# Patient Record
Sex: MALE | Race: ASIAN | NOT HISPANIC OR LATINO | Employment: OTHER | URBAN - METROPOLITAN AREA
[De-identification: names, ages, dates, MRNs, and addresses within clinical notes are randomized per-mention and may not be internally consistent; named-entity substitution may affect disease eponyms.]

---

## 2017-01-20 ENCOUNTER — HOSPITAL ENCOUNTER (OUTPATIENT)
Dept: LAB | Facility: MEDICAL CENTER | Age: 52
End: 2017-01-20
Attending: INTERNAL MEDICINE
Payer: COMMERCIAL

## 2017-01-20 DIAGNOSIS — I25.10 CORONARY ARTERY DISEASE DUE TO CALCIFIED CORONARY LESION: ICD-10-CM

## 2017-01-20 DIAGNOSIS — I25.84 CORONARY ARTERY DISEASE DUE TO CALCIFIED CORONARY LESION: ICD-10-CM

## 2017-01-20 DIAGNOSIS — E78.5 DYSLIPIDEMIA: ICD-10-CM

## 2017-01-20 LAB
ALBUMIN SERPL BCP-MCNC: 4.6 G/DL (ref 3.2–4.9)
ALBUMIN/GLOB SERPL: 1.7 G/DL
ALP SERPL-CCNC: 43 U/L (ref 30–99)
ALT SERPL-CCNC: 24 U/L (ref 2–50)
ANION GAP SERPL CALC-SCNC: 11 MMOL/L (ref 0–11.9)
AST SERPL-CCNC: 22 U/L (ref 12–45)
BILIRUB SERPL-MCNC: 0.7 MG/DL (ref 0.1–1.5)
BUN SERPL-MCNC: 20 MG/DL (ref 8–22)
CALCIUM SERPL-MCNC: 9.6 MG/DL (ref 8.5–10.5)
CHLORIDE SERPL-SCNC: 102 MMOL/L (ref 96–112)
CHOLEST SERPL-MCNC: 153 MG/DL (ref 100–199)
CO2 SERPL-SCNC: 28 MMOL/L (ref 20–33)
CREAT SERPL-MCNC: 0.88 MG/DL (ref 0.5–1.4)
GLOBULIN SER CALC-MCNC: 2.7 G/DL (ref 1.9–3.5)
GLUCOSE SERPL-MCNC: 165 MG/DL (ref 65–99)
HDLC SERPL-MCNC: 47 MG/DL
LDLC SERPL CALC-MCNC: 82 MG/DL
POTASSIUM SERPL-SCNC: 4.1 MMOL/L (ref 3.6–5.5)
PROT SERPL-MCNC: 7.3 G/DL (ref 6–8.2)
SODIUM SERPL-SCNC: 141 MMOL/L (ref 135–145)
TRIGL SERPL-MCNC: 122 MG/DL (ref 0–149)

## 2017-01-20 PROCEDURE — 80053 COMPREHEN METABOLIC PANEL: CPT

## 2017-01-20 PROCEDURE — 80061 LIPID PANEL: CPT

## 2017-01-20 PROCEDURE — 36415 COLL VENOUS BLD VENIPUNCTURE: CPT

## 2017-01-25 ENCOUNTER — APPOINTMENT (OUTPATIENT)
Dept: CARDIOLOGY | Facility: MEDICAL CENTER | Age: 52
End: 2017-01-25
Payer: COMMERCIAL

## 2017-03-28 ENCOUNTER — OFFICE VISIT (OUTPATIENT)
Dept: CARDIOLOGY | Facility: MEDICAL CENTER | Age: 52
End: 2017-03-28
Payer: COMMERCIAL

## 2017-03-28 VITALS
BODY MASS INDEX: 25.16 KG/M2 | DIASTOLIC BLOOD PRESSURE: 80 MMHG | HEIGHT: 65 IN | HEART RATE: 102 BPM | WEIGHT: 151 LBS | SYSTOLIC BLOOD PRESSURE: 130 MMHG

## 2017-03-28 DIAGNOSIS — I25.84 CORONARY ARTERY DISEASE DUE TO CALCIFIED CORONARY LESION: ICD-10-CM

## 2017-03-28 DIAGNOSIS — E78.5 DYSLIPIDEMIA: ICD-10-CM

## 2017-03-28 DIAGNOSIS — I25.10 CORONARY ARTERY DISEASE DUE TO CALCIFIED CORONARY LESION: ICD-10-CM

## 2017-03-28 PROCEDURE — 99214 OFFICE O/P EST MOD 30 MIN: CPT | Performed by: INTERNAL MEDICINE

## 2017-03-28 RX ORDER — NAPROXEN SODIUM 220 MG
220 TABLET ORAL 2 TIMES DAILY WITH MEALS
COMMUNITY
End: 2023-08-10

## 2017-03-28 RX ORDER — EZETIMIBE 10 MG/1
10 TABLET ORAL DAILY
Qty: 30 TAB | Refills: 11 | Status: SHIPPED | OUTPATIENT
Start: 2017-03-28 | End: 2018-05-07 | Stop reason: SDUPTHER

## 2017-03-28 RX ORDER — LISINOPRIL 2.5 MG/1
2.5 TABLET ORAL DAILY
COMMUNITY
End: 2022-07-01

## 2017-03-28 NOTE — Clinical Note
[facility]   [address]  [place] [address]  Phone: [phone]  Fax: [phone]              Jorden Bonilla  1965    Encounter Date: 3/28/2017    Jasbir Vegas M.D.          PROGRESS NOTE:  Subjective:   Jorden Bonilla is a 51 y.o. male who presents today for followup post coronary bypass graft surgery in August 2015.    He denies any chest discomfort, dyspnea, edema, palpitations or lightheadedness. He is exercising regularly without difficulty. He does jog a couple times a week for about 45 minutes.    Past Medical History   Diagnosis Date   • Type II or unspecified type diabetes mellitus without mention of complication, not stated as uncontrolled      Past Surgical History   Procedure Laterality Date   • Multiple coronary artery bypass endo vein harvest N/A 8/22/2015     Procedure: coronary artery bypass grtafting x 3, endoscopic vein harvest right leg, transesophageal echocardiogram;  Surgeon: Henry Baldwin M.D.;  Location: SURGERY [facility];  Service:      History reviewed. No pertinent family history.  History   Smoking status   • Former Smoker -- 25 years   • Quit date: 08/22/1999   Smokeless tobacco   • Never Used     No Known Allergies  Outpatient Encounter Prescriptions as of 3/28/2017   Medication Sig Dispense Refill   • lisinopril (PRINIVIL) 2.5 MG Tab Take 2.5 mg by mouth every day.     • aspirin 81 MG tablet Take 81 mg by mouth every day.     • naproxen (ALEVE) 220 MG tablet Take 220 mg by mouth 2 times a day, with meals.     • Coenzyme Q10 (CO Q 10) 100 MG Cap Take  by mouth.     • metformin (GLUCOPHAGE) 1000 MG tablet Take 1,000 mg by mouth 2 times a day, with meals.     • metoprolol (LOPRESSOR) 25 MG Tab Take 1 Tab by mouth 2 Times a Day. 60 Tab 11   • rosuvastatin (CRESTOR) 40 MG tablet Take 1 Tab by mouth every day. 30 Tab 11   • aspirin EC 81 MG EC tablet Take 1 Tab by mouth every day. 30 Tab 3   • rosuvastatin (CRESTOR)  "40 MG tablet Take 1 Tab by mouth every day. 30 Tab 3   • omeprazole (PRILOSEC) 20 MG delayed-release capsule Take 1 Cap by mouth every day. 30 Cap 11     No facility-administered encounter medications on file as of 3/28/2017.     ROS       Objective:   /80 mmHg  Pulse 102  Ht 1.651 m (5' 5\")  Wt 68.493 kg (151 lb)  BMI 25.13 kg/m2    Physical Exam   Neck: No JVD present.   Cardiovascular: Normal rate and regular rhythm.  Exam reveals no gallop.    No murmur heard.  Pulmonary/Chest: Effort normal. He has no rales.   Abdominal: Soft. There is no tenderness.   Musculoskeletal: He exhibits no edema.          Lab Results   Component Value Date/Time    CHOLESTEROL, 01/20/2017 06:06 AM    LDL 82 01/20/2017 06:06 AM    HDL 47 01/20/2017 06:06 AM    TRIGLYCERIDES 122 01/20/2017 06:06 AM       Lab Results   Component Value Date/Time    SODIUM 141 01/20/2017 06:06 AM    POTASSIUM 4.1 01/20/2017 06:06 AM    CHLORIDE 102 01/20/2017 06:06 AM    CO2 28 01/20/2017 06:06 AM    GLUCOSE 165* 01/20/2017 06:06 AM    BUN 20 01/20/2017 06:06 AM    CREATININE 0.88 01/20/2017 06:06 AM     Lab Results   Component Value Date/Time    ALKALINE PHOSPHATASE 43 01/20/2017 06:06 AM    AST(SGOT) 22 01/20/2017 06:06 AM    ALT(SGPT) 24 01/20/2017 06:06 AM    TOTAL BILIRUBIN 0.7 01/20/2017 06:06 AM      No results found for: BNPBTYPENAT           Assessment:     1. Coronary artery disease due to calcified coronary lesion: CABG ×3 in 2015     2. Dyslipidemia         Medical Decision Making:  Today's Assessment / Status / Plan:     Mr. Bonilla is clinically stable. He is asymptomatic from a cardiovascular standpoint. His lipid status has improved with better dietary compliance but is not under optimal control. At this time, we will add ezetimibe 10 mg daily. He'll have lab work in about 6 weeks and follow-up in a few months with an echocardiogram. He is mildly tachycardic and has not had an echocardiogram since his bypass graft " surgery.      Narendra Arora D.O.  [address]   [address]  [facility] [address]  VIA Facsimile: [phone]

## 2017-03-28 NOTE — MR AVS SNAPSHOT
"Jorden Bonilla   3/28/2017 9:00 AM   Office Visit   MRN: [id number]    Department:  [facility]   Dept Phone:  [phone]    Description:  Male : 1965   Provider:  Jasbir Vegas M.D.           Reason for Visit     Follow-Up [facility] in [facility] 2017      Allergies as of 3/28/2017     No Known Allergies      You were diagnosed with     Coronary artery disease due to calcified coronary lesion   [8284290]       Dyslipidemia   [136312]         Vital Signs     Blood Pressure Pulse Height Weight Body Mass Index Smoking Status    130/80 mmHg 102 1.651 m (5' 5\") 68.493 kg (151 lb) 25.13 kg/m2 Former Smoker      Basic Information     Date Of Birth Sex Race Ethnicity Preferred Language    1965 Male  Non- English      Problem List              ICD-10-CM Priority Class Noted - Resolved    Chest pain R07.9   2015 - Present    Dyslipidemia E78.5   2015 - Present    Diabetes mellitus, new onset (CMS-HCC) E11.9   2015 - Present    Abnormal stress test R94.39   2015 - Present    Coronary artery disease due to calcified coronary lesion: CABG ×3 in  I25.10, I25.84   2015 - Present    S/P CABG x 3 Z95.1   2015 - Present      Health Maintenance        Date Due Completion Dates    IMM HEP B VACCINE (1 of 3 - Primary Series) 1965 ---    DIABETES MONOFILAMENT / LE EXAM 1965 ---    RETINAL SCREENING 1981 ---    URINE ACR / MICROALBUMIN 1981 ---    IMM DTaP/Tdap/Td Vaccine (1 - Tdap) 2009    COLONOSCOPY 2013 ---    IMM INFLUENZA (1) 2016 10/1/2014    A1C SCREENING 2017, 2015, 2015    FASTING LIPID PROFILE 2018, 2016, 2016, 3/30/2016, 2015    SERUM CREATININE 2018, 2016, 3/30/2016, 2015, 2015, 2015, 2015, 2015, 2015, 2015            Current Immunizations     Influenza TIV (IM) 10/1/2014    TD Vaccine 2009  8:00 PM   "   Below and/or attached are the medications your provider expects you to take. Review all of your home medications and newly ordered medications with your provider and/or pharmacist. Follow medication instructions as directed by your provider and/or pharmacist. Please keep your medication list with you and share with your provider. Update the information when medications are discontinued, doses are changed, or new medications (including over-the-counter products) are added; and carry medication information at all times in the event of emergency situations     Allergies:  No Known Allergies          Medications  Valid as of: March 28, 2017 -  9:17 AM    Generic Name Brand Name Tablet Size Instructions for use    Aspirin (Tablet Delayed Response) aspirin 81 MG Take 1 Tab by mouth every day.        Aspirin (Tab) aspirin 81 MG Take 81 mg by mouth every day.        Coenzyme Q10 (Cap) Co Q 10 100 MG Take  by mouth.        Ezetimibe (Tab) ZETIA 10 MG Take 1 Tab by mouth every day.        Lisinopril (Tab) PRINIVIL 2.5 MG Take 2.5 mg by mouth every day.        MetFORMIN HCl (Tab) GLUCOPHAGE 1000 MG Take 1,000 mg by mouth 2 times a day, with meals.        Metoprolol Tartrate (Tab) LOPRESSOR 25 MG Take 1 Tab by mouth 2 Times a Day.        Naproxen Sodium (Tab) ANAPROX 220 MG Take 220 mg by mouth 2 times a day, with meals.        Omeprazole (CAPSULE DELAYED RELEASE) PRILOSEC 20 MG Take 1 Cap by mouth every day.        Rosuvastatin Calcium (Tab) CRESTOR 40 MG Take 1 Tab by mouth every day.        Rosuvastatin Calcium (Tab) CRESTOR 40 MG Take 1 Tab by mouth every day.        .                 Medicines prescribed today were sent to:     [facility] [address] [place] [address] - [address] [place] [address]    Phone: [phone] Fax: [phone]    Open 24 Hours?: No      Medication refill instructions:       If your prescription bottle indicates you have medication refills left, it is not necessary  to call your provider’s office. Please contact your pharmacy and they will refill your medication.    If your prescription bottle indicates you do not have any refills left, you may request refills at any time through one of the following ways: The online Ecrio system (except Urgent Care), by calling your provider’s office, or by asking your pharmacy to contact your provider’s office with a refill request. Medication refills are processed only during regular business hours and may not be available until the next business day. Your provider may request additional information or to have a follow-up visit with you prior to refilling your medication.   *Please Note: Medication refills are assigned a new Rx number when refilled electronically. Your pharmacy may indicate that no refills were authorized even though a new prescription for the same medication is available at the pharmacy. Please request the medicine by name with the pharmacy before contacting your provider for a refill.        Your To Do List     Future Labs/Procedures Complete By Expires    COMP METABOLIC PANEL  As directed 3/28/2018    Echocardiogram Comp w/o Cont  As directed 3/28/2018    Scheduling Instructions:    Less than 3 months    LIPID PROFILE  As directed 3/28/2018         Ecrio Access Code: [email or web address]  Expires: 4/15/2017 12:06 PM    Ecrio  A secure, online tool to manage your health information     [employer]’s Ecrio® is a secure, online tool that connects you to your personalized health information from the privacy of your home -- day or night - making it very easy for you to manage your healthcare. Once the activation process is completed, you can even access your medical information using the Ecrio wilfredo, which is available for free in the Apple Wilfredo store or Google Play store.     Ecrio provides the following levels of access (as shown below):   My Chart Features   [facility] Primary Care Doctor [facility]  Specialists  [facility]  Urgent  Care [facility]  Primary Care  Doctor   Email your healthcare team securely and privately 24/7 X X X    Manage appointments: schedule your next appointment; view details of past/upcoming appointments X      Request prescription refills. X      View recent personal medical records, including lab and immunizations X X X X   View health record, including health history, allergies, medications X X X X   Read reports about your outpatient visits, procedures, consult and ER notes X X X X   See your discharge summary, which is a recap of your hospital and/or ER visit that includes your diagnosis, lab results, and care plan. X X       How to register for CorpU:  1. Go to  [email or web address].  2. Click on the Sign Up Now box, which takes you to the New Member Sign Up page. You will need to provide the following information:  a. Enter your CorpU Access Code exactly as it appears at the top of this page. (You will not need to use this code after you’ve completed the sign-up process. If you do not sign up before the expiration date, you must request a new code.)   b. Enter your date of birth.   c. Enter your home email address.   d. Click Submit, and follow the next screen’s instructions.  3. Create a CorpU ID. This will be your CorpU login ID and cannot be changed, so think of one that is secure and easy to remember.  4. Create a CorpU password. You can change your password at any time.  5. Enter your Password Reset Question and Answer. This can be used at a later time if you forget your password.   6. Enter your e-mail address. This allows you to receive e-mail notifications when new information is available in CorpU.  7. Click Sign Up. You can now view your health information.    For assistance activating your CorpU account, call [phone]

## 2017-04-21 ENCOUNTER — HOSPITAL ENCOUNTER (OUTPATIENT)
Dept: CARDIOLOGY | Facility: MEDICAL CENTER | Age: 52
End: 2017-04-21
Attending: INTERNAL MEDICINE
Payer: COMMERCIAL

## 2017-04-21 DIAGNOSIS — E78.5 DYSLIPIDEMIA: ICD-10-CM

## 2017-04-21 DIAGNOSIS — I25.84 CORONARY ARTERY DISEASE DUE TO CALCIFIED CORONARY LESION: ICD-10-CM

## 2017-04-21 DIAGNOSIS — I25.10 CORONARY ARTERY DISEASE DUE TO CALCIFIED CORONARY LESION: ICD-10-CM

## 2017-04-21 LAB
LV EJECT FRACT  99904: 65
LV EJECT FRACT MOD 2C 99903: 63.6
LV EJECT FRACT MOD 4C 99902: 67
LV EJECT FRACT MOD BP 99901: 66.35

## 2017-04-21 PROCEDURE — 93306 TTE W/DOPPLER COMPLETE: CPT | Mod: 26 | Performed by: INTERNAL MEDICINE

## 2017-04-21 PROCEDURE — 93306 TTE W/DOPPLER COMPLETE: CPT

## 2017-06-22 ENCOUNTER — HOSPITAL ENCOUNTER (OUTPATIENT)
Dept: LAB | Facility: MEDICAL CENTER | Age: 52
End: 2017-06-22
Attending: INTERNAL MEDICINE
Payer: COMMERCIAL

## 2017-06-22 DIAGNOSIS — I25.10 CORONARY ARTERY DISEASE DUE TO CALCIFIED CORONARY LESION: ICD-10-CM

## 2017-06-22 DIAGNOSIS — I25.84 CORONARY ARTERY DISEASE DUE TO CALCIFIED CORONARY LESION: ICD-10-CM

## 2017-06-22 DIAGNOSIS — E78.5 DYSLIPIDEMIA: ICD-10-CM

## 2017-06-22 LAB
ALBUMIN SERPL BCP-MCNC: 4.2 G/DL (ref 3.2–4.9)
ALBUMIN/GLOB SERPL: 1.6 G/DL
ALP SERPL-CCNC: 42 U/L (ref 30–99)
ALT SERPL-CCNC: 55 U/L (ref 2–50)
ANION GAP SERPL CALC-SCNC: 6 MMOL/L (ref 0–11.9)
AST SERPL-CCNC: 43 U/L (ref 12–45)
BILIRUB SERPL-MCNC: 0.6 MG/DL (ref 0.1–1.5)
BUN SERPL-MCNC: 13 MG/DL (ref 8–22)
CALCIUM SERPL-MCNC: 9.2 MG/DL (ref 8.5–10.5)
CHLORIDE SERPL-SCNC: 101 MMOL/L (ref 96–112)
CHOLEST SERPL-MCNC: 66 MG/DL (ref 100–199)
CO2 SERPL-SCNC: 28 MMOL/L (ref 20–33)
CREAT SERPL-MCNC: 0.82 MG/DL (ref 0.5–1.4)
GFR SERPL CREATININE-BSD FRML MDRD: >60 ML/MIN/1.73 M 2
GLOBULIN SER CALC-MCNC: 2.7 G/DL (ref 1.9–3.5)
GLUCOSE SERPL-MCNC: 155 MG/DL (ref 65–99)
HDLC SERPL-MCNC: 35 MG/DL
LDLC SERPL CALC-MCNC: 13 MG/DL
POTASSIUM SERPL-SCNC: 3.9 MMOL/L (ref 3.6–5.5)
PROT SERPL-MCNC: 6.9 G/DL (ref 6–8.2)
SODIUM SERPL-SCNC: 135 MMOL/L (ref 135–145)
TRIGL SERPL-MCNC: 89 MG/DL (ref 0–149)

## 2017-06-22 PROCEDURE — 80061 LIPID PANEL: CPT

## 2017-06-22 PROCEDURE — 80053 COMPREHEN METABOLIC PANEL: CPT

## 2017-06-22 PROCEDURE — 36415 COLL VENOUS BLD VENIPUNCTURE: CPT

## 2017-06-29 ENCOUNTER — OFFICE VISIT (OUTPATIENT)
Dept: CARDIOLOGY | Facility: MEDICAL CENTER | Age: 52
End: 2017-06-29
Payer: COMMERCIAL

## 2017-06-29 VITALS
WEIGHT: 148 LBS | HEIGHT: 65 IN | HEART RATE: 110 BPM | BODY MASS INDEX: 24.66 KG/M2 | DIASTOLIC BLOOD PRESSURE: 80 MMHG | SYSTOLIC BLOOD PRESSURE: 120 MMHG | OXYGEN SATURATION: 96 %

## 2017-06-29 DIAGNOSIS — E78.5 DYSLIPIDEMIA: ICD-10-CM

## 2017-06-29 DIAGNOSIS — I25.84 CORONARY ARTERY DISEASE DUE TO CALCIFIED CORONARY LESION: ICD-10-CM

## 2017-06-29 DIAGNOSIS — I25.10 CORONARY ARTERY DISEASE DUE TO CALCIFIED CORONARY LESION: ICD-10-CM

## 2017-06-29 PROCEDURE — 99214 OFFICE O/P EST MOD 30 MIN: CPT | Performed by: INTERNAL MEDICINE

## 2017-06-29 NOTE — Clinical Note
[facility]   [address], [address]  [place] [address]  Phone: [phone]  Fax: [phone]              Jorden Bonilla  1965    Encounter Date: 6/29/2017    Jasbir Vegas M.D.          PROGRESS NOTE:  Subjective:   Jorden Bonilla is a 52 y.o. male who presents today for followup post coronary bypass graft surgery in August 2015.    He denies any chest discomfort, dyspnea, edema, palpitations or lightheadedness. He is exercising regularly without difficulty.     Past Medical History   Diagnosis Date   • Type II or unspecified type diabetes mellitus without mention of complication, not stated as uncontrolled      Past Surgical History   Procedure Laterality Date   • Multiple coronary artery bypass endo vein harvest N/A 8/22/2015     Procedure: coronary artery bypass grtafting x 3, endoscopic vein harvest right leg, transesophageal echocardiogram;  Surgeon: Henry Baldwin M.D.;  Location: SURGERY [facility];  Service:      No family history on file.  History   Smoking status   • Former Smoker -- 25 years   • Quit date: 08/22/1999   Smokeless tobacco   • Never Used     No Known Allergies  Outpatient Encounter Prescriptions as of 6/29/2017   Medication Sig Dispense Refill   • lisinopril (PRINIVIL) 2.5 MG Tab Take 2.5 mg by mouth every day.     • aspirin 81 MG tablet Take 81 mg by mouth every day.     • naproxen (ALEVE) 220 MG tablet Take 220 mg by mouth 2 times a day, with meals.     • Coenzyme Q10 (CO Q 10) 100 MG Cap Take  by mouth.     • metformin (GLUCOPHAGE) 1000 MG tablet Take 1,000 mg by mouth 2 times a day, with meals.     • ezetimibe (ZETIA) 10 MG Tab Take 1 Tab by mouth every day. 30 Tab 11   • metoprolol (LOPRESSOR) 25 MG Tab Take 1 Tab by mouth 2 Times a Day. 60 Tab 11   • rosuvastatin (CRESTOR) 40 MG tablet Take 1 Tab by mouth every day. 30 Tab 11   • aspirin EC 81 MG EC tablet Take 1 Tab by mouth every day. 30 Tab 3   • rosuvastatin  "(CRESTOR) 40 MG tablet Take 1 Tab by mouth every day. (Patient not taking: Reported on 6/29/2017) 30 Tab 3   • omeprazole (PRILOSEC) 20 MG delayed-release capsule Take 1 Cap by mouth every day. (Patient not taking: Reported on 6/29/2017) 30 Cap 11     No facility-administered encounter medications on file as of 6/29/2017.     ROS       Objective:   /80 mmHg  Pulse 110  Ht 1.651 m (5' 5\")  Wt 67.132 kg (148 lb)  BMI 24.63 kg/m2  SpO2 96%    Physical Exam   Neck: No JVD present.   Cardiovascular: Regular rhythm.  Tachycardia present.  Exam reveals no gallop.    No murmur heard.  Pulmonary/Chest: Effort normal. He has no rales.   Abdominal: Soft. There is no tenderness.   Musculoskeletal: He exhibits no edema.          Lab Results   Component Value Date/Time    CHOLESTEROL,TOT 66* 06/22/2017 06:24 AM    LDL 13 06/22/2017 06:24 AM    HDL 35* 06/22/2017 06:24 AM    TRIGLYCERIDES 89 06/22/2017 06:24 AM       Lab Results   Component Value Date/Time    SODIUM 135 06/22/2017 06:24 AM    POTASSIUM 3.9 06/22/2017 06:24 AM    CHLORIDE 101 06/22/2017 06:24 AM    CO2 28 06/22/2017 06:24 AM    GLUCOSE 155* 06/22/2017 06:24 AM    BUN 13 06/22/2017 06:24 AM    CREATININE 0.82 06/22/2017 06:24 AM     Lab Results   Component Value Date/Time    ALKALINE PHOSPHATASE 42 06/22/2017 06:24 AM    AST(SGOT) 43 06/22/2017 06:24 AM    ALT(SGPT) 55* 06/22/2017 06:24 AM    TOTAL BILIRUBIN 0.6 06/22/2017 06:24 AM      No results found for: BNPBTYPENAT     Echocardiogram:  CONCLUSIONS  Normal left ventricular chamber size. Mild concentric left ventricular   hypertrophy. Normal left ventricular systolic function. Left   ventricular ejection fraction is visually estimated to be 65%. Normal   regional wall motion. Grade I diastolic dysfunction.  Mild tricuspid regurgitation. Right ventricular systolic pressure is   estimated to be 35 mmHg.  Compared to the report of the study done 8/2015- the current study is   technically much improved..   "     Exam Date:         04/21/2017        Assessment:     1. Coronary artery disease due to calcified coronary lesion: CABG ×3 in 2015     2. Dyslipidemia         Medical Decision Making:  Today's Assessment / Status / Plan:     Mr. Bonilla is clinically stable. He has had an excellent response in his lipid status with the addition of ezetimibe. His LDL is probably not really 13. We will obtain a direct LDL before his follow-up appointment in a few months. He is somewhat tachycardic but his LV function is normal. I suspect his tachycardia is because of poor physical conditioning. We discussed a better walking program on his treadmill. In addition, he'll undergo a routine exercise tolerance test in about 3 months prior to his follow-up appointment. Depending on the results of that study and his heart rate response, we will decide whether or not to proceed any further evaluation.      Narendra Arora D.O.  [address]   [address]  [place] [address]  VIA Facsimile: [phone]

## 2017-06-29 NOTE — MR AVS SNAPSHOT
"Jorden Bonilla   2017 10:30 AM   Office Visit   MRN: [id number]    Department:  [facility]   Dept Phone:  [phone]    Description:  Male : 1965   Provider:  Jasbir Vegas M.D.           Reason for Visit     Follow-Up           Allergies as of 2017     No Known Allergies      You were diagnosed with     Coronary artery disease due to calcified coronary lesion   [9876554]       Dyslipidemia   [971094]         Vital Signs     Blood Pressure Pulse Height Weight Body Mass Index Oxygen Saturation    120/80 mmHg 110 1.651 m (5' 5\") 67.132 kg (148 lb) 24.63 kg/m2 96%    Smoking Status                   Former Smoker           Basic Information     Date Of Birth Sex Race Ethnicity Preferred Language    1965 Male  Non- English      Problem List              ICD-10-CM Priority Class Noted - Resolved    Chest pain R07.9   2015 - Present    Dyslipidemia E78.5   2015 - Present    Diabetes mellitus, new onset (CMS-HCC) E11.9   2015 - Present    Abnormal stress test R94.39   2015 - Present    Coronary artery disease due to calcified coronary lesion: CABG ×3 in  I25.10, I25.84   2015 - Present    S/P CABG x 3 Z95.1   2015 - Present      Health Maintenance        Date Due Completion Dates    IMM HEP B VACCINE (1 of 3 - Primary Series) 1965 ---    IMM DTaP/Tdap/Td Vaccine (1 - Tdap) 2009    COLONOSCOPY 2013 ---            Current Immunizations     Influenza TIV (IM) 10/1/2014    TD Vaccine 2009  8:00 PM      Below and/or attached are the medications your provider expects you to take. Review all of your home medications and newly ordered medications with your provider and/or pharmacist. Follow medication instructions as directed by your provider and/or pharmacist. Please keep your medication list with you and share with your provider. Update the information when medications are discontinued, doses are changed, or new " medications (including over-the-counter products) are added; and carry medication information at all times in the event of emergency situations     Allergies:  No Known Allergies          Medications  Valid as of: June 29, 2017 - 10:32 AM    Generic Name Brand Name Tablet Size Instructions for use    Aspirin (Tablet Delayed Response) aspirin 81 MG Take 1 Tab by mouth every day.        Aspirin (Tab) aspirin 81 MG Take 81 mg by mouth every day.        Coenzyme Q10 (Cap) Co Q 10 100 MG Take  by mouth.        Ezetimibe (Tab) ZETIA 10 MG Take 1 Tab by mouth every day.        Lisinopril (Tab) PRINIVIL 2.5 MG Take 2.5 mg by mouth every day.        MetFORMIN HCl (Tab) GLUCOPHAGE 1000 MG Take 1,000 mg by mouth 2 times a day, with meals.        Metoprolol Tartrate (Tab) LOPRESSOR 25 MG Take 1 Tab by mouth 2 Times a Day.        Naproxen Sodium (Tab) ANAPROX 220 MG Take 220 mg by mouth 2 times a day, with meals.        Omeprazole (CAPSULE DELAYED RELEASE) PRILOSEC 20 MG Take 1 Cap by mouth every day.        Rosuvastatin Calcium (Tab) CRESTOR 40 MG Take 1 Tab by mouth every day.        Rosuvastatin Calcium (Tab) CRESTOR 40 MG Take 1 Tab by mouth every day.        .                 Medicines prescribed today were sent to:     [facility] [address] [place] [address]    Phone: [phone] Fax: [phone]    Open 24 Hours?: No      Medication refill instructions:       If your prescription bottle indicates you have medication refills left, it is not necessary to call your provider’s office. Please contact your pharmacy and they will refill your medication.    If your prescription bottle indicates you do not have any refills left, you may request refills at any time through one of the following ways: The online G-mode system (except Urgent Care), by calling your provider’s office, or by asking your pharmacy to contact your provider’s office with a refill request.  Medication refills are processed only during regular business hours and may not be available until the next business day. Your provider may request additional information or to have a follow-up visit with you prior to refilling your medication.   *Please Note: Medication refills are assigned a new Rx number when refilled electronically. Your pharmacy may indicate that no refills were authorized even though a new prescription for the same medication is available at the pharmacy. Please request the medicine by name with the pharmacy before contacting your provider for a refill.        Your To Do List     Future Labs/Procedures Complete By Expires    LUCILA DIRECT  As directed 6/29/2018      Instructions    Training heart rate between 130 and 140          Latio Access Code: [email or web address]  Expires: 7/16/2017  4:16 AM    Latio  A secure, online tool to manage your health information     [employer]’s Latio® is a secure, online tool that connects you to your personalized health information from the privacy of your home -- day or night - making it very easy for you to manage your healthcare. Once the activation process is completed, you can even access your medical information using the Latio wilfredo, which is available for free in the Apple Wilfredo store or Google Play store.     Latio provides the following levels of access (as shown below):   My Chart Features   [facility] Primary Care Doctor [facility]  Specialists [facility]  Urgent  Care Non-[facility]  Primary Care  Doctor   Email your healthcare team securely and privately 24/7 X X X    Manage appointments: schedule your next appointment; view details of past/upcoming appointments X      Request prescription refills. X      View recent personal medical records, including lab and immunizations X X X X   View health record, including health history, allergies, medications X X X X   Read reports about your outpatient visits, procedures, consult and ER notes X X X X   See your  discharge summary, which is a recap of your hospital and/or ER visit that includes your diagnosis, lab results, and care plan. X X       How to register for Fan TV:  1. Go to  [email or web address].  2. Click on the Sign Up Now box, which takes you to the New Member Sign Up page. You will need to provide the following information:  a. Enter your Fan TV Access Code exactly as it appears at the top of this page. (You will not need to use this code after you’ve completed the sign-up process. If you do not sign up before the expiration date, you must request a new code.)   b. Enter your date of birth.   c. Enter your home email address.   d. Click Submit, and follow the next screen’s instructions.  3. Create a Fan TV ID. This will be your Fan TV login ID and cannot be changed, so think of one that is secure and easy to remember.  4. Create a Fan TV password. You can change your password at any time.  5. Enter your Password Reset Question and Answer. This can be used at a later time if you forget your password.   6. Enter your e-mail address. This allows you to receive e-mail notifications when new information is available in Fan TV.  7. Click Sign Up. You can now view your health information.    For assistance activating your Fan TV account, call [phone]

## 2017-06-29 NOTE — PROGRESS NOTES
Subjective:   Jorden Bonilla is a 52 y.o. male who presents today for followup post coronary bypass graft surgery in August 2015.    He denies any chest discomfort, dyspnea, edema, palpitations or lightheadedness. He is exercising regularly without difficulty.     Past Medical History   Diagnosis Date   • Type II or unspecified type diabetes mellitus without mention of complication, not stated as uncontrolled      Past Surgical History   Procedure Laterality Date   • Multiple coronary artery bypass endo vein harvest N/A 8/22/2015     Procedure: coronary artery bypass grtafting x 3, endoscopic vein harvest right leg, transesophageal echocardiogram;  Surgeon: Henry Baldwin M.D.;  Location: SURGERY [facility];  Service:      No family history on file.  History   Smoking status   • Former Smoker -- 25 years   • Quit date: 08/22/1999   Smokeless tobacco   • Never Used     No Known Allergies  Outpatient Encounter Prescriptions as of 6/29/2017   Medication Sig Dispense Refill   • lisinopril (PRINIVIL) 2.5 MG Tab Take 2.5 mg by mouth every day.     • aspirin 81 MG tablet Take 81 mg by mouth every day.     • naproxen (ALEVE) 220 MG tablet Take 220 mg by mouth 2 times a day, with meals.     • Coenzyme Q10 (CO Q 10) 100 MG Cap Take  by mouth.     • metformin (GLUCOPHAGE) 1000 MG tablet Take 1,000 mg by mouth 2 times a day, with meals.     • ezetimibe (ZETIA) 10 MG Tab Take 1 Tab by mouth every day. 30 Tab 11   • metoprolol (LOPRESSOR) 25 MG Tab Take 1 Tab by mouth 2 Times a Day. 60 Tab 11   • rosuvastatin (CRESTOR) 40 MG tablet Take 1 Tab by mouth every day. 30 Tab 11   • aspirin EC 81 MG EC tablet Take 1 Tab by mouth every day. 30 Tab 3   • rosuvastatin (CRESTOR) 40 MG tablet Take 1 Tab by mouth every day. (Patient not taking: Reported on 6/29/2017) 30 Tab 3   • omeprazole (PRILOSEC) 20 MG delayed-release capsule Take 1 Cap by mouth every day. (Patient not taking: Reported on 6/29/2017) 30 Cap 11     No  "facility-administered encounter medications on file as of 6/29/2017.     ROS       Objective:   /80 mmHg  Pulse 110  Ht 1.651 m (5' 5\")  Wt 67.132 kg (148 lb)  BMI 24.63 kg/m2  SpO2 96%    Physical Exam   Neck: No JVD present.   Cardiovascular: Regular rhythm.  Tachycardia present.  Exam reveals no gallop.    No murmur heard.  Pulmonary/Chest: Effort normal. He has no rales.   Abdominal: Soft. There is no tenderness.   Musculoskeletal: He exhibits no edema.          Lab Results   Component Value Date/Time    CHOLESTEROL,TOT 66* 06/22/2017 06:24 AM    LDL 13 06/22/2017 06:24 AM    HDL 35* 06/22/2017 06:24 AM    TRIGLYCERIDES 89 06/22/2017 06:24 AM       Lab Results   Component Value Date/Time    SODIUM 135 06/22/2017 06:24 AM    POTASSIUM 3.9 06/22/2017 06:24 AM    CHLORIDE 101 06/22/2017 06:24 AM    CO2 28 06/22/2017 06:24 AM    GLUCOSE 155* 06/22/2017 06:24 AM    BUN 13 06/22/2017 06:24 AM    CREATININE 0.82 06/22/2017 06:24 AM     Lab Results   Component Value Date/Time    ALKALINE PHOSPHATASE 42 06/22/2017 06:24 AM    AST(SGOT) 43 06/22/2017 06:24 AM    ALT(SGPT) 55* 06/22/2017 06:24 AM    TOTAL BILIRUBIN 0.6 06/22/2017 06:24 AM      No results found for: BNPBTYPENAT     Echocardiogram:  CONCLUSIONS  Normal left ventricular chamber size. Mild concentric left ventricular   hypertrophy. Normal left ventricular systolic function. Left   ventricular ejection fraction is visually estimated to be 65%. Normal   regional wall motion. Grade I diastolic dysfunction.  Mild tricuspid regurgitation. Right ventricular systolic pressure is   estimated to be 35 mmHg.  Compared to the report of the study done 8/2015- the current study is   technically much improved..     Exam Date:         04/21/2017        Assessment:     1. Coronary artery disease due to calcified coronary lesion: CABG ×3 in 2015     2. Dyslipidemia         Medical Decision Making:  Today's Assessment / Status / Plan:     Mr. Bonilla is clinically " stable. He has had an excellent response in his lipid status with the addition of ezetimibe. His LDL is probably not really 13. We will obtain a direct LDL before his follow-up appointment in a few months. He is somewhat tachycardic but his LV function is normal. I suspect his tachycardia is because of poor physical conditioning. We discussed a better walking program on his treadmill. In addition, he'll undergo a routine exercise tolerance test in about 3 months prior to his follow-up appointment. Depending on the results of that study and his heart rate response, we will decide whether or not to proceed any further evaluation.

## 2017-09-05 ENCOUNTER — HOSPITAL ENCOUNTER (OUTPATIENT)
Dept: LAB | Facility: MEDICAL CENTER | Age: 52
End: 2017-09-05
Attending: FAMILY MEDICINE
Payer: COMMERCIAL

## 2017-09-05 LAB
ALBUMIN SERPL BCP-MCNC: 4.4 G/DL (ref 3.2–4.9)
ALBUMIN/GLOB SERPL: 1.6 G/DL
ALP SERPL-CCNC: 34 U/L (ref 30–99)
ALT SERPL-CCNC: 25 U/L (ref 2–50)
ANION GAP SERPL CALC-SCNC: 9 MMOL/L (ref 0–11.9)
AST SERPL-CCNC: 21 U/L (ref 12–45)
BASOPHILS # BLD AUTO: 0.4 % (ref 0–1.8)
BASOPHILS # BLD: 0.03 K/UL (ref 0–0.12)
BILIRUB SERPL-MCNC: 0.5 MG/DL (ref 0.1–1.5)
BUN SERPL-MCNC: 11 MG/DL (ref 8–22)
CALCIUM SERPL-MCNC: 9.6 MG/DL (ref 8.5–10.5)
CHLORIDE SERPL-SCNC: 101 MMOL/L (ref 96–112)
CHOLEST SERPL-MCNC: 168 MG/DL (ref 100–199)
CO2 SERPL-SCNC: 28 MMOL/L (ref 20–33)
CREAT SERPL-MCNC: 0.78 MG/DL (ref 0.5–1.4)
EOSINOPHIL # BLD AUTO: 0.17 K/UL (ref 0–0.51)
EOSINOPHIL NFR BLD: 2.3 % (ref 0–6.9)
ERYTHROCYTE [DISTWIDTH] IN BLOOD BY AUTOMATED COUNT: 41 FL (ref 35.9–50)
EST. AVERAGE GLUCOSE BLD GHB EST-MCNC: 235 MG/DL
FASTING STATUS PATIENT QL REPORTED: NORMAL
GFR SERPL CREATININE-BSD FRML MDRD: >60 ML/MIN/1.73 M 2
GLOBULIN SER CALC-MCNC: 2.7 G/DL (ref 1.9–3.5)
GLUCOSE SERPL-MCNC: 167 MG/DL (ref 65–99)
HBA1C MFR BLD: 9.8 % (ref 0–5.6)
HCT VFR BLD AUTO: 48.9 % (ref 42–52)
HDLC SERPL-MCNC: 44 MG/DL
HGB BLD-MCNC: 15.4 G/DL (ref 14–18)
IMM GRANULOCYTES # BLD AUTO: 0.02 K/UL (ref 0–0.11)
IMM GRANULOCYTES NFR BLD AUTO: 0.3 % (ref 0–0.9)
LDLC SERPL CALC-MCNC: 56 MG/DL
LYMPHOCYTES # BLD AUTO: 2.43 K/UL (ref 1–4.8)
LYMPHOCYTES NFR BLD: 33 % (ref 22–41)
MCH RBC QN AUTO: 26.9 PG (ref 27–33)
MCHC RBC AUTO-ENTMCNC: 31.5 G/DL (ref 33.7–35.3)
MCV RBC AUTO: 85.3 FL (ref 81.4–97.8)
MONOCYTES # BLD AUTO: 0.62 K/UL (ref 0–0.85)
MONOCYTES NFR BLD AUTO: 8.4 % (ref 0–13.4)
NEUTROPHILS # BLD AUTO: 4.1 K/UL (ref 1.82–7.42)
NEUTROPHILS NFR BLD: 55.6 % (ref 44–72)
NRBC # BLD AUTO: 0 K/UL
NRBC BLD AUTO-RTO: 0 /100 WBC
PLATELET # BLD AUTO: 172 K/UL (ref 164–446)
PMV BLD AUTO: 11 FL (ref 9–12.9)
POTASSIUM SERPL-SCNC: 4.4 MMOL/L (ref 3.6–5.5)
PROT SERPL-MCNC: 7.1 G/DL (ref 6–8.2)
PSA SERPL-MCNC: 0.95 NG/ML (ref 0–4)
RBC # BLD AUTO: 5.73 M/UL (ref 4.7–6.1)
SODIUM SERPL-SCNC: 138 MMOL/L (ref 135–145)
TRIGL SERPL-MCNC: 341 MG/DL (ref 0–149)
TSH SERPL DL<=0.005 MIU/L-ACNC: 2.35 UIU/ML (ref 0.3–3.7)
WBC # BLD AUTO: 7.4 K/UL (ref 4.8–10.8)

## 2017-09-05 PROCEDURE — 84153 ASSAY OF PSA TOTAL: CPT

## 2017-09-05 PROCEDURE — 80053 COMPREHEN METABOLIC PANEL: CPT

## 2017-09-05 PROCEDURE — 36415 COLL VENOUS BLD VENIPUNCTURE: CPT

## 2017-09-05 PROCEDURE — 85025 COMPLETE CBC W/AUTO DIFF WBC: CPT

## 2017-09-05 PROCEDURE — 84443 ASSAY THYROID STIM HORMONE: CPT

## 2017-09-05 PROCEDURE — 83036 HEMOGLOBIN GLYCOSYLATED A1C: CPT

## 2017-09-05 PROCEDURE — 80061 LIPID PANEL: CPT

## 2017-09-18 ENCOUNTER — HOSPITAL ENCOUNTER (OUTPATIENT)
Dept: LAB | Facility: MEDICAL CENTER | Age: 52
End: 2017-09-18
Attending: INTERNAL MEDICINE
Payer: COMMERCIAL

## 2017-09-18 DIAGNOSIS — E78.5 DYSLIPIDEMIA: ICD-10-CM

## 2017-09-18 DIAGNOSIS — I25.10 CORONARY ARTERY DISEASE DUE TO CALCIFIED CORONARY LESION: ICD-10-CM

## 2017-09-18 DIAGNOSIS — I25.84 CORONARY ARTERY DISEASE DUE TO CALCIFIED CORONARY LESION: ICD-10-CM

## 2017-09-18 PROCEDURE — 83721 ASSAY OF BLOOD LIPOPROTEIN: CPT

## 2017-09-18 PROCEDURE — 36415 COLL VENOUS BLD VENIPUNCTURE: CPT

## 2017-09-19 LAB — LDLC SERPL-MCNC: 134 MG/DL (ref 0–129)

## 2017-09-21 ENCOUNTER — NON-PROVIDER VISIT (OUTPATIENT)
Dept: CARDIOLOGY | Facility: MEDICAL CENTER | Age: 52
End: 2017-09-21
Attending: INTERNAL MEDICINE
Payer: COMMERCIAL

## 2017-09-21 VITALS
WEIGHT: 148 LBS | HEIGHT: 65 IN | SYSTOLIC BLOOD PRESSURE: 128 MMHG | OXYGEN SATURATION: 96 % | HEART RATE: 93 BPM | BODY MASS INDEX: 24.66 KG/M2 | DIASTOLIC BLOOD PRESSURE: 74 MMHG

## 2017-09-21 DIAGNOSIS — I25.84 CORONARY ARTERY DISEASE DUE TO CALCIFIED CORONARY LESION: ICD-10-CM

## 2017-09-21 DIAGNOSIS — I25.10 CORONARY ARTERY DISEASE DUE TO CALCIFIED CORONARY LESION: ICD-10-CM

## 2017-09-21 LAB — TREADMILL STRESS: NORMAL

## 2017-09-21 PROCEDURE — 93015 CV STRESS TEST SUPVJ I&R: CPT | Performed by: INTERNAL MEDICINE

## 2017-09-21 NOTE — PROGRESS NOTES
Dear Blanca,    Can you please let Jorden Bonilla know that result is ok and I will see patient as scheduled?    Thanks Jaime Rios.

## 2017-09-25 ENCOUNTER — OFFICE VISIT (OUTPATIENT)
Dept: CARDIOLOGY | Facility: MEDICAL CENTER | Age: 52
End: 2017-09-25
Payer: COMMERCIAL

## 2017-09-25 VITALS
SYSTOLIC BLOOD PRESSURE: 122 MMHG | HEART RATE: 94 BPM | DIASTOLIC BLOOD PRESSURE: 80 MMHG | BODY MASS INDEX: 24.66 KG/M2 | OXYGEN SATURATION: 96 % | WEIGHT: 148 LBS | HEIGHT: 65 IN

## 2017-09-25 DIAGNOSIS — I25.10 CORONARY ARTERY DISEASE DUE TO CALCIFIED CORONARY LESION: ICD-10-CM

## 2017-09-25 DIAGNOSIS — E78.5 DYSLIPIDEMIA: ICD-10-CM

## 2017-09-25 DIAGNOSIS — I25.84 CORONARY ARTERY DISEASE DUE TO CALCIFIED CORONARY LESION: ICD-10-CM

## 2017-09-25 PROCEDURE — 99214 OFFICE O/P EST MOD 30 MIN: CPT | Performed by: INTERNAL MEDICINE

## 2017-09-25 RX ORDER — METOPROLOL SUCCINATE 50 MG/1
50 TABLET, EXTENDED RELEASE ORAL DAILY
Qty: 30 TAB | Refills: 11 | Status: SHIPPED | OUTPATIENT
Start: 2017-09-25 | End: 2022-07-01

## 2017-09-25 NOTE — LETTER
[facility]   [address], [address]  [place] [address]  Phone: [phone]  Fax: [phone]              Jorden Bonilla  1965    Encounter Date: 9/25/2017    Jasbir Vegas M.D.          PROGRESS NOTE:  Subjective:   Jorden Bonilla is a 52 y.o. male who presents today for followup post coronary bypass graft surgery in August 2015.    He's been exercising about 6 days a week for 30 minutes.  He denies any chest discomfort, dyspnea, edema, palpitations or lightheadedness. He is exercising regularly without difficulty.     Past Medical History:   Diagnosis Date   • Type II or unspecified type diabetes mellitus without mention of complication, not stated as uncontrolled      Past Surgical History:   Procedure Laterality Date   • MULTIPLE CORONARY ARTERY BYPASS ENDO VEIN HARVEST N/A 8/22/2015    Procedure: coronary artery bypass grtafting x 3, endoscopic vein harvest right leg, transesophageal echocardiogram;  Surgeon: Henry Baldwin M.D.;  Location: SURGERY [facility];  Service:      No family history on file.  History   Smoking Status   • Former Smoker   • Years: 25.00   • Quit date: 8/22/1999   Smokeless Tobacco   • Never Used     No Known Allergies  Outpatient Encounter Prescriptions as of 9/25/2017   Medication Sig Dispense Refill   • lisinopril (PRINIVIL) 2.5 MG Tab Take 2.5 mg by mouth every day.     • naproxen (ALEVE) 220 MG tablet Take 220 mg by mouth 2 times a day, with meals.     • metformin (GLUCOPHAGE) 1000 MG tablet Take 1,000 mg by mouth 2 times a day, with meals.     • ezetimibe (ZETIA) 10 MG Tab Take 1 Tab by mouth every day. 30 Tab 11   • rosuvastatin (CRESTOR) 40 MG tablet Take 1 Tab by mouth every day. 30 Tab 11   • aspirin EC 81 MG EC tablet Take 1 Tab by mouth every day. 30 Tab 3   • aspirin 81 MG tablet Take 81 mg by mouth every day.     • Coenzyme Q10 (CO Q 10) 100 MG Cap Take  by mouth.     • metoprolol (LOPRESSOR) 25 MG Tab Take  "1 Tab by mouth 2 Times a Day. 60 Tab 11   • rosuvastatin (CRESTOR) 40 MG tablet Take 1 Tab by mouth every day. (Patient not taking: Reported on 6/29/2017) 30 Tab 3   • omeprazole (PRILOSEC) 20 MG delayed-release capsule Take 1 Cap by mouth every day. (Patient not taking: Reported on 6/29/2017) 30 Cap 11     No facility-administered encounter medications on file as of 9/25/2017.      ROS       Objective:   /80   Pulse 94   Ht 1.651 m (5' 5\")   Wt 67.1 kg (148 lb)   SpO2 96%   BMI 24.63 kg/m²      Physical Exam   Neck: No JVD present.   Cardiovascular: Regular rhythm.  Tachycardia present.  Exam reveals no gallop.    No murmur heard.  Pulmonary/Chest: Effort normal. He has no rales.   Abdominal: Soft. There is no tenderness.   Musculoskeletal: He exhibits no edema.          Lab Results   Component Value Date/Time    CHOLSTRLTOT 168 09/05/2017 07:00 AM    LDL 56 09/05/2017 07:00 AM    HDL 44 09/05/2017 07:00 AM    TRIGLYCERIDE 341 (H) 09/05/2017 07:00 AM       Lab Results   Component Value Date/Time    SODIUM 138 09/05/2017 07:00 AM    POTASSIUM 4.4 09/05/2017 07:00 AM    CHLORIDE 101 09/05/2017 07:00 AM    CO2 28 09/05/2017 07:00 AM    GLUCOSE 167 (H) 09/05/2017 07:00 AM    BUN 11 09/05/2017 07:00 AM    CREATININE 0.78 09/05/2017 07:00 AM     Lab Results   Component Value Date/Time    ALKPHOSPHAT 34 09/05/2017 07:00 AM    ASTSGOT 21 09/05/2017 07:00 AM    ALTSGPT 25 09/05/2017 07:00 AM    TBILIRUBIN 0.5 09/05/2017 07:00 AM      No results found for: BNPBTYPENAT     Direct LDL from September 18: 134.    Echocardiogram:  CONCLUSIONS  Normal left ventricular chamber size. Mild concentric left ventricular   hypertrophy. Normal left ventricular systolic function. Left   ventricular ejection fraction is visually estimated to be 65%. Normal   regional wall motion. Grade I diastolic dysfunction.  Mild tricuspid regurgitation. Right ventricular systolic pressure is   estimated to be 35 mmHg.  Compared to the report " of the study done 8/2015- the current study is   technically much improved..     Exam Date:         04/21/2017    Exercise tolerance test:  9/21/2017 3:59 PM  Provider conclusions and analysis:  Adequate exercise capacity  No evidence of ischemic ECG changes at peak stress  At peak stress, rhythm is sinus tachycardia  No arrhythmia noted.      Assessment:     1. Coronary artery disease due to calcified coronary lesion: CABG ×3 in 2015     2. Dyslipidemia         Medical Decision Making:  Today's Assessment / Status / Plan:     Mr. Bonilla is clinically stable. He is exercising more regularly. However, his resting heart rate is mildly elevated. At this time, we will place him back on low-dose beta blocker therapy. His lipid status has been under excellent control. It's unclear why his direct LDL is elevated at 134. In any event he is on high-dose rosuvastatin and ezetimibe. He will follow-up in 6 months with repeat blood work.      Narendra Arora D.O.  [address]  [address]  [facility] [address]  VIA Facsimile: [phone]

## 2017-09-26 NOTE — PROGRESS NOTES
Subjective:   Jorden Bonilla is a 52 y.o. male who presents today for followup post coronary bypass graft surgery in August 2015.    He's been exercising about 6 days a week for 30 minutes.  He denies any chest discomfort, dyspnea, edema, palpitations or lightheadedness. He is exercising regularly without difficulty.     Past Medical History:   Diagnosis Date   • Type II or unspecified type diabetes mellitus without mention of complication, not stated as uncontrolled      Past Surgical History:   Procedure Laterality Date   • MULTIPLE CORONARY ARTERY BYPASS ENDO VEIN HARVEST N/A 8/22/2015    Procedure: coronary artery bypass grtafting x 3, endoscopic vein harvest right leg, transesophageal echocardiogram;  Surgeon: Henry Baldwin M.D.;  Location: SURGERY [facility];  Service:      No family history on file.  History   Smoking Status   • Former Smoker   • Years: 25.00   • Quit date: 8/22/1999   Smokeless Tobacco   • Never Used     No Known Allergies  Outpatient Encounter Prescriptions as of 9/25/2017   Medication Sig Dispense Refill   • lisinopril (PRINIVIL) 2.5 MG Tab Take 2.5 mg by mouth every day.     • naproxen (ALEVE) 220 MG tablet Take 220 mg by mouth 2 times a day, with meals.     • metformin (GLUCOPHAGE) 1000 MG tablet Take 1,000 mg by mouth 2 times a day, with meals.     • ezetimibe (ZETIA) 10 MG Tab Take 1 Tab by mouth every day. 30 Tab 11   • rosuvastatin (CRESTOR) 40 MG tablet Take 1 Tab by mouth every day. 30 Tab 11   • aspirin EC 81 MG EC tablet Take 1 Tab by mouth every day. 30 Tab 3   • aspirin 81 MG tablet Take 81 mg by mouth every day.     • Coenzyme Q10 (CO Q 10) 100 MG Cap Take  by mouth.     • metoprolol (LOPRESSOR) 25 MG Tab Take 1 Tab by mouth 2 Times a Day. 60 Tab 11   • rosuvastatin (CRESTOR) 40 MG tablet Take 1 Tab by mouth every day. (Patient not taking: Reported on 6/29/2017) 30 Tab 3   • omeprazole (PRILOSEC) 20 MG delayed-release capsule Take 1 Cap by mouth every day. (Patient not  "taking: Reported on 6/29/2017) 30 Cap 11     No facility-administered encounter medications on file as of 9/25/2017.      ROS       Objective:   /80   Pulse 94   Ht 1.651 m (5' 5\")   Wt 67.1 kg (148 lb)   SpO2 96%   BMI 24.63 kg/m²     Physical Exam   Neck: No JVD present.   Cardiovascular: Regular rhythm.  Tachycardia present.  Exam reveals no gallop.    No murmur heard.  Pulmonary/Chest: Effort normal. He has no rales.   Abdominal: Soft. There is no tenderness.   Musculoskeletal: He exhibits no edema.          Lab Results   Component Value Date/Time    CHOLSTRLTOT 168 09/05/2017 07:00 AM    LDL 56 09/05/2017 07:00 AM    HDL 44 09/05/2017 07:00 AM    TRIGLYCERIDE 341 (H) 09/05/2017 07:00 AM       Lab Results   Component Value Date/Time    SODIUM 138 09/05/2017 07:00 AM    POTASSIUM 4.4 09/05/2017 07:00 AM    CHLORIDE 101 09/05/2017 07:00 AM    CO2 28 09/05/2017 07:00 AM    GLUCOSE 167 (H) 09/05/2017 07:00 AM    BUN 11 09/05/2017 07:00 AM    CREATININE 0.78 09/05/2017 07:00 AM     Lab Results   Component Value Date/Time    ALKPHOSPHAT 34 09/05/2017 07:00 AM    ASTSGOT 21 09/05/2017 07:00 AM    ALTSGPT 25 09/05/2017 07:00 AM    TBILIRUBIN 0.5 09/05/2017 07:00 AM      No results found for: BNPBTYPENAT     Direct LDL from September 18: 134.    Echocardiogram:  CONCLUSIONS  Normal left ventricular chamber size. Mild concentric left ventricular   hypertrophy. Normal left ventricular systolic function. Left   ventricular ejection fraction is visually estimated to be 65%. Normal   regional wall motion. Grade I diastolic dysfunction.  Mild tricuspid regurgitation. Right ventricular systolic pressure is   estimated to be 35 mmHg.  Compared to the report of the study done 8/2015- the current study is   technically much improved..     Exam Date:         04/21/2017    Exercise tolerance test:  9/21/2017 3:59 PM  Provider conclusions and analysis:  Adequate exercise capacity  No evidence of ischemic ECG changes at " peak stress  At peak stress, rhythm is sinus tachycardia  No arrhythmia noted.      Assessment:     1. Coronary artery disease due to calcified coronary lesion: CABG ×3 in 2015     2. Dyslipidemia         Medical Decision Making:  Today's Assessment / Status / Plan:     Mr. Bonilla is clinically stable. He is exercising more regularly. However, his resting heart rate is mildly elevated. At this time, we will place him back on low-dose beta blocker therapy. His lipid status has been under excellent control. It's unclear why his direct LDL is elevated at 134. In any event he is on high-dose rosuvastatin and ezetimibe. He will follow-up in 6 months with repeat blood work.

## 2018-05-07 DIAGNOSIS — I25.10 CORONARY ARTERY DISEASE DUE TO CALCIFIED CORONARY LESION: ICD-10-CM

## 2018-05-07 DIAGNOSIS — I25.84 CORONARY ARTERY DISEASE DUE TO CALCIFIED CORONARY LESION: ICD-10-CM

## 2018-05-09 RX ORDER — EZETIMIBE 10 MG/1
TABLET ORAL
Qty: 90 TAB | Refills: 0 | Status: SHIPPED | OUTPATIENT
Start: 2018-05-09 | End: 2023-08-10

## 2019-04-07 NOTE — PROGRESS NOTES
INTENSIVE CARE UNIT  Resident Physician Progress Note    Patient - Emerita Pang  Date of Admission -  2019  8:39 PM  Date of Evaluation -  2019  Room and Bed Number -  0117/0117-01   Hospital Day - 2      SUBJECTIVE:     OVERNIGHT EVENTS:    Patient developed worsening labs, pain, lactic acidosis yesterday afternoon. Surgery consulted and took patient to the OR for ischemic bowel resection. Patient aggressively fluid hydrated overnight. Able to wean patient off pressors overnight. Down to one sedative. Patient awakening this AM and intermittently following commands with sedation holiday this AM. Calcium replaced multiple times throughout night. No further dialysis attempted as potassium improved. Around 9 AM, patient went into Afib with RVR. Digoxin 250 mcg given x2 and cardiology consulted. Patient abd has wound vac and was left open. Surgery planning to take back within 24 to 48 hours.      AWAKE & FOLLOWING COMMANDS:  [] No   [x] Yes    SECRETIONS Amount:  [x] Small [] Moderate  [] Large  [] None  Color:     [] White [x] Colored  [] Bloody    SEDATION:  RAAS Score:  [] Propofol gtt  [x] Fentanyl gtt  [] Ativan gtt   [] No Sedation    PARALYZED:  [x] No    [] Yes    VASOPRESSORS:  [] No    [x] Yes  [] Levophed [] Dopamine [x] Vasopressin  [] Dobutamine [] Phenylephrine [] Epinephrine      OBJECTIVE:     VITAL SIGNS:  BP (!) 71/36   Pulse 153   Temp 97.3 °F (36.3 °C) (Core)   Resp 17   Ht 5' 1.02\" (1.55 m)   Wt 190 lb 7.6 oz (86.4 kg)   SpO2 97%   BMI 35.96 kg/m²   Tmax over 24 hours:  Temp (24hrs), Av.7 °F (38.2 °C), Min:97.3 °F (36.3 °C), Max:101.4 °F (38.6 °C)      Patient Vitals for the past 8 hrs:   BP Temp Temp src Pulse Resp SpO2 Weight   19 1015 -- -- -- 153 17 97 % --   19 1009 -- -- -- 154 -- -- --   19 1000 (!) 71/36 -- -- 153 24 96 % --   19 0958 -- -- -- 154 25 96 % --   1945 -- -- -- 156 24 96 % --   19 -- -- -- 154 -- -- -- Subjective:   Jorden Bonilla is a 51 y.o. male who presents today for followup post coronary bypass graft surgery in August 2015.    He denies any chest discomfort, dyspnea, edema, palpitations or lightheadedness. He is exercising regularly without difficulty. He does jog a couple times a week for about 45 minutes.    Past Medical History   Diagnosis Date   • Type II or unspecified type diabetes mellitus without mention of complication, not stated as uncontrolled      Past Surgical History   Procedure Laterality Date   • Multiple coronary artery bypass endo vein harvest N/A 8/22/2015     Procedure: coronary artery bypass grtafting x 3, endoscopic vein harvest right leg, transesophageal echocardiogram;  Surgeon: Henry Baldwin M.D.;  Location: SURGERY [facility];  Service:      History reviewed. No pertinent family history.  History   Smoking status   • Former Smoker -- 25 years   • Quit date: 08/22/1999   Smokeless tobacco   • Never Used     No Known Allergies  Outpatient Encounter Prescriptions as of 3/28/2017   Medication Sig Dispense Refill   • lisinopril (PRINIVIL) 2.5 MG Tab Take 2.5 mg by mouth every day.     • aspirin 81 MG tablet Take 81 mg by mouth every day.     • naproxen (ALEVE) 220 MG tablet Take 220 mg by mouth 2 times a day, with meals.     • Coenzyme Q10 (CO Q 10) 100 MG Cap Take  by mouth.     • metformin (GLUCOPHAGE) 1000 MG tablet Take 1,000 mg by mouth 2 times a day, with meals.     • metoprolol (LOPRESSOR) 25 MG Tab Take 1 Tab by mouth 2 Times a Day. 60 Tab 11   • rosuvastatin (CRESTOR) 40 MG tablet Take 1 Tab by mouth every day. 30 Tab 11   • aspirin EC 81 MG EC tablet Take 1 Tab by mouth every day. 30 Tab 3   • rosuvastatin (CRESTOR) 40 MG tablet Take 1 Tab by mouth every day. 30 Tab 3   • omeprazole (PRILOSEC) 20 MG delayed-release capsule Take 1 Cap by mouth every day. 30 Cap 11     No facility-administered encounter medications on file as of 3/28/2017.     ROS       Objective:   BP  "130/80 mmHg  Pulse 102  Ht 1.651 m (5' 5\")  Wt 68.493 kg (151 lb)  BMI 25.13 kg/m2    Physical Exam   Neck: No JVD present.   Cardiovascular: Normal rate and regular rhythm.  Exam reveals no gallop.    No murmur heard.  Pulmonary/Chest: Effort normal. He has no rales.   Abdominal: Soft. There is no tenderness.   Musculoskeletal: He exhibits no edema.          Lab Results   Component Value Date/Time    CHOLESTEROL, 01/20/2017 06:06 AM    LDL 82 01/20/2017 06:06 AM    HDL 47 01/20/2017 06:06 AM    TRIGLYCERIDES 122 01/20/2017 06:06 AM       Lab Results   Component Value Date/Time    SODIUM 141 01/20/2017 06:06 AM    POTASSIUM 4.1 01/20/2017 06:06 AM    CHLORIDE 102 01/20/2017 06:06 AM    CO2 28 01/20/2017 06:06 AM    GLUCOSE 165* 01/20/2017 06:06 AM    BUN 20 01/20/2017 06:06 AM    CREATININE 0.88 01/20/2017 06:06 AM     Lab Results   Component Value Date/Time    ALKALINE PHOSPHATASE 43 01/20/2017 06:06 AM    AST(SGOT) 22 01/20/2017 06:06 AM    ALT(SGPT) 24 01/20/2017 06:06 AM    TOTAL BILIRUBIN 0.7 01/20/2017 06:06 AM      No results found for: BNPBTYPENAT           Assessment:     1. Coronary artery disease due to calcified coronary lesion: CABG ×3 in 2015     2. Dyslipidemia         Medical Decision Making:  Today's Assessment / Status / Plan:     Mr. Bonilla is clinically stable. He is asymptomatic from a cardiovascular standpoint. His lipid status has improved with better dietary compliance but is not under optimal control. At this time, we will add ezetimibe 10 mg daily. He'll have lab work in about 6 weeks and follow-up in a few months with an echocardiogram. He is mildly tachycardic and has not had an echocardiogram since his bypass graft surgery.  " 04/07/19 0930 -- -- -- 152 22 96 % --   04/07/19 0915 -- -- -- 155 21 97 % --   04/07/19 0905 -- -- -- -- 29 97 % --   04/07/19 0903 -- -- -- 129 28 97 % --   04/07/19 0900 (!) 98/54 -- -- 107 22 97 % --   04/07/19 0845 -- -- -- 106 22 97 % --   04/07/19 0830 -- -- -- 103 27 96 % --   04/07/19 0815 -- -- -- 105 25 96 % --   04/07/19 0800 (!) 81/43 97.3 °F (36.3 °C) CORE 103 22 96 % --   04/07/19 0745 -- -- -- 106 20 97 % --   04/07/19 0730 -- -- -- 103 29 100 % --   04/07/19 0715 -- -- -- 102 21 96 % --   04/07/19 0700 (!) 76/37 -- -- 101 28 96 % --   04/07/19 0645 -- -- -- 103 20 96 % --   04/07/19 0630 -- -- -- 106 27 96 % --   04/07/19 0615 -- -- -- 107 28 96 % --   04/07/19 0600 (!) 69/32 -- -- 110 20 96 % --   04/07/19 0545 -- -- -- 110 26 97 % --   04/07/19 0530 -- -- -- 110 19 97 % --   04/07/19 0515 -- -- -- 109 22 97 % --   04/07/19 0503 -- -- -- -- -- -- 190 lb 7.6 oz (86.4 kg)   04/07/19 0500 (!) 90/50 -- -- 108 23 96 % --   04/07/19 0445 -- -- -- 107 21 -- --   04/07/19 0430 -- -- -- 104 18 91 % --   04/07/19 0415 -- -- -- 102 16 93 % --   04/07/19 0400 114/82 98.1 °F (36.7 °C) CORE 101 21 92 % --   04/07/19 0345 -- -- -- 103 21 -- --   04/07/19 0330 -- -- -- 102 21 91 % --   04/07/19 0315 -- -- -- 101 19 94 % --   04/07/19 0300 95/63 -- -- 105 19 91 % --   04/07/19 0245 -- -- -- 107 18 -- --         Intake/Output Summary (Last 24 hours) at 4/7/2019 1032  Last data filed at 4/7/2019 1000  Gross per 24 hour   Intake 40118.38 ml   Output 2210 ml   Net 83002.38 ml     Date 04/07/19 0000 - 04/07/19 2359   Shift 3565-3956 5519-3296 0166-7043 24 Hour Total   INTAKE   I.V.(mL/kg) 6027. 7(69.8) 10(0.1)  6037. 7(69.9)   Shift Total(mL/kg) 2853. 7(69.8) 10(0.1)  6037. 7(69.9)   OUTPUT   Urine(mL/kg/hr) 455(0.7) 175  630   Emesis/NG output(mL/kg) 200(2.3)   200(2.3)   Drains(mL/kg) 700(8.1)   700(8.1)   Shift Total(mL/kg) 1355(15.7) 175(2)  1530(17.7)   Weight (kg) 86.4 86.4 86.4 86.4     Wt Readings from Last 3 Day  Ventilator Started: Yes  Skin Assessment: Clean, dry, & intact  Vent Type: Servo i  Vent Mode: PRVC  Vt Ordered: 470 mL  Rate Set: 20 bmp  FiO2 : 40 %  Sensitivity: 5  PEEP/CPAP: 8  I Time/ I Time %: 0.9 s  Cuff Pressure (cm H2O): 22 cm H2O  Humidification Source: HME  Additional Respiratory  Assessments  Pulse: 153  Resp: 17  SpO2: 97 %  End Tidal CO2: 30 (%)  Position: Semi-Salazar's  Humidification Source: HME  Oral Care Completed?: Yes  Oral Care: Mouth swabbed, Mouth moisturizer, Mouth suctioned  Subglottic Suction Done?: Yes  Cuff Pressure (cm H2O): 22 cm H2O    ABGs:   Lab Results   Component Value Date    AOE9NDP 21 04/07/2019    FIO2 40.0 04/07/2019     DATA:  Complete Blood Count: Recent Labs     04/06/19 0436 04/06/19 2107 04/07/19  0457   WBC 12.3* 9.9 16.8*   RBC 4.19 2.97* 2.97*   HGB 12.8 9.2* 9.1*   HCT 40.3 29.2* 28.1*   MCV 96.2 98.3 94.6   MCH 30.5 31.0 30.6   MCHC 31.8 31.5 32.4   RDW 14.6* 15.0* 14.6*    271 234   MPV 10.5 11.8 11.8        Last 3 Blood Glucose:   Recent Labs     04/05/19 2152 04/06/19  0025 04/06/19 0436 04/06/19 0942 04/06/19  2348 04/07/19  0457   GLUCOSE 186* 227* 105* 85 91 73        PT/INR:    Lab Results   Component Value Date    PROTIME 15.5 04/07/2019    INR 1.5 04/07/2019     PTT:    Lab Results   Component Value Date    APTT 28.0 04/05/2019       Comprehensive Metabolic Profile:   Recent Labs     04/06/19  0436 04/06/19  0942  04/06/19 2348 04/07/19  0442 04/07/19  0457 04/07/19  0651   * 131*  --  129*  --   --  129*  --    K 5.2 4.6  --  5.1  --   --  5.4*  --    CL 96* 94*  --  93*  --   --  91*  --    CO2 13* 13*  --  14*  --   --  17*  --    BUN 53* 62*  --  62*  --   --  62*  --    CREATININE 3.11* 3.89*   < > 3.79*   < > 5.83* 3.90* 6.19*   GLUCOSE 105* 85  --  91  --   --  73  --    CALCIUM 6.8* 6.8*  --  6.4*  --   --  7.5*  --    PROT 5.5*  --   --  3.9*  --   --  4.0*  --    LABALBU 3.2*  --   --  2.6*  --   --  2.3*  --    BILITOT 0.62  --   --  1.56*  --   --  1.92*  --    ALKPHOS 240*  --   --  187*  --   --  194*  --    AST 2,617*  --   --  3,260*  --   --  3,542*  --    ALT 1,740*  --   --  1,408*  --   --  1,528*  --     < > = values in this interval not displayed. Magnesium:   Lab Results   Component Value Date    MG 2.2 04/06/2019    MG 1.6 04/06/2019     Phosphorus:   Lab Results   Component Value Date    PHOS 8.8 04/06/2019    PHOS 6.1 04/06/2019     Ionized Calcium:   Lab Results   Component Value Date    CAION 1.00 04/07/2019    CAION 0.96 04/07/2019    CAION 0.88 04/06/2019        Urinalysis: Lab Results   Component Value Date    NITRU NEGATIVE 04/06/2019    COLORU DARK YELLOW 04/06/2019    PHUR 5.0 04/06/2019    WBCUA 50  04/06/2019    RBCUA 50  04/06/2019    MUCUS NOT REPORTED 04/06/2019    TRICHOMONAS NOT REPORTED 04/06/2019    YEAST NOT REPORTED 04/06/2019    BACTERIA NOT REPORTED 04/06/2019    SPECGRAV 1.032 04/06/2019    LEUKOCYTESUR NEGATIVE 04/06/2019    UROBILINOGEN Normal 04/06/2019    BILIRUBINUR NEGATIVE 04/06/2019    GLUCOSEU NEGATIVE 04/06/2019    KETUA TRACE 04/06/2019    AMORPHOUS NOT REPORTED 04/06/2019       HgBA1c:  No results found for: LABA1C  TSH:    Lab Results   Component Value Date    TSH 0.97 04/05/2019     Lactic Acid: No results found for: LACTA   Troponin: No results for input(s): TROPONINI in the last 72 hours.     ASSESSMENT:     Patient Active Problem List    Diagnosis Date Noted    Ischemic necrosis of large intestine ([facility] [address] 04/07/2019    Small bowel ischemia ([facility] [address] 04/07/2019    Acute GI bleeding 04/07/2019    Rhabdomyolysis 04/06/2019    Hyponatremia 04/06/2019    Lactic acidosis 04/06/2019    MARY (acute kidney injury) ([facility] [address] 11/71/4651    Metabolic acidosis 35/21/1293    Acute respiratory failure ([facility] [address] 04/06/2019    Elevated liver enzymes 04/06/2019    Hyperkalemia     Shock ([facility] [address] 04/05/2019    Arthritis of left knee     S/P total knee arthroplasty 01/26/2017    replacement. Will give IV fluid bolus her  Avoid diuretics. Follow up LFTs and INR tomorrow. Continue to follow arterial blood gases. Next and continue with bicarbonate drip. Likely will need CVVH. Continue with Levophed but surgery want vasopressin. Will get chest x-ray tomorrow. Continue to follow lactic acid. Surgery plans to take her back to surgery later today. Cardiology was consulted and discussed with cardiology we will not be able to give amiodarone drip because of severely elevated liver function tests and she already had digoxin. Will start her on Cardizem drip without bolus and will continue with pressors  Follow up CK tomorrow     Discussed with parents in detail, all questions answered  Discussed with nursing staff, treatment plan discussed. Discussed with surgery attending Dr. Judah Adams. Discussed with the respiratory therapist.     Total critical care time caring for this patient with life threatening, unstable organ failure, including direct patient contact, management of life support systems, review of data including imaging and labs, discussions with other team members and physicians at least 61  Min so far today, excluding procedures. Genesis Arenas MD  4/7/2019 6:03 PM    Please note that this chart was generated using voice recognition Dragon dictation software. Although every effort was made to ensure the accuracy of this automated transcription, some errors in transcription may have occurred.

## 2022-06-29 ENCOUNTER — OFFICE VISIT (OUTPATIENT)
Dept: URGENT CARE | Facility: PHYSICIAN GROUP | Age: 57
End: 2022-06-29
Payer: COMMERCIAL

## 2022-06-29 VITALS
RESPIRATION RATE: 20 BRPM | TEMPERATURE: 96.6 F | SYSTOLIC BLOOD PRESSURE: 114 MMHG | HEART RATE: 109 BPM | DIASTOLIC BLOOD PRESSURE: 76 MMHG | BODY MASS INDEX: 23.82 KG/M2 | OXYGEN SATURATION: 98 % | HEIGHT: 65 IN | WEIGHT: 143 LBS

## 2022-06-29 DIAGNOSIS — R19.7 DIARRHEA, UNSPECIFIED TYPE: ICD-10-CM

## 2022-06-29 PROBLEM — R80.9 MICROALBUMINURIA DUE TO TYPE 2 DIABETES MELLITUS (HCC): Status: ACTIVE | Noted: 2021-12-21

## 2022-06-29 PROBLEM — E55.9 VITAMIN D DEFICIENCY: Status: ACTIVE | Noted: 2021-12-22

## 2022-06-29 PROBLEM — E11.65 TYPE 2 DIABETES MELLITUS WITH HYPERGLYCEMIA, WITHOUT LONG-TERM CURRENT USE OF INSULIN (HCC): Status: ACTIVE | Noted: 2022-03-30

## 2022-06-29 PROBLEM — I10 PRIMARY HYPERTENSION: Status: ACTIVE | Noted: 2021-12-21

## 2022-06-29 PROBLEM — E11.29 MICROALBUMINURIA DUE TO TYPE 2 DIABETES MELLITUS (HCC): Status: ACTIVE | Noted: 2021-12-21

## 2022-06-29 PROBLEM — K59.04 CHRONIC IDIOPATHIC CONSTIPATION: Status: ACTIVE | Noted: 2021-12-21

## 2022-06-29 PROBLEM — E78.2 MIXED HYPERLIPIDEMIA: Status: ACTIVE | Noted: 2021-12-21

## 2022-06-29 PROBLEM — K21.9 GASTROESOPHAGEAL REFLUX DISEASE WITHOUT ESOPHAGITIS: Status: ACTIVE | Noted: 2021-12-21

## 2022-06-29 PROCEDURE — 99203 OFFICE O/P NEW LOW 30 MIN: CPT | Performed by: PHYSICIAN ASSISTANT

## 2022-06-29 RX ORDER — EMPAGLIFLOZIN 10 MG/1
TABLET, FILM COATED ORAL
COMMUNITY
Start: 2022-04-04 | End: 2022-07-01

## 2022-06-29 RX ORDER — OLMESARTAN MEDOXOMIL 20 MG/1
20 TABLET ORAL DAILY
COMMUNITY
Start: 2022-06-09 | End: 2022-09-07

## 2022-06-29 RX ORDER — EMPAGLIFLOZIN 25 MG/1
TABLET, FILM COATED ORAL
COMMUNITY
Start: 2022-05-12 | End: 2022-07-01

## 2022-06-29 RX ORDER — BLOOD SUGAR DIAGNOSTIC
STRIP MISCELLANEOUS
COMMUNITY
Start: 2022-03-30 | End: 2022-07-01

## 2022-06-29 RX ORDER — DULAGLUTIDE 1.5 MG/.5ML
1.5 INJECTION, SOLUTION SUBCUTANEOUS
Status: ON HOLD | COMMUNITY
Start: 2022-05-12 | End: 2023-09-05

## 2022-06-29 RX ORDER — NAPROXEN SODIUM 220 MG
220 TABLET ORAL
COMMUNITY
End: 2022-07-01

## 2022-06-29 RX ORDER — DULAGLUTIDE 0.75 MG/.5ML
INJECTION, SOLUTION SUBCUTANEOUS
COMMUNITY
Start: 2022-04-04 | End: 2022-07-01

## 2022-06-29 RX ORDER — DULAGLUTIDE 1.5 MG/.5ML
INJECTION, SOLUTION SUBCUTANEOUS
COMMUNITY
Start: 2022-06-18 | End: 2022-07-01

## 2022-06-29 RX ORDER — METFORMIN HYDROCHLORIDE 500 MG/1
1000 TABLET, EXTENDED RELEASE ORAL 2 TIMES DAILY
COMMUNITY
Start: 2022-06-09 | End: 2022-08-08

## 2022-06-29 RX ORDER — EMPAGLIFLOZIN 25 MG/1
1 TABLET, FILM COATED ORAL
COMMUNITY
Start: 2022-05-12 | End: 2022-07-01

## 2022-06-29 RX ORDER — EZETIMIBE 10 MG/1
10 TABLET ORAL DAILY
COMMUNITY
Start: 2022-05-12 | End: 2022-07-01

## 2022-06-29 RX ORDER — ATORVASTATIN CALCIUM 40 MG/1
80 TABLET, FILM COATED ORAL DAILY
COMMUNITY
Start: 2022-06-09 | End: 2022-09-07

## 2022-06-29 ASSESSMENT — ENCOUNTER SYMPTOMS
EYE REDNESS: 0
CHILLS: 0
SINUS PAIN: 0
DIARRHEA: 1
DIZZINESS: 0
VOMITING: 0
EYE PAIN: 0
NAUSEA: 0
FEVER: 0
COUGH: 0
CONSTIPATION: 0
ABDOMINAL PAIN: 0
EYE DISCHARGE: 0
SHORTNESS OF BREATH: 0
SORE THROAT: 0
DIAPHORESIS: 0
HEADACHES: 0
WHEEZING: 0

## 2022-06-29 NOTE — PROGRESS NOTES
"  Subjective:     Jorden Bonilla  is a 57 y.o. male who presents for Diarrhea (2x days ) and Other (PT states low blood pressure; 77/50/)       He presents today with diarrhea x2 days.  He also reported an at home blood pressure using an electronic blood pressure machine of 77/50 recorded just a few hours ago.  He notes chills yesterday but denies fever.  No chest pain, shortness of breath, difficulties with breathing.  Denies any recent travel or drinking out of any questionable water sources.  He has used Imodium for his symptoms, this has improved his diarrhea.  Denies any recent close sick contacts.      Review of Systems   Constitutional: Negative for chills, diaphoresis, fever and malaise/fatigue.   HENT: Negative for congestion, ear discharge, sinus pain and sore throat.    Eyes: Negative for pain, discharge and redness.   Respiratory: Negative for cough, shortness of breath and wheezing.    Cardiovascular: Negative for chest pain.   Gastrointestinal: Positive for diarrhea. Negative for abdominal pain, constipation, nausea and vomiting.   Neurological: Negative for dizziness and headaches.      No Known Allergies  Past Medical History:   Diagnosis Date   • Type II or unspecified type diabetes mellitus without mention of complication, not stated as uncontrolled         Objective:   /76 (BP Location: Right arm, Patient Position: Sitting, BP Cuff Size: Adult)   Pulse (!) 109   Temp 35.9 °C (96.6 °F) (Temporal)   Resp 20   Ht 1.651 m (5' 5\")   Wt 64.9 kg (143 lb)   SpO2 98%   BMI 23.80 kg/m²   Physical Exam  Vitals and nursing note reviewed.   Constitutional:       General: He is not in acute distress.     Appearance: Normal appearance. He is not ill-appearing, toxic-appearing or diaphoretic.   HENT:      Head: Normocephalic.      Right Ear: Tympanic membrane, ear canal and external ear normal. There is no impacted cerumen.      Left Ear: Tympanic membrane, ear canal and external ear normal. " There is no impacted cerumen.      Nose: No congestion or rhinorrhea.      Mouth/Throat:      Mouth: Mucous membranes are moist.      Pharynx: No oropharyngeal exudate or posterior oropharyngeal erythema.   Eyes:      General: No scleral icterus.        Right eye: No discharge.         Left eye: No discharge.      Conjunctiva/sclera: Conjunctivae normal.   Cardiovascular:      Rate and Rhythm: Normal rate and regular rhythm.   Pulmonary:      Effort: Pulmonary effort is normal. No respiratory distress.      Breath sounds: Normal breath sounds. No stridor. No wheezing or rhonchi.   Musculoskeletal:      Cervical back: Neck supple.   Lymphadenopathy:      Cervical: No cervical adenopathy.   Neurological:      General: No focal deficit present.      Mental Status: He is alert and oriented to person, place, and time.   Psychiatric:         Mood and Affect: Mood normal.         Behavior: Behavior normal.         Thought Content: Thought content normal.         Judgment: Judgment normal.             Diagnostic testing: None    Assessment/Plan:     Encounter Diagnoses   Name Primary?   • Diarrhea, unspecified type         Plan for care for today's complaint includes over-the-counter Imodium.  Patient did have a low blood pressure recording at home, blood pressure taken multiple times in office today was within the normal range.  He is also not ill-appearing or having altered mental status so I do not feel that additional work-up for hypotension is needed at this time.  He should continue adequate food and fluid intake to help prevent dehydration.  I did discuss with the patient and his wife signs of dehydration, if these do arise then he should follow-up in the emergency department for IV rehydration..    See AVS Instructions below for written guidance provided to patient on after-visit management and care in addition to our verbal discussion during the visit.    Please note that this dictation was created using voice  recognition software. I have attempted to correct all errors, but there may be sound-alike, spelling, grammar and possibly content errors that I did not discover before finalizing the note.    Centreterri Harrell PA-C

## 2022-07-01 ENCOUNTER — APPOINTMENT (OUTPATIENT)
Dept: RADIOLOGY | Facility: MEDICAL CENTER | Age: 57
DRG: 872 | End: 2022-07-01
Attending: EMERGENCY MEDICINE
Payer: COMMERCIAL

## 2022-07-01 ENCOUNTER — HOSPITAL ENCOUNTER (INPATIENT)
Facility: MEDICAL CENTER | Age: 57
LOS: 1 days | DRG: 872 | End: 2022-07-03
Attending: EMERGENCY MEDICINE | Admitting: STUDENT IN AN ORGANIZED HEALTH CARE EDUCATION/TRAINING PROGRAM
Payer: COMMERCIAL

## 2022-07-01 ENCOUNTER — APPOINTMENT (OUTPATIENT)
Dept: RADIOLOGY | Facility: MEDICAL CENTER | Age: 57
DRG: 872 | End: 2022-07-01
Attending: STUDENT IN AN ORGANIZED HEALTH CARE EDUCATION/TRAINING PROGRAM
Payer: COMMERCIAL

## 2022-07-01 DIAGNOSIS — E87.6 HYPOKALEMIA: ICD-10-CM

## 2022-07-01 DIAGNOSIS — R19.7 DIARRHEA OF PRESUMED INFECTIOUS ORIGIN: ICD-10-CM

## 2022-07-01 PROBLEM — R00.0 TACHYCARDIA: Status: ACTIVE | Noted: 2022-07-01

## 2022-07-01 LAB
ALBUMIN SERPL BCP-MCNC: 4.7 G/DL (ref 3.2–4.9)
ALBUMIN/GLOB SERPL: 1.7 G/DL
ALP SERPL-CCNC: 61 U/L (ref 30–99)
ALT SERPL-CCNC: 15 U/L (ref 2–50)
ANION GAP SERPL CALC-SCNC: 13 MMOL/L (ref 7–16)
APPEARANCE UR: CLEAR
AST SERPL-CCNC: 12 U/L (ref 12–45)
BASOPHILS # BLD AUTO: 0.3 % (ref 0–1.8)
BASOPHILS # BLD: 0.04 K/UL (ref 0–0.12)
BILIRUB SERPL-MCNC: 0.6 MG/DL (ref 0.1–1.5)
BILIRUB UR QL STRIP.AUTO: NEGATIVE
BUN SERPL-MCNC: 24 MG/DL (ref 8–22)
CALCIUM SERPL-MCNC: 8.9 MG/DL (ref 8.5–10.5)
CHLORIDE SERPL-SCNC: 98 MMOL/L (ref 96–112)
CO2 SERPL-SCNC: 20 MMOL/L (ref 20–33)
COLOR UR: YELLOW
CREAT SERPL-MCNC: 1.08 MG/DL (ref 0.5–1.4)
EKG IMPRESSION: NORMAL
EOSINOPHIL # BLD AUTO: 0.17 K/UL (ref 0–0.51)
EOSINOPHIL NFR BLD: 1.5 % (ref 0–6.9)
ERYTHROCYTE [DISTWIDTH] IN BLOOD BY AUTOMATED COUNT: 36.7 FL (ref 35.9–50)
FLUAV RNA SPEC QL NAA+PROBE: NEGATIVE
FLUBV RNA SPEC QL NAA+PROBE: NEGATIVE
GFR SERPLBLD CREATININE-BSD FMLA CKD-EPI: 79 ML/MIN/1.73 M 2
GLOBULIN SER CALC-MCNC: 2.7 G/DL (ref 1.9–3.5)
GLUCOSE SERPL-MCNC: 230 MG/DL (ref 65–99)
GLUCOSE UR STRIP.AUTO-MCNC: 100 MG/DL
HCT VFR BLD AUTO: 47.4 % (ref 42–52)
HGB BLD-MCNC: 15.8 G/DL (ref 14–18)
IMM GRANULOCYTES # BLD AUTO: 0.05 K/UL (ref 0–0.11)
IMM GRANULOCYTES NFR BLD AUTO: 0.4 % (ref 0–0.9)
KETONES UR STRIP.AUTO-MCNC: NEGATIVE MG/DL
LACTATE SERPL-SCNC: 1.9 MMOL/L (ref 0.5–2)
LACTATE SERPL-SCNC: 2.2 MMOL/L (ref 0.5–2)
LEUKOCYTE ESTERASE UR QL STRIP.AUTO: NEGATIVE
LIPASE SERPL-CCNC: 32 U/L (ref 11–82)
LYMPHOCYTES # BLD AUTO: 0.74 K/UL (ref 1–4.8)
LYMPHOCYTES NFR BLD: 6.5 % (ref 22–41)
MCH RBC QN AUTO: 26.8 PG (ref 27–33)
MCHC RBC AUTO-ENTMCNC: 33.3 G/DL (ref 33.7–35.3)
MCV RBC AUTO: 80.3 FL (ref 81.4–97.8)
MICRO URNS: ABNORMAL
MONOCYTES # BLD AUTO: 0.97 K/UL (ref 0–0.85)
MONOCYTES NFR BLD AUTO: 8.5 % (ref 0–13.4)
NEUTROPHILS # BLD AUTO: 9.49 K/UL (ref 1.82–7.42)
NEUTROPHILS NFR BLD: 82.8 % (ref 44–72)
NITRITE UR QL STRIP.AUTO: NEGATIVE
NRBC # BLD AUTO: 0 K/UL
NRBC BLD-RTO: 0 /100 WBC
NT-PROBNP SERPL IA-MCNC: 35 PG/ML (ref 0–125)
PH UR STRIP.AUTO: 5.5 [PH] (ref 5–8)
PLATELET # BLD AUTO: 254 K/UL (ref 164–446)
PMV BLD AUTO: 10.2 FL (ref 9–12.9)
POTASSIUM SERPL-SCNC: 3.5 MMOL/L (ref 3.6–5.5)
PROCALCITONIN SERPL-MCNC: 0.19 NG/ML
PROT SERPL-MCNC: 7.4 G/DL (ref 6–8.2)
PROT UR QL STRIP: NEGATIVE MG/DL
RBC # BLD AUTO: 5.9 M/UL (ref 4.7–6.1)
RBC UR QL AUTO: NEGATIVE
RSV RNA SPEC QL NAA+PROBE: NEGATIVE
SARS-COV-2 RNA RESP QL NAA+PROBE: NOTDETECTED
SODIUM SERPL-SCNC: 131 MMOL/L (ref 135–145)
SP GR UR STRIP.AUTO: 1.01
SPECIMEN SOURCE: NORMAL
TROPONIN T SERPL-MCNC: 8 NG/L (ref 6–19)
TSH SERPL DL<=0.005 MIU/L-ACNC: 1.08 UIU/ML (ref 0.38–5.33)
UROBILINOGEN UR STRIP.AUTO-MCNC: 0.2 MG/DL
WBC # BLD AUTO: 11.5 K/UL (ref 4.8–10.8)

## 2022-07-01 PROCEDURE — 93005 ELECTROCARDIOGRAM TRACING: CPT | Performed by: EMERGENCY MEDICINE

## 2022-07-01 PROCEDURE — 71045 X-RAY EXAM CHEST 1 VIEW: CPT

## 2022-07-01 PROCEDURE — 81003 URINALYSIS AUTO W/O SCOPE: CPT

## 2022-07-01 PROCEDURE — 700105 HCHG RX REV CODE 258: Performed by: EMERGENCY MEDICINE

## 2022-07-01 PROCEDURE — 93005 ELECTROCARDIOGRAM TRACING: CPT

## 2022-07-01 PROCEDURE — 87040 BLOOD CULTURE FOR BACTERIA: CPT

## 2022-07-01 PROCEDURE — 96365 THER/PROPH/DIAG IV INF INIT: CPT

## 2022-07-01 PROCEDURE — 84443 ASSAY THYROID STIM HORMONE: CPT

## 2022-07-01 PROCEDURE — 85025 COMPLETE CBC W/AUTO DIFF WBC: CPT

## 2022-07-01 PROCEDURE — 36415 COLL VENOUS BLD VENIPUNCTURE: CPT

## 2022-07-01 PROCEDURE — 0241U HCHG SARS-COV-2 COVID-19 NFCT DS RESP RNA 4 TRGT MIC: CPT

## 2022-07-01 PROCEDURE — 83880 ASSAY OF NATRIURETIC PEPTIDE: CPT

## 2022-07-01 PROCEDURE — 83605 ASSAY OF LACTIC ACID: CPT

## 2022-07-01 PROCEDURE — 99220 PR INITIAL OBSERVATION CARE,LEVL III: CPT | Performed by: STUDENT IN AN ORGANIZED HEALTH CARE EDUCATION/TRAINING PROGRAM

## 2022-07-01 PROCEDURE — 700117 HCHG RX CONTRAST REV CODE 255: Performed by: EMERGENCY MEDICINE

## 2022-07-01 PROCEDURE — A9270 NON-COVERED ITEM OR SERVICE: HCPCS | Performed by: EMERGENCY MEDICINE

## 2022-07-01 PROCEDURE — 83690 ASSAY OF LIPASE: CPT

## 2022-07-01 PROCEDURE — 700102 HCHG RX REV CODE 250 W/ 637 OVERRIDE(OP): Performed by: EMERGENCY MEDICINE

## 2022-07-01 PROCEDURE — 80053 COMPREHEN METABOLIC PANEL: CPT

## 2022-07-01 PROCEDURE — 71275 CT ANGIOGRAPHY CHEST: CPT

## 2022-07-01 PROCEDURE — 96375 TX/PRO/DX INJ NEW DRUG ADDON: CPT

## 2022-07-01 PROCEDURE — 87086 URINE CULTURE/COLONY COUNT: CPT

## 2022-07-01 PROCEDURE — 84145 PROCALCITONIN (PCT): CPT

## 2022-07-01 PROCEDURE — 84484 ASSAY OF TROPONIN QUANT: CPT

## 2022-07-01 PROCEDURE — C9803 HOPD COVID-19 SPEC COLLECT: HCPCS | Performed by: EMERGENCY MEDICINE

## 2022-07-01 PROCEDURE — 99285 EMERGENCY DEPT VISIT HI MDM: CPT

## 2022-07-01 PROCEDURE — 74177 CT ABD & PELVIS W/CONTRAST: CPT

## 2022-07-01 PROCEDURE — G0378 HOSPITAL OBSERVATION PER HR: HCPCS

## 2022-07-01 RX ORDER — ACETAMINOPHEN 325 MG/1
975 TABLET ORAL ONCE
Status: COMPLETED | OUTPATIENT
Start: 2022-07-01 | End: 2022-07-01

## 2022-07-01 RX ORDER — SODIUM CHLORIDE, SODIUM LACTATE, POTASSIUM CHLORIDE, CALCIUM CHLORIDE 600; 310; 30; 20 MG/100ML; MG/100ML; MG/100ML; MG/100ML
2000 INJECTION, SOLUTION INTRAVENOUS ONCE
Status: COMPLETED | OUTPATIENT
Start: 2022-07-01 | End: 2022-07-01

## 2022-07-01 RX ORDER — EZETIMIBE 10 MG/1
10 TABLET ORAL DAILY
Status: DISCONTINUED | OUTPATIENT
Start: 2022-07-02 | End: 2022-07-03 | Stop reason: HOSPADM

## 2022-07-01 RX ORDER — ATORVASTATIN CALCIUM 80 MG/1
80 TABLET, FILM COATED ORAL DAILY
Status: DISCONTINUED | OUTPATIENT
Start: 2022-07-02 | End: 2022-07-03 | Stop reason: HOSPADM

## 2022-07-01 RX ORDER — DEXTROSE MONOHYDRATE 25 G/50ML
25 INJECTION, SOLUTION INTRAVENOUS
Status: DISCONTINUED | OUTPATIENT
Start: 2022-07-01 | End: 2022-07-03 | Stop reason: HOSPADM

## 2022-07-01 RX ADMIN — IOHEXOL 75 ML: 350 INJECTION, SOLUTION INTRAVENOUS at 23:49

## 2022-07-01 RX ADMIN — ACETAMINOPHEN 975 MG: 325 TABLET, FILM COATED ORAL at 18:16

## 2022-07-01 RX ADMIN — SODIUM CHLORIDE, POTASSIUM CHLORIDE, SODIUM LACTATE AND CALCIUM CHLORIDE 2000 ML: 600; 310; 30; 20 INJECTION, SOLUTION INTRAVENOUS at 18:15

## 2022-07-02 ENCOUNTER — APPOINTMENT (OUTPATIENT)
Dept: CARDIOLOGY | Facility: MEDICAL CENTER | Age: 57
DRG: 872 | End: 2022-07-02
Attending: STUDENT IN AN ORGANIZED HEALTH CARE EDUCATION/TRAINING PROGRAM
Payer: COMMERCIAL

## 2022-07-02 PROBLEM — E87.6 HYPOKALEMIA: Status: ACTIVE | Noted: 2022-07-02

## 2022-07-02 PROBLEM — A41.9 SEPSIS (HCC): Status: ACTIVE | Noted: 2022-07-02

## 2022-07-02 PROBLEM — R19.7 DIARRHEA: Status: ACTIVE | Noted: 2022-07-02

## 2022-07-02 LAB
GLUCOSE BLD STRIP.AUTO-MCNC: 124 MG/DL (ref 65–99)
GLUCOSE BLD STRIP.AUTO-MCNC: 148 MG/DL (ref 65–99)
GLUCOSE BLD STRIP.AUTO-MCNC: 149 MG/DL (ref 65–99)
GLUCOSE BLD STRIP.AUTO-MCNC: 169 MG/DL (ref 65–99)
GLUCOSE BLD STRIP.AUTO-MCNC: 177 MG/DL (ref 65–99)
LV EJECT FRACT  99904: 55
LV EJECT FRACT MOD 2C 99903: 58
LV EJECT FRACT MOD 4C 99902: 57.63
LV EJECT FRACT MOD BP 99901: 49.98

## 2022-07-02 PROCEDURE — 700101 HCHG RX REV CODE 250: Performed by: STUDENT IN AN ORGANIZED HEALTH CARE EDUCATION/TRAINING PROGRAM

## 2022-07-02 PROCEDURE — 700102 HCHG RX REV CODE 250 W/ 637 OVERRIDE(OP): Performed by: NURSE PRACTITIONER

## 2022-07-02 PROCEDURE — A9270 NON-COVERED ITEM OR SERVICE: HCPCS | Performed by: NURSE PRACTITIONER

## 2022-07-02 PROCEDURE — 700111 HCHG RX REV CODE 636 W/ 250 OVERRIDE (IP): Performed by: STUDENT IN AN ORGANIZED HEALTH CARE EDUCATION/TRAINING PROGRAM

## 2022-07-02 PROCEDURE — 700102 HCHG RX REV CODE 250 W/ 637 OVERRIDE(OP): Performed by: STUDENT IN AN ORGANIZED HEALTH CARE EDUCATION/TRAINING PROGRAM

## 2022-07-02 PROCEDURE — 770001 HCHG ROOM/CARE - MED/SURG/GYN PRIV*

## 2022-07-02 PROCEDURE — 99232 SBSQ HOSP IP/OBS MODERATE 35: CPT | Performed by: NURSE PRACTITIONER

## 2022-07-02 PROCEDURE — A9270 NON-COVERED ITEM OR SERVICE: HCPCS | Performed by: STUDENT IN AN ORGANIZED HEALTH CARE EDUCATION/TRAINING PROGRAM

## 2022-07-02 PROCEDURE — 700105 HCHG RX REV CODE 258: Performed by: NURSE PRACTITIONER

## 2022-07-02 PROCEDURE — 82962 GLUCOSE BLOOD TEST: CPT | Mod: 91

## 2022-07-02 PROCEDURE — 93306 TTE W/DOPPLER COMPLETE: CPT | Mod: 26 | Performed by: INTERNAL MEDICINE

## 2022-07-02 PROCEDURE — 93306 TTE W/DOPPLER COMPLETE: CPT

## 2022-07-02 RX ORDER — METRONIDAZOLE 500 MG/1
500 TABLET ORAL EVERY 8 HOURS
Status: DISCONTINUED | OUTPATIENT
Start: 2022-07-02 | End: 2022-07-03 | Stop reason: HOSPADM

## 2022-07-02 RX ORDER — SODIUM CHLORIDE 9 MG/ML
INJECTION, SOLUTION INTRAVENOUS CONTINUOUS
Status: ACTIVE | OUTPATIENT
Start: 2022-07-02 | End: 2022-07-03

## 2022-07-02 RX ORDER — ONDANSETRON 2 MG/ML
4 INJECTION INTRAMUSCULAR; INTRAVENOUS EVERY 4 HOURS PRN
Status: DISCONTINUED | OUTPATIENT
Start: 2022-07-02 | End: 2022-07-03 | Stop reason: HOSPADM

## 2022-07-02 RX ORDER — POTASSIUM CHLORIDE 20 MEQ/1
20 TABLET, EXTENDED RELEASE ORAL ONCE
Status: COMPLETED | OUTPATIENT
Start: 2022-07-02 | End: 2022-07-02

## 2022-07-02 RX ORDER — SODIUM CHLORIDE 9 MG/ML
INJECTION, SOLUTION INTRAVENOUS CONTINUOUS
Status: DISCONTINUED | OUTPATIENT
Start: 2022-07-02 | End: 2022-07-02

## 2022-07-02 RX ORDER — METRONIDAZOLE 500 MG/100ML
500 INJECTION, SOLUTION INTRAVENOUS EVERY 8 HOURS
Status: DISCONTINUED | OUTPATIENT
Start: 2022-07-02 | End: 2022-07-02

## 2022-07-02 RX ADMIN — METRONIDAZOLE 500 MG: 500 TABLET ORAL at 22:06

## 2022-07-02 RX ADMIN — METRONIDAZOLE 500 MG: 500 INJECTION, SOLUTION INTRAVENOUS at 05:13

## 2022-07-02 RX ADMIN — METRONIDAZOLE 500 MG: 500 TABLET ORAL at 13:32

## 2022-07-02 RX ADMIN — METRONIDAZOLE 500 MG: 500 INJECTION, SOLUTION INTRAVENOUS at 00:45

## 2022-07-02 RX ADMIN — SODIUM CHLORIDE: 9 INJECTION, SOLUTION INTRAVENOUS at 14:22

## 2022-07-02 RX ADMIN — WATER 1 G: 1000 INJECTION, SOLUTION INTRAVENOUS at 00:51

## 2022-07-02 RX ADMIN — ASPIRIN 81 MG: 81 TABLET, COATED ORAL at 05:13

## 2022-07-02 RX ADMIN — EZETIMIBE 10 MG: 10 TABLET ORAL at 05:13

## 2022-07-02 RX ADMIN — POTASSIUM CHLORIDE 20 MEQ: 1500 TABLET, EXTENDED RELEASE ORAL at 00:51

## 2022-07-02 RX ADMIN — RIVAROXABAN 10 MG: 10 TABLET, FILM COATED ORAL at 17:24

## 2022-07-02 RX ADMIN — ATORVASTATIN CALCIUM 80 MG: 80 TABLET, FILM COATED ORAL at 05:13

## 2022-07-02 ASSESSMENT — ENCOUNTER SYMPTOMS
PALPITATIONS: 0
COUGH: 0
FEVER: 0
DOUBLE VISION: 0
WEAKNESS: 0
HEMOPTYSIS: 0
DIARRHEA: 1
DIZZINESS: 1
CHILLS: 0
HEADACHES: 0
VOMITING: 0
NECK PAIN: 0
BLURRED VISION: 0
NAUSEA: 0
HEARTBURN: 0
ABDOMINAL PAIN: 0
ABDOMINAL PAIN: 1
BRUISES/BLEEDS EASILY: 0
FALLS: 0
MYALGIAS: 0
DEPRESSION: 0

## 2022-07-02 ASSESSMENT — LIFESTYLE VARIABLES
EVER FELT BAD OR GUILTY ABOUT YOUR DRINKING: NO
AVERAGE NUMBER OF DAYS PER WEEK YOU HAVE A DRINK CONTAINING ALCOHOL: 0
TOTAL SCORE: 0
TOTAL SCORE: 0
ALCOHOL_USE: NO
HOW MANY TIMES IN THE PAST YEAR HAVE YOU HAD 5 OR MORE DRINKS IN A DAY: 0
HAVE PEOPLE ANNOYED YOU BY CRITICIZING YOUR DRINKING: NO
ON A TYPICAL DAY WHEN YOU DRINK ALCOHOL HOW MANY DRINKS DO YOU HAVE: 0
EVER HAD A DRINK FIRST THING IN THE MORNING TO STEADY YOUR NERVES TO GET RID OF A HANGOVER: NO
CONSUMPTION TOTAL: NEGATIVE
DOES PATIENT WANT TO STOP DRINKING: CANNOT ASSESS
HAVE YOU EVER FELT YOU SHOULD CUT DOWN ON YOUR DRINKING: NO
TOTAL SCORE: 0

## 2022-07-02 ASSESSMENT — COGNITIVE AND FUNCTIONAL STATUS - GENERAL
SUGGESTED CMS G CODE MODIFIER DAILY ACTIVITY: CH
DAILY ACTIVITIY SCORE: 24
SUGGESTED CMS G CODE MODIFIER MOBILITY: CH
MOBILITY SCORE: 24

## 2022-07-02 ASSESSMENT — PAIN DESCRIPTION - PAIN TYPE: TYPE: ACUTE PAIN

## 2022-07-02 ASSESSMENT — FIBROSIS 4 INDEX
FIB4 SCORE: 0.72
FIB4 SCORE: 0.72

## 2022-07-02 ASSESSMENT — PATIENT HEALTH QUESTIONNAIRE - PHQ9
1. LITTLE INTEREST OR PLEASURE IN DOING THINGS: NOT AT ALL
2. FEELING DOWN, DEPRESSED, IRRITABLE, OR HOPELESS: NOT AT ALL
SUM OF ALL RESPONSES TO PHQ9 QUESTIONS 1 AND 2: 0

## 2022-07-02 NOTE — CARE PLAN
The patient is Stable - Low risk of patient condition declining or worsening    Shift Goals  Clinical Goals: Abx therapy, monitor BP and HR  Patient Goals: Rest  Family Goals: Rest/comfort      Problem: Hemodynamics  Goal: Patient's hemodynamics, fluid balance and neurologic status will be stable or improve  Outcome: Progressing  Note: Patient received 2L of fluid in ED, will continue to monitor I/O's and vital signs.     Problem: Fluid Volume  Goal: Fluid volume balance will be maintained  Outcome: Progressing  Note: Monitoring labs and vitals signs.      Problem: Fall Risk  Goal: Patient will remain free from falls  Outcome: Progressing  Note: Fall precautions in place, please see admit note.        Progress made toward(s) clinical / shift goals:  Progressing

## 2022-07-02 NOTE — PROGRESS NOTES
4 Eyes Skin Assessment Completed by Nahomi RN and ANJALI Bal.    Head WDL  Ears Redness and Blanching  Nose WDL  Mouth WDL  Neck WDL  Breast/Chest Scar from previous heart surgery  Shoulder Blades WDL  Spine WDL  (R) Arm/Elbow/Hand WDL  (L) Arm/Elbow/Hand WDL  Abdomen WDL  Groin WDL  Scrotum/Coccyx/Buttocks WDL  (R) Leg Scar  (L) Leg Scar  (R) Heel/Foot/Toe Dry and calloused   (L) Heel/Foot/Toe Dry and calloused          Devices In Places Tele Box, Blood Pressure Cuff and Pulse Ox      Interventions In Place Pillows and Pressure Redistribution Mattress    Possible Skin Injury No    Pictures Uploaded Into Epic N/A  Wound Consult Placed N/A  RN Wound Prevention Protocol Ordered No

## 2022-07-02 NOTE — PROGRESS NOTES
[facility] Medicine Daily Progress Note    Date of Service  7/2/2022    Chief Complaint  Jorden Bonilla is a 57 y.o. male admitted 7/1/2022 with dizziness.    Hospital Course  Jorden Bonilla is a 57 y.o. male past medical history of CAD status post CABG in 2017, type II DM patient reports he believes it was 2015 with past medical history of  who presented 7/1/2022 with dizziness.    Patient presented with complaint of mild abdominal pain 5 out of 10.  Is reporting loose stools/diarrhea for 24 hours.  Patient continues to have loose stools in the morning.  He thinks this is possibly related to something that he ate.  Denies any fever, chills, nausea or vomiting.    The emergency department patient met criteria for sepsis with tachycardia hypotension and possible abdominal infection.  Was started on ceftriaxone and Flagyl, and sepsis bundle including fluid resuscitation.    There was concern for possible PE, patient underwent CTA which was negative for PE.  CT abdomen/pelvis was completed did show distended small bowel loops concerning for ileus.  Patient has active bowel sounds, denies abdominal pain, continues to have liquid stool.  Will be started on a diet, continue IV rehydration continue to monitor.    Since diarrhea stools are persisting this morning will order additional stool work-up.  We will continue prophylactic antibiotic therapy with Flagyl and Rocephin.  Nursing to check orthostatics given the patient's presentation with dizziness.    Also noted to be mildly hypokalemic and an atraumatic.  Believe these are secondary to GI losses, will continue to monitor closely and replace potassium as needed.    Patient with history of CAD, CABG x3 and 2015.  Echocardiogram pending given presentation with dizziness.  Continue patient's aspirin, ARB and statin.      Interval Problem Update  Patient in bed, no signs of acute distress.  Denies any abdominal pain, has no abdominal tenderness.  Does report that he  continues to have diarrhea.  -Continue use, mild rehydration  -Pending echocardiogram results  -Will have nursing complete orthostatics  -Will send additional testing for stool given persistent diarrhea  -Continue prophylactic Flagyl/ceftriaxone  -Continue to monitor electrolytes closely    I have discussed this patient's plan of care and discharge plan at IDT rounds today with Case Management, Nursing, Nursing leadership, and other members of the IDT team.    Consultants/Specialty  None    Code Status  Full Code    Disposition  Patient is not medically cleared for discharge.   Anticipate discharge to to home with close outpatient follow-up.  I have placed the appropriate orders for post-discharge needs.    Review of Systems  Review of Systems   Constitutional: Negative for chills and fever.   HENT: Negative for hearing loss and tinnitus.    Eyes: Negative for blurred vision and double vision.   Respiratory: Negative for cough and hemoptysis.    Cardiovascular: Negative for chest pain and palpitations.   Gastrointestinal: Positive for diarrhea. Negative for abdominal pain, heartburn, nausea and vomiting.   Genitourinary: Negative for dysuria and urgency.   Musculoskeletal: Negative for falls, myalgias and neck pain.   Skin: Negative for itching and rash.   Neurological: Positive for dizziness. Negative for weakness and headaches.   Endo/Heme/Allergies: Does not bruise/bleed easily.   Psychiatric/Behavioral: Negative for depression and suicidal ideas.        Physical Exam  Temp:  [36.9 °C (98.4 °F)-38.3 °C (100.9 °F)] 37.2 °C (99 °F)  Pulse:  [] 101  Resp:  [16-21] 16  BP: ()/(61-72) 95/63  SpO2:  [94 %-98 %] 95 %    Physical Exam  Vitals and nursing note reviewed.   Constitutional:       General: He is not in acute distress.     Appearance: He is well-developed. He is not ill-appearing.   HENT:      Head: Normocephalic and atraumatic.      Mouth/Throat:      Mouth: Mucous membranes are moist.    Cardiovascular:      Rate and Rhythm: Normal rate and regular rhythm.      Pulses: Normal pulses.      Heart sounds: Normal heart sounds. No murmur heard.  Pulmonary:      Effort: Pulmonary effort is normal.      Breath sounds: Normal breath sounds. No decreased air movement. No wheezing.   Abdominal:      General: Bowel sounds are normal. There is no distension.      Palpations: Abdomen is soft.      Tenderness: There is no abdominal tenderness. There is no right CVA tenderness, left CVA tenderness or guarding.   Musculoskeletal:      Right lower leg: No edema.      Left lower leg: No edema.   Neurological:      General: No focal deficit present.      Mental Status: He is alert and oriented to person, place, and time.      Cranial Nerves: No cranial nerve deficit.      Motor: No weakness.   Psychiatric:         Attention and Perception: Attention normal.         Mood and Affect: Mood normal.         Speech: Speech normal.         Behavior: Behavior normal. Behavior is cooperative.         Fluids    Intake/Output Summary (Last 24 hours) at 7/2/2022 1400  Last data filed at 7/2/2022 1007  Gross per 24 hour   Intake 2530 ml   Output --   Net 2530 ml       Laboratory  Recent Labs     07/01/22  1717   WBC 11.5*   RBC 5.90   HEMOGLOBIN 15.8   HEMATOCRIT 47.4   MCV 80.3*   MCH 26.8*   MCHC 33.3*   RDW 36.7   PLATELETCT 254   MPV 10.2     Recent Labs     07/01/22  1717   SODIUM 131*   POTASSIUM 3.5*   CHLORIDE 98   CO2 20   GLUCOSE 230*   BUN 24*   CREATININE 1.08   CALCIUM 8.9                   Imaging  EC-ECHOCARDIOGRAM COMPLETE W/O CONT         CT-ABDOMEN-PELVIS WITH   Final Result         1.  Fluid-filled distended small bowel loops and fluid-filled contents throughout the colon, appearance suggests ileus and/or enterocolitis versus other underlying diarrheal disease.   2.  Atherosclerosis      CT-CTA CHEST PULMONARY ARTERY W/ RECONS   Final Result         1.  No large central pulmonary embolus is appreciated,  evaluation of the subsegmental branches is essentially nondiagnostic due to motion artifacts. Additional imaging would be required for definitive exclusion of small distal pulmonary emboli.   2.  Atherosclerosis and atherosclerotic coronary artery disease      DX-CHEST-PORTABLE (1 VIEW)   Final Result      1.  There is no acute cardiopulmonary process.           Assessment/Plan  * Sepsis (HCC)- (present on admission)  Assessment & Plan  This is Sepsis Present on admission  SIRS criteria identified on my evaluation include: Fever, with temperature greater than 101 deg F and Tachycardia, with heart rate greater than 90 BPM  Source is GI infection   Sepsis protocol initiated  Fluid resuscitation ordered per protocol  Crystalloid Fluid Administration: Fluid resuscitation ordered per standard protocol - 30 mL/kg per current or ideal body weight  IV antibiotics as appropriate for source of sepsis  Reassessment: I have reassessed the patient's hemodynamic status    IV Rocephin/Flagyl   Blood cultures drawn, no growth at 24 hours  Patient continues to have some mild diarrhea continue IV management          Diarrhea  Assessment & Plan  Patient presents with 1 day of diarrhea and dizziness, diarrhea continues to persist  -Continue IV fluid replacement and electrolyte replacement as needed  -Will send additional studies including stool culture and C. difficile, low suspicion for C. difficile as patient is not recently on antibiotics, PPI or has any history    Hypokalemia  Assessment & Plan  Replete     Tachycardia- (present on admission)  Assessment & Plan  Secondary to sepsis due to GI infection   CTA PE negative for PE  IVF     Type 2 diabetes mellitus with hyperglycemia, without long-term current use of insulin (HCC)- (present on admission)  Assessment & Plan  Last A1c 9.5   SSI with accu-checks and hypoglycemia protocol       Coronary artery disease due to calcified coronary lesion: CABG ×3 in 2015- (present on  admission)  Assessment & Plan  C/w Aspirin, ARB, Statin   BB from AM   Echocardiogram results are still pending    Dyslipidemia- (present on admission)  Assessment & Plan  C/w statin    Hyponatremia- (present on admission)  Assessment & Plan  Most likely secondary to acute dehydration and volume loss related to gastritis  Patient receiving fluid resuscitation we will continue to monitor       VTE prophylaxis: Xarelto 10 mg daily as prophylaxis    I have performed a physical exam and reviewed and updated ROS and Plan today (7/2/2022). In review of yesterday's note (7/1/2022), there are no changes except as documented above.

## 2022-07-02 NOTE — ED TRIAGE NOTES
Chief Complaint   Patient presents with   • Hypotension     Pt reports bp yesterday read 77/50. Denies fevers, CP, SOB, N/V. Pt is reporting generalizes abdominal pain and diarrhea. Denies recent infections.    • Dizziness     Since Wednesday      Protocol ordered. Pt ambulatory. A&Ox4. Pt educated to inform staff of any changes.     Language line  used: Gabbi [id number].

## 2022-07-02 NOTE — ED NOTES
Med rec completed per patient with photo of medication bottles on phone  And Language Line interpretor [id number]  Allergies reviewed  No PO Antibiotics in the last 30 days

## 2022-07-02 NOTE — CARE PLAN
Problem: Mechanical Ventilation  Goal: Patient will be able to express needs and understand communication  Outcome: Progressing     Problem: Knowledge Deficit - Standard  Goal: Patient and family/care givers will demonstrate understanding of plan of care, disease process/condition, diagnostic tests and medications  Outcome: Progressing     Problem: Fall Risk  Goal: Patient will remain free from falls  Outcome: Progressing   The patient is Stable - Low risk of patient condition declining or worsening    Shift Goals  Clinical Goals: abx, monitor BP and bm's  Patient Goals: rest, comfort  Family Goals: Rest/comfort    Progress made toward(s) clinical / shift goals:  abx changed from IV to PO and continued, patient with only one reported loose BM this AM, BP has remained stable, echo complete and unchanged from the prior study    Patient is not progressing towards the following goals: patient still with mild abdominal pain, c diff testing ordered

## 2022-07-02 NOTE — HOSPITAL COURSE
Jorden Bonilla is a 57 y.o. male past medical history of CAD status post CABG in 2017, type II DM patient reports he believes it was 2015 with past medical history of  who presented 7/1/2022 with dizziness.    Patient presented with complaint of mild abdominal pain 5 out of 10.  Is reporting loose stools/diarrhea for 24 hours.  Patient continues to have loose stools in the morning.  He thinks this is possibly related to something that he ate.  Denies any fever, chills, nausea or vomiting.    The emergency department patient met criteria for sepsis with tachycardia hypotension and possible abdominal infection.  Was started on ceftriaxone and Flagyl, and sepsis bundle including fluid resuscitation.    There was concern for possible PE, patient underwent CTA which was negative for PE. CT abdomen did show dilated loops of bowl concerning ileus versus enterocolitis.Patient has active bowel sounds, denies abdominal pain. And able to have formed stool, diarrhea has resolved.      Given initial presentation concern for sepsis with GI source, patient to complete 5 day course of Flagyl and Cefdinir.      Also noted to be mildly hypokalemic.  Believe these are secondary to GI losses, replaced, will be discharged with 3 additional days of potassium replacement.    Patient with history of CAD, CABG x3 and 2015.  Echocardiogram completed, no changes from patient's baseline noted.  Continue patient's aspirin, ARB and statin.

## 2022-07-02 NOTE — ASSESSMENT & PLAN NOTE
Most likely secondary to acute dehydration and volume loss related to gastritis  Patient receiving fluid resuscitation we will continue to monitor

## 2022-07-02 NOTE — H&P
Hospital Medicine History & Physical Note    Date of Service  7/1/2022    Primary Care Physician  Narendra Arora D.O.    Consultants  none    Specialist Names: none     Code Status  Full Code    Chief Complaint  Chief Complaint   Patient presents with   • Hypotension     Pt reports bp yesterday read 77/50. Denies fevers, CP, SOB, N/V. Pt is reporting generalizes abdominal pain and diarrhea. Denies recent infections.    • Dizziness     Since Wednesday        History of Presenting Illness  Jorden Bonilla is a 57 y.o. male past medical history of CAD status post CABG in 2017, type II DM patient reports he believes it was 2015 with past medical history of  who presented 7/1/2022 with dizziness.  He reports that the floor and everything around him is moving.  He denies any does report chest pain that is worse with exertion.  He denies any chills.  He denies any nausea or vomiting.  Another complaint which he mentioned to me was abdominal pain 5 out of 10 intensity.  Reports loose stools since yesterday.    In the ER, patient met criteria for sepsis.  There was concern for possible PE for which a CTA pulmonary angiogram was done which was negative for large embolus.  CT abdomen/pelvis done was significant for fluid-filled distended small bowel loops and fluid-filled contents throughout the colon suggestive of ileus.    He will be admitted for sepsis secondary to intra-abdominal infection.    I discussed the plan of care with patient.    Review of Systems  Review of Systems   Constitutional: Negative for chills and fever.   HENT: Negative for hearing loss and tinnitus.    Eyes: Negative for blurred vision and double vision.   Respiratory: Negative for cough and hemoptysis.    Cardiovascular: Negative for chest pain and palpitations.   Gastrointestinal: Positive for abdominal pain and diarrhea. Negative for heartburn and nausea.   Genitourinary: Negative for dysuria and urgency.   Musculoskeletal: Negative for myalgias  and neck pain.   Skin: Negative for itching and rash.   Neurological: Positive for dizziness. Negative for headaches.   Endo/Heme/Allergies: Does not bruise/bleed easily.   Psychiatric/Behavioral: Negative for depression and suicidal ideas.       Past Medical History   has a past medical history of Type II or unspecified type diabetes mellitus without mention of complication, not stated as uncontrolled.    Surgical History   has a past surgical history that includes multiple coronary artery bypass endo vein harvest (N/A, 8/22/2015).     Family History  family history is not on file.   Family history reviewed with patient. There is no family history that is pertinent to the chief complaint.     Social History   reports that he quit smoking about 22 years ago. He quit after 25.00 years of use. He has never used smokeless tobacco. He reports that he does not drink alcohol and does not use drugs.    Allergies  No Known Allergies    Medications  Prior to Admission Medications   Prescriptions Last Dose Informant Patient Reported? Taking?   Dulaglutide (TRULICITY) 1.5 MG/0.5ML Solution Pen-injector 6/23/2022 at [facility] Patient Yes No   Sig: Inject 1.5 mg under the skin.   aspirin EC 81 MG EC tablet >3 days at [facility] Patient No No   Sig: Take 1 Tab by mouth every day.   atorvastatin (LIPITOR) 40 MG Tab >3 days at [facility] Patient Yes No   Sig: Take 80 mg by mouth every day. 2 tablets = 80 mg   ezetimibe (ZETIA) 10 MG Tab >3 days at [facility] Patient No No   Sig: TAKE ONE TABLET BY MOUTH ONCE DAILY   metFORMIN ER (GLUCOPHAGE XR) 500 MG TABLET SR 24 HR >3 days at [facility] Patient Yes No   Sig: Take 1,000 mg by mouth 2 times a day. 2 tablets = 1000 mg   naproxen (ANAPROX) 220 MG tablet >3 days at [facility] Patient Yes No   Sig: Take 220 mg by mouth 2 times a day, with meals.   olmesartan (BENICAR) 20 MG Tab >3 days at [facility] Patient Yes No   Sig: Take 20 mg by mouth every day.      Facility-Administered Medications: None       Physical Exam  Temp:   [37.8 °C (100 °F)-37.9 °C (100.2 °F)] 37.8 °C (100 °F)  Pulse:  [111-124] 116  Resp:  [18] 18  BP: (100-120)/(61-72) 101/61  SpO2:  [94 %-98 %] 97 %  Blood Pressure: 101/61   Temperature: 37.8 °C (100 °F)   Pulse: (!) 116   Respiration: 18   Pulse Oximetry: 97 %       Physical Exam  Vitals and nursing note reviewed.   Constitutional:       General: He is not in acute distress.     Appearance: Normal appearance. He is not ill-appearing.   HENT:      Head: Normocephalic and atraumatic.      Right Ear: Tympanic membrane normal. There is no impacted cerumen.      Left Ear: Tympanic membrane normal. There is no impacted cerumen.      Nose: Nose normal. No congestion or rhinorrhea.      Mouth/Throat:      Mouth: Mucous membranes are moist.      Pharynx: Oropharynx is clear.   Eyes:      General:         Right eye: No discharge.         Left eye: No discharge.      Extraocular Movements: Extraocular movements intact.      Pupils: Pupils are equal, round, and reactive to light.   Cardiovascular:      Rate and Rhythm: Regular rhythm. Tachycardia present.      Pulses: Normal pulses.      Heart sounds: Normal heart sounds.   Pulmonary:      Effort: Pulmonary effort is normal. No respiratory distress.      Breath sounds: Normal breath sounds. No stridor. No wheezing or rhonchi.   Abdominal:      General: There is no distension.      Palpations: Abdomen is soft. There is no mass.      Tenderness: There is abdominal tenderness.      Hernia: No hernia is present.      Comments: Hyperactive bowel sounds    Musculoskeletal:         General: No swelling, tenderness, deformity or signs of injury. Normal range of motion.      Cervical back: Neck supple. No rigidity or tenderness.   Skin:     General: Skin is warm.      Capillary Refill: Capillary refill takes less than 2 seconds.      Coloration: Skin is not jaundiced or pale.      Findings: No bruising or erythema.   Neurological:      General: No focal deficit present.      Mental  Status: He is alert and oriented to person, place, and time. Mental status is at baseline.      Cranial Nerves: No cranial nerve deficit.      Sensory: No sensory deficit.      Motor: No weakness.      Coordination: Coordination normal.   Psychiatric:         Mood and Affect: Mood normal.         Behavior: Behavior normal.         Laboratory:  Recent Labs     07/01/22 1717   WBC 11.5*   RBC 5.90   HEMOGLOBIN 15.8   HEMATOCRIT 47.4   MCV 80.3*   MCH 26.8*   MCHC 33.3*   RDW 36.7   PLATELETCT 254   MPV 10.2     Recent Labs     07/01/22 1717   SODIUM 131*   POTASSIUM 3.5*   CHLORIDE 98   CO2 20   GLUCOSE 230*   BUN 24*   CREATININE 1.08   CALCIUM 8.9     Recent Labs     07/01/22 1717   ALTSGPT 15   ASTSGOT 12   ALKPHOSPHAT 61   TBILIRUBIN 0.6   LIPASE 32   GLUCOSE 230*         Recent Labs     07/01/22 2035   NTPROBNP 35         Recent Labs     07/01/22 2035   TROPONINT 8       Imaging:  DX-CHEST-PORTABLE (1 VIEW)   Final Result      1.  There is no acute cardiopulmonary process.      CT-HEAD W/O    (Results Pending)   CT-CTA CHEST PULMONARY ARTERY W/ RECONS    (Results Pending)   EC-ECHOCARDIOGRAM COMPLETE W/O CONT    (Results Pending)       X-Ray:  I have personally reviewed the images and compared with prior images.  EKG:  I have personally reviewed the images and compared with prior images.    Assessment/Plan:  Justification for Admission Status  I anticipate this patient will require at least two midnights for appropriate medical management, necessitating inpatient admission because sepsis secondary to Gi infection     * Sepsis (HCC)- (present on admission)  Assessment & Plan  This is Sepsis Present on admission  SIRS criteria identified on my evaluation include: Fever, with temperature greater than 101 deg F and Tachycardia, with heart rate greater than 90 BPM  Source is GI infection   Sepsis protocol initiated  Fluid resuscitation ordered per protocol  Crystalloid Fluid Administration: Fluid resuscitation  ordered per standard protocol - 30 mL/kg per current or ideal body weight  IV antibiotics as appropriate for source of sepsis  Reassessment: I have reassessed the patient's hemodynamic status    IV Rocephin/Flagyl   De escalate based on C+S          Hypokalemia  Assessment & Plan  Replete     Tachycardia- (present on admission)  Assessment & Plan  Secondary to sepsis due to GI infection   CTA PE negative for PE  IVF     Type 2 diabetes mellitus with hyperglycemia, without long-term current use of insulin (HCC)- (present on admission)  Assessment & Plan  Last A1c 9.5   SSI with accu-checks and hypoglycemia protocol       Coronary artery disease due to calcified coronary lesion: CABG ×3 in 2015- (present on admission)  Assessment & Plan  C/w Aspirin, ARB, Statin   BB from AM   Echocardiogram transthoracic to evaluate for LVF    Dyslipidemia- (present on admission)  Assessment & Plan  C/w statin      VTE prophylaxis: Xarelto 10 mg daily as prophylaxis

## 2022-07-02 NOTE — ASSESSMENT & PLAN NOTE
Patient presents with 1 day of diarrhea and dizziness, diarrhea continues to persist  -Continue IV fluid replacement and electrolyte replacement as needed  -Will send additional studies including stool culture and C. difficile, low suspicion for C. difficile as patient is not recently on antibiotics, PPI or has any history

## 2022-07-02 NOTE — ED PROVIDER NOTES
ED Provider Note      CHIEF COMPLAINT  Dizzy    HPI    Please note Tag"[employer]  was used    Jorden Ashley Bonilla is a 57 y.o. male who presents with a chief complaint of dizzy.  He has no specific description.  He says he simply dizzy.  He denies being vertiginous.  It is not worse when he is moving or walking.  He states is worse when he is drinking his medicine.  It is intermittent.  States that he limit his work by 4 hours yesterday.  There is been associated chest pressure chest pain associated.  No associated nausea no vomiting.  He has had diarrhea for the last several days which actually improved.    Patient history of type 2 diabetes.  Accompanied by his wife.      Denies any fever chills sore throat trouble swallowing chest pain trouble breathing diarrhea diarrhea noted above no dysuria no hematuria no rashes.    He states that he saw his doctor who increased his cholesterol medication.  However, he has not seen his doctor or partners doctor about the dizziness.  He states that his knee as his eyes are causing the room to spin.  It is when he is walking and exerting himself.  Is better when he is at rest.  And associate shortness of breath.    REVIEW OF SYSTEMS  See above    PAST MEDICAL HISTORY  Past Medical History:   Diagnosis Date   • Type II or unspecified type diabetes mellitus without mention of complication, not stated as uncontrolled        FAMILY HISTORY  History reviewed. No pertinent family history.    SOCIAL HISTORY  Social History     Socioeconomic History   • Marital status:    Tobacco Use   • Smoking status: Former Smoker     Years: 25.00     Quit date: 1999     Years since quittin.8   • Smokeless tobacco: Never Used   Substance and Sexual Activity   • Alcohol use: No   • Drug use: No       SURGICAL HISTORY  Past Surgical History:   Procedure Laterality Date   • MULTIPLE CORONARY ARTERY BYPASS ENDO VEIN HARVEST N/A 2015    Procedure: coronary artery bypass  "grtafting x 3, endoscopic vein harvest right leg, transesophageal echocardiogram;  Surgeon: Henry Baldwin M.D.;  Location: SURGERY [facility];  Service:        CURRENT MEDICATIONS  Home Medications     Reviewed by Polly Parr R.N. (Registered Nurse) on 07/01/22 at 1707  Med List Status: Not Addressed   Medication Last Dose Status   aspirin 81 MG tablet  Active   Aspirin Buf,CaCarb-MgCarb-MgO, 81 MG Tab  Active   aspirin EC 81 MG EC tablet  Active   atorvastatin (LIPITOR) 40 MG Tab  Active   Coenzyme Q10 (CO Q 10) 100 MG Cap  Active   Dulaglutide (TRULICITY) 1.5 MG/0.5ML Solution Pen-injector  Active   Empagliflozin (JARDIANCE) 25 MG Tab  Active   ezetimibe (ZETIA) 10 MG Tab  Active   ezetimibe (ZETIA) 10 MG Tab  Active   glucose blood (ONETOUCH ULTRA) strip  Active   JARDIANCE 10 MG Tab  Active   JARDIANCE 25 MG Tab  Active   lisinopril (PRINIVIL) 2.5 MG Tab  Active   metformin (GLUCOPHAGE) 1000 MG tablet  Active   metFORMIN ER (GLUCOPHAGE XR) 500 MG TABLET SR 24 HR  Active   metoprolol SR (TOPROL XL) 50 MG TABLET SR 24 HR  Active   naproxen (ANAPROX) 220 MG tablet  Active   naproxen (ANAPROX) 220 MG tablet  Active   olmesartan (BENICAR) 20 MG Tab  Active   rosuvastatin (CRESTOR) 40 MG tablet  Active   TRULICITY 0.75 MG/0.5ML Solution Pen-injector  Active   TRULICITY 1.5 MG/0.5ML Solution Pen-injector  Active   Zoster Vac Recomb Adjuvanted (SHINGRIX) 50 MCG/0.5ML Recon Susp  Active                 ALLERGIES  No Known Allergies    PHYSICAL EXAM  VITAL SIGNS: /72   Pulse (!) 123   Temp 37.9 °C (100.2 °F) (Temporal)   Resp 18   Ht 1.651 m (5' 5\")   Wt 63.9 kg (140 lb 14 oz)   SpO2 98%   BMI 23.44 kg/m²   Constitutional: Well developed, Well nourished, No acute distress, Non-toxic appearance.   HENT: Normocephalic, Atraumatic, Bilateral external ears normal, Oropharynx moist, No oral exudates, Nose normal.  Panic membranes are clear bilaterally eyes: PERRLA, EOMI, Conjunctiva normal, No " discharge.   Musculoskeletal: Neck has normal range of motion, No tenderness, Supple.   Lymphatic: No cervical lymphadenopathy noted.   Cardiovascular: Normal heart rate, Normal rhythm, No murmurs, No rubs, No gallops.  Patient has no dizziness while standing up.  Orthostatics her blood pressure actually increasing.  Thorax & Lungs: Normal breath sounds, No respiratory distress, No wheezing, No chest tenderness.   Abdomen: Nondistended nontender soft  Skin: Warm, Dry, No erythema, No rash.   : No CVA tenderness.   Psychiatric: Calm, not anxious  Neurologic: Alert & oriented, moves all extremities equally good [place]-Hallpike test.  No facial droop good finger-nose negative pronator drift    RADIOLOGY/PROCEDURES  Results for orders placed or performed during the hospital encounter of 07/01/22   CBC WITH DIFFERENTIAL   Result Value Ref Range    WBC 11.5 (H) 4.8 - 10.8 K/uL    RBC 5.90 4.70 - 6.10 M/uL    Hemoglobin 15.8 14.0 - 18.0 g/dL    Hematocrit 47.4 42.0 - 52.0 %    MCV 80.3 (L) 81.4 - 97.8 fL    MCH 26.8 (L) 27.0 - 33.0 pg    MCHC 33.3 (L) 33.7 - 35.3 g/dL    RDW 36.7 35.9 - 50.0 fL    Platelet Count 254 164 - 446 K/uL    MPV 10.2 9.0 - 12.9 fL    Neutrophils-Polys 82.80 (H) 44.00 - 72.00 %    Lymphocytes 6.50 (L) 22.00 - 41.00 %    Monocytes 8.50 0.00 - 13.40 %    Eosinophils 1.50 0.00 - 6.90 %    Basophils 0.30 0.00 - 1.80 %    Immature Granulocytes 0.40 0.00 - 0.90 %    Nucleated RBC 0.00 /100 WBC    Neutrophils (Absolute) 9.49 (H) 1.82 - 7.42 K/uL    Lymphs (Absolute) 0.74 (L) 1.00 - 4.80 K/uL    Monos (Absolute) 0.97 (H) 0.00 - 0.85 K/uL    Eos (Absolute) 0.17 0.00 - 0.51 K/uL    Baso (Absolute) 0.04 0.00 - 0.12 K/uL    Immature Granulocytes (abs) 0.05 0.00 - 0.11 K/uL    NRBC (Absolute) 0.00 K/uL   COMP METABOLIC PANEL   Result Value Ref Range    Sodium 131 (L) 135 - 145 mmol/L    Potassium 3.5 (L) 3.6 - 5.5 mmol/L    Chloride 98 96 - 112 mmol/L    Co2 20 20 - 33 mmol/L    Anion Gap 13.0 7.0 - 16.0     Glucose 230 (H) 65 - 99 mg/dL    Bun 24 (H) 8 - 22 mg/dL    Creatinine 1.08 0.50 - 1.40 mg/dL    Calcium 8.9 8.5 - 10.5 mg/dL    AST(SGOT) 12 12 - 45 U/L    ALT(SGPT) 15 2 - 50 U/L    Alkaline Phosphatase 61 30 - 99 U/L    Total Bilirubin 0.6 0.1 - 1.5 mg/dL    Albumin 4.7 3.2 - 4.9 g/dL    Total Protein 7.4 6.0 - 8.2 g/dL    Globulin 2.7 1.9 - 3.5 g/dL    A-G Ratio 1.7 g/dL   LIPASE   Result Value Ref Range    Lipase 32 11 - 82 U/L   URINALYSIS    Specimen: Urine, Clean Catch   Result Value Ref Range    Color Yellow     Character Clear     Specific Gravity 1.015 <1.035    Ph 5.5 5.0 - 8.0    Glucose 100 (A) Negative mg/dL    Ketones Negative Negative mg/dL    Protein Negative Negative mg/dL    Bilirubin Negative Negative    Urobilinogen, Urine 0.2 Negative    Nitrite Negative Negative    Leukocyte Esterase Negative Negative    Occult Blood Negative Negative    Micro Urine Req see below    Lactic acid (lactate)   Result Value Ref Range    Lactic Acid 2.2 (H) 0.5 - 2.0 mmol/L   Lactic acid (lactate): Repeat if initial lactic acid result is greater than 2   Result Value Ref Range    Lactic Acid 1.9 0.5 - 2.0 mmol/L   ESTIMATED GFR   Result Value Ref Range    GFR (CKD-EPI) 79 >60 mL/min/1.73 m 2   CoV-2, FLU A/B, and RSV by PCR (2-4 Hours CEPHEID) : Collect NP swab in VTM    Specimen: Respirate   Result Value Ref Range    Influenza virus A RNA Negative Negative    Influenza virus B, PCR Negative Negative    RSV, PCR Negative Negative    SARS-CoV-2 by PCR NotDetected     SARS-CoV-2 Source NP Swab    TROPONIN   Result Value Ref Range    Troponin T 8 6 - 19 ng/L   EKG   Result Value Ref Range    Report       [facility] Emergency Dept.    Test Date:  2022  Pt Name:    KENDRA GAFFNEY                 Department: ER  MRN:        [id number]                      Room:  Gender:     Male                         Technician: [id number]  :        1965                   Requested By:ER TRIAGE PROTOCOL  Order  #:    [id number]                    Reading MD:    Measurements  Intervals                                Axis  Rate:       123                          P:          25  WA:         148                          QRS:        -26  QRSD:       80                           T:          66  QT:         308  QTc:        441    Interpretive Statements  SINUS TACHYCARDIA  LEFT ATRIAL ABNORMALITY  BORDERLINE LEFT AXIS DEVIATION  BORDERLINE T ABNORMALITIES, ANT-LAT LEADS  Compared to ECG 08/24/2015 10:55:28  Atrial abnormality now present  T-wave abnormality now present  ST (T wave) deviation no longer present          COURSE & MEDICAL DECISION MAKING  Pertinent Labs & Imaging studies reviewed. (See chart for details)  Very pleasant gentleman 57 years old type 2 diabetes here with a chief complaint of dizziness.  At this point patient is borderline temperature is tachycardic.  I SPECT is most likely is infectious in etiology.  His neuro exam shows no signs of cerebellar dysfunction.  He is negative [place]-Hallpike test.  Patient simply states dizzy does not give vertiginous symptoms.    Plan  IV fluids 2 L  Tylenol  Septic protocol started  's COVID fluids the patient has no initial symptoms or findings suggest focal bacterial infection.    She was examined he continued be tachycardic troponin was added that was negative.  The EKG showed no acute ST segment elevations or depressions and no signs of pericarditis.  Patient we had a toggle lock  used patient is dizzy usually is walking.  Is been going on for the last 3 days.  He did increase his Crestor.  However he has not a echocardiogram for many many years.    Is a 57-year-old gentleman who comes in today with dizziness for the last 3 to 4 days worse when he is walking almost like the room is spinning.  Worse when he walks.  He states still sounds very much like exertional dizziness and at this point not positional.  CT scan is pending over and call the hospitalist LAURA  think patient is a correct tachycardia heart cardiac work-up and observation he is still tachycardic still borderline hypotensive.    10:01 PM  Patient remains tachycardic hypotensive has a fever at this point admit margination admit the patient for further work-up.  Echocardiogram I am concerned about a cardiac etiology at this time.  The patient at this point is in guarded condition.    FINAL IMPRESSION  1.  Tachycardia  2.  Hypotension  3.  Fever      Electronically signed by: Nick Stanford M.D., 7/1/2022 6:10 PM

## 2022-07-02 NOTE — PROGRESS NOTES
Assumed care of patient, up to new room in T-819. Received report from Blanca ALBA. Patient is AOx4, states 0/10 pain. Patient denies feeling nauseous. Patient oriented to new room, fall precautions in place, nonskid socks on, bed locked and in lowest position, bed alarm on and working, and call light within reach. Vitals signs taken, patient is medical without a heart monitor on. All questions answered, no further needs at this time.

## 2022-07-02 NOTE — ED NOTES
Pt resting in gurney, respirations even and unlabored. NAD noted. Updated on POC - pending ct scan. Pt verbalized understanding.

## 2022-07-02 NOTE — ED NOTES
Pt completed IVF, repeat lactic collected and sent. Temp rechecked, pt denies needs at the moment. NAD noted.

## 2022-07-02 NOTE — ED NOTES
Lactic & labs sent. CXR at bedside. Pt's only complaint is midback pain & diarrhea, no cough noted, abdomen soft & nontender. On cardiac monitor.

## 2022-07-02 NOTE — ASSESSMENT & PLAN NOTE
This is Sepsis Present on admission  SIRS criteria identified on my evaluation include: Fever, with temperature greater than 101 deg F and Tachycardia, with heart rate greater than 90 BPM  Source is GI infection   Sepsis protocol initiated  Fluid resuscitation ordered per protocol  Crystalloid Fluid Administration: Fluid resuscitation ordered per standard protocol - 30 mL/kg per current or ideal body weight  IV antibiotics as appropriate for source of sepsis  Reassessment: I have reassessed the patient's hemodynamic status    IV Rocephin/Flagyl   Blood cultures drawn, no growth at 24 hours  Patient continues to have some mild diarrhea continue IV management

## 2022-07-02 NOTE — ED NOTES
Pt hooked back onto monitor, started and 2nd bag of LR. Pt denies pain, respirations even and unlabored. Relative at bedside.

## 2022-07-03 ENCOUNTER — PHARMACY VISIT (OUTPATIENT)
Dept: PHARMACY | Facility: MEDICAL CENTER | Age: 57
End: 2022-07-03
Payer: MEDICARE

## 2022-07-03 VITALS
DIASTOLIC BLOOD PRESSURE: 73 MMHG | HEART RATE: 77 BPM | BODY MASS INDEX: 22.74 KG/M2 | WEIGHT: 136.47 LBS | RESPIRATION RATE: 16 BRPM | OXYGEN SATURATION: 97 % | SYSTOLIC BLOOD PRESSURE: 116 MMHG | HEIGHT: 65 IN | TEMPERATURE: 98.8 F

## 2022-07-03 PROBLEM — R19.7 DIARRHEA: Status: RESOLVED | Noted: 2022-07-02 | Resolved: 2022-07-03

## 2022-07-03 PROBLEM — A41.9 SEPSIS (HCC): Status: RESOLVED | Noted: 2022-07-02 | Resolved: 2022-07-03

## 2022-07-03 PROBLEM — E87.6 HYPOKALEMIA: Status: RESOLVED | Noted: 2022-07-02 | Resolved: 2022-07-03

## 2022-07-03 PROBLEM — R00.0 TACHYCARDIA: Status: RESOLVED | Noted: 2022-07-01 | Resolved: 2022-07-03

## 2022-07-03 LAB
ANION GAP SERPL CALC-SCNC: 11 MMOL/L (ref 7–16)
BASOPHILS # BLD AUTO: 0 % (ref 0–1.8)
BASOPHILS # BLD: 0 K/UL (ref 0–0.12)
BUN SERPL-MCNC: 12 MG/DL (ref 8–22)
CALCIUM SERPL-MCNC: 8.4 MG/DL (ref 8.5–10.5)
CHLORIDE SERPL-SCNC: 107 MMOL/L (ref 96–112)
CO2 SERPL-SCNC: 22 MMOL/L (ref 20–33)
CREAT SERPL-MCNC: 0.82 MG/DL (ref 0.5–1.4)
EOSINOPHIL # BLD AUTO: 0.32 K/UL (ref 0–0.51)
EOSINOPHIL NFR BLD: 5.3 % (ref 0–6.9)
ERYTHROCYTE [DISTWIDTH] IN BLOOD BY AUTOMATED COUNT: 36.7 FL (ref 35.9–50)
GFR SERPLBLD CREATININE-BSD FMLA CKD-EPI: 101 ML/MIN/1.73 M 2
GLUCOSE BLD STRIP.AUTO-MCNC: 143 MG/DL (ref 65–99)
GLUCOSE SERPL-MCNC: 123 MG/DL (ref 65–99)
HCT VFR BLD AUTO: 41.5 % (ref 42–52)
HGB BLD-MCNC: 14 G/DL (ref 14–18)
LYMPHOCYTES # BLD AUTO: 1.52 K/UL (ref 1–4.8)
LYMPHOCYTES NFR BLD: 25.4 % (ref 22–41)
MANUAL DIFF BLD: NORMAL
MCH RBC QN AUTO: 27 PG (ref 27–33)
MCHC RBC AUTO-ENTMCNC: 33.7 G/DL (ref 33.7–35.3)
MCV RBC AUTO: 80.1 FL (ref 81.4–97.8)
MONOCYTES # BLD AUTO: 0.63 K/UL (ref 0–0.85)
MONOCYTES NFR BLD AUTO: 10.5 % (ref 0–13.4)
MORPHOLOGY BLD-IMP: NORMAL
MYELOCYTES NFR BLD MANUAL: 0.9 %
NEUTROPHILS # BLD AUTO: 3.47 K/UL (ref 1.82–7.42)
NEUTROPHILS NFR BLD: 57.9 % (ref 44–72)
NRBC # BLD AUTO: 0 K/UL
NRBC BLD-RTO: 0 /100 WBC
PLATELET # BLD AUTO: 208 K/UL (ref 164–446)
PLATELET BLD QL SMEAR: NORMAL
PMV BLD AUTO: 10.6 FL (ref 9–12.9)
POTASSIUM SERPL-SCNC: 3.1 MMOL/L (ref 3.6–5.5)
RBC # BLD AUTO: 5.18 M/UL (ref 4.7–6.1)
RBC BLD AUTO: PRESENT
SMUDGE CELLS BLD QL SMEAR: NORMAL
SODIUM SERPL-SCNC: 140 MMOL/L (ref 135–145)
VARIANT LYMPHS BLD QL SMEAR: NORMAL
WBC # BLD AUTO: 6 K/UL (ref 4.8–10.8)

## 2022-07-03 PROCEDURE — A9270 NON-COVERED ITEM OR SERVICE: HCPCS | Performed by: STUDENT IN AN ORGANIZED HEALTH CARE EDUCATION/TRAINING PROGRAM

## 2022-07-03 PROCEDURE — 700102 HCHG RX REV CODE 250 W/ 637 OVERRIDE(OP): Performed by: STUDENT IN AN ORGANIZED HEALTH CARE EDUCATION/TRAINING PROGRAM

## 2022-07-03 PROCEDURE — 700111 HCHG RX REV CODE 636 W/ 250 OVERRIDE (IP): Performed by: STUDENT IN AN ORGANIZED HEALTH CARE EDUCATION/TRAINING PROGRAM

## 2022-07-03 PROCEDURE — RXMED WILLOW AMBULATORY MEDICATION CHARGE: Performed by: NURSE PRACTITIONER

## 2022-07-03 PROCEDURE — 36415 COLL VENOUS BLD VENIPUNCTURE: CPT

## 2022-07-03 PROCEDURE — 85007 BL SMEAR W/DIFF WBC COUNT: CPT

## 2022-07-03 PROCEDURE — 99239 HOSP IP/OBS DSCHRG MGMT >30: CPT | Mod: FS | Performed by: NURSE PRACTITIONER

## 2022-07-03 PROCEDURE — 700102 HCHG RX REV CODE 250 W/ 637 OVERRIDE(OP): Performed by: NURSE PRACTITIONER

## 2022-07-03 PROCEDURE — A9270 NON-COVERED ITEM OR SERVICE: HCPCS | Performed by: NURSE PRACTITIONER

## 2022-07-03 PROCEDURE — 85025 COMPLETE CBC W/AUTO DIFF WBC: CPT

## 2022-07-03 PROCEDURE — 80048 BASIC METABOLIC PNL TOTAL CA: CPT

## 2022-07-03 PROCEDURE — 82962 GLUCOSE BLOOD TEST: CPT

## 2022-07-03 RX ORDER — METRONIDAZOLE 500 MG/1
500 TABLET ORAL EVERY 8 HOURS
Qty: 9 TABLET | Refills: 0 | Status: SHIPPED | OUTPATIENT
Start: 2022-07-03 | End: 2022-07-06

## 2022-07-03 RX ORDER — POTASSIUM CHLORIDE 20 MEQ/1
20 TABLET, EXTENDED RELEASE ORAL DAILY
Qty: 3 TABLET | Refills: 0 | Status: SHIPPED | OUTPATIENT
Start: 2022-07-03 | End: 2022-07-06

## 2022-07-03 RX ORDER — CEFDINIR 300 MG/1
300 CAPSULE ORAL 2 TIMES DAILY
Qty: 6 CAPSULE | Refills: 0 | Status: SHIPPED | OUTPATIENT
Start: 2022-07-03 | End: 2022-07-06

## 2022-07-03 RX ORDER — POTASSIUM CHLORIDE 20 MEQ/1
40 TABLET, EXTENDED RELEASE ORAL ONCE
Status: COMPLETED | OUTPATIENT
Start: 2022-07-03 | End: 2022-07-03

## 2022-07-03 RX ADMIN — EZETIMIBE 10 MG: 10 TABLET ORAL at 05:34

## 2022-07-03 RX ADMIN — POTASSIUM CHLORIDE 40 MEQ: 1500 TABLET, EXTENDED RELEASE ORAL at 09:44

## 2022-07-03 RX ADMIN — ASPIRIN 81 MG: 81 TABLET, COATED ORAL at 05:34

## 2022-07-03 RX ADMIN — WATER 1 G: 1000 INJECTION, SOLUTION INTRAVENOUS at 05:34

## 2022-07-03 RX ADMIN — ATORVASTATIN CALCIUM 80 MG: 80 TABLET, FILM COATED ORAL at 05:34

## 2022-07-03 RX ADMIN — METRONIDAZOLE 500 MG: 500 TABLET ORAL at 05:34

## 2022-07-03 ASSESSMENT — PAIN DESCRIPTION - PAIN TYPE: TYPE: ACUTE PAIN

## 2022-07-03 NOTE — CARE PLAN
Problem: Hemodynamics  Goal: Patient's hemodynamics, fluid balance and neurologic status will be stable or improve  Outcome: Progressing     Problem: Respiratory  Goal: Patient will achieve/maintain optimum respiratory ventilation and gas exchange  Outcome: Progressing     Problem: Knowledge Deficit - Standard  Goal: Patient and family/care givers will demonstrate understanding of plan of care, disease process/condition, diagnostic tests and medications  Outcome: Progressing     Problem: Fall Risk  Goal: Patient will remain free from falls  Outcome: Progressing   The patient is Stable - Low risk of patient condition declining or worsening    Shift Goals  Clinical Goals: ABX, monitor BP  Patient Goals: restt, comfort  Family Goals: Rest/comfort    Progress made toward(s) clinical / shift goals:  Progressing    Patient is not progressing towards the following goals:

## 2022-07-03 NOTE — DISCHARGE SUMMARY
Discharge Summary    CHIEF COMPLAINT ON ADMISSION  Chief Complaint   Patient presents with   • Hypotension     Pt reports bp yesterday read 77/50. Denies fevers, CP, SOB, N/V. Pt is reporting generalizes abdominal pain and diarrhea. Denies recent infections.    • Dizziness     Since Wednesday        Reason for Admission  Low blood pressure      Admission Date  7/1/2022    CODE STATUS  Full Code    HPI & HOSPITAL COURSE    Jorden Bonilla is a 57 y.o. male past medical history of CAD status post CABG in 2017, type II DM patient reports he believes it was 2015 with past medical history of  who presented 7/1/2022 with dizziness.    Patient presented with complaint of mild abdominal pain 5 out of 10.  Is reporting loose stools/diarrhea for 24 hours.  Patient continues to have loose stools in the morning.  He thinks this is possibly related to something that he ate.  Denies any fever, chills, nausea or vomiting.    The emergency department patient met criteria for sepsis with tachycardia hypotension and possible abdominal infection.  Was started on ceftriaxone and Flagyl, and sepsis bundle including fluid resuscitation.    There was concern for possible PE, patient underwent CTA which was negative for PE. CT abdomen did show dilated loops of bowl concerning ileus versus enterocolitis.Patient has active bowel sounds, denies abdominal pain. And able to have formed stool, diarrhea has resolved.      Given initial presentation concern for sepsis with GI source, patient to complete 5 day course of Flagyl and Cefdinir.      Also noted to be mildly hypokalemic.  Believe these are secondary to GI losses, replaced, will be discharged with 3 additional days of potassium replacement.    Patient with history of CAD, CABG x3 and 2015.  Echocardiogram completed, no changes from patient's baseline noted.  Continue patient's aspirin, ARB and statin.    I have performed a physical exam and reviewed and updated ROS and Plan today  (7/3/2022). In review of yesterday's note (7/2/2022), there are no changes except as documented above.     Therefore, he is discharged in fair and stable condition to home with close outpatient follow-up.    The patient met 2-midnight criteria for an inpatient stay at the time of discharge.    Discharge Date  7/3/2022    FOLLOW UP ITEMS POST DISCHARGE    Discharge Instructions per REY Bowers.    Continue flagyl, cefdinir and potassium for 3 days. Restart all home medications.    DIET: [place] diet    ACTIVITY: No limitations    DIAGNOSIS: Acute dehydration and electrolyte imbalance secondary to acute gastritis    Return to ER if symptoms return.      DISCHARGE DIAGNOSES  Principal Problem (Resolved):    Sepsis (HCC) POA: Yes  Active Problems:    Dyslipidemia POA: Yes    Coronary artery disease due to calcified coronary lesion: CABG ×3 in 2015 POA: Yes      Overview: Formatting of this note might be different from the original.      2015 CABG x 3    Type 2 diabetes mellitus with hyperglycemia, without long-term current use of insulin (HCC) POA: Yes      Overview: Formatting of this note might be different from the original.      With proteinuria, CAD      3/23/22 A1c 11.1 fasting glucose 179  Resolved Problems:    Hyponatremia POA: Yes    Tachycardia POA: Yes    Hypokalemia POA: Unknown    Diarrhea POA: Unknown      FOLLOW UP  No future appointments.  No follow-up provider specified.    MEDICATIONS ON DISCHARGE     Medication List      START taking these medications      Instructions   cefdinir 300 MG Caps  Commonly known as: OMNICEF   Take 1 Capsule by mouth 2 times a day for 3 days.  Dose: 300 mg     metroNIDAZOLE 500 MG Tabs  Commonly known as: FLAGYL   Take 1 Tablet by mouth every 8 hours for 3 days.  Dose: 500 mg     potassium chloride SA 20 MEQ Tbcr  Commonly known as: Kdur   Take 1 Tablet by mouth every day for 3 days.  Dose: 20 mEq        CONTINUE taking these medications      Instructions    aspirin 81 MG EC tablet   Take 1 Tab by mouth every day.  Dose: 81 mg     atorvastatin 40 MG Tabs  Commonly known as: LIPITOR   Take 80 mg by mouth every day. 2 tablets = 80 mg  Dose: 80 mg     ezetimibe 10 MG Tabs  Commonly known as: ZETIA   Doctor's comments: Please consider 90 day supplies to promote better adherence  TAKE ONE TABLET BY MOUTH ONCE DAILY     metFORMIN  MG Tb24  Commonly known as: GLUCOPHAGE XR   Take 1,000 mg by mouth 2 times a day. 2 tablets = 1000 mg  Dose: 1,000 mg     naproxen 220 MG tablet  Commonly known as: ANAPROX   Take 220 mg by mouth 2 times a day, with meals.  Dose: 220 mg     olmesartan 20 MG Tabs  Commonly known as: BENICAR   Take 20 mg by mouth every day.  Dose: 20 mg     Trulicity 1.5 MG/0.5ML Sopn  Generic drug: Dulaglutide   Inject 1.5 mg under the skin.  Dose: 1.5 mg            Allergies  No Known Allergies    DIET  Orders Placed This Encounter   Procedures   • Diet Order Diet: Consistent CHO (Diabetic)     Standing Status:   Standing     Number of Occurrences:   1     Order Specific Question:   Diet:     Answer:   Consistent CHO (Diabetic) [4]       ACTIVITY  As tolerated.  Weight bearing as tolerated    CONSULTATIONS  None    PROCEDURES  Echocardiogram    LABORATORY  Lab Results   Component Value Date    SODIUM 140 07/03/2022    POTASSIUM 3.1 (L) 07/03/2022    CHLORIDE 107 07/03/2022    CO2 22 07/03/2022    GLUCOSE 123 (H) 07/03/2022    BUN 12 07/03/2022    CREATININE 0.82 07/03/2022        Lab Results   Component Value Date    WBC 6.0 07/03/2022    HEMOGLOBIN 14.0 07/03/2022    HEMATOCRIT 41.5 (L) 07/03/2022    PLATELETCT 208 07/03/2022      EC-ECHOCARDIOGRAM COMPLETE W/O CONT   Final Result      CT-ABDOMEN-PELVIS WITH   Final Result         1.  Fluid-filled distended small bowel loops and fluid-filled contents throughout the colon, appearance suggests ileus and/or enterocolitis versus other underlying diarrheal disease.   2.  Atherosclerosis      CT-CTA CHEST  PULMONARY ARTERY W/ RECONS   Final Result         1.  No large central pulmonary embolus is appreciated, evaluation of the subsegmental branches is essentially nondiagnostic due to motion artifacts. Additional imaging would be required for definitive exclusion of small distal pulmonary emboli.   2.  Atherosclerosis and atherosclerotic coronary artery disease      DX-CHEST-PORTABLE (1 VIEW)   Final Result      1.  There is no acute cardiopulmonary process.        Total time of the discharge process took 34 minutes.

## 2022-07-03 NOTE — PROGRESS NOTES
Bedside report received from day RN, pt care assumed, assessment completed. Pt is A&O4, pain 0/10. Updated on POC, questions answered. Bed in lowest, locked position, treaded socks on, call light and belongings within reach. Fall precautions in place.

## 2022-07-03 NOTE — DISCHARGE INSTRUCTIONS
Discharge Instructions per REY Bowers.    Continue flagyl, cefdinir and potassium for 3 days. Restart all home medications.    DIET: [place] diet    ACTIVITY: No limitations    DIAGNOSIS: Acute dehydration and electrolyte imbalance secondary to acute gastritis    Return to ER if symptoms return.    Discharge Instructions    Discharged to home by car with relative. Discharged via wheelchair, hospital escort: Yes.  Special equipment needed: Not Applicable    Be sure to schedule a follow-up appointment with your primary care doctor or any specialists as instructed.     Discharge Plan:        I understand that a diet low in cholesterol, fat, and sodium is recommended for good health. Unless I have been given specific instructions below for another diet, I accept this instruction as my diet prescription.   Other diet: [place]      Special Instructions: None    -Is this patient being discharged with medication to prevent blood clots?  No    Is patient discharged on Warfarin / Coumadin?   No

## 2022-07-04 LAB
BACTERIA UR CULT: NORMAL
SIGNIFICANT IND 70042: NORMAL
SITE SITE: NORMAL
SOURCE SOURCE: NORMAL

## 2022-07-06 LAB
BACTERIA BLD CULT: NORMAL
BACTERIA BLD CULT: NORMAL
SIGNIFICANT IND 70042: NORMAL
SIGNIFICANT IND 70042: NORMAL
SITE SITE: NORMAL
SITE SITE: NORMAL
SOURCE SOURCE: NORMAL
SOURCE SOURCE: NORMAL

## 2022-07-13 NOTE — DOCUMENTATION QUERY
[facility]                                                                       Query Response Note      PATIENT:               KENDRA GAFFNEY  ACCT #:                  [id number]  MRN:                     [id number]  :                      1965  ADMIT DATE:       2022 5:28 PM  DISCH DATE:        7/3/2022 12:06 PM  RESPONDING  PROVIDER #:        [id number]           QUERY TEXT:    The diagnosis of sepsis has been documented.    Please provide additional clinical indicators/SIRS criteria supportive of the documented diagnosis of sepsis.     Note: If an appropriate response is not listed below, please respond with a new note.    Thank you.      The patient's Clinical Indicators include:  57 y.o. male presented 2022 with dizziness. Sepsis secondary to GI infection. - Dr. Resendiz, H&P, 22    Patient met criteria for sepsis with tachycardia, hypotension and possible abdominal infection. Acute gastritis - Leandro MOHR, Discharged Summary, 7/3/22    LOS: 2    WBC: 11.5, 6.0  Procalcitonin: 0.19  Lactate: 2.2, 1.9    T-max: 100.9  HR: 100-120s    Risk factor: gastritis    Treatment: IV flagyl, ceftriaxone    Thank you,  Helio Alves  Clinical   Connect via [employer]  Options provided:   -- Sepsis exists, (please document additional + SIRS criteria and clinical indicators)   -- Sepsis does not exist - amended documentation in the medical record and updated problem list   -- Unable to provide additional clarity regarding the diagnosis   -- Other, please specify   -- Unable to determine      Query created by: Helio Alves on 2022 12:20 PM    RESPONSE TEXT:    sepsis exists - fever, tachycardia, intraabdominal source of infection/colitis.          Electronically signed by:  PAMELA DAWN MD 2022 1:21 PM

## 2023-08-10 ENCOUNTER — APPOINTMENT (OUTPATIENT)
Dept: RADIOLOGY | Facility: MEDICAL CENTER | Age: 58
DRG: 065 | End: 2023-08-10
Attending: HOSPITALIST
Payer: COMMERCIAL

## 2023-08-10 ENCOUNTER — APPOINTMENT (OUTPATIENT)
Dept: RADIOLOGY | Facility: MEDICAL CENTER | Age: 58
DRG: 065 | End: 2023-08-10
Attending: EMERGENCY MEDICINE
Payer: COMMERCIAL

## 2023-08-10 ENCOUNTER — HOSPITAL ENCOUNTER (INPATIENT)
Facility: MEDICAL CENTER | Age: 58
LOS: 4 days | DRG: 065 | End: 2023-08-14
Attending: EMERGENCY MEDICINE | Admitting: HOSPITALIST
Payer: COMMERCIAL

## 2023-08-10 DIAGNOSIS — I63.9 CEREBROVASCULAR ACCIDENT (CVA), UNSPECIFIED MECHANISM (HCC): ICD-10-CM

## 2023-08-10 DIAGNOSIS — I69.959 FLACCID HEMIPLEGIA AS LATE EFFECT OF CEREBROVASCULAR DISEASE, UNSPECIFIED CEREBROVASCULAR DISEASE TYPE, UNSPECIFIED LATERALITY (HCC): ICD-10-CM

## 2023-08-10 PROBLEM — G81.90 HEMIPLEGIA (HCC): Status: ACTIVE | Noted: 2023-08-10

## 2023-08-10 PROBLEM — E86.0 DEHYDRATION: Status: ACTIVE | Noted: 2023-08-10

## 2023-08-10 LAB
ABO GROUP BLD: NORMAL
ALBUMIN SERPL BCP-MCNC: 3.3 G/DL (ref 3.2–4.9)
ALBUMIN/GLOB SERPL: 1.5 G/DL
ALP SERPL-CCNC: 48 U/L (ref 30–99)
ALT SERPL-CCNC: 11 U/L (ref 2–50)
ANION GAP SERPL CALC-SCNC: 10 MMOL/L (ref 7–16)
APTT PPP: 27.8 SEC (ref 24.7–36)
AST SERPL-CCNC: 13 U/L (ref 12–45)
BASOPHILS # BLD AUTO: 0.3 % (ref 0–1.8)
BASOPHILS # BLD: 0.03 K/UL (ref 0–0.12)
BILIRUB SERPL-MCNC: 0.5 MG/DL (ref 0.1–1.5)
BLD GP AB SCN SERPL QL: NORMAL
BUN SERPL-MCNC: 17 MG/DL (ref 8–22)
CALCIUM ALBUM COR SERPL-MCNC: 8.9 MG/DL (ref 8.5–10.5)
CALCIUM SERPL-MCNC: 8.3 MG/DL (ref 8.5–10.5)
CHLORIDE SERPL-SCNC: 97 MMOL/L (ref 96–112)
CO2 SERPL-SCNC: 23 MMOL/L (ref 20–33)
CREAT SERPL-MCNC: 0.72 MG/DL (ref 0.5–1.4)
EKG IMPRESSION: NORMAL
EOSINOPHIL # BLD AUTO: 0.04 K/UL (ref 0–0.51)
EOSINOPHIL NFR BLD: 0.5 % (ref 0–6.9)
ERYTHROCYTE [DISTWIDTH] IN BLOOD BY AUTOMATED COUNT: 35.9 FL (ref 35.9–50)
EST. AVERAGE GLUCOSE BLD GHB EST-MCNC: 295 MG/DL
GFR SERPLBLD CREATININE-BSD FMLA CKD-EPI: 104 ML/MIN/1.73 M 2
GLOBULIN SER CALC-MCNC: 2.2 G/DL (ref 1.9–3.5)
GLUCOSE BLD STRIP.AUTO-MCNC: 134 MG/DL (ref 65–99)
GLUCOSE BLD STRIP.AUTO-MCNC: 168 MG/DL (ref 65–99)
GLUCOSE BLD STRIP.AUTO-MCNC: 241 MG/DL (ref 65–99)
GLUCOSE BLD STRIP.AUTO-MCNC: 339 MG/DL (ref 65–99)
GLUCOSE SERPL-MCNC: 334 MG/DL (ref 65–99)
HBA1C MFR BLD: 11.9 % (ref 4–5.6)
HCT VFR BLD AUTO: 41.6 % (ref 42–52)
HGB BLD-MCNC: 13.9 G/DL (ref 14–18)
IMM GRANULOCYTES # BLD AUTO: 0.04 K/UL (ref 0–0.11)
IMM GRANULOCYTES NFR BLD AUTO: 0.5 % (ref 0–0.9)
INR PPP: 1.04 (ref 0.87–1.13)
LYMPHOCYTES # BLD AUTO: 1.36 K/UL (ref 1–4.8)
LYMPHOCYTES NFR BLD: 15.5 % (ref 22–41)
MCH RBC QN AUTO: 27 PG (ref 27–33)
MCHC RBC AUTO-ENTMCNC: 33.4 G/DL (ref 32.3–36.5)
MCV RBC AUTO: 80.9 FL (ref 81.4–97.8)
MONOCYTES # BLD AUTO: 0.51 K/UL (ref 0–0.85)
MONOCYTES NFR BLD AUTO: 5.8 % (ref 0–13.4)
NEUTROPHILS # BLD AUTO: 6.78 K/UL (ref 1.82–7.42)
NEUTROPHILS NFR BLD: 77.4 % (ref 44–72)
NRBC # BLD AUTO: 0 K/UL
NRBC BLD-RTO: 0 /100 WBC (ref 0–0.2)
PLATELET # BLD AUTO: 203 K/UL (ref 164–446)
PMV BLD AUTO: 10.3 FL (ref 9–12.9)
POTASSIUM SERPL-SCNC: 4.1 MMOL/L (ref 3.6–5.5)
PROT SERPL-MCNC: 5.5 G/DL (ref 6–8.2)
PROTHROMBIN TIME: 13.5 SEC (ref 12–14.6)
RBC # BLD AUTO: 5.14 M/UL (ref 4.7–6.1)
RH BLD: NORMAL
SODIUM SERPL-SCNC: 130 MMOL/L (ref 135–145)
TROPONIN T SERPL-MCNC: 11 NG/L (ref 6–19)
WBC # BLD AUTO: 8.8 K/UL (ref 4.8–10.8)

## 2023-08-10 PROCEDURE — 71045 X-RAY EXAM CHEST 1 VIEW: CPT

## 2023-08-10 PROCEDURE — 70450 CT HEAD/BRAIN W/O DYE: CPT

## 2023-08-10 PROCEDURE — 86901 BLOOD TYPING SEROLOGIC RH(D): CPT

## 2023-08-10 PROCEDURE — 93005 ELECTROCARDIOGRAM TRACING: CPT | Performed by: EMERGENCY MEDICINE

## 2023-08-10 PROCEDURE — 70496 CT ANGIOGRAPHY HEAD: CPT

## 2023-08-10 PROCEDURE — 86850 RBC ANTIBODY SCREEN: CPT

## 2023-08-10 PROCEDURE — 99285 EMERGENCY DEPT VISIT HI MDM: CPT

## 2023-08-10 PROCEDURE — 92610 EVALUATE SWALLOWING FUNCTION: CPT

## 2023-08-10 PROCEDURE — 36415 COLL VENOUS BLD VENIPUNCTURE: CPT

## 2023-08-10 PROCEDURE — 700102 HCHG RX REV CODE 250 W/ 637 OVERRIDE(OP): Performed by: HOSPITALIST

## 2023-08-10 PROCEDURE — 86900 BLOOD TYPING SEROLOGIC ABO: CPT

## 2023-08-10 PROCEDURE — 82962 GLUCOSE BLOOD TEST: CPT | Mod: 91

## 2023-08-10 PROCEDURE — 80053 COMPREHEN METABOLIC PANEL: CPT

## 2023-08-10 PROCEDURE — 85730 THROMBOPLASTIN TIME PARTIAL: CPT

## 2023-08-10 PROCEDURE — 700117 HCHG RX CONTRAST REV CODE 255: Performed by: EMERGENCY MEDICINE

## 2023-08-10 PROCEDURE — 85025 COMPLETE CBC W/AUTO DIFF WBC: CPT

## 2023-08-10 PROCEDURE — 99223 1ST HOSP IP/OBS HIGH 75: CPT | Performed by: HOSPITALIST

## 2023-08-10 PROCEDURE — 70498 CT ANGIOGRAPHY NECK: CPT

## 2023-08-10 PROCEDURE — 83036 HEMOGLOBIN GLYCOSYLATED A1C: CPT

## 2023-08-10 PROCEDURE — 70551 MRI BRAIN STEM W/O DYE: CPT

## 2023-08-10 PROCEDURE — 700111 HCHG RX REV CODE 636 W/ 250 OVERRIDE (IP): Mod: JZ | Performed by: HOSPITALIST

## 2023-08-10 PROCEDURE — 700105 HCHG RX REV CODE 258: Performed by: HOSPITALIST

## 2023-08-10 PROCEDURE — 84484 ASSAY OF TROPONIN QUANT: CPT

## 2023-08-10 PROCEDURE — 99254 IP/OBS CNSLTJ NEW/EST MOD 60: CPT | Performed by: PSYCHIATRY & NEUROLOGY

## 2023-08-10 PROCEDURE — 0042T CT-CEREBRAL PERFUSION ANALYSIS: CPT

## 2023-08-10 PROCEDURE — 770020 HCHG ROOM/CARE - TELE (206)

## 2023-08-10 PROCEDURE — A9270 NON-COVERED ITEM OR SERVICE: HCPCS | Performed by: HOSPITALIST

## 2023-08-10 PROCEDURE — 85610 PROTHROMBIN TIME: CPT

## 2023-08-10 RX ORDER — ATORVASTATIN CALCIUM 80 MG/1
80 TABLET, FILM COATED ORAL EVERY EVENING
Status: ON HOLD | COMMUNITY
End: 2023-09-05 | Stop reason: SDUPTHER

## 2023-08-10 RX ORDER — POLYETHYLENE GLYCOL 3350 17 G/17G
1 POWDER, FOR SOLUTION ORAL
Status: DISCONTINUED | OUTPATIENT
Start: 2023-08-10 | End: 2023-08-14 | Stop reason: HOSPADM

## 2023-08-10 RX ORDER — ATORVASTATIN CALCIUM 40 MG/1
40 TABLET, FILM COATED ORAL EVERY EVENING
Status: DISCONTINUED | OUTPATIENT
Start: 2023-08-10 | End: 2023-08-11

## 2023-08-10 RX ORDER — SODIUM CHLORIDE 9 MG/ML
1000 INJECTION, SOLUTION INTRAVENOUS CONTINUOUS
Status: DISCONTINUED | OUTPATIENT
Start: 2023-08-10 | End: 2023-08-11

## 2023-08-10 RX ORDER — EMPAGLIFLOZIN 25 MG/1
25 TABLET, FILM COATED ORAL EVERY EVENING
Status: ON HOLD | COMMUNITY
End: 2023-09-05

## 2023-08-10 RX ORDER — METFORMIN HYDROCHLORIDE 500 MG/1
1000 TABLET, EXTENDED RELEASE ORAL 2 TIMES DAILY
Status: ON HOLD | COMMUNITY
End: 2023-09-05

## 2023-08-10 RX ORDER — ACETAMINOPHEN 325 MG/1
650 TABLET ORAL EVERY 6 HOURS PRN
Status: DISCONTINUED | OUTPATIENT
Start: 2023-08-10 | End: 2023-08-14 | Stop reason: HOSPADM

## 2023-08-10 RX ORDER — ATORVASTATIN CALCIUM 80 MG/1
80 TABLET, FILM COATED ORAL EVERY EVENING
Status: DISCONTINUED | OUTPATIENT
Start: 2023-08-10 | End: 2023-08-10

## 2023-08-10 RX ORDER — AMOXICILLIN 250 MG
2 CAPSULE ORAL 2 TIMES DAILY
Status: DISCONTINUED | OUTPATIENT
Start: 2023-08-10 | End: 2023-08-14 | Stop reason: HOSPADM

## 2023-08-10 RX ORDER — PROCHLORPERAZINE EDISYLATE 5 MG/ML
5-10 INJECTION INTRAMUSCULAR; INTRAVENOUS EVERY 4 HOURS PRN
Status: DISCONTINUED | OUTPATIENT
Start: 2023-08-10 | End: 2023-08-14 | Stop reason: HOSPADM

## 2023-08-10 RX ORDER — OLMESARTAN MEDOXOMIL 20 MG/1
20 TABLET ORAL EVERY EVENING
Status: DISCONTINUED | OUTPATIENT
Start: 2023-08-11 | End: 2023-08-14 | Stop reason: HOSPADM

## 2023-08-10 RX ORDER — PROMETHAZINE HYDROCHLORIDE 25 MG/1
12.5-25 TABLET ORAL EVERY 4 HOURS PRN
Status: DISCONTINUED | OUTPATIENT
Start: 2023-08-10 | End: 2023-08-14 | Stop reason: HOSPADM

## 2023-08-10 RX ORDER — ONDANSETRON 2 MG/ML
4 INJECTION INTRAMUSCULAR; INTRAVENOUS EVERY 4 HOURS PRN
Status: DISCONTINUED | OUTPATIENT
Start: 2023-08-10 | End: 2023-08-14 | Stop reason: HOSPADM

## 2023-08-10 RX ORDER — ONDANSETRON 4 MG/1
4 TABLET, ORALLY DISINTEGRATING ORAL EVERY 4 HOURS PRN
Status: DISCONTINUED | OUTPATIENT
Start: 2023-08-10 | End: 2023-08-14 | Stop reason: HOSPADM

## 2023-08-10 RX ORDER — OLMESARTAN MEDOXOMIL 20 MG/1
20 TABLET ORAL EVERY EVENING
Status: ON HOLD | COMMUNITY
End: 2023-09-05

## 2023-08-10 RX ORDER — ASPIRIN 81 MG/1
81 TABLET ORAL EVERY EVENING
Status: DISCONTINUED | OUTPATIENT
Start: 2023-08-10 | End: 2023-08-14 | Stop reason: HOSPADM

## 2023-08-10 RX ORDER — PROMETHAZINE HYDROCHLORIDE 25 MG/1
12.5-25 SUPPOSITORY RECTAL EVERY 4 HOURS PRN
Status: DISCONTINUED | OUTPATIENT
Start: 2023-08-10 | End: 2023-08-14 | Stop reason: HOSPADM

## 2023-08-10 RX ORDER — DEXTROSE MONOHYDRATE 25 G/50ML
25 INJECTION, SOLUTION INTRAVENOUS
Status: DISCONTINUED | OUTPATIENT
Start: 2023-08-10 | End: 2023-08-14 | Stop reason: HOSPADM

## 2023-08-10 RX ORDER — ENOXAPARIN SODIUM 100 MG/ML
40 INJECTION SUBCUTANEOUS DAILY
Status: DISCONTINUED | OUTPATIENT
Start: 2023-08-10 | End: 2023-08-14 | Stop reason: HOSPADM

## 2023-08-10 RX ORDER — BISACODYL 10 MG
10 SUPPOSITORY, RECTAL RECTAL
Status: DISCONTINUED | OUTPATIENT
Start: 2023-08-10 | End: 2023-08-14 | Stop reason: HOSPADM

## 2023-08-10 RX ADMIN — SENNOSIDES AND DOCUSATE SODIUM 2 TABLET: 50; 8.6 TABLET ORAL at 09:18

## 2023-08-10 RX ADMIN — PROCHLORPERAZINE EDISYLATE 10 MG: 5 INJECTION, SOLUTION INTRAMUSCULAR; INTRAVENOUS at 21:57

## 2023-08-10 RX ADMIN — ATORVASTATIN CALCIUM 40 MG: 40 TABLET, FILM COATED ORAL at 17:08

## 2023-08-10 RX ADMIN — INSULIN HUMAN 2 UNITS: 100 INJECTION, SOLUTION PARENTERAL at 11:55

## 2023-08-10 RX ADMIN — IOHEXOL 40 ML: 350 INJECTION, SOLUTION INTRAVENOUS at 07:30

## 2023-08-10 RX ADMIN — INSULIN HUMAN 1 UNITS: 100 INJECTION, SOLUTION PARENTERAL at 16:57

## 2023-08-10 RX ADMIN — ONDANSETRON 4 MG: 2 INJECTION INTRAMUSCULAR; INTRAVENOUS at 16:52

## 2023-08-10 RX ADMIN — IOHEXOL 80 ML: 350 INJECTION, SOLUTION INTRAVENOUS at 07:30

## 2023-08-10 RX ADMIN — ENOXAPARIN SODIUM 40 MG: 100 INJECTION SUBCUTANEOUS at 17:07

## 2023-08-10 RX ADMIN — ASPIRIN 81 MG: 81 TABLET, COATED ORAL at 17:08

## 2023-08-10 RX ADMIN — SODIUM CHLORIDE 1000 ML: 9 INJECTION, SOLUTION INTRAVENOUS at 12:54

## 2023-08-10 RX ADMIN — ONDANSETRON 4 MG: 2 INJECTION INTRAMUSCULAR; INTRAVENOUS at 21:12

## 2023-08-10 ASSESSMENT — LIFESTYLE VARIABLES
EVER FELT BAD OR GUILTY ABOUT YOUR DRINKING: NO
CONSUMPTION TOTAL: NEGATIVE
HAVE YOU EVER FELT YOU SHOULD CUT DOWN ON YOUR DRINKING: NO
EVER HAD A DRINK FIRST THING IN THE MORNING TO STEADY YOUR NERVES TO GET RID OF A HANGOVER: NO
TOTAL SCORE: 0
TOTAL SCORE: 0
ALCOHOL_USE: NO
ON A TYPICAL DAY WHEN YOU DRINK ALCOHOL HOW MANY DRINKS DO YOU HAVE: 0
HAVE PEOPLE ANNOYED YOU BY CRITICIZING YOUR DRINKING: NO
TOTAL SCORE: 0
AVERAGE NUMBER OF DAYS PER WEEK YOU HAVE A DRINK CONTAINING ALCOHOL: 0
HOW MANY TIMES IN THE PAST YEAR HAVE YOU HAD 5 OR MORE DRINKS IN A DAY: 0

## 2023-08-10 ASSESSMENT — COGNITIVE AND FUNCTIONAL STATUS - GENERAL
MOVING FROM LYING ON BACK TO SITTING ON SIDE OF FLAT BED: A LOT
DAILY ACTIVITIY SCORE: 17
MOBILITY SCORE: 11
MOVING TO AND FROM BED TO CHAIR: A LOT
STANDING UP FROM CHAIR USING ARMS: TOTAL
WALKING IN HOSPITAL ROOM: TOTAL
SUGGESTED CMS G CODE MODIFIER DAILY ACTIVITY: CK
CLIMB 3 TO 5 STEPS WITH RAILING: TOTAL
DRESSING REGULAR LOWER BODY CLOTHING: A LOT
TOILETING: A LITTLE
HELP NEEDED FOR BATHING: A LITTLE
DRESSING REGULAR UPPER BODY CLOTHING: A LOT
SUGGESTED CMS G CODE MODIFIER MOBILITY: CL
PERSONAL GROOMING: A LITTLE

## 2023-08-10 ASSESSMENT — ENCOUNTER SYMPTOMS
NERVOUS/ANXIOUS: 0
MYALGIAS: 0
FLANK PAIN: 0
FOCAL WEAKNESS: 1
CHILLS: 0
FEVER: 0
BRUISES/BLEEDS EASILY: 0
STRIDOR: 0
EYE DISCHARGE: 0
ABDOMINAL PAIN: 0
VOMITING: 0
SHORTNESS OF BREATH: 0
EYE REDNESS: 0
COUGH: 0

## 2023-08-10 ASSESSMENT — FIBROSIS 4 INDEX
FIB4 SCORE: 1.16
FIB4 SCORE: 1.16

## 2023-08-10 ASSESSMENT — PAIN DESCRIPTION - PAIN TYPE: TYPE: ACUTE PAIN

## 2023-08-10 NOTE — ED NOTES
Dysphagia screening/MRI screening completed. Medicated per MAR. No additional needs at this time.

## 2023-08-10 NOTE — ASSESSMENT & PLAN NOTE
Was taking Jardiance, Trulicity, metformin   A1c >11%  Hyperglycemia improving  glargine 5 units nightly  Continue ISS

## 2023-08-10 NOTE — ED PROVIDER NOTES
Emergency Physician Note    Chief Concern:  Stroke Like Symptoms- Left sided weakness.     HPI/ROS   Outside Historians:   Significant other Wife     External Records Reviewed:   Inpatient Notes Mr. Bonilla was admitted to this facility last year.  Discharge summary reviewed from 7/3/2022.  He was admitted for evaluation of lightheadedness and low blood pressure.  He is noted of the past medical history significant for coronary artery disease status post CABG in 2017, type 2 diabetes.  He also reported abdominal discomfort.  He met criteria for sepsis, there was also concern for pulmonary embolism, CTA was negative for PE.  CT of the abdomen showed dilated loops of bowel concerning for ileus versus enterocolitis.  He was treated with Flagyl and cefdinir.  He responded well to therapy, was able to be discharged in stable condition.    HPI:  Jorden Bonilla is a 58 y.o. male who presents to the emergency department as a stroke activation.  He felt normal when he went to bed last night around 10:30 PM.  Around 6:15 AM he presented to the emergency department today brought by private vehicle for left upper extremity and left lower extremity weakness, predominantly left lower extremity.  He states he is having significant difficulty moving the left leg, and that is much weaker than usual.  He noticed this when he woke up around 3:00 this morning.  He attempted to massage the leg, states it is a little bit stronger at this time than it was when he first woke up, but does not feel as strong as the right.  He has not noticed any slurred speech, family member has not noticed any abnormal speech, or confusion.  He has no associated chest pain or back pain.  He stroke risk factors include history of CABG, as well as diabetes.  He states that he is supposed to be taking medication for hypertension and diabetes, though he does not take these medications daily.  He reports no prior history of CVA.  He has no prior history of  CVA, states that he was able to bear some weight on the left leg, but he had to walk very slowly and carefully.  He did drive to  his wife from work, but states that he drove with the right leg, and did not have to use the left.    PAST MEDICAL HISTORY    Past Medical History:   Diagnosis Date    Type II or unspecified type diabetes mellitus without mention of complication, not stated as uncontrolled        SURGICAL HISTORY  Past Surgical History:   Procedure Laterality Date    MULTIPLE CORONARY ARTERY BYPASS ENDO VEIN HARVEST N/A 8/22/2015    Procedure: coronary artery bypass grtafting x 3, endoscopic vein harvest right leg, transesophageal echocardiogram;  Surgeon: Henry Baldwin M.D.;  Location: SURGERY [facility];  Service:        FAMILY HISTORY  No family history reported.     SOCIAL HISTORY   reports that he quit smoking about 23 years ago. He has never used smokeless tobacco. He reports that he does not drink alcohol and does not use drugs.    CURRENT MEDICATIONS  Current Discharge Medication List        CONTINUE these medications which have NOT CHANGED    Details   atorvastatin (LIPITOR) 80 MG tablet Take 80 mg by mouth every evening.      olmesartan (BENICAR) 20 MG Tab Take 20 mg by mouth every evening.      Empagliflozin (JARDIANCE) 25 MG Tab Take 25 mg by mouth every evening.      Multiple Vitamins-Minerals (CENTRUM ADULT PO) Take 1 Tablet by mouth every evening.      Coenzyme Q10 (COQ-10 PO) Take 1 Tablet by mouth every evening.      metFORMIN ER (GLUCOPHAGE XR) 500 MG TABLET SR 24 HR Take 1,000 mg by mouth 2 times a day.      Dulaglutide (TRULICITY) 1.5 MG/0.5ML Solution Pen-injector Inject 1.5 mg under the skin.      aspirin EC 81 MG EC tablet Take 1 Tab by mouth every day.  Qty: 30 Tab, Refills: 3             ALLERGIES  Patient has no known allergies.    PHYSICAL EXAM  Vital Signs: /74   Pulse 75   Temp 36.4 °C (97.5 °F) (Temporal)   Resp 16   Wt 62 kg (136 lb 11 oz)   SpO2 94%    BMI 22.75 kg/m²   Constitutional: Alert, no acute distress  HENT: Normocephalic, atraumatic.  Cardiovascular: Regular rate and rhythm, no murmur appreciated, no irregular rhythm  Pulmonary: No respiratory distress, normal work of breathing  Abdomen: Soft, non tender, no peritoneal signs.  Skin: Warm, dry, no rashes or lesions  Musculoskeletal: Normal range of motion in all extremities, no swelling or deformity noted  Neurologic: NIH stroke scale is 1, he has no facial droop, no slurred speech, normal finger-to-nose testing which is slightly slowed on the left likely due to left upper extremity weakness.  He has 5 out of 5 right upper and right lower extremity strength, 4 out of 5 left upper and left lower extremity strength.  Psychiatric: Normal and appropriate mood and affect      Diagnostic Studies & Procedures    Labs:  All labs reviewed by me as noted below.    EK Lead EKG interpreted by me as noted below  Results for orders placed or performed during the hospital encounter of 08/10/23   EKG (NOW)   Result Value Ref Range    Report       [facility] Emergency Dept.    Test Date:  2023-08-10  Pt Name:    KENDRA GAFFNEY                 Department: ER  MRN:        [id number]                      Room:        04  Gender:     Male                         Technician: [id number]  :        1965                   Requested By:DARVIN REMY  Order #:    [id number]                    Reading MD: DARVIN REMY MD    Measurements  Intervals                                Axis  Rate:       90                           P:          31  AK:         151                          QRS:        -41  QRSD:       85                           T:          31  QT:         372  QTc:        455    Interpretive Statements  Rate 90, sinus rhythm, no ST elevation or depression, no pathologic T wave  inversions.  T wave inversions previously seen in V2 are no longer present.  Electronically Signed On 08-  07:31:05 PDT by DARVIN REMY MD          Radiology:  The attending Emergency Physician has independently interpreted the following imaging:  I independently reviewed the CT of the head, no acute bleed identified.    DX-CHEST-PORTABLE (1 VIEW)   Final Result      No acute process.      CT-CEREBRAL PERFUSION ANALYSIS   Final Result      1.  Cerebral blood flow less than 30% likely representing completed infarct = 0 mL.      2.  T Max more than 6 seconds likely representing combination of completed infarct and ischemia = 0 mL.      3.  Mismatched volume likely representing ischemic brain/penumbra = None      4.  Please note that the cerebral perfusion was performed on the limited brain tissue around the basal ganglia region. Infarct/ischemia outside the CT perfusion sections can be missed in this study.      CT-CTA NECK WITH & W/O-POST PROCESSING   Final Result      No significant carotid or vertebral stenosis.      CT-CTA HEAD WITH & W/O-POST PROCESS   Final Result         1. 40% left intracranial vertebral artery stenosis.   2. Minimal bilateral intracranial carotid arterial narrowing secondary to plaque.   3. No large vessel occlusion or aneurysm identified.      CT-HEAD W/O   Final Result      No acute process.         MR-BRAIN-W/O    (Results Pending)   EC-ECHOCARDIOGRAM COMPLETE W/O CONT    (Results Pending)       Course and Medical Decision Making    ED Observation Status? No; Patient does not meet criteria for ED Observation.        Initial Assessment and Plan    Mr. Kwon presents to the emergency department today for evaluation of weakness onset around 3:00 this morning when he woke from sleep.  This is localized to the left leg, and somewhat involving the left arm.  He states that his symptoms seem to have improved since time of onset.  Stroke assessment was called on his arrival.  Due to last known normal less than 24 hours prior to arrival and weakness on left lower extremity testing, stroke activation  was called on my initial assessment.  He reports no thoracic back pain, no chest pain, doubt aortic etiology.  Systolic blood pressure is 138, no evidence of hypertensive emergency.    I discussed his presentation with Dr. Lopez who reviewed imaging as well, no large vessel occlusion, no indication for alteplase at this time with NIH stroke scale of 1.  Plan is for admission to hospitalist service for continued CVA evaluation, and MR imaging.    On laboratory evaluation glucose is 334 with a normal anion gap.  Sodium is 130, otherwise no significant abnormalities.  High-sensitivity troponin is 11.    Chest x-ray is negative for acute process.  CT perfusion study shows no mismatch.  CTA of the neck is negative for carotid or vertebral stenosis.  CT of the head demonstrates 40% left intracranial vertebral artery stenosis, no large vessel occlusion or aneurysm identified.  CT head is negative for acute process.     Plan at this time is for admission to medicine service for continued CVA evaluation.  He has had no new or worsening neurologic symptoms since time of arrival.    Additional Problems and Disposition    I have discussed management of the patient with the following physicians:  Dr. Lopez, Stroke Neurologist and Dr. Barraza (Hospitalist).     Disposition:  Patient will be hospitalized by Dr. Barraza in guarded condition.     FINAL IMPRESSION   1. Flaccid hemiplegia as late effect of cerebrovascular disease, unspecified cerebrovascular disease type, unspecified laterality (HCC)      Antonio SANCHEZ (Scott), am scribing for, and in the presence of, Phyllis Paige M.D..    Electronically signed by: Antonio Platt (Scott), 8/10/2023    Phyllis SANCHEZ M.D. personally performed the services described in this documentation, as scribed by Antonio Platt in my presence, and it is both accurate and complete.     The note accurately reflects work and decisions made by me.  Phyllis Paige M.D.  8/13/2023  11:14  AM

## 2023-08-10 NOTE — PROGRESS NOTES
4 Eyes Skin Assessment Completed by davi, RN and irene RN.    Head WDL  Ears WDL  Nose WDL  Mouth WDL  Neck WDL  Breast/Chest Scar  Shoulder Blades WDL  Spine WDL  (R) Arm/Elbow/Hand WDL  (L) Arm/Elbow/Hand WDL  Abdomen WDL  Groin WDL  Scrotum/Coccyx/Buttocks WDL  (R) Leg WDL  (L) Leg WDL  (R) Heel/Foot/Toe WDL  (L) Heel/Foot/Toe WDL          Devices In Places Blood Pressure Cuff and Pulse Ox      Interventions In Place Pillows    Possible Skin Injury No    Pictures Uploaded Into Epic N/A  Wound Consult Placed N/A  RN Wound Prevention Protocol Ordered No

## 2023-08-10 NOTE — DISCHARGE PLANNING
PM&R referral from Dr. Whitmore. Stroke protocol. Ongoing work up NIHSS 1. Physiatry consult pended at this time.

## 2023-08-10 NOTE — H&P
Hospital Medicine History & Physical Note    Date of Service  8/10/2023    Primary Care Physician  Narendra Arora D.O.    Consultants  [facility]    Code Status  Full Code    Chief Complaint  Left-sided weakness    History of Presenting Illness  Jorden Bonilla is a 58 y.o. male with a past medical history of poorly controlled diabetes and primary hypertension due to poor compliance who presented 8/10/2023 with left-sided weakness.  Patient was last normal and he slept last night around 10:30 PM.  He woke up this morning with left upper and lower extremity weakness.  The patient denies having abnormal jerking movement, tongue biting, fecal or urinary incontinence.      I discussed the plan of care with emergency department physician, the patient and patient family present at bedside    Review of Systems  Review of Systems   Constitutional:  Negative for chills and fever.   Eyes:  Negative for discharge and redness.   Respiratory:  Negative for cough, shortness of breath and stridor.    Cardiovascular:  Negative for chest pain and leg swelling.   Gastrointestinal:  Negative for abdominal pain and vomiting.   Genitourinary:  Negative for flank pain.   Musculoskeletal:  Negative for myalgias.   Skin: Negative.    Neurological:  Positive for focal weakness.   Endo/Heme/Allergies:  Does not bruise/bleed easily.   Psychiatric/Behavioral:  The patient is not nervous/anxious.      Past Medical History   has a past medical history of Type II or unspecified type diabetes mellitus without mention of complication, not stated as uncontrolled.    Surgical History   has a past surgical history that includes multiple coronary artery bypass endo vein harvest (N/A, 8/22/2015).     Family History  family history is not on file.      Social History   reports that he quit smoking about 23 years ago. He has never used smokeless tobacco. He reports that he does not drink alcohol and does not use drugs.    Allergies  No Known  Allergies    Medications  Prior to Admission Medications   Prescriptions Last Dose Informant Patient Reported? Taking?   ATORVASTATIN CALCIUM PO 8/9/2023 at PM Significant Other Yes Yes   Sig: Take 1 Tablet by mouth every evening.   Coenzyme Q10 (COQ-10 PO) 8/9/2023 at PM Significant Other Yes Yes   Sig: Take 1 Tablet by mouth every evening.   Dulaglutide (TRULICITY) 1.5 MG/0.5ML Solution Pen-injector 8/9/2023 at 2 Significant Other Yes No   Sig: Inject 1.5 mg under the skin.   Empagliflozin (JARDIANCE) 25 MG Tab 8/9/2023 at PM Significant Other Yes Yes   Sig: Take 25 mg by mouth every evening.   METFORMIN HCL PO 8/9/2023 at PM Significant Other Yes Yes   Sig: Take 2 Tablets by mouth 2 times a day.   Multiple Vitamins-Minerals (CENTRUM ADULT PO) 8/9/2023 at PM Significant Other Yes Yes   Sig: Take 1 Tablet by mouth every evening.   aspirin EC 81 MG EC tablet 8/9/2023 at PM Significant Other No No   Sig: Take 1 Tab by mouth every day.   olmesartan (BENICAR) 20 MG Tab 8/9/2023 at PM Significant Other Yes Yes   Sig: Take 20 mg by mouth every evening.      Facility-Administered Medications: None     Physical Exam  Temp:  [36.4 °C (97.5 °F)] 36.4 °C (97.5 °F)  Pulse:  [73-90] 75  Resp:  [16-18] 16  BP: (115-146)/(72-75) 115/74  SpO2:  [93 %-96 %] 94 %  Blood Pressure: 122/75   Temperature: 36.4 °C (97.5 °F)   Pulse: 73   Respiration: 16   Pulse Oximetry: 93 %     Physical Exam  Constitutional:       General: He is not in acute distress.     Appearance: He is not ill-appearing or diaphoretic.   HENT:      Head: Atraumatic.      Right Ear: External ear normal.      Left Ear: External ear normal.      Nose: No congestion or rhinorrhea.      Mouth/Throat:      Mouth: Mucous membranes are moist.   Eyes:      General: No scleral icterus.        Right eye: No discharge.         Left eye: No discharge.      Pupils: Pupils are equal, round, and reactive to light.   Cardiovascular:      Rate and Rhythm: Normal rate and regular  rhythm.   Pulmonary:      Effort: Pulmonary effort is normal.   Abdominal:      General: There is no distension.   Musculoskeletal:      Cervical back: Neck supple. No rigidity. No muscular tenderness.      Right lower leg: No edema.      Left lower leg: No edema.   Skin:     Coloration: Skin is not jaundiced or pale.   Neurological:      Mental Status: He is alert and oriented to person, place, and time.      Motor: Weakness (Primary 3/5 on the left upper extremity, 2/5 on the left lower extremity) present.      Coordination: Coordination abnormal.   Psychiatric:         Mood and Affect: Mood normal.         Behavior: Behavior normal.       Laboratory:  Recent Labs     08/10/23  0648   WBC 8.8   RBC 5.14   HEMOGLOBIN 13.9*   HEMATOCRIT 41.6*   MCV 80.9*   MCH 27.0   MCHC 33.4   RDW 35.9   PLATELETCT 203   MPV 10.3     Recent Labs     08/10/23  0648   SODIUM 130*   POTASSIUM 4.1   CHLORIDE 97   CO2 23   GLUCOSE 334*   BUN 17   CREATININE 0.72   CALCIUM 8.3*     Recent Labs     08/10/23  0648   ALTSGPT 11   ASTSGOT 13   ALKPHOSPHAT 48   TBILIRUBIN 0.5   GLUCOSE 334*     Recent Labs     08/10/23  0648   APTT 27.8   INR 1.04     No results for input(s): NTPROBNP in the last 72 hours.      Recent Labs     08/10/23  0648   TROPONINT 11     Imaging:  DX-CHEST-PORTABLE (1 VIEW)   Final Result      No acute process.      CT-CEREBRAL PERFUSION ANALYSIS   Final Result      1.  Cerebral blood flow less than 30% likely representing completed infarct = 0 mL.      2.  T Max more than 6 seconds likely representing combination of completed infarct and ischemia = 0 mL.      3.  Mismatched volume likely representing ischemic brain/penumbra = None      4.  Please note that the cerebral perfusion was performed on the limited brain tissue around the basal ganglia region. Infarct/ischemia outside the CT perfusion sections can be missed in this study.      CT-CTA NECK WITH & W/O-POST PROCESSING   Final Result      No significant carotid  or vertebral stenosis.      CT-CTA HEAD WITH & W/O-POST PROCESS   Final Result         1. 40% left intracranial vertebral artery stenosis.   2. Minimal bilateral intracranial carotid arterial narrowing secondary to plaque.   3. No large vessel occlusion or aneurysm identified.      CT-HEAD W/O   Final Result      No acute process.         MR-BRAIN-W/O    (Results Pending)   EC-ECHOCARDIOGRAM COMPLETE W/O CONT    (Results Pending)     Assessment/Plan:  Justification for Admission Status  I anticipate this patient will require at least two midnights for appropriate medical management, necessitating inpatient admission because the patient has hemiplegia likely secondary to an acute stroke.    Patient will need a Telemetry bed on NEUROLOGY service.  Hemiplegia likely secondary to acute stroke.    * Hemiplegia (HCC)- (present on admission)  Assessment & Plan  Admit to Neuro, with continuous cardiac monitoring  CT, CT-angiography, head, neck showed no acute infarction, or hemorrhage   We will check an echo, and an MRI  NPO, till screened by speech  Aspiration, Fall, and seizure precautions    Neuro checks   Speech. Physical, occupational therapy evaluation ordered  Physiatry consult placed   I will start high intensity statin and aspirin.    Dehydration- (present on admission)  Assessment & Plan  Likely secondary to reduced oral intake and osmotic diuresis secondary to hyperglycemia.  Encourage oral intake as tolerated, antiemetics as needed.  Intravenous hydration until adequate oral intake is achieved    Primary hypertension- (present on admission)  Assessment & Plan  Resume home olmesartan with hold parameters    Type 2 diabetes mellitus with hyperglycemia, without long-term current use of insulin (HCC)- (present on admission)  Assessment & Plan  With hyperglycemia  Last glycated hemoglobin was 8.5%  I will start short acting insulin for now  I will order Accu-Checks, hypoglycemia protocol  Adjust according to blood  sugars trend    Hyponatremia- (present on admission)  Assessment & Plan  With a component of pseudohyponatremia.  Measured sodium is likely low secondary to elevated blood sugars.  Treating hyperglycemia, continue to monitor, anticipate measured sodium to improve once blood sugars are higher controlled.      Dyslipidemia- (present on admission)  Assessment & Plan  Start high intensity statin    VTE prophylaxis: SCDs/TEDs and enoxaparin ppx

## 2023-08-10 NOTE — CONSULTS
Neurology STROKE CODE H&P  Neurohospitalist Service, [facility] Neurosciences    Referring Physician: Phyllis Paige M.D.    STROKE CODE: Left side weakness      To obtain the most accurate data regarding the time called, and time patient seen, refer to the stroke run-sheet and chart.  For time of CT, refer to the radiology report. See A&P below for TPA Decision and door to needle time if and when applicable.    HPI: Jorden Bonilla is a 58 year old man with CAD s/p CABG, type 2 diabetes, hypertension, presenting with L side weakness.  He reports that he went to bed normal around 1030PM last night, and woke up this morning with L leg weakness.  Later, he noted that his L hand also felt weak.  He came to the ER for evaulation.  On arrival, SBPs in 140s, FSBS 339.  He reports no headache, no prior history of stroke.  He states he is compliant with medications, which includes ASA but no anticoagulation.  On ROS, denies infectious prodrome or constitutional symptoms.    Review of systems: In addition to what is detailed in the HPI above, all other systems reviewed and are negative.    Past Medical History:    has a past medical history of Type II or unspecified type diabetes mellitus without mention of complication, not stated as uncontrolled. CAD and hypertension.    FHx:  No family history of early strokes or blood clotting disorder    SHx:   reports that he quit smoking about 23 years ago. He has never used smokeless tobacco. He reports that he does not drink alcohol and does not use drugs.    Allergies:  No Known Allergies    Medications:  No current facility-administered medications for this encounter.    Current Outpatient Medications:     Dulaglutide (TRULICITY) 1.5 MG/0.5ML Solution Pen-injector, Inject 1.5 mg under the skin., Disp: , Rfl:     ezetimibe (ZETIA) 10 MG Tab, TAKE ONE TABLET BY MOUTH ONCE DAILY, Disp: 90 Tab, Rfl: 0    naproxen (ANAPROX) 220 MG tablet, Take 220 mg by mouth 2 times a  day, with meals., Disp: , Rfl:     aspirin EC 81 MG EC tablet, Take 1 Tab by mouth every day., Disp: 30 Tab, Rfl: 3    Physical Examination:    Vitals:    08/10/23 0648 08/10/23 0649   BP: (!) 146/72    Pulse: 87 90   Resp: 18    SpO2: 96%          General: Patient is awake and in no acute distress  Eye: Examination of optic disks not indicated at this time given acuity of consult  Neck: There is normal range of motion  CV: Regular rate   Extremities:  Clear, dry, intact, without peripheral edema    NEUROLOGICAL EXAM:     Mental status: Awake, alert and fully oriented  Speech and language: Speech is clear and fluent. The patient is able to name and repeat, and follow commands  Cranial nerve exam:  Visual fields are full. There is no nystagmus. Extraocular muscles are intact. Face is symmetric.   Motor exam: There is sustained antigravity with no downward drift in R arm and leg.  Left arm is antigravity, sustained.  There is clear finger extensor weakness.  Left leg is antigravity with drift.  Sensory exam:  Reacts to tactile in all 4 distal extremities, there is no neglect to double stim.  Coordination: No ataxia on bilateral finger-to-nose testing.  Gait: Deferred due to patient preference.    NIHSS: National Institutes of Health Stroke Scale    [0] 1a:Level of Consciousness    0-alert 1-drowsy   2-stupor   3-coma  [0] 1b:LOC Questions                  0-both  1-one      2-neither  [0] 1c:LOC Commands                   0-both  1-one      2-neither  [0] 2: Best Gaze                     0-nl    1-partial  2-forced  [0] 3: Visual Fields                   0-nl    1-partial  2-complete 3-bilat  [0] 4: Facial Paresis                0-nl    1-minor    2-partial  3-full  MOTOR                       0-nl  [0] 5: Right Arm           1-drift  [0] 6: Left Arm             2-some effort vs gravity  [0] 7: Right Leg           3-no effort vs gravity  [1] 8: Left Leg             4-no movement                              x-untestable  [0] 9: Limb Ataxia                    0-abs   1-1_limb   2-2+_limbs       x-untestable  [0] 10:Sensory                        0-nl    1-partial  2-dense  [0] 11:Best Language/Aphasia         0-nl    1-mild/mod 2-severe   3-mute  [0] 12:Dysarthria                     0-nl    1-mild/mod 2-severe       x-untestable  [0] 13:Neglect/Inattention            0-none  1-partial  2-complete  [1] TOTAL    Baseline Modified Farnham Scale (MRS): 0 = No symptoms    Objective Data:    Labs:  Lab Results   Component Value Date/Time    PROTHROMBTM 16.5 (H) 08/22/2015 11:40 AM    INR 1.34 (H) 08/22/2015 11:40 AM      Lab Results   Component Value Date/Time    WBC 6.0 07/03/2022 01:57 AM    RBC 5.18 07/03/2022 01:57 AM    HEMOGLOBIN 14.0 07/03/2022 01:57 AM    HEMATOCRIT 41.5 (L) 07/03/2022 01:57 AM    MCV 80.1 (L) 07/03/2022 01:57 AM    MCH 27.0 07/03/2022 01:57 AM    MCHC 33.7 07/03/2022 01:57 AM    MPV 10.6 07/03/2022 01:57 AM    NEUTSPOLYS 57.90 07/03/2022 01:57 AM    LYMPHOCYTES 25.40 07/03/2022 01:57 AM    MONOCYTES 10.50 07/03/2022 01:57 AM    EOSINOPHILS 5.30 07/03/2022 01:57 AM    BASOPHILS 0.00 07/03/2022 01:57 AM      Lab Results   Component Value Date/Time    SODIUM 140 07/03/2022 01:57 AM    POTASSIUM 3.1 (L) 07/03/2022 01:57 AM    CHLORIDE 107 07/03/2022 01:57 AM    CO2 22 07/03/2022 01:57 AM    GLUCOSE 123 (H) 07/03/2022 01:57 AM    BUN 12 07/03/2022 01:57 AM    CREATININE 0.82 07/03/2022 01:57 AM      Lab Results   Component Value Date/Time    CHOLSTRLTOT 168 09/05/2017 07:00 AM    LDL 56 09/05/2017 07:00 AM    HDL 44 09/05/2017 07:00 AM    TRIGLYCERIDE 341 (H) 09/05/2017 07:00 AM       Lab Results   Component Value Date/Time    ALKPHOSPHAT 61 07/01/2022 05:17 PM    ASTSGOT 12 07/01/2022 05:17 PM    ALTSGPT 15 07/01/2022 05:17 PM    TBILIRUBIN 0.6 07/01/2022 05:17 PM        Imaging/Testing:    I interpreted and/or reviewed the patient's neuroimaging    DX-CHEST-PORTABLE (1 VIEW)    (Results Pending)    CT-HEAD W/O    (Results Pending)   CT-CEREBRAL PERFUSION ANALYSIS    (Results Pending)   CT-CTA HEAD WITH & W/O-POST PROCESS    (Results Pending)   CT-CTA NECK WITH & W/O-POST PROCESSING    (Results Pending)       Assessment and Plan:  Jorden Bonilla is a 58 year old man with multiple vascular risk factors, presenting with L side weakness upon awakening today.  Stroke protocol CT without evidence of hemorrhage, large territory stroke, LVO, or critical, flow limiting stenoses.  There is no substantial ischemic penumbra that warrants off-label thrombolytics.  At this time, will admit for stroke evaluation and therapies.    Problem list:   Stroke by clinical assessment   Type 2 diabetes   CAD   Hypertension    Recommendations:   - admit to Neuroscience, q4h neurochecks/NIHSS   - expedite MRI brain without contrast   - continue ASA 81mg daily   - in absence of ischemic penumbra or critical stenoses, no acute hypertensive response is required. Long-term BP goal is 110-130/60-80, ok to restart PO anti-hypertensives   - stroke labs:  HgbA1c and lipid panel   - start atorvastatin 40mg daily for goal LDL < 70, titrate dose to LDL results   - DM management for goal HgbA1c < 7, acutely aim for FSBS    - may defer TTE and ziopatch monitoring unless MRI brain reveals embolic appearing strokes   - PT/OT/SLP   - stroke bridge clinic follow up    Patient discussed with Dr. Paige, ER attending.    Anirudh Lopez MD  Vascular Neurology

## 2023-08-10 NOTE — ASSESSMENT & PLAN NOTE
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

## 2023-08-10 NOTE — ED NOTES
Pharmacy Medication Reconciliation    ~Medication Reconciliation partially completed per patient & patient spouse at bedside with pictures of Rx bottles.   ~Allergies reviewed   ~No oral ABX within the last 30 days  ~[facility] :  [facility]    ~Med rec to follow up with patient home pharmacy when they reopen to verify Metformin & Atorvastatin

## 2023-08-10 NOTE — THERAPY
"Speech Language Pathology   Clinical Swallow Evaluation     Patient Name: Jorden Bonilla  AGE:  58 y.o., SEX:  male  Medical Record #: [id number]  Date of Service: 8/10/2023      History of Present Illness  Pt is a 58 y.o. M admitted on 8/10/23 with L UE/LE weakness. Stroke protocol CT without evidence of hemorrhage, large territory stroke, LVO, or critical, flow limiting stenoses.  There is no substantial ischemic penumbra that warrants off-label thrombolytics.    CMHx: Hemiplegia   PMHx: CAD s/p CABG, type 2 diabetes, hypertension    No Hx of skilled ST indicated in the pt's chart.     MRI Pending     CXR 8/10/23:   \"No acute process.\"    General Information:  Vitals  O2 Delivery Device: None - Room Air  Level of Consciousness: Alert, Awake  Orientation: Oriented x 4  Follows Directives: Yes    Prior Living Situation & Level of Function:  Prior Services: None, Home-Independent  Lives with - Patient's Self Care Capacity: Spouse  Communication: WFL  Swallowing: WFL    Oral Mechanism Evaluation:  Dentition: Fair, Natural dentition   Facial Symmetry: Equal  Facial Sensation: Equal     Labial Observations: WFL   Lingual Observations: Midline  Motor Speech: WFL       Laryngeal Function:  Secretion Management: Adequate  Voice Quality: WFL  Cough: Perceptually WNL    Subjective  Patient cleared by RN for clinical bedside swallow evaluation. Pt was AAOx4, pleasant, and cooperative with SLP tasks. Pt's spouse was present during the evaluation. Language line utilized.  Pt denied Hx of dysphagia and pneumonia. Pt endorsed Hx of acid reflux managed with diet modifications. Per pt report, he does not believe he has any difficulty with deglutition at this time.       Assessment  Current Method of Nutrition: Oral diet (Cardiac/CHO)  Positioning: Zambrano's (60-90 degrees)  Bolus Administration: Patient (SLP provided assistance with meal tray set-up)  O2 Delivery Device: None - Room Air  Factor(s) Affecting Performance: None   "   Swallowing Trials:  Swallowing Trials  Thin Liquid (TN0): WFL  Pureed (PU4): WFL  Soft & Bite Sized (SB6): WFL  Regular (RG7): Impaired    Comments:   Pt was positioned upright in Zambrano's prior to PO trials. Due to L UE hemiplegia, SLP provided assistance with meal tray set-up. 3 ounce water challenge was completed. The 3 ounce water challenge is a swallow screen which if passed has a predictive rate of 96% sensitivity for identifying individuals safe to swallow (Daler et al., 2008). Pt completed without incident. Additional trials of puree, soft and bite size, and regular were completed. Pt demonstrated appropriate labial containment, stripping, and A/P transport inferred to be WNL as evidenced via lack of oral pocketing upon inspection. Mastication was prolonged, however functional, on regular items. Pt reported globus on regular and requested liquid wash. With use of liquid wash, globus sensation improved. HLE was palpated and present; swallow initiation inferred to be timely. No s/sx of laryngeal insufficiency or esophageal dysphagia appreciated. Vocal quality was clear and cough was perceptually strong. Pt demonstrated appropriate rate and volume of intake with self-feeding.     Pt /family education provided on the anatomy/physiology of deglutition and s/sx of aspiration. Additional education provided on good, thorough, oral care and mobility, as medically appropriate, to mitigate the risk associated with aspiration. Utilizing the teach back method, pt verbally demonstrated understanding with 100% accuracy.  Additional education on reflux precautions and risk management (small bites/sips, slow rate, and alternate bites/sips) was completed. Pt/family verbally acknowledged education. All questions addressed.     Clinical Impressions  Pt presents with mild oral dysphagia at bedside on regular texture items as indicated by prolonged mastication and report of globus. Per patient, globus improved with use of  "liquid wash and was not present on all other PO. Pt is at increased risk of impaired airway protection/swallow efficiency r/t potential CVA; however, no clinical s/sx of laryngeal insufficiency appreciated on PO. Pt with strong cough and fair oral hygiene mitigating the risk associated with aspiration.  SLP recommending careful initiation of soft and bite size solids with thin liquids. Skilled ST in the acute setting indicated to monitor diet tolerance and maximize dysphagia outcomes. Should change in status occur, please downgrade as appropriate and consult SLP. Pt/family verbally consented to this plan of care.     Recommendations  Diet Consistency: soft and bite size solids with thin liquids  Instrumentation: None indicated at this time  Medication: Whole with liquid, As tolerated  Supervision: Distant supervision - check on patient 2-3 times per meal, Assist with meal tray set up  Positioning: Fully upright and midline during oral intake, Remain upright for 45 minutes after oral intake, Meals sitting upright in a chair, as tolerated  Risk Management : Small bites/sips, Alternate bites and sips, Slow rate of intake, Physical mobility, as tolerated  Oral Care: BID    SLP Treatment Plan  Treatment Plan: Dysphagia Treatment  SLP Frequency: 3x Per Week  Estimated Duration: Until Therapy Goals Met    Anticipated Discharge Needs  Discharge Recommendations: Other (Pend clinical progress)   Therapy Recommendations Upon DC: Dysphagia Training      Patient / Family Goals  Patient / Family Goal #1: \"I haven't eaten at the hospital yet.\"  Short Term Goals  Short Term Goal # 1: Pt will consume soft and bite size solid and thin liquid diet without any overt s/sx of aspiration or change in respiratory status.    Ijeoma Riggs, SLP   "

## 2023-08-10 NOTE — ED NOTES
Assumed care of pt, bedside report received. Introduced self as pt RN, GCS 15, NAD, VSS on RA, aware of POC, denies needs at this time, wife at bedside, call light in reach, will continue to monitor.

## 2023-08-11 LAB
ALBUMIN SERPL BCP-MCNC: 4 G/DL (ref 3.2–4.9)
ALBUMIN/GLOB SERPL: 1.6 G/DL
ALP SERPL-CCNC: 61 U/L (ref 30–99)
ALT SERPL-CCNC: 12 U/L (ref 2–50)
ANION GAP SERPL CALC-SCNC: 16 MMOL/L (ref 7–16)
AST SERPL-CCNC: 13 U/L (ref 12–45)
BILIRUB SERPL-MCNC: 0.6 MG/DL (ref 0.1–1.5)
BUN SERPL-MCNC: 17 MG/DL (ref 8–22)
CALCIUM ALBUM COR SERPL-MCNC: 8.7 MG/DL (ref 8.5–10.5)
CALCIUM SERPL-MCNC: 8.7 MG/DL (ref 8.5–10.5)
CHLORIDE SERPL-SCNC: 104 MMOL/L (ref 96–112)
CHOLEST SERPL-MCNC: 275 MG/DL (ref 100–199)
CO2 SERPL-SCNC: 19 MMOL/L (ref 20–33)
CREAT SERPL-MCNC: 0.76 MG/DL (ref 0.5–1.4)
ERYTHROCYTE [DISTWIDTH] IN BLOOD BY AUTOMATED COUNT: 35.9 FL (ref 35.9–50)
GFR SERPLBLD CREATININE-BSD FMLA CKD-EPI: 103 ML/MIN/1.73 M 2
GLOBULIN SER CALC-MCNC: 2.5 G/DL (ref 1.9–3.5)
GLUCOSE BLD STRIP.AUTO-MCNC: 205 MG/DL (ref 65–99)
GLUCOSE BLD STRIP.AUTO-MCNC: 255 MG/DL (ref 65–99)
GLUCOSE BLD STRIP.AUTO-MCNC: 259 MG/DL (ref 65–99)
GLUCOSE BLD STRIP.AUTO-MCNC: 303 MG/DL (ref 65–99)
GLUCOSE SERPL-MCNC: 320 MG/DL (ref 65–99)
HCT VFR BLD AUTO: 47.6 % (ref 42–52)
HDLC SERPL-MCNC: 51 MG/DL
HGB BLD-MCNC: 15.5 G/DL (ref 14–18)
LDLC SERPL CALC-MCNC: 189 MG/DL
MAGNESIUM SERPL-MCNC: 2.1 MG/DL (ref 1.5–2.5)
MCH RBC QN AUTO: 26.2 PG (ref 27–33)
MCHC RBC AUTO-ENTMCNC: 32.6 G/DL (ref 32.3–36.5)
MCV RBC AUTO: 80.4 FL (ref 81.4–97.8)
PLATELET # BLD AUTO: 241 K/UL (ref 164–446)
PMV BLD AUTO: 10.9 FL (ref 9–12.9)
POTASSIUM SERPL-SCNC: 4.6 MMOL/L (ref 3.6–5.5)
PROT SERPL-MCNC: 6.5 G/DL (ref 6–8.2)
RBC # BLD AUTO: 5.92 M/UL (ref 4.7–6.1)
SODIUM SERPL-SCNC: 139 MMOL/L (ref 135–145)
TRIGL SERPL-MCNC: 177 MG/DL (ref 0–149)
WBC # BLD AUTO: 11.8 K/UL (ref 4.8–10.8)

## 2023-08-11 PROCEDURE — A9270 NON-COVERED ITEM OR SERVICE: HCPCS | Performed by: PSYCHIATRY & NEUROLOGY

## 2023-08-11 PROCEDURE — 97166 OT EVAL MOD COMPLEX 45 MIN: CPT

## 2023-08-11 PROCEDURE — 97163 PT EVAL HIGH COMPLEX 45 MIN: CPT

## 2023-08-11 PROCEDURE — 700102 HCHG RX REV CODE 250 W/ 637 OVERRIDE(OP): Performed by: PSYCHIATRY & NEUROLOGY

## 2023-08-11 PROCEDURE — 99255 IP/OBS CONSLTJ NEW/EST HI 80: CPT | Performed by: PHYSICAL MEDICINE & REHABILITATION

## 2023-08-11 PROCEDURE — 700102 HCHG RX REV CODE 250 W/ 637 OVERRIDE(OP): Performed by: HOSPITALIST

## 2023-08-11 PROCEDURE — 80061 LIPID PANEL: CPT

## 2023-08-11 PROCEDURE — 770020 HCHG ROOM/CARE - TELE (206)

## 2023-08-11 PROCEDURE — 80053 COMPREHEN METABOLIC PANEL: CPT

## 2023-08-11 PROCEDURE — 700105 HCHG RX REV CODE 258: Performed by: HOSPITALIST

## 2023-08-11 PROCEDURE — 99233 SBSQ HOSP IP/OBS HIGH 50: CPT | Performed by: PSYCHIATRY & NEUROLOGY

## 2023-08-11 PROCEDURE — 700102 HCHG RX REV CODE 250 W/ 637 OVERRIDE(OP): Performed by: INTERNAL MEDICINE

## 2023-08-11 PROCEDURE — 700111 HCHG RX REV CODE 636 W/ 250 OVERRIDE (IP): Performed by: HOSPITALIST

## 2023-08-11 PROCEDURE — A9270 NON-COVERED ITEM OR SERVICE: HCPCS | Performed by: HOSPITALIST

## 2023-08-11 PROCEDURE — 51798 US URINE CAPACITY MEASURE: CPT

## 2023-08-11 PROCEDURE — 82962 GLUCOSE BLOOD TEST: CPT | Mod: 91

## 2023-08-11 PROCEDURE — L4398 FOOT DROP SPLINT PRE OTS: HCPCS

## 2023-08-11 PROCEDURE — A9270 NON-COVERED ITEM OR SERVICE: HCPCS | Performed by: INTERNAL MEDICINE

## 2023-08-11 PROCEDURE — 85027 COMPLETE CBC AUTOMATED: CPT

## 2023-08-11 PROCEDURE — 36415 COLL VENOUS BLD VENIPUNCTURE: CPT

## 2023-08-11 PROCEDURE — 99232 SBSQ HOSP IP/OBS MODERATE 35: CPT | Performed by: INTERNAL MEDICINE

## 2023-08-11 PROCEDURE — 83735 ASSAY OF MAGNESIUM: CPT

## 2023-08-11 RX ORDER — CLOPIDOGREL BISULFATE 75 MG/1
75 TABLET ORAL DAILY
Status: DISCONTINUED | OUTPATIENT
Start: 2023-08-11 | End: 2023-08-13

## 2023-08-11 RX ORDER — MECLIZINE HYDROCHLORIDE 25 MG/1
12.5 TABLET ORAL 3 TIMES DAILY
Status: DISCONTINUED | OUTPATIENT
Start: 2023-08-11 | End: 2023-08-12

## 2023-08-11 RX ORDER — ATORVASTATIN CALCIUM 80 MG/1
80 TABLET, FILM COATED ORAL EVERY EVENING
Status: DISCONTINUED | OUTPATIENT
Start: 2023-08-11 | End: 2023-08-14 | Stop reason: HOSPADM

## 2023-08-11 RX ADMIN — INSULIN GLARGINE-YFGN 5 UNITS: 100 INJECTION, SOLUTION SUBCUTANEOUS at 18:15

## 2023-08-11 RX ADMIN — ENOXAPARIN SODIUM 40 MG: 100 INJECTION SUBCUTANEOUS at 18:05

## 2023-08-11 RX ADMIN — SENNOSIDES AND DOCUSATE SODIUM 2 TABLET: 50; 8.6 TABLET ORAL at 18:05

## 2023-08-11 RX ADMIN — PROCHLORPERAZINE EDISYLATE 10 MG: 5 INJECTION, SOLUTION INTRAMUSCULAR; INTRAVENOUS at 09:13

## 2023-08-11 RX ADMIN — CLOPIDOGREL BISULFATE 75 MG: 75 TABLET ORAL at 08:20

## 2023-08-11 RX ADMIN — MECLIZINE HYDROCHLORIDE 12.5 MG: 25 TABLET ORAL at 20:01

## 2023-08-11 RX ADMIN — INSULIN HUMAN 3 UNITS: 100 INJECTION, SOLUTION PARENTERAL at 20:04

## 2023-08-11 RX ADMIN — ONDANSETRON 4 MG: 2 INJECTION INTRAMUSCULAR; INTRAVENOUS at 07:04

## 2023-08-11 RX ADMIN — MECLIZINE HYDROCHLORIDE 12.5 MG: 25 TABLET ORAL at 12:20

## 2023-08-11 RX ADMIN — SODIUM CHLORIDE 1000 ML: 9 INJECTION, SOLUTION INTRAVENOUS at 02:16

## 2023-08-11 RX ADMIN — MECLIZINE HYDROCHLORIDE 12.5 MG: 25 TABLET ORAL at 16:12

## 2023-08-11 RX ADMIN — INSULIN HUMAN 2 UNITS: 100 INJECTION, SOLUTION PARENTERAL at 18:17

## 2023-08-11 RX ADMIN — ATORVASTATIN CALCIUM 80 MG: 80 TABLET, FILM COATED ORAL at 18:05

## 2023-08-11 RX ADMIN — ASPIRIN 81 MG: 81 TABLET, COATED ORAL at 18:05

## 2023-08-11 RX ADMIN — INSULIN HUMAN 4 UNITS: 100 INJECTION, SOLUTION PARENTERAL at 08:11

## 2023-08-11 RX ADMIN — SENNOSIDES AND DOCUSATE SODIUM 2 TABLET: 50; 8.6 TABLET ORAL at 04:46

## 2023-08-11 RX ADMIN — INSULIN HUMAN 3 UNITS: 100 INJECTION, SOLUTION PARENTERAL at 12:32

## 2023-08-11 ASSESSMENT — COGNITIVE AND FUNCTIONAL STATUS - GENERAL
STANDING UP FROM CHAIR USING ARMS: TOTAL
DAILY ACTIVITIY SCORE: 12
HELP NEEDED FOR BATHING: A LOT
PERSONAL GROOMING: A LOT
MOVING TO AND FROM BED TO CHAIR: UNABLE
DRESSING REGULAR UPPER BODY CLOTHING: A LOT
TOILETING: A LOT
EATING MEALS: A LOT
MOVING FROM LYING ON BACK TO SITTING ON SIDE OF FLAT BED: UNABLE
DRESSING REGULAR LOWER BODY CLOTHING: A LOT
CLIMB 3 TO 5 STEPS WITH RAILING: TOTAL
SUGGESTED CMS G CODE MODIFIER DAILY ACTIVITY: CL
WALKING IN HOSPITAL ROOM: TOTAL
SUGGESTED CMS G CODE MODIFIER MOBILITY: CM
MOBILITY SCORE: 9

## 2023-08-11 ASSESSMENT — ENCOUNTER SYMPTOMS
DIZZINESS: 1
SHORTNESS OF BREATH: 0
ABDOMINAL PAIN: 0
NAUSEA: 0
SPEECH CHANGE: 1
FOCAL WEAKNESS: 1
HEADACHES: 0
MYALGIAS: 0

## 2023-08-11 ASSESSMENT — ACTIVITIES OF DAILY LIVING (ADL): TOILETING: INDEPENDENT

## 2023-08-11 ASSESSMENT — FIBROSIS 4 INDEX: FIB4 SCORE: 0.93

## 2023-08-11 ASSESSMENT — PAIN DESCRIPTION - PAIN TYPE: TYPE: ACUTE PAIN

## 2023-08-11 NOTE — DISCHARGE PLANNING
Would appreciate [place] ila once appropriate.    1238-Physiatry to consult.     7439-Msg placed to Alyssa, spouse to discuss [facility] & D/C resources/support.  Submitted to insurance.    0643-Insurance has authorized.  Auth # [id number]

## 2023-08-11 NOTE — THERAPY
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 Pt mobilizing fairly well very participatory despite nausea and dizziness; holding own balance edge of bed by end of session, no neglect noted; able to stand min A of 2 with some shuffled steps; highly recommend acute rehab if qualifies, has 24/7 support on dc  and good potential. Will follow.     Plan    Physical Therapy Initial Treatment Plan   Treatment Plan : Bed Mobility, Equipment, Gait Training, Manual Therapy, Neuro Re-Education / Balance, Self Care / Home Evaluation, Stair Training, Therapeutic Activities, Therapeutic Exercise  Treatment Frequency: 5 Times per Week  Duration: Until Therapy Goals Met    DC Equipment Recommendations: Unable to determine at this time  Discharge Recommendations: acute rehab if qualifies; SNF if not     Abridged Subjective/Objective     08/11/23 1032   Prior Living Situation   Prior Services None;Home-Independent   Housing / Facility 1 Story House   Steps Into Home 1   Equipment Owned None   Lives with - Patient's Self Care Capacity Spouse   Comments denies falls; multiple family elisabe lives with her and can provide 24/7 assist;   Prior Level of Functional Mobility   Bed Mobility Independent   Transfer Status Independent   Ambulation Independent   Ambulation Distance to tolerance   Stairs Independent   Cognition    Cognition / Consciousness X   Level of Consciousness Alert   Initiation Impaired   Comments cooperative; clearly uncomfortable wiht his dizziness and nausea; wife at bedside; following all cmmands, no neglect noted;   Passive ROM Lower Body   Passive ROM Lower Body WDL    Strength Lower Body   Lower Body Strength  X   Comments no active movement during testing; no patellar reflex illicited   Sensation Lower Body   Lower Extremity Sensation   WDL   Comments subjective reports, will need to clarify and localize   Balance Assessment   Sitting Balance (Static) Fair -   Sitting Balance (Dynamic) Poor +   Standing Balance (Static) Poor   Standing Balance (Dynamic) Poor -   Weight Shift Sitting Fair   Weight Shift Standing Poor   Comments right UE on railing with holding midline; head forward wiht fatigue but able to hold upright by end of session with good scapular retraction   Bed Mobility    Supine to Sit Moderate Assist   Sit to Supine Minimal Assist   Gait Analysis   Comments took some shuffled side ways steps toward the head of bed   Functional Mobility   Sit to Stand Minimal Assist  (of 2 vs mod of 1)   Bed, Chair, Wheelchair Transfer   (likely mod; deferred due to ongoing nausea)   Comments reports eyes closed makes dizziness improve and focusing on a nonmoving object however fatiuges and nasuea increases;   Short Term Goals    Short Term Goal # 1 Pt will perform supine<>sit from flat HOB/n orailing with supervisoin within 6 visits to ensure independent mobility at home.   Short Term Goal # 2 Pt will perform sit<>stand with hemiwalker and supervision wihtin 6 visits to ensure independnet mobility at home.   Short Term Goal # 3 Pt will ambulate x 150ft with hemiwalker wihtin 6 visits to ensure independent mobility at home.   Short Term Goal # 4 Pt will ascend/descend 1 step with min A within 6 visits to enter/exit home.   Education Group   Role of Physical Therapist Patient Response Patient;Acceptance;Explanation;Demonstration;Action Demonstration;Verbal Demonstration   Additional Comments dizziness attenuation execises, eyes open vs closed for symptoms

## 2023-08-11 NOTE — DIETARY
Nutrition Services: T2DM Diet Education Consult   Day 1 of admit.  Jorden Bonilla is a 58 y.o. male with admitting DX of Hemiplegia.     RD able to visit pt at bedside to provide diabetes diet education for T2DM patient. Pt asleep, spouse Alyssa at bedside. Diet education provided with  #[id number] Paul with Alyssa pts spouse. Tagalog handout provided. Discussed balanced plate method and [place] foods they regularly eat. Rehab physician did use  for visit as well during education. Encouraged water intake for hydration, and discussed importance of adequate protein in diet for muscle strength, and fiber. All questions answered.     No other education needs identified at this time. Consider referral to outpatient nutrition services for continuation of education as indicated or per pt preferences.     Please re-consult RD as indicated.

## 2023-08-11 NOTE — THERAPY
Occupational Therapy   Initial Evaluation     Patient Name: Jorden Bonilla  Age:  58 y.o., Sex:  male  Medical Record #: [id number]  Today's Date: 8/11/2023     Precautions  Precautions: Fall Risk, Swallow Precautions  Comments: L phyllis    Assessment  Patient is 58 y.o. male with a diagnosis of new onset CVA with left weakness and poorly controlled DM with high blood sugar.  Additional factors influencing patient status / progress: Pt has large supportive family and spouse that can assist 24/7 prn. Pt is motivated for therapy despite significant dizziness and nausea. Pt is independent and strong at baseline. Pt can benefit from acute OT to increase independence in ADLs prior to dc. Pt will need post acute inpatient OT prior to dc home.      Plan    Occupational Therapy Initial Treatment Plan   Treatment Interventions: Self Care / Activities of Daily Living, Adaptive Equipment, Neuro Re-Education / Balance, Therapeutic Exercises, Therapeutic Activity  Treatment Frequency: 5 Times per Week  Duration: Until Therapy Goals Met    DC Equipment Recommendations: Unable to determine at this time  Discharge Recommendations: Recommend post-acute placement for additional occupational therapy services prior to discharge home        08/11/23 0855   Prior Living Situation   Prior Services None;Home-Independent   Housing / Facility 1 Story House   Steps Into Home 1   Steps In Home 0   Bathroom Set up Bathtub / Shower Combination   Equipment Owned None   Lives with - Patient's Self Care Capacity Spouse;Sibling;Adult Children   Comments Family can assist prn   Prior Level of ADL Function   Self Feeding Independent   Grooming / Hygiene Independent   Bathing Independent   Dressing Independent   Toileting Independent   Prior Level of IADL Function   Medication Management Independent   Laundry Independent   Kitchen Mobility Independent   Finances Independent   Home Management Independent   Shopping Independent   Prior Level Of Mobility  Independent Without Device in Community   Driving / Transportation Driving Independent   History of Falls   History of Falls No   Precautions   Precautions Fall Risk;Swallow Precautions   Comments L phyllis   Pain   Pain Scales 0 to 10 Scale    Intervention Ambulation / Increased Activity   Pain 0 - 10 Group   Therapist Pain Assessment Post Activity Pain Same as Prior to Activity;Nurse Notified;0   Cognition    Cognition / Consciousness X   Speech/ Communication Delayed Responses;Dysarthric   Level of Consciousness Responds to voice   Ability To Follow Commands 1 Step   Attention Impaired   Initiation Impaired   Passive ROM Upper Body   Passive ROM Upper Body WDL   Active ROM Upper Body   Active ROM Upper Body  X   Dominant Hand Right   Comments L flaccid   Strength Upper Body   Upper Body Strength  X   Comments R wnl; L shoulder shrug only   Sensation Upper Body   Upper Extremity Sensation  WDL   Comments Pt reports normal sensation bilaterally   Upper Body Muscle Tone   Upper Body Muscle Tone  X   Lt Upper Extremity Muscle Tone Non Functional;Hypotonic   Neurological Concerns   Neurological Concerns Yes  (new onset Left phyllis; right lean in standing; mild right lean in sitting)   Coordination Upper Body   Coordination X   Comments no left GM/FM coord   Balance Assessment   Sitting Balance (Static) Fair   Sitting Balance (Dynamic) Fair -   Standing Balance (Static) Poor +   Standing Balance (Dynamic) Poor +   Weight Shift Sitting Poor   Weight Shift Standing Poor   Comments with bilat HHA   Bed Mobility    Supine to Sit Moderate Assist   Sit to Supine Moderate Assist   Scooting Moderate Assist   ADL Assessment   Grooming Seated;Moderate Assist   Upper Body Dressing Moderate Assist   Lower Body Dressing Maximal Assist   Toileting Moderate Assist  (with urinal)   Functional Mobility   Sit to Stand Moderate Assist   Bed, Chair, Wheelchair Transfer   (sit to stand only and side steps along bed with mod a.)   Comments  bilateral HHA   Visual Perception   Visual Perception  X   Comments mild possible neglect to left but will look all planes when cued   Activity Tolerance   Sitting Edge of Bed 10 min   Standing 2 min   Patient / Family Goals   Patient / Family Goal #1 Family: get better   Short Term Goals   Short Term Goal # 1 xfer to chair with min a   Short Term Goal # 2 LB dressing with min a   Short Term Goal # 3 seated grooming with spv   Occupational Therapy Initial Treatment Plan    Treatment Interventions Self Care / Activities of Daily Living;Adaptive Equipment;Neuro Re-Education / Balance;Therapeutic Exercises;Therapeutic Activity   Treatment Frequency 5 Times per Week   Duration Until Therapy Goals Met   Problem List   Problem List Decreased Active Daily Living Skills;Decreased Homemaking Skills;Decreased Upper Extremity Strength Left;Decreased Upper Extremity AROM Left;Decreased Functional Mobility;Decreased Activity Tolerance;Safety Awareness Deficits / Cognition;Impaired Coordination Left Upper Extremity;Impaired Upper Extremity Tone Left;Impaired Postural Control / Balance   Anticipated Discharge Equipment and Recommendations   DC Equipment Recommendations Unable to determine at this time   Discharge Recommendations Recommend post-acute placement for additional occupational therapy services prior to discharge home

## 2023-08-11 NOTE — CONSULTS
Physical Medicine and Rehabilitation Consultation          Date of initial consultation: 8/11/2023  Consulting provider: Steve Barraza M.D.  Reason for consultation: assess for acute inpatient rehab appropriateness  LOS: 1 Day(s)    Chief complaint: Left hemiplegia    HPI: The patient is a 58 y.o. right hand dominant male with a past medical history of CAD status post CABG, type 2 diabetes, hypertension;  who presented on 8/10/2023  6:19 AM with left-sided weakness.  Patient was seen by neurology, found to have a NIH score of 1 on 8/10.  CT head showed no acute intracranial process or LVO.  CT perfusion found no mismatch.  Patient did not receive tPA or thrombectomy.  Follow-up MRI found questionable tiny acute infarct in the anterior right medulla, versus artifact.  Overnight, patient had worsening left-sided weakness, today NIH score is 7.     The patient currently reports not doing very well.  Patient endorses headache, nausea, weakness, and numbness in the left side.  Patient is joined the room by his spouse who is at bedside.  Spouse works [employer] shift 10 PM to 6 AM Monday through Friday.  There is no other family in the area to help support on discharge.    ROS  Pertinent positives are mentioned in the HPI, all others reviewed and are negative.    Social Hx:  1 SH  1 BONG  With: Spouse    Employment: [employer]    THERAPY:  Restrictions: Fall risk, swallow precautions  PT: Functional mobility   8/11: Perform sit to stand at min assist, took some shuffled steps along the edge of the bed    OT: ADLs  8/11: Max assist lower body dressing, mod assist toileting    SLP:   8/10: Soft and bite-size solids with thin liquids    IMAGING:  MR brain 8/10/2023  1.  Questionable tiny acute infarct in the anterior right medulla is artifact.  2.  No intracranial hemorrhage or other significant brain abnormality.    PROCEDURES:  None    PMH:  Past Medical History:   Diagnosis Date    Type II or unspecified type  diabetes mellitus without mention of complication, not stated as uncontrolled        PSH:  Past Surgical History:   Procedure Laterality Date    MULTIPLE CORONARY ARTERY BYPASS ENDO VEIN HARVEST N/A 8/22/2015    Procedure: coronary artery bypass grtafting x 3, endoscopic vein harvest right leg, transesophageal echocardiogram;  Surgeon: Henry Baldwin M.D.;  Location: SURGERY [facility];  Service:        FHX:  Non-pertinent to today's issues    Medications:  Current Facility-Administered Medications   Medication Dose    atorvastatin (Lipitor) tablet 80 mg  80 mg    clopidogrel (Plavix) tablet 75 mg  75 mg    meclizine (Antivert) tablet 12.5 mg  12.5 mg    aspirin EC tablet 81 mg  81 mg    olmesartan (Benicar) tablet 20 mg  20 mg    acetaminophen (Tylenol) tablet 650 mg  650 mg    senna-docusate (Pericolace Or Senokot S) 8.6-50 MG per tablet 2 Tablet  2 Tablet    And    polyethylene glycol/lytes (Miralax) PACKET 1 Packet  1 Packet    And    magnesium hydroxide (Milk Of Magnesia) suspension 30 mL  30 mL    And    bisacodyl (Dulcolax) suppository 10 mg  10 mg    enoxaparin (Lovenox) inj 40 mg  40 mg    ondansetron (Zofran) syringe/vial injection 4 mg  4 mg    ondansetron (Zofran ODT) dispertab 4 mg  4 mg    promethazine (Phenergan) tablet 12.5-25 mg  12.5-25 mg    promethazine (Phenergan) suppository 12.5-25 mg  12.5-25 mg    prochlorperazine (Compazine) injection 5-10 mg  5-10 mg    insulin regular (HumuLIN R,NovoLIN R) injection  1-6 Units    And    dextrose 50% (D50W) injection 25 g  25 g       Allergies:  No Known Allergies      Physical Exam:  Vitals: /81   Pulse 87   Temp 36.3 °C (97.3 °F) (Temporal)   Resp 18   Wt 63.5 kg (139 lb 15.9 oz)   SpO2 93%   Gen: NAD  Head: NC/AT  Eyes/ Nose/ Mouth: moist mucous membranes  Cardio: RRR, good distal perfusion, warm extremities  Pulm: normal respiratory effort, no cyanosis   Abd: Soft NTND, negative borborygmi   Ext: No peripheral edema. No calf  tenderness. No clubbing.    Mental status: answers questions appropriately follows commands  Speech: fluent, no aphasia or dysarthria    Motor:      Upper Extremity  Myotome R L   Shoulder flexion C5 5 0/5   Elbow flexion C5 5 0/5   Wrist extension C6 5 0/5   Elbow extension C7 5 0/5   Finger flexion C8 5 0/5   Finger abduction T1 5 0/5     Lower Extremity Myotome R L   Hip flexion L2 5 0/5   Knee extension L3 5 3/5   Ankle dorsiflexion L4 5 0/5   Toe extension L5 5 0/5   Ankle plantarflexion S1 5 0/5     Sensory:   Altered sensation to light touch in the left hemibody    Labs: Reviewed and significant for   Recent Labs     08/10/23  0648 08/11/23  0658   RBC 5.14 5.92   HEMOGLOBIN 13.9* 15.5   HEMATOCRIT 41.6* 47.6   PLATELETCT 203 241   PROTHROMBTM 13.5  --    APTT 27.8  --    INR 1.04  --      Recent Labs     08/10/23  0648 08/11/23  0658   SODIUM 130* 139   POTASSIUM 4.1 4.6   CHLORIDE 97 104   CO2 23 19*   GLUCOSE 334* 320*   BUN 17 17   CREATININE 0.72 0.76   CALCIUM 8.3* 8.7     Recent Results (from the past 24 hour(s))   POCT glucose device results    Collection Time: 08/10/23  4:55 PM   Result Value Ref Range    POC Glucose, Blood 168 (H) 65 - 99 mg/dL   POCT glucose device results    Collection Time: 08/10/23  8:16 PM   Result Value Ref Range    POC Glucose, Blood 134 (H) 65 - 99 mg/dL   Lipid Profile    Collection Time: 08/11/23  6:58 AM   Result Value Ref Range    Cholesterol,Tot 275 (H) 100 - 199 mg/dL    Triglycerides 177 (H) 0 - 149 mg/dL    HDL 51 >=40 mg/dL     (H) <100 mg/dL   CBC without Differential    Collection Time: 08/11/23  6:58 AM   Result Value Ref Range    WBC 11.8 (H) 4.8 - 10.8 K/uL    RBC 5.92 4.70 - 6.10 M/uL    Hemoglobin 15.5 14.0 - 18.0 g/dL    Hematocrit 47.6 42.0 - 52.0 %    MCV 80.4 (L) 81.4 - 97.8 fL    MCH 26.2 (L) 27.0 - 33.0 pg    MCHC 32.6 32.3 - 36.5 g/dL    RDW 35.9 35.9 - 50.0 fL    Platelet Count 241 164 - 446 K/uL    MPV 10.9 9.0 - 12.9 fL   Comp Metabolic Panel  (CMP)    Collection Time: 08/11/23  6:58 AM   Result Value Ref Range    Sodium 139 135 - 145 mmol/L    Potassium 4.6 3.6 - 5.5 mmol/L    Chloride 104 96 - 112 mmol/L    Co2 19 (L) 20 - 33 mmol/L    Anion Gap 16.0 7.0 - 16.0    Glucose 320 (H) 65 - 99 mg/dL    Bun 17 8 - 22 mg/dL    Creatinine 0.76 0.50 - 1.40 mg/dL    Calcium 8.7 8.5 - 10.5 mg/dL    Correct Calcium 8.7 8.5 - 10.5 mg/dL    AST(SGOT) 13 12 - 45 U/L    ALT(SGPT) 12 2 - 50 U/L    Alkaline Phosphatase 61 30 - 99 U/L    Total Bilirubin 0.6 0.1 - 1.5 mg/dL    Albumin 4.0 3.2 - 4.9 g/dL    Total Protein 6.5 6.0 - 8.2 g/dL    Globulin 2.5 1.9 - 3.5 g/dL    A-G Ratio 1.6 g/dL   Magnesium    Collection Time: 08/11/23  6:58 AM   Result Value Ref Range    Magnesium 2.1 1.5 - 2.5 mg/dL   ESTIMATED GFR    Collection Time: 08/11/23  6:58 AM   Result Value Ref Range    GFR (CKD-EPI) 103 >60 mL/min/1.73 m 2   POCT glucose device results    Collection Time: 08/11/23  8:09 AM   Result Value Ref Range    POC Glucose, Blood 303 (H) 65 - 99 mg/dL   POCT glucose device results    Collection Time: 08/11/23 12:29 PM   Result Value Ref Range    POC Glucose, Blood 259 (H) 65 - 99 mg/dL         ASSESSMENT:  Patient is a 58 y.o. male admitted with left sided weakness secondary to stroke by clinical assessment    [facility] Code / Diagnosis to Support: 0001.1 - Stroke: Left Body Involvement (Right Brain)    Rehabilitation: Impaired ADLs and mobility  Patient has had a significant change in function since admission, requires updated therapy notes to evaluate for IPR    All cases are subject to administrative review and recommendations may change    Disposition recommendations:  -Patient's functional debility is severe and is unclear that patient's spouse is able to care for him at this level.  Patient may likely require SNF  -Awaiting updated therapy evaluations  -TCC to verify discharge support  -PMR to follow in the periphery for rehab appropriateness, please reach out with questions  or request for medical management    Medical Complexity:    Left-sided weakness  -Initial NIH score 1, which increased to 7 today.  Patient now has dense left-sided hemiplegia.  -Recommend repeat MR brain to assess for new areas of ischemia that can guide rehabilitation strategy  -Neurology following, no additional work-up or interventions planned  -Secondary stroke prevention with aspirin 81 mg daily indefinitely, Plavix 75 mg daily for 10 days, atorvastatin 80 mg daily  -Repeat PT and OT    Diabetes mellitus  -Hemoglobin A1c 11.9  -Sliding scale insulin while in-house  -Glargine 5 units q. Evening    Leukocytosis  -WBC 11.8  -Potential stress reaction      DVT PPX: Lovenox      Thank you for allowing us to participate in the care of this patient.     Patient was seen for >80 minutes on unit/floor of which > 50% of time was spent on counseling and coordination of care regarding the above, including prognosis, risk reduction, benefits of treatment, and options for next stage of care.    Timur Camarena, DO   Physical Medicine and Rehabilitation     Please note that this dictation was created using voice recognition software. I have made every reasonable attempt to correct obvious errors, but there may be errors of grammar and possibly content that I did not discover before finalizing the note.

## 2023-08-11 NOTE — CARE PLAN
The patient is Stable - Low risk of patient condition declining or worsening    Shift Goals  Clinical Goals: Stable Neuros  Patient Goals: Rest  Family Goals: Rest    Progress made toward(s) clinical / shift goals:      Problem: Neuro Status  Goal: Neuro status will remain stable or improve  8/11/2023 0402 by Mata Pratt R.N.  Outcome: Progressing  8/11/2023 0402 by Mata Pratt R.N.  Outcome: Progressing    Patient is not progressing towards the following goals:      Problem: Self Care  Goal: Patient will have the ability to perform ADLs independently or with assistance (bathe, groom, dress, toilet and feed)  8/11/2023 0402 by Mata Pratt R.N.  Outcome: Not Progressing  8/11/2023 0402 by STEVEN BurrowsN.  Outcome: Not Progressing     Problem: Knowledge Deficit - Standard  Goal: Patient and family/care givers will demonstrate understanding of plan of care, disease process/condition, diagnostic tests and medications  8/11/2023 0402 by Mata Pratt R.N.  Outcome: Not Progressing  8/11/2023 0402 by Mata Pratt R.N.  Outcome: Progressing

## 2023-08-11 NOTE — DISCHARGE PLANNING
[facility]P TCN chart review completed. Collaborated with MEMO Cook prior to meeting with the pt. The most current review of medical record, knowledge of pt's PLOF and social support, LACE+ score of 68, 6 clicks scores of 11 mobility were considered.      Pt seen at bedside, defer to spouse. Introduced TCN program. Provided education regarding post acute levels of care. Discussed HTH/SCP plan benefits (Meds to Beds, medical uber and [facility] transitional care). Pt verbalizes understanding.     Language Gabbi romano [id number].  Spouse reports patient has significant support at home, and can assist as needed.  PT/OT verbalize post acute recommendations, awaiting formal recommendations.  Spouse reports preference is discharge to post acute placement prior to returning home.  No further TCN needs at this time.     Choice proactively obtained for SNF, IRF and faxed to [facility]. TCN will continue to follow and collaborate with discharge planning team as additional post acute needs arise. Thank you.     Completed today:  Awaiting PT/OT recommendations  See above  Choice obtained: IRF, SNF  [facility] (1)  [facility] (2)  [facility] referral not sent, likely post acute placement.

## 2023-08-11 NOTE — PROGRESS NOTES
Monitor Summary: SR 95, LA .0.15, QRS .0.08, QT .0.34 with rare PVCs per strip from monitor room

## 2023-08-11 NOTE — CARE PLAN
The patient is {Patient Stability:[id number]}    Shift Goals  Clinical Goals: Stable Neuros  Patient Goals: Rest  Family Goals: Rest    Progress made toward(s) clinical / shift goals:  ***    Patient is not progressing towards the following goals:      Problem: Self Care  Goal: Patient will have the ability to perform ADLs independently or with assistance (bathe, groom, dress, toilet and feed)  Outcome: Not Progressing

## 2023-08-11 NOTE — PROGRESS NOTES
Neurology Progress Note  Neurohospitalist Service, [facility]    Referring Physician: Tiffany Moreau M.D.      Interval History: No acute events overnight.  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  Worsening L side weakness overnight.    Review of systems: In addition to what is detailed in the HPI and/or updated in the interval history, all other systems reviewed and are negative.    Past Medical History, Past Surgical History and Social History reviewed and unchanged from prior    Medications:    Current Facility-Administered Medications:     aspirin EC tablet 81 mg, 81 mg, Oral, Q EVENING, Steve Barraza M.D., 81 mg at 08/10/23 1708    olmesartan (Benicar) tablet 20 mg, 20 mg, Oral, Q EVENING, Steve Barraza M.D.    acetaminophen (Tylenol) tablet 650 mg, 650 mg, Oral, Q6HRS PRN, Steve Barraza M.D.    senna-docusate (Pericolace Or Senokot S) 8.6-50 MG per tablet 2 Tablet, 2 Tablet, Oral, BID, 2 Tablet at 08/11/23 0446 **AND** polyethylene glycol/lytes (Miralax) PACKET 1 Packet, 1 Packet, Oral, QDAY PRN **AND** magnesium hydroxide (Milk Of Magnesia) suspension 30 mL, 30 mL, Oral, QDAY PRN **AND** bisacodyl (Dulcolax) suppository 10 mg, 10 mg, Rectal, QDAY PRN, Steve Barraza M.D.    enoxaparin (Lovenox) inj 40 mg, 40 mg, Subcutaneous, DAILY AT 1800, Steve Barraza M.D., 40 mg at 08/10/23 1707    ondansetron (Zofran) syringe/vial injection 4 mg, 4 mg, Intravenous, Q4HRS PRN, Steve Barraza M.D., 4 mg at 08/11/23 0704    ondansetron (Zofran ODT) dispertab 4 mg, 4 mg, Oral, Q4HRS PRN, Steev Barraza M.D.    promethazine (Phenergan) tablet 12.5-25 mg, 12.5-25 mg, Oral, Q4HRS PRN, Steve Barraza M.D.    promethazine (Phenergan) suppository 12.5-25 mg, 12.5-25 mg, Rectal, Q4HRS PRN, Steve Barraza M.D.    prochlorperazine (Compazine) injection 5-10 mg, 5-10 mg, Intravenous, Q4HRS PRN, Steve Barraza M.D., 10 mg at 08/10/23 7396    insulin regular  (HumuLIN R,NovoLIN R) injection, 1-6 Units, Subcutaneous, 4X/DAY ACHS, 1 Units at 08/10/23 1657 **AND** POC blood glucose manual result, , , Q AC AND BEDTIME(S) **AND** NOTIFY MD and PharmD, , , Once **AND** Administer 20 grams of glucose (approximately 8 ounces of fruit juice) every 15 minutes PRN FSBG less than 70 mg/dL, , , PRN **AND** dextrose 50% (D50W) injection 25 g, 25 g, Intravenous, Q15 MIN PRN, Steve Barraza M.D.    atorvastatin (Lipitor) tablet 40 mg, 40 mg, Oral, Q EVENING, Steve Barraza M.D., 40 mg at 08/10/23 1708    Physical Examination:   /78   Pulse 78   Temp 36.1 °C (97 °F) (Temporal)   Resp 19   Wt 63.5 kg (139 lb 15.9 oz)   SpO2 94%   BMI 23.30 kg/m²       General: Patient is awake and in no acute distress  Neck: There is normal range of motion  CV: Regular rate   Extremities:  Warm, dry, and intact, without peripheral lower extremity edema    NEUROLOGICAL EXAM:     Mental status: Awake, alert and fully oriented  Speech and language: Speech is clear and fluent. The patient is able to name and repeat, and follow commands  Cranial nerve exam:  Visual fields are full. Extraocular muscles are intact. Subtle L face droop.  Motor exam: There is sustained antigravity with no downward drift in R arm and leg.  Left arm has no movement today, left leg briefly antigravity with drift to bed.  Sensory exam:  Reacts to tactile in all 4 distal extremities, there is no neglect to double stim.  Coordination: No ataxia on R finger-to-nose testing.  Gait: Deferred due to patient preference.     NIHSS: National Institutes of Health Stroke Scale     [0] 1a:Level of Consciousness           0-alert 1-drowsy   2-stupor   3-coma  [0] 1b:LOC Questions                         0-both  1-one      2-neither  [0] 1c:LOC Commands                       0-both  1-one      2-neither  [0] 2: Best Gaze                      0-nl    1-partial  2-forced  [0] 3: Visual Fields                              0-nl     1-partial  2-complete 3-bilat  [1] 4: Facial Paresis                0-nl    1-minor    2-partial  3-full  MOTOR                                              0-nl  [0] 5: Right Arm                       1-drift  [4] 6: Left Arm                                     2-some effort vs gravity  [0] 7: Right Leg                       3-no effort vs gravity  [2] 8: Left Leg                                      4-no movement                                                              x-untestable  [0] 9: Limb Ataxia                    0-abs   1-1_limb   2-2+_limbs                                                              x-untestable  [0] 10:Sensory                        0-nl    1-partial  2-dense  [0] 11:Best Language/Aphasia         0-nl    1-mild/mod 2-severe   3-mute  [0] 12:Dysarthria                     0-nl    1-mild/mod 2-severe                                                              x-untestable  [0] 13:Neglect/Inattention                   0-none  1-partial  2-complete  [7] TOTAL      Objective Data:    Labs:  Lab Results   Component Value Date/Time    PROTHROMBTM 13.5 08/10/2023 06:48 AM    INR 1.04 08/10/2023 06:48 AM      Lab Results   Component Value Date/Time    WBC 11.8 (H) 08/11/2023 06:58 AM    RBC 5.92 08/11/2023 06:58 AM    HEMOGLOBIN 15.5 08/11/2023 06:58 AM    HEMATOCRIT 47.6 08/11/2023 06:58 AM    MCV 80.4 (L) 08/11/2023 06:58 AM    MCH 26.2 (L) 08/11/2023 06:58 AM    MCHC 32.6 08/11/2023 06:58 AM    MPV 10.9 08/11/2023 06:58 AM    NEUTSPOLYS 77.40 (H) 08/10/2023 06:48 AM    LYMPHOCYTES 15.50 (L) 08/10/2023 06:48 AM    MONOCYTES 5.80 08/10/2023 06:48 AM    EOSINOPHILS 0.50 08/10/2023 06:48 AM    BASOPHILS 0.30 08/10/2023 06:48 AM      Lab Results   Component Value Date/Time    SODIUM 139 08/11/2023 06:58 AM    POTASSIUM 4.6 08/11/2023 06:58 AM    CHLORIDE 104 08/11/2023 06:58 AM    CO2 19 (L) 08/11/2023 06:58 AM    GLUCOSE 320 (H) 08/11/2023 06:58 AM    BUN 17 08/11/2023 06:58 AM    CREATININE  0.76 08/11/2023 06:58 AM      Lab Results   Component Value Date/Time    CHOLSTRLTOT 275 (H) 08/11/2023 06:58 AM     (H) 08/11/2023 06:58 AM    HDL 51 08/11/2023 06:58 AM    TRIGLYCERIDE 177 (H) 08/11/2023 06:58 AM       Lab Results   Component Value Date/Time    ALKPHOSPHAT 61 08/11/2023 06:58 AM    ASTSGOT 13 08/11/2023 06:58 AM    ALTSGPT 12 08/11/2023 06:58 AM    TBILIRUBIN 0.6 08/11/2023 06:58 AM        Imaging/Testing:    I interpreted and/or reviewed the patient's neuroimaging    MR-BRAIN-W/O   Final Result      1.  Questionable tiny acute infarct in the anterior right medulla is artifact.   2.  No intracranial hemorrhage or other significant brain abnormality.      DX-CHEST-PORTABLE (1 VIEW)   Final Result      No acute process.      CT-CEREBRAL PERFUSION ANALYSIS   Final Result      1.  Cerebral blood flow less than 30% likely representing completed infarct = 0 mL.      2.  T Max more than 6 seconds likely representing combination of completed infarct and ischemia = 0 mL.      3.  Mismatched volume likely representing ischemic brain/penumbra = None      4.  Please note that the cerebral perfusion was performed on the limited brain tissue around the basal ganglia region. Infarct/ischemia outside the CT perfusion sections can be missed in this study.      CT-CTA NECK WITH & W/O-POST PROCESSING   Final Result      No significant carotid or vertebral stenosis.      CT-CTA HEAD WITH & W/O-POST PROCESS   Final Result         1. 40% left intracranial vertebral artery stenosis.   2. Minimal bilateral intracranial carotid arterial narrowing secondary to plaque.   3. No large vessel occlusion or aneurysm identified.      CT-HEAD W/O   Final Result      No acute process.             Assessment and Plan:  Jorden Bonilla is a 58 year old man with multiple vascular risk factors, presenting with L side weakness upon awakening.  Stroke protocol CT without evidence of hemorrhage, large territory stroke, LVO, or  critical, flow limiting stenoses. MRI revealing R anterior medullary infarct.  Unclear stroke etiology- atheroembolic, cardioembolic, and small vessel etiologies are all possible.  Recommend completing embolic workup at this time.  Will initiate short-term DAPT.       Problem list:   Stroke by clinical assessment   Type 2 diabetes   CAD   Hypertension     Recommendations:              - q4h neurochecks/NIHSS              - continue ASA 81mg daily indefinitely   - start plavix 75mg daily x 10 days (end date is 8/21)- I have placed this order              - in absence of ischemic penumbra or critical stenoses, no acute hypertensive response is required. Long-term BP goal is 110-130/60-80, ok to restart PO anti-hypertensives              - stroke labs:  HgbA1c 11.9 and               - increase atorvastatin to 80mg daily for goal LDL < 70              - DM management for goal HgbA1c < 7, acutely aim for FSBS               - attain TTE and ziopatch monitorng at discharge              - PT/OT/SLP              - stroke bridge clinic follow up     The evaluation of the patient, and recommended management, was discussed with Dr. Moreau, hospitalist attending. I have performed a physical exam and reviewed and updated ROS and Plan today (8/11/2023).     Anirudh Lopez MD  Neurohospitalist, Acute Care Services

## 2023-08-11 NOTE — PROGRESS NOTES
Hospital Medicine Daily Progress Note    Date of Service  8/11/2023    Chief Complaint  Jorden Bonilla is a 58 y.o. male admitted 8/10/2023 with stroke    Hospital Course  59 yo man with history of CABG, DM, HTN with poor compliance to his medications who woke up with left sided weakness.  CTA head and neck showed 40% left intracranial vertebral artery stenosis, minimal bilateral intracranial artery narrowing.  Neurology was consulted.  He was started on aspirin.  MRI brain showed acute infarct in anterior right medulla.    Interval Problem Update  Sinus on telemetry  Hyperglycemia improving  Continues to have severe left-sided weakness.    He complains of dizziness and the room spinning worse with turning his head to the left, also has left ear tinnitus.  Denies hearing loss.  Will start on meclizine.  Wife is bedside.  We discussed further inpatient rehab which she is agreeable to.  Echocardiogram ordered to complete stroke work-up    I have discussed this patient's plan of care and discharge plan at IDT rounds today with Case Management, Nursing, Nursing leadership, and other members of the IDT team.    Consultants/Specialty  neurology    Code Status  Full Code    Disposition  The patient is not medically cleared for discharge to home or a post-acute facility.  Anticipate discharge to: an inpatient rehabilitation hospital    I have placed the appropriate orders for post-discharge needs.    Review of Systems  Review of Systems   Constitutional:  Positive for malaise/fatigue.   HENT:  Positive for tinnitus. Negative for hearing loss.    Respiratory:  Negative for shortness of breath.    Cardiovascular:  Negative for chest pain.   Gastrointestinal:  Negative for abdominal pain and nausea.   Musculoskeletal:  Negative for myalgias.   Neurological:  Positive for dizziness, speech change and focal weakness. Negative for headaches.        Physical Exam  Temp:  [36.1 °C (97 °F)-36.5 °C (97.7 °F)] 36.3 °C (97.3  °F)  Pulse:  [73-87] 87  Resp:  [14-20] 18  BP: (121-135)/(72-81) 135/81  SpO2:  [93 %-96 %] 93 %    Physical Exam  Vitals and nursing note reviewed.   Constitutional:       Appearance: He is ill-appearing.   HENT:      Head: Normocephalic.      Mouth/Throat:      Mouth: Mucous membranes are moist.   Eyes:      General:         Right eye: No discharge.         Left eye: No discharge.   Cardiovascular:      Rate and Rhythm: Normal rate and regular rhythm.      Heart sounds: Murmur (Systolic) heard.   Pulmonary:      Effort: Pulmonary effort is normal. No respiratory distress.      Breath sounds: No wheezing or rales.   Abdominal:      Palpations: Abdomen is soft.      Tenderness: There is no abdominal tenderness.   Musculoskeletal:         General: No swelling.      Cervical back: Neck supple.   Skin:     General: Skin is warm and dry.   Neurological:      Mental Status: He is alert and oriented to person, place, and time.      Comments: Left arm is flaccid.  Left leg weaker than right  Slurred speech, facial droop         Fluids    Intake/Output Summary (Last 24 hours) at 8/11/2023 1437  Last data filed at 8/11/2023 1305  Gross per 24 hour   Intake --   Output 1550 ml   Net -1550 ml       Laboratory  Recent Labs     08/10/23  0648 08/11/23  0658   WBC 8.8 11.8*   RBC 5.14 5.92   HEMOGLOBIN 13.9* 15.5   HEMATOCRIT 41.6* 47.6   MCV 80.9* 80.4*   MCH 27.0 26.2*   MCHC 33.4 32.6   RDW 35.9 35.9   PLATELETCT 203 241   MPV 10.3 10.9     Recent Labs     08/10/23  0648 08/11/23  0658   SODIUM 130* 139   POTASSIUM 4.1 4.6   CHLORIDE 97 104   CO2 23 19*   GLUCOSE 334* 320*   BUN 17 17   CREATININE 0.72 0.76   CALCIUM 8.3* 8.7     Recent Labs     08/10/23  0648   APTT 27.8   INR 1.04         Recent Labs     08/11/23  0658   TRIGLYCERIDE 177*   HDL 51   *       Imaging  MR-BRAIN-W/O   Final Result      1.  Questionable tiny acute infarct in the anterior right medulla is artifact.   2.  No intracranial hemorrhage or other  significant brain abnormality.      DX-CHEST-PORTABLE (1 VIEW)   Final Result      No acute process.      CT-CEREBRAL PERFUSION ANALYSIS   Final Result      1.  Cerebral blood flow less than 30% likely representing completed infarct = 0 mL.      2.  T Max more than 6 seconds likely representing combination of completed infarct and ischemia = 0 mL.      3.  Mismatched volume likely representing ischemic brain/penumbra = None      4.  Please note that the cerebral perfusion was performed on the limited brain tissue around the basal ganglia region. Infarct/ischemia outside the CT perfusion sections can be missed in this study.      CT-CTA NECK WITH & W/O-POST PROCESSING   Final Result      No significant carotid or vertebral stenosis.      CT-CTA HEAD WITH & W/O-POST PROCESS   Final Result         1. 40% left intracranial vertebral artery stenosis.   2. Minimal bilateral intracranial carotid arterial narrowing secondary to plaque.   3. No large vessel occlusion or aneurysm identified.      CT-HEAD W/O   Final Result      No acute process.         EC-ECHOCARDIOGRAM COMPLETE W/ CONT    (Results Pending)        Assessment/Plan  * Hemiplegia (HCC)- (present on admission)  Assessment & Plan  MRI brain showed acute infarct in anterior right medulla  CTA negative for significant large vessel occlusion  Neurology consulted  Plan for aspirin with 10 days of Plavix  Lipitor 80 mg  Ordered for echo, plan for Zio patch on discharge  PT OT ST, physiatry consult  Telemetry  Has some dizziness, seems more consistent with BPPV.  Trial meclizine    Dehydration- (present on admission)  Assessment & Plan  Oral intake as tolerated    Primary hypertension- (present on admission)  Assessment & Plan  Continue home olmesartan with hold parameters.  Avoid hypotension    Type 2 diabetes mellitus with hyperglycemia, without long-term current use of insulin (HCC)- (present on admission)  Assessment & Plan  Was taking Jardiance, Trulicity,  metformin   A1c >11%  Remains hyperglycemic  Add glargine 5 units nightly  Continue ISS    Coronary artery disease due to calcified coronary lesion: CABG ×3 in 2015- (present on admission)  Assessment & Plan  Continue aspirin and Lipitor.  He denies chest pain    Dyslipidemia- (present on admission)  Assessment & Plan  Start high intensity statin    Hyponatremia- (present on admission)  Assessment & Plan  Improved         VTE prophylaxis:    enoxaparin ppx      I have performed a physical exam and reviewed and updated ROS and Plan today (8/11/2023). In review of yesterday's note (8/10/2023), there are no changes except as documented above.

## 2023-08-11 NOTE — DISCHARGE PLANNING
Received Choice Form @: 1144  Agency/ Facility Name: [facility]  Referral Sent per Choice Form @: 1152      Received Choice Form @: 1150  Agency/ Facility Name: [facility]  Referral Sent per Choice Form @: 115

## 2023-08-11 NOTE — DISCHARGE PLANNING
Case discussed in IDT rounds earlier today:   Per Dr. Moreau, patient medically cleared for DC to IRF or SNF.  [facility] following; Physiatry consult pending.   CM sent Voalte message to Blair @ [facility] informing him of medical clearance.      **1430 Hrs  - Per Dr. Moreau, ECHO ordered & pending at this time.   Case Management Discharge Planning    Admission Date: 8/10/2023  GMLOS: 2.9  ALOS: 1    6-Clicks ADL Score: 12  6-Clicks Mobility Score: 9  PT and/or OT Eval ordered: Yes  Post-acute Referrals Ordered: Yes  Post-acute Choice Obtained: Yes  Has referral(s) been sent to post-acute provider:  Yes      Anticipated Discharge Dispo: Discharge Disposition: Disch to  rehab facility or distinct part unit ()    DME Needed: No    Action(s) Taken: Assessment completed based on chart review & collaboration with [facility].   Patient resides with spouse in single level house, was independent with ADLs, and ambulating w/o assistive devices prior to admission.    IRF & SNF choices already obtained by [facility]; Preference is IRF.    Escalations Completed: None    Medically Clear: Yes    Next Steps: CM will f/u on IRF & SNF referrals statuses    Barriers to Discharge: Pending Placement    Is the patient up for discharge tomorrow:  Potentially today; Pending ECHO      Care Transition Team Assessment    Information Source  Orientation Level: Oriented X4  Information Given By: Other (Comments) (Chart review & collaboration with [facility])  Who is responsible for making decisions for patient? : Patient    Readmission Evaluation  Is this a readmission?: No    Elopement Risk  Legal Hold: No  Ambulatory or Self Mobile in Wheelchair: No-Not an Elopement Risk    Interdisciplinary Discharge Planning  Lives with - Patient's Self Care Capacity: Spouse  Patient or legal guardian wants to designate a caregiver: No  Support Systems: Spouse / Significant Other  Housing / Facility: [address]  Prior Services: None, Home-Independent  Durable Medical  Equipment: Home Oxygen    Discharge Preparedness  What is your plan after discharge?: Other (comment) (IRF vs SNF)  What are your discharge supports?: Spouse  Prior Functional Level: Independent with Activities of Daily Living, Ambulatory    Functional Assesment  Prior Functional Level: Independent with Activities of Daily Living, Ambulatory    Finances  Financial Barriers to Discharge: No  Prescription Coverage: Yes    Vision / Hearing Impairment  Vision Impairment : Yes  Right Eye Vision: Wears Glasses  Left Eye Vision: Wears Glasses  Hearing Impairment : No     Advance Directive  Advance Directive?:  (None on file)    Domestic Abuse  Have you ever been the victim of abuse or violence?: No  Physical Abuse or Sexual Abuse: No  Verbal Abuse or Emotional Abuse: No  Possible Abuse/Neglect Reported to:: Not Applicable       Discharge Risks or Barriers  Discharge risks or barriers?: Complex medical needs  Patient risk factors: Complex medical needs    Anticipated Discharge Information  Discharge Disposition: Disch to IP rehab facility or distinct part unit (62)

## 2023-08-12 PROBLEM — R33.9 URINARY RETENTION: Status: ACTIVE | Noted: 2023-08-12

## 2023-08-12 LAB
ANION GAP SERPL CALC-SCNC: 9 MMOL/L (ref 7–16)
BASOPHILS # BLD AUTO: 0.1 % (ref 0–1.8)
BASOPHILS # BLD: 0.02 K/UL (ref 0–0.12)
BUN SERPL-MCNC: 20 MG/DL (ref 8–22)
CALCIUM SERPL-MCNC: 8.9 MG/DL (ref 8.5–10.5)
CHLORIDE SERPL-SCNC: 105 MMOL/L (ref 96–112)
CO2 SERPL-SCNC: 28 MMOL/L (ref 20–33)
CREAT SERPL-MCNC: 0.88 MG/DL (ref 0.5–1.4)
EOSINOPHIL # BLD AUTO: 0.01 K/UL (ref 0–0.51)
EOSINOPHIL NFR BLD: 0.1 % (ref 0–6.9)
ERYTHROCYTE [DISTWIDTH] IN BLOOD BY AUTOMATED COUNT: 36.7 FL (ref 35.9–50)
GFR SERPLBLD CREATININE-BSD FMLA CKD-EPI: 98 ML/MIN/1.73 M 2
GLUCOSE BLD STRIP.AUTO-MCNC: 145 MG/DL (ref 65–99)
GLUCOSE BLD STRIP.AUTO-MCNC: 168 MG/DL (ref 65–99)
GLUCOSE BLD STRIP.AUTO-MCNC: 175 MG/DL (ref 65–99)
GLUCOSE BLD STRIP.AUTO-MCNC: 201 MG/DL (ref 65–99)
GLUCOSE SERPL-MCNC: 146 MG/DL (ref 65–99)
HCT VFR BLD AUTO: 46.4 % (ref 42–52)
HGB BLD-MCNC: 15.2 G/DL (ref 14–18)
IMM GRANULOCYTES # BLD AUTO: 0.07 K/UL (ref 0–0.11)
IMM GRANULOCYTES NFR BLD AUTO: 0.5 % (ref 0–0.9)
LYMPHOCYTES # BLD AUTO: 2.12 K/UL (ref 1–4.8)
LYMPHOCYTES NFR BLD: 15.2 % (ref 22–41)
MCH RBC QN AUTO: 26.3 PG (ref 27–33)
MCHC RBC AUTO-ENTMCNC: 32.8 G/DL (ref 32.3–36.5)
MCV RBC AUTO: 80.1 FL (ref 81.4–97.8)
MONOCYTES # BLD AUTO: 1.31 K/UL (ref 0–0.85)
MONOCYTES NFR BLD AUTO: 9.4 % (ref 0–13.4)
NEUTROPHILS # BLD AUTO: 10.42 K/UL (ref 1.82–7.42)
NEUTROPHILS NFR BLD: 74.7 % (ref 44–72)
NRBC # BLD AUTO: 0 K/UL
NRBC BLD-RTO: 0 /100 WBC (ref 0–0.2)
PLATELET # BLD AUTO: 257 K/UL (ref 164–446)
PMV BLD AUTO: 10.7 FL (ref 9–12.9)
POTASSIUM SERPL-SCNC: 3.8 MMOL/L (ref 3.6–5.5)
RBC # BLD AUTO: 5.79 M/UL (ref 4.7–6.1)
SODIUM SERPL-SCNC: 142 MMOL/L (ref 135–145)
WBC # BLD AUTO: 14 K/UL (ref 4.8–10.8)

## 2023-08-12 PROCEDURE — 99232 SBSQ HOSP IP/OBS MODERATE 35: CPT | Performed by: PSYCHIATRY & NEUROLOGY

## 2023-08-12 PROCEDURE — A9270 NON-COVERED ITEM OR SERVICE: HCPCS | Performed by: INTERNAL MEDICINE

## 2023-08-12 PROCEDURE — 700102 HCHG RX REV CODE 250 W/ 637 OVERRIDE(OP): Performed by: PSYCHIATRY & NEUROLOGY

## 2023-08-12 PROCEDURE — 36415 COLL VENOUS BLD VENIPUNCTURE: CPT

## 2023-08-12 PROCEDURE — A9270 NON-COVERED ITEM OR SERVICE: HCPCS | Performed by: PSYCHIATRY & NEUROLOGY

## 2023-08-12 PROCEDURE — 770020 HCHG ROOM/CARE - TELE (206)

## 2023-08-12 PROCEDURE — 51798 US URINE CAPACITY MEASURE: CPT

## 2023-08-12 PROCEDURE — 85025 COMPLETE CBC W/AUTO DIFF WBC: CPT

## 2023-08-12 PROCEDURE — 80048 BASIC METABOLIC PNL TOTAL CA: CPT

## 2023-08-12 PROCEDURE — A9270 NON-COVERED ITEM OR SERVICE: HCPCS | Performed by: HOSPITALIST

## 2023-08-12 PROCEDURE — 700102 HCHG RX REV CODE 250 W/ 637 OVERRIDE(OP): Performed by: HOSPITALIST

## 2023-08-12 PROCEDURE — 99232 SBSQ HOSP IP/OBS MODERATE 35: CPT | Performed by: INTERNAL MEDICINE

## 2023-08-12 PROCEDURE — 700102 HCHG RX REV CODE 250 W/ 637 OVERRIDE(OP): Performed by: INTERNAL MEDICINE

## 2023-08-12 PROCEDURE — 700111 HCHG RX REV CODE 636 W/ 250 OVERRIDE (IP): Mod: JZ | Performed by: HOSPITALIST

## 2023-08-12 PROCEDURE — 82962 GLUCOSE BLOOD TEST: CPT | Mod: 91

## 2023-08-12 RX ORDER — MECLIZINE HYDROCHLORIDE 25 MG/1
25 TABLET ORAL 3 TIMES DAILY
Status: DISCONTINUED | OUTPATIENT
Start: 2023-08-12 | End: 2023-08-14 | Stop reason: HOSPADM

## 2023-08-12 RX ADMIN — INSULIN HUMAN 1 UNITS: 100 INJECTION, SOLUTION PARENTERAL at 12:23

## 2023-08-12 RX ADMIN — SENNOSIDES AND DOCUSATE SODIUM 2 TABLET: 50; 8.6 TABLET ORAL at 05:30

## 2023-08-12 RX ADMIN — ACETAMINOPHEN 650 MG: 325 TABLET, FILM COATED ORAL at 16:37

## 2023-08-12 RX ADMIN — SENNOSIDES AND DOCUSATE SODIUM 2 TABLET: 50; 8.6 TABLET ORAL at 16:37

## 2023-08-12 RX ADMIN — ASPIRIN 81 MG: 81 TABLET, COATED ORAL at 16:37

## 2023-08-12 RX ADMIN — INSULIN HUMAN 1 UNITS: 100 INJECTION, SOLUTION PARENTERAL at 20:44

## 2023-08-12 RX ADMIN — MECLIZINE HYDROCHLORIDE 12.5 MG: 25 TABLET ORAL at 08:23

## 2023-08-12 RX ADMIN — INSULIN GLARGINE-YFGN 5 UNITS: 100 INJECTION, SOLUTION SUBCUTANEOUS at 17:58

## 2023-08-12 RX ADMIN — ENOXAPARIN SODIUM 40 MG: 100 INJECTION SUBCUTANEOUS at 16:37

## 2023-08-12 RX ADMIN — CLOPIDOGREL BISULFATE 75 MG: 75 TABLET ORAL at 05:30

## 2023-08-12 RX ADMIN — MECLIZINE HYDROCHLORIDE 25 MG: 25 TABLET ORAL at 20:41

## 2023-08-12 RX ADMIN — ATORVASTATIN CALCIUM 80 MG: 80 TABLET, FILM COATED ORAL at 16:37

## 2023-08-12 RX ADMIN — INSULIN HUMAN 2 UNITS: 100 INJECTION, SOLUTION PARENTERAL at 17:58

## 2023-08-12 RX ADMIN — MECLIZINE HYDROCHLORIDE 25 MG: 25 TABLET ORAL at 15:28

## 2023-08-12 ASSESSMENT — ENCOUNTER SYMPTOMS
NAUSEA: 0
ABDOMINAL PAIN: 0
DIZZINESS: 1
FOCAL WEAKNESS: 1
SPEECH CHANGE: 1
HEADACHES: 0
SHORTNESS OF BREATH: 0
MYALGIAS: 0

## 2023-08-12 ASSESSMENT — PAIN DESCRIPTION - PAIN TYPE
TYPE: ACUTE PAIN
TYPE: ACUTE PAIN

## 2023-08-12 NOTE — PROGRESS NOTES
Monitor Summary: SR 77-90, KS .16, QRS .08, QT .38, occasional PVCs per strip from monitor room.

## 2023-08-12 NOTE — DISCHARGE PLANNING
Dr. Camarena is recommending an updated MRI d/t increased NIH from 1 to 7.  Would also appreciate updated TX evals once appropriate.

## 2023-08-12 NOTE — PROGRESS NOTES
Neurology Progress Note  Neurohospitalist Service, [facility]sciences    Referring Physician: Tiffany Moreau M.D.      Interval History: No acute events overnight.  TTE pending.    Review of systems: In addition to what is detailed in the HPI and/or updated in the interval history, all other systems reviewed and are negative.    Past Medical History, Past Surgical History and Social History reviewed and unchanged from prior    Medications:    Current Facility-Administered Medications:     atorvastatin (Lipitor) tablet 80 mg, 80 mg, Oral, Q EVENING, Anirudh Lopez M.D., 80 mg at 08/11/23 1805    clopidogrel (Plavix) tablet 75 mg, 75 mg, Oral, DAILY, Anirudh Lopez M.D., 75 mg at 08/12/23 0530    meclizine (Antivert) tablet 12.5 mg, 12.5 mg, Oral, TID, Tiffany Moreau M.D., 12.5 mg at 08/11/23 2001    insulin GLARGINE (Lantus,Semglee) injection, 5 Units, Subcutaneous, Q EVENING, Tiffany Moreau M.D., 5 Units at 08/11/23 1815    aspirin EC tablet 81 mg, 81 mg, Oral, Q EVENING, Steve Barraza M.D., 81 mg at 08/11/23 1805    olmesartan (Benicar) tablet 20 mg, 20 mg, Oral, Q EVENING, Steve Barraza M.D.    acetaminophen (Tylenol) tablet 650 mg, 650 mg, Oral, Q6HRS PRN, Steve Barraza M.D.    senna-docusate (Pericolace Or Senokot S) 8.6-50 MG per tablet 2 Tablet, 2 Tablet, Oral, BID, 2 Tablet at 08/12/23 0530 **AND** polyethylene glycol/lytes (Miralax) PACKET 1 Packet, 1 Packet, Oral, QDAY PRN **AND** magnesium hydroxide (Milk Of Magnesia) suspension 30 mL, 30 mL, Oral, QDAY PRN **AND** bisacodyl (Dulcolax) suppository 10 mg, 10 mg, Rectal, QDAY PRN, Steve Barraza M.D.    enoxaparin (Lovenox) inj 40 mg, 40 mg, Subcutaneous, DAILY AT 1800, Steve Barraza M.D., 40 mg at 08/11/23 1805    ondansetron (Zofran) syringe/vial injection 4 mg, 4 mg, Intravenous, Q4HRS PRN, Steve Barraza M.D., 4 mg at 08/11/23 0704    ondansetron (Zofran ODT) dispertab 4 mg, 4 mg, Oral, Q4HRS PRN, Steve Barraza M.D.     promethazine (Phenergan) tablet 12.5-25 mg, 12.5-25 mg, Oral, Q4HRS PRN, Steve Barraza M.D.    promethazine (Phenergan) suppository 12.5-25 mg, 12.5-25 mg, Rectal, Q4HRS PRN, Steve Barraza M.D.    prochlorperazine (Compazine) injection 5-10 mg, 5-10 mg, Intravenous, Q4HRS PRN, Steve Barraza M.D., 10 mg at 08/11/23 0913    insulin regular (HumuLIN R,NovoLIN R) injection, 1-6 Units, Subcutaneous, 4X/DAY ACHS, 3 Units at 08/11/23 2004 **AND** POC blood glucose manual result, , , Q AC AND BEDTIME(S) **AND** NOTIFY MD and PharmD, , , Once **AND** Administer 20 grams of glucose (approximately 8 ounces of fruit juice) every 15 minutes PRN FSBG less than 70 mg/dL, , , PRN **AND** dextrose 50% (D50W) injection 25 g, 25 g, Intravenous, Q15 MIN PRN, Steve Barraza M.D.    Physical Examination:   /79   Pulse 83   Temp 36.8 °C (98.2 °F) (Temporal)   Resp 17   Wt 65.4 kg (144 lb 2.9 oz)   SpO2 (!) 9%   BMI 23.99 kg/m²       General: Patient is awake and in no acute distress  Neck: There is normal range of motion  CV: Regular rate   Extremities:  Warm, dry, and intact, without peripheral lower extremity edema    NEUROLOGICAL EXAM:     Mental status: Awake, alert and fully oriented  Speech and language: Speech is clear and fluent. The patient is able to name and repeat, and follow commands  Cranial nerve exam:  Visual fields are full. Extraocular muscles are intact. Subtle L face droop.  Motor exam: There is sustained antigravity with no downward drift in R arm and leg.  Left arm has no movement today, left leg briefly antigravity with drift to bed.  Sensory exam:  Reacts to tactile in all 4 distal extremities, there is no neglect to double stim.  Coordination: No ataxia on R finger-to-nose testing.  Gait: Deferred due to patient preference.     NIHSS: National Institutes of Health Stroke Scale     [0] 1a:Level of Consciousness           0-alert 1-drowsy   2-stupor   3-coma  [0] 1b:LOC Questions                          0-both  1-one      2-neither  [0] 1c:LOC Commands                       0-both  1-one      2-neither  [0] 2: Best Gaze                      0-nl    1-partial  2-forced  [0] 3: Visual Fields                              0-nl    1-partial  2-complete 3-bilat  [1] 4: Facial Paresis                0-nl    1-minor    2-partial  3-full  MOTOR                                              0-nl  [0] 5: Right Arm                       1-drift  [4] 6: Left Arm                                     2-some effort vs gravity  [0] 7: Right Leg                       3-no effort vs gravity  [2] 8: Left Leg                                      4-no movement                                                              x-untestable  [0] 9: Limb Ataxia                    0-abs   1-1_limb   2-2+_limbs                                                              x-untestable  [0] 10:Sensory                        0-nl    1-partial  2-dense  [0] 11:Best Language/Aphasia         0-nl    1-mild/mod 2-severe   3-mute  [0] 12:Dysarthria                     0-nl    1-mild/mod 2-severe                                                              x-untestable  [0] 13:Neglect/Inattention                   0-none  1-partial  2-complete  [7] TOTAL      Objective Data:    Labs:  Lab Results   Component Value Date/Time    PROTHROMBTM 13.5 08/10/2023 06:48 AM    INR 1.04 08/10/2023 06:48 AM      Lab Results   Component Value Date/Time    WBC 14.0 (H) 08/12/2023 12:51 AM    RBC 5.79 08/12/2023 12:51 AM    HEMOGLOBIN 15.2 08/12/2023 12:51 AM    HEMATOCRIT 46.4 08/12/2023 12:51 AM    MCV 80.1 (L) 08/12/2023 12:51 AM    MCH 26.3 (L) 08/12/2023 12:51 AM    MCHC 32.8 08/12/2023 12:51 AM    MPV 10.7 08/12/2023 12:51 AM    NEUTSPOLYS 74.70 (H) 08/12/2023 12:51 AM    LYMPHOCYTES 15.20 (L) 08/12/2023 12:51 AM    MONOCYTES 9.40 08/12/2023 12:51 AM    EOSINOPHILS 0.10 08/12/2023 12:51 AM    BASOPHILS 0.10 08/12/2023 12:51 AM      Lab Results   Component  Value Date/Time    SODIUM 142 08/12/2023 12:51 AM    POTASSIUM 3.8 08/12/2023 12:51 AM    CHLORIDE 105 08/12/2023 12:51 AM    CO2 28 08/12/2023 12:51 AM    GLUCOSE 146 (H) 08/12/2023 12:51 AM    BUN 20 08/12/2023 12:51 AM    CREATININE 0.88 08/12/2023 12:51 AM      Lab Results   Component Value Date/Time    CHOLSTRLTOT 275 (H) 08/11/2023 06:58 AM     (H) 08/11/2023 06:58 AM    HDL 51 08/11/2023 06:58 AM    TRIGLYCERIDE 177 (H) 08/11/2023 06:58 AM       Lab Results   Component Value Date/Time    ALKPHOSPHAT 61 08/11/2023 06:58 AM    ASTSGOT 13 08/11/2023 06:58 AM    ALTSGPT 12 08/11/2023 06:58 AM    TBILIRUBIN 0.6 08/11/2023 06:58 AM        Imaging/Testing:    I interpreted and/or reviewed the patient's neuroimaging    MR-BRAIN-W/O   Final Result      1.  Questionable tiny acute infarct in the anterior right medulla is artifact.   2.  No intracranial hemorrhage or other significant brain abnormality.      DX-CHEST-PORTABLE (1 VIEW)   Final Result      No acute process.      CT-CEREBRAL PERFUSION ANALYSIS   Final Result      1.  Cerebral blood flow less than 30% likely representing completed infarct = 0 mL.      2.  T Max more than 6 seconds likely representing combination of completed infarct and ischemia = 0 mL.      3.  Mismatched volume likely representing ischemic brain/penumbra = None      4.  Please note that the cerebral perfusion was performed on the limited brain tissue around the basal ganglia region. Infarct/ischemia outside the CT perfusion sections can be missed in this study.      CT-CTA NECK WITH & W/O-POST PROCESSING   Final Result      No significant carotid or vertebral stenosis.      CT-CTA HEAD WITH & W/O-POST PROCESS   Final Result         1. 40% left intracranial vertebral artery stenosis.   2. Minimal bilateral intracranial carotid arterial narrowing secondary to plaque.   3. No large vessel occlusion or aneurysm identified.      CT-HEAD W/O   Final Result      No acute process.          EC-ECHOCARDIOGRAM COMPLETE W/ CONT    (Results Pending)       Assessment and Plan:  Jorden Bonilla is a 58 year old man with multiple vascular risk factors, presenting with L side weakness upon awakening.  Stroke protocol CT without evidence of hemorrhage, large territory stroke, LVO, or critical, flow limiting stenoses. MRI revealing R anterior medullary infarct.  Unclear stroke etiology- atheroembolic, cardioembolic, and small vessel etiologies are all possible.  Recommend completing embolic workup at this time.  Will initiate short-term DAPT.       Problem list:   Stroke by clinical assessment   Type 2 diabetes   CAD   Hypertension     Recommendations:              - q4h neurochecks/NIHSS              - continue ASA 81mg daily indefinitely   - continue plavix 75mg daily x 10 days (end date is 8/21)-              - in absence of ischemic penumbra or critical stenoses, no acute hypertensive response is required. Long-term BP goal is 110-130/60-80, titrate PO anti-hypertensives              - stroke labs:  HgbA1c 11.9 and               - continue atorvastatin to 80mg daily for goal LDL < 70              - DM management for goal HgbA1c < 7, acutely aim for FSBS               - attain TTE and ziopatch monitorng at discharge              - PT/OT/SLP              - stroke bridge clinic follow up     The evaluation of the patient, and recommended management, was discussed with Dr. Moreau, hospitalist attending. I have performed a physical exam and reviewed and updated ROS and Plan today (8/12/2023).     Anirudh Lopez MD  Neurohospitalist, Acute Care Services

## 2023-08-12 NOTE — PROGRESS NOTES
Hospital Medicine Daily Progress Note    Date of Service  8/12/2023    Chief Complaint  Jorden Bonilla is a 58 y.o. male admitted 8/10/2023 with stroke    Hospital Course  57 yo man with history of CABG, DM, HTN with poor compliance to his medications who woke up with left sided weakness.  CTA head and neck showed 40% left intracranial vertebral artery stenosis, minimal bilateral intracranial artery narrowing.  Neurology was consulted.  He was started on aspirin.  MRI brain showed acute infarct in anterior right medulla.    Interval Problem Update  8/11 - Sinus on telemetry  Hyperglycemia improving  Continues to have severe left-sided weakness.    He complains of dizziness and the room spinning worse with turning his head to the left, also has left ear tinnitus.  Denies hearing loss.  Will start on meclizine.  Wife is bedside.  We discussed further inpatient rehab which she is agreeable to.  Echocardiogram ordered to complete stroke work-up    8/12 -sinus on telemetry.  Leukocytosis 14, afebrile.  Patient says his dizziness is doing better, was able to sleep more last night.  I will increase meclizine dose.  TTE pending.  Continues to have no strength on the left side.  He has recurring urinary retention, ordered for Damon    I have discussed this patient's plan of care and discharge plan at IDT rounds today with Case Management, Nursing, Nursing leadership, and other members of the IDT team.    Consultants/Specialty  neurology    Code Status  Full Code    Disposition  The patient is not medically cleared for discharge to home or a post-acute facility.  Anticipate discharge to: an inpatient rehabilitation hospital    I have placed the appropriate orders for post-discharge needs.    Review of Systems  Review of Systems   Constitutional:  Positive for malaise/fatigue.   HENT:  Positive for tinnitus. Negative for hearing loss.    Respiratory:  Negative for shortness of breath.    Cardiovascular:  Negative for chest  pain.   Gastrointestinal:  Negative for abdominal pain and nausea.   Musculoskeletal:  Negative for myalgias.   Neurological:  Positive for dizziness (Improved), speech change and focal weakness. Negative for headaches.        Physical Exam  Temp:  [36.1 °C (97 °F)-37 °C (98.6 °F)] 36.1 °C (97 °F)  Pulse:  [73-98] 90  Resp:  [16-20] 18  BP: (113-130)/(70-81) 123/72  SpO2:  [9 %-98 %] 96 %    Physical Exam  Vitals and nursing note reviewed.   Constitutional:       Appearance: He is ill-appearing.   HENT:      Head: Normocephalic.      Mouth/Throat:      Mouth: Mucous membranes are moist.   Eyes:      General:         Right eye: No discharge.         Left eye: No discharge.   Cardiovascular:      Rate and Rhythm: Normal rate and regular rhythm.      Heart sounds: Murmur (Systolic) heard.   Pulmonary:      Effort: Pulmonary effort is normal. No respiratory distress.      Breath sounds: No wheezing or rales.   Abdominal:      Palpations: Abdomen is soft.      Tenderness: There is no abdominal tenderness.   Musculoskeletal:         General: No swelling.      Cervical back: Neck supple.   Skin:     General: Skin is warm and dry.   Neurological:      Mental Status: He is alert and oriented to person, place, and time.      Comments: Left arm is flaccid.  Unable to move left toes  Slurred speech, facial droop         Fluids    Intake/Output Summary (Last 24 hours) at 8/12/2023 1251  Last data filed at 8/12/2023 0347  Gross per 24 hour   Intake 240 ml   Output 1780 ml   Net -1540 ml       Laboratory  Recent Labs     08/10/23  0648 08/11/23  0658 08/12/23  0051   WBC 8.8 11.8* 14.0*   RBC 5.14 5.92 5.79   HEMOGLOBIN 13.9* 15.5 15.2   HEMATOCRIT 41.6* 47.6 46.4   MCV 80.9* 80.4* 80.1*   MCH 27.0 26.2* 26.3*   MCHC 33.4 32.6 32.8   RDW 35.9 35.9 36.7   PLATELETCT 203 241 257   MPV 10.3 10.9 10.7     Recent Labs     08/10/23  0648 08/11/23  0658 08/12/23  0051   SODIUM 130* 139 142   POTASSIUM 4.1 4.6 3.8   CHLORIDE 97 104 105    CO2 23 19* 28   GLUCOSE 334* 320* 146*   BUN 17 17 20   CREATININE 0.72 0.76 0.88   CALCIUM 8.3* 8.7 8.9     Recent Labs     08/10/23  0648   APTT 27.8   INR 1.04         Recent Labs     08/11/23  0658   TRIGLYCERIDE 177*   HDL 51   *       Imaging  MR-BRAIN-W/O   Final Result      1.  Questionable tiny acute infarct in the anterior right medulla is artifact.   2.  No intracranial hemorrhage or other significant brain abnormality.      DX-CHEST-PORTABLE (1 VIEW)   Final Result      No acute process.      CT-CEREBRAL PERFUSION ANALYSIS   Final Result      1.  Cerebral blood flow less than 30% likely representing completed infarct = 0 mL.      2.  T Max more than 6 seconds likely representing combination of completed infarct and ischemia = 0 mL.      3.  Mismatched volume likely representing ischemic brain/penumbra = None      4.  Please note that the cerebral perfusion was performed on the limited brain tissue around the basal ganglia region. Infarct/ischemia outside the CT perfusion sections can be missed in this study.      CT-CTA NECK WITH & W/O-POST PROCESSING   Final Result      No significant carotid or vertebral stenosis.      CT-CTA HEAD WITH & W/O-POST PROCESS   Final Result         1. 40% left intracranial vertebral artery stenosis.   2. Minimal bilateral intracranial carotid arterial narrowing secondary to plaque.   3. No large vessel occlusion or aneurysm identified.      CT-HEAD W/O   Final Result      No acute process.         EC-ECHOCARDIOGRAM COMPLETE W/ CONT    (Results Pending)        Assessment/Plan  * Hemiplegia (HCC)- (present on admission)  Assessment & Plan  MRI brain showed acute infarct in anterior right medulla  CTA negative for significant large vessel occlusion  Neurology consulted  Plan for aspirin with 10 days of Plavix  Lipitor 80 mg  TTE pending, plan for Zio patch on discharge  PT OT ST, physiatry consult  Telemetry  Has some dizziness, seems more consistent with BPPV.   Meclizine is improving his symptoms, increase dose    Urinary retention  Assessment & Plan  Damon cath placed 8/12    Dehydration- (present on admission)  Assessment & Plan  Oral intake as tolerated    Primary hypertension- (present on admission)  Assessment & Plan  Continue home olmesartan with hold parameters.  Avoid hypotension    Type 2 diabetes mellitus with hyperglycemia, without long-term current use of insulin (HCC)- (present on admission)  Assessment & Plan  Was taking Jardiance, Trulicity, metformin   A1c >11%  Hyperglycemia improving  glargine 5 units nightly  Continue ISS    Coronary artery disease due to calcified coronary lesion: CABG ×3 in 2015- (present on admission)  Assessment & Plan  Continue aspirin and Lipitor.  He denies chest pain    Dyslipidemia- (present on admission)  Assessment & Plan  Start high intensity statin    Hyponatremia- (present on admission)  Assessment & Plan  Improved         VTE prophylaxis:    enoxaparin ppx      I have performed a physical exam and reviewed and updated ROS and Plan today (8/12/2023). In review of yesterday's note (8/11/2023), there are no changes except as documented above.

## 2023-08-12 NOTE — CARE PLAN
The patient is Stable - Low risk of patient condition declining or worsening    Shift Goals  Clinical Goals: Stable Neuros  Patient Goals: Rest  Family Goals: Rest    Progress made toward(s) clinical / shift goals:    Problem: Knowledge Deficit - Stroke Education  Goal: Patient's knowledge of stroke and risk factors will improve  Outcome: Progressing  Note: Pt and wife demonstrate understanding of plan of care and ask appropriate questions.      Problem: Neuro Status  Goal: Neuro status will remain stable or improve  Outcome: Progressing  Note: Patients neuro status remains stable      Problem: Urinary Elimination  Goal: Establish and maintain regular urinary output  Outcome: Progressing  Note: Patient encouraged to urinate using urinal with minimal staff assistance     Problem: Gastrointestinal Irritability  Goal: Nausea and vomiting will be absent or improve  Outcome: Progressing  Note: Pt nausea and vomiting was treated with prescribed medications. Nausea and vomiting improved throughout shift.

## 2023-08-12 NOTE — PROGRESS NOTES
Monitor Summary: SR 86-95, ND -0.12, QRS -0.07, QT -0.38, with rare coup per strip from the monitor room.

## 2023-08-13 ENCOUNTER — APPOINTMENT (OUTPATIENT)
Dept: CARDIOLOGY | Facility: MEDICAL CENTER | Age: 58
DRG: 065 | End: 2023-08-13
Attending: INTERNAL MEDICINE
Payer: COMMERCIAL

## 2023-08-13 LAB
ANION GAP SERPL CALC-SCNC: 11 MMOL/L (ref 7–16)
BASOPHILS # BLD AUTO: 0.4 % (ref 0–1.8)
BASOPHILS # BLD: 0.04 K/UL (ref 0–0.12)
BUN SERPL-MCNC: 20 MG/DL (ref 8–22)
CALCIUM SERPL-MCNC: 9 MG/DL (ref 8.5–10.5)
CHLORIDE SERPL-SCNC: 106 MMOL/L (ref 96–112)
CO2 SERPL-SCNC: 25 MMOL/L (ref 20–33)
CREAT SERPL-MCNC: 0.83 MG/DL (ref 0.5–1.4)
EOSINOPHIL # BLD AUTO: 0.12 K/UL (ref 0–0.51)
EOSINOPHIL NFR BLD: 1.3 % (ref 0–6.9)
ERYTHROCYTE [DISTWIDTH] IN BLOOD BY AUTOMATED COUNT: 37 FL (ref 35.9–50)
GFR SERPLBLD CREATININE-BSD FMLA CKD-EPI: 100 ML/MIN/1.73 M 2
GLUCOSE BLD STRIP.AUTO-MCNC: 159 MG/DL (ref 65–99)
GLUCOSE BLD STRIP.AUTO-MCNC: 183 MG/DL (ref 65–99)
GLUCOSE BLD STRIP.AUTO-MCNC: 197 MG/DL (ref 65–99)
GLUCOSE BLD STRIP.AUTO-MCNC: 285 MG/DL (ref 65–99)
GLUCOSE SERPL-MCNC: 141 MG/DL (ref 65–99)
HCT VFR BLD AUTO: 49.4 % (ref 42–52)
HGB BLD-MCNC: 16.1 G/DL (ref 14–18)
IMM GRANULOCYTES # BLD AUTO: 0.03 K/UL (ref 0–0.11)
IMM GRANULOCYTES NFR BLD AUTO: 0.3 % (ref 0–0.9)
LV EJECT FRACT  99904: 55
LYMPHOCYTES # BLD AUTO: 2.59 K/UL (ref 1–4.8)
LYMPHOCYTES NFR BLD: 27.3 % (ref 22–41)
MCH RBC QN AUTO: 26.4 PG (ref 27–33)
MCHC RBC AUTO-ENTMCNC: 32.6 G/DL (ref 32.3–36.5)
MCV RBC AUTO: 81 FL (ref 81.4–97.8)
MONOCYTES # BLD AUTO: 0.95 K/UL (ref 0–0.85)
MONOCYTES NFR BLD AUTO: 10 % (ref 0–13.4)
NEUTROPHILS # BLD AUTO: 5.75 K/UL (ref 1.82–7.42)
NEUTROPHILS NFR BLD: 60.7 % (ref 44–72)
NRBC # BLD AUTO: 0 K/UL
NRBC BLD-RTO: 0 /100 WBC (ref 0–0.2)
PLATELET # BLD AUTO: 170 K/UL (ref 164–446)
PMV BLD AUTO: 11.5 FL (ref 9–12.9)
POTASSIUM SERPL-SCNC: 3.7 MMOL/L (ref 3.6–5.5)
RBC # BLD AUTO: 6.1 M/UL (ref 4.7–6.1)
SODIUM SERPL-SCNC: 142 MMOL/L (ref 135–145)
WBC # BLD AUTO: 9.5 K/UL (ref 4.8–10.8)

## 2023-08-13 PROCEDURE — 700102 HCHG RX REV CODE 250 W/ 637 OVERRIDE(OP): Performed by: INTERNAL MEDICINE

## 2023-08-13 PROCEDURE — 82962 GLUCOSE BLOOD TEST: CPT

## 2023-08-13 PROCEDURE — 85025 COMPLETE CBC W/AUTO DIFF WBC: CPT

## 2023-08-13 PROCEDURE — 700111 HCHG RX REV CODE 636 W/ 250 OVERRIDE (IP): Mod: JZ | Performed by: HOSPITALIST

## 2023-08-13 PROCEDURE — 93306 TTE W/DOPPLER COMPLETE: CPT

## 2023-08-13 PROCEDURE — A9270 NON-COVERED ITEM OR SERVICE: HCPCS | Performed by: HOSPITALIST

## 2023-08-13 PROCEDURE — 93306 TTE W/DOPPLER COMPLETE: CPT | Mod: 26 | Performed by: INTERNAL MEDICINE

## 2023-08-13 PROCEDURE — 80048 BASIC METABOLIC PNL TOTAL CA: CPT

## 2023-08-13 PROCEDURE — A9270 NON-COVERED ITEM OR SERVICE: HCPCS | Performed by: PSYCHIATRY & NEUROLOGY

## 2023-08-13 PROCEDURE — 770020 HCHG ROOM/CARE - TELE (206)

## 2023-08-13 PROCEDURE — A9270 NON-COVERED ITEM OR SERVICE: HCPCS | Performed by: INTERNAL MEDICINE

## 2023-08-13 PROCEDURE — 36415 COLL VENOUS BLD VENIPUNCTURE: CPT

## 2023-08-13 PROCEDURE — 99232 SBSQ HOSP IP/OBS MODERATE 35: CPT | Performed by: INTERNAL MEDICINE

## 2023-08-13 PROCEDURE — 700102 HCHG RX REV CODE 250 W/ 637 OVERRIDE(OP): Performed by: PSYCHIATRY & NEUROLOGY

## 2023-08-13 PROCEDURE — 700102 HCHG RX REV CODE 250 W/ 637 OVERRIDE(OP): Performed by: HOSPITALIST

## 2023-08-13 RX ORDER — TAMSULOSIN HYDROCHLORIDE 0.4 MG/1
0.4 CAPSULE ORAL
Status: DISCONTINUED | OUTPATIENT
Start: 2023-08-13 | End: 2023-08-14 | Stop reason: HOSPADM

## 2023-08-13 RX ORDER — CLOPIDOGREL BISULFATE 75 MG/1
75 TABLET ORAL DAILY
Status: DISCONTINUED | OUTPATIENT
Start: 2023-08-14 | End: 2023-08-14 | Stop reason: HOSPADM

## 2023-08-13 RX ADMIN — INSULIN GLARGINE-YFGN 5 UNITS: 100 INJECTION, SOLUTION SUBCUTANEOUS at 17:33

## 2023-08-13 RX ADMIN — ATORVASTATIN CALCIUM 80 MG: 80 TABLET, FILM COATED ORAL at 17:39

## 2023-08-13 RX ADMIN — INSULIN HUMAN 1 UNITS: 100 INJECTION, SOLUTION PARENTERAL at 17:33

## 2023-08-13 RX ADMIN — INSULIN HUMAN 1 UNITS: 100 INJECTION, SOLUTION PARENTERAL at 21:13

## 2023-08-13 RX ADMIN — ENOXAPARIN SODIUM 40 MG: 100 INJECTION SUBCUTANEOUS at 17:34

## 2023-08-13 RX ADMIN — TAMSULOSIN HYDROCHLORIDE 0.4 MG: 0.4 CAPSULE ORAL at 16:16

## 2023-08-13 RX ADMIN — SENNOSIDES AND DOCUSATE SODIUM 2 TABLET: 50; 8.6 TABLET ORAL at 06:11

## 2023-08-13 RX ADMIN — MECLIZINE HYDROCHLORIDE 25 MG: 25 TABLET ORAL at 21:09

## 2023-08-13 RX ADMIN — INSULIN HUMAN 3 UNITS: 100 INJECTION, SOLUTION PARENTERAL at 08:28

## 2023-08-13 RX ADMIN — ASPIRIN 81 MG: 81 TABLET, COATED ORAL at 17:34

## 2023-08-13 RX ADMIN — INSULIN HUMAN 1 UNITS: 100 INJECTION, SOLUTION PARENTERAL at 12:27

## 2023-08-13 RX ADMIN — SENNOSIDES AND DOCUSATE SODIUM 2 TABLET: 50; 8.6 TABLET ORAL at 17:34

## 2023-08-13 RX ADMIN — MECLIZINE HYDROCHLORIDE 25 MG: 25 TABLET ORAL at 14:48

## 2023-08-13 RX ADMIN — CLOPIDOGREL BISULFATE 75 MG: 75 TABLET ORAL at 06:12

## 2023-08-13 RX ADMIN — POLYETHYLENE GLYCOL 3350 1 PACKET: 17 POWDER, FOR SOLUTION ORAL at 17:40

## 2023-08-13 RX ADMIN — MECLIZINE HYDROCHLORIDE 25 MG: 25 TABLET ORAL at 09:06

## 2023-08-13 ASSESSMENT — ENCOUNTER SYMPTOMS
FOCAL WEAKNESS: 1
SPEECH CHANGE: 1
ABDOMINAL PAIN: 0
SHORTNESS OF BREATH: 0
MYALGIAS: 0
NAUSEA: 0
DIZZINESS: 1
HEADACHES: 0
MEMORY LOSS: 0

## 2023-08-13 ASSESSMENT — PAIN DESCRIPTION - PAIN TYPE
TYPE: ACUTE PAIN
TYPE: ACUTE PAIN

## 2023-08-13 ASSESSMENT — FIBROSIS 4 INDEX: FIB4 SCORE: 0.88

## 2023-08-13 NOTE — CARE PLAN
The patient is Stable - Low risk of patient condition declining or worsening    Shift Goals  Clinical Goals: Stable Neuros  Patient Goals: Rest  Family Goals: Rest    Progress made toward(s) clinical / shift goals:    Problem: Knowledge Deficit - Stroke Education  Goal: Patient's knowledge of stroke and risk factors will improve  Outcome: Progressing  Note: Pt demonstrates understanding of plan of care and asks appropriate questions. Pt doing passive ROM exercises on his own on affected extremities.     Problem: Neuro Status  Goal: Neuro status will remain stable or improve  Outcome: Progressing  Note: Patients neuro status remains stable      Problem: Respiratory - Stroke Patient  Goal: Patient will achieve/maintain optimum respiratory rate/effort  Outcome: Progressing  Note: PT demonstrated and understands importance of coughing and deep breathing       Patient is not progressing towards the following goals:

## 2023-08-13 NOTE — PROGRESS NOTES
Hospital Medicine Daily Progress Note    Date of Service  8/13/2023    Chief Complaint  Jorden Bonilla is a 58 y.o. male admitted 8/10/2023 with stroke    Hospital Course  59 yo man with history of CABG, DM, HTN with poor compliance to his medications who woke up with left sided weakness.  CTA head and neck showed 40% left intracranial vertebral artery stenosis, minimal bilateral intracranial artery narrowing.  Neurology was consulted.  He was started on aspirin.  MRI brain showed acute infarct in anterior right medulla.    Interval Problem Update  8/11 - Sinus on telemetry  Hyperglycemia improving  Continues to have severe left-sided weakness.    He complains of dizziness and the room spinning worse with turning his head to the left, also has left ear tinnitus.  Denies hearing loss.  Will start on meclizine.  Wife is bedside.  We discussed further inpatient rehab which she is agreeable to.  Echocardiogram ordered to complete stroke work-up    8/12 -sinus on telemetry.  Leukocytosis 14, afebrile.  Patient says his dizziness is doing better, was able to sleep more last night.  I will increase meclizine dose.  TTE pending.  Continues to have no strength on the left side.  He has recurring urinary retention, ordered for Damon    8/13 -glucose is better controlled.  Leukocytosis resolved.  TTE pending.  He says his dizziness is doing much better, able to sleep.  He is able to raise his left elbow and left knee better today, still no movement of fingers or toes.    I have discussed this patient's plan of care and discharge plan at IDT rounds today with Case Management, Nursing, Nursing leadership, and other members of the IDT team.    Consultants/Specialty  neurology    Code Status  Full Code    Disposition  The patient is not medically cleared for discharge to home or a post-acute facility.  Anticipate discharge to: skilled nursing facility    I have placed the appropriate orders for post-discharge needs.    Review  of Systems  Review of Systems   Constitutional:  Positive for malaise/fatigue.   HENT:  Positive for tinnitus. Negative for hearing loss.    Respiratory:  Negative for shortness of breath.    Cardiovascular:  Negative for chest pain.   Gastrointestinal:  Negative for abdominal pain and nausea.   Musculoskeletal:  Negative for myalgias.   Neurological:  Positive for dizziness (Improved), speech change and focal weakness. Negative for headaches.   Psychiatric/Behavioral:  Negative for memory loss.         Physical Exam  Temp:  [36.3 °C (97.3 °F)-36.7 °C (98.1 °F)] 36.6 °C (97.9 °F)  Pulse:  [73-96] 96  Resp:  [16-18] 16  BP: (117-128)/(72-80) 117/77  SpO2:  [95 %-98 %] 95 %    Physical Exam  Vitals and nursing note reviewed.   Constitutional:       Appearance: He is ill-appearing.   HENT:      Head: Normocephalic.      Mouth/Throat:      Mouth: Mucous membranes are moist.   Eyes:      General:         Right eye: No discharge.         Left eye: No discharge.   Cardiovascular:      Rate and Rhythm: Normal rate and regular rhythm.      Heart sounds: Murmur (Systolic) heard.   Pulmonary:      Effort: Pulmonary effort is normal. No respiratory distress.      Breath sounds: No wheezing or rales.   Abdominal:      Palpations: Abdomen is soft.      Tenderness: There is no abdominal tenderness.   Musculoskeletal:         General: No swelling.      Cervical back: Neck supple.   Skin:     General: Skin is warm and dry.   Neurological:      Mental Status: He is alert and oriented to person, place, and time.      Comments: Able to raise left elbow off the bed, no movement of left fingers.  Unable to move left toes, able to weakly raise left knee  Slurred speech, facial droop         Fluids    Intake/Output Summary (Last 24 hours) at 8/13/2023 1441  Last data filed at 8/13/2023 0543  Gross per 24 hour   Intake --   Output 2225 ml   Net -2225 ml       Laboratory  Recent Labs     08/11/23  0658 08/12/23  0051 08/13/23  0336   WBC  11.8* 14.0* 9.5   RBC 5.92 5.79 6.10   HEMOGLOBIN 15.5 15.2 16.1   HEMATOCRIT 47.6 46.4 49.4   MCV 80.4* 80.1* 81.0*   MCH 26.2* 26.3* 26.4*   MCHC 32.6 32.8 32.6   RDW 35.9 36.7 37.0   PLATELETCT 241 257 170   MPV 10.9 10.7 11.5     Recent Labs     08/11/23  0658 08/12/23  0051 08/13/23  0336   SODIUM 139 142 142   POTASSIUM 4.6 3.8 3.7   CHLORIDE 104 105 106   CO2 19* 28 25   GLUCOSE 320* 146* 141*   BUN 17 20 20   CREATININE 0.76 0.88 0.83   CALCIUM 8.7 8.9 9.0               Recent Labs     08/11/23  0658   TRIGLYCERIDE 177*   HDL 51   *       Imaging  EC-ECHOCARDIOGRAM COMPLETE W/O CONT         MR-BRAIN-W/O   Final Result      1.  Questionable tiny acute infarct in the anterior right medulla is artifact.   2.  No intracranial hemorrhage or other significant brain abnormality.      DX-CHEST-PORTABLE (1 VIEW)   Final Result      No acute process.      CT-CEREBRAL PERFUSION ANALYSIS   Final Result      1.  Cerebral blood flow less than 30% likely representing completed infarct = 0 mL.      2.  T Max more than 6 seconds likely representing combination of completed infarct and ischemia = 0 mL.      3.  Mismatched volume likely representing ischemic brain/penumbra = None      4.  Please note that the cerebral perfusion was performed on the limited brain tissue around the basal ganglia region. Infarct/ischemia outside the CT perfusion sections can be missed in this study.      CT-CTA NECK WITH & W/O-POST PROCESSING   Final Result      No significant carotid or vertebral stenosis.      CT-CTA HEAD WITH & W/O-POST PROCESS   Final Result         1. 40% left intracranial vertebral artery stenosis.   2. Minimal bilateral intracranial carotid arterial narrowing secondary to plaque.   3. No large vessel occlusion or aneurysm identified.      CT-HEAD W/O   Final Result      No acute process.              Assessment/Plan  * Hemiplegia (HCC)- (present on admission)  Assessment & Plan  MRI brain showed acute infarct in  anterior right medulla  CTA negative for significant large vessel occlusion  Neurology consulted  Plan for aspirin with 10 days of Plavix  Lipitor 80 mg  TTE pending, plan for Zio patch on discharge  PT OT ST, physiatry consult  Telemetry  Meclizine has improved his dizziness, continue    Urinary retention  Assessment & Plan  Damon cath placed 8/12  Start Flomax    Dehydration- (present on admission)  Assessment & Plan  Oral intake as tolerated    Primary hypertension- (present on admission)  Assessment & Plan  Continue home olmesartan with hold parameters.  Avoid hypotension    Type 2 diabetes mellitus with hyperglycemia, without long-term current use of insulin (HCC)- (present on admission)  Assessment & Plan  Was taking Jardiance, Trulicity, metformin   A1c >11%  Hyperglycemia improving  glargine 5 units nightly  Continue ISS    Coronary artery disease due to calcified coronary lesion: CABG ×3 in 2015- (present on admission)  Assessment & Plan  Continue aspirin and Lipitor.  He denies chest pain    Dyslipidemia- (present on admission)  Assessment & Plan  Start high intensity statin    Hyponatremia- (present on admission)  Assessment & Plan  Improved         VTE prophylaxis:    enoxaparin ppx      I have performed a physical exam and reviewed and updated ROS and Plan today (8/13/2023). In review of yesterday's note (8/12/2023), there are no changes except as documented above.

## 2023-08-14 ENCOUNTER — HOSPITAL ENCOUNTER (INPATIENT)
Facility: REHABILITATION | Age: 58
LOS: 22 days | DRG: 057 | End: 2023-09-05
Attending: PHYSICAL MEDICINE & REHABILITATION | Admitting: PHYSICAL MEDICINE & REHABILITATION
Payer: COMMERCIAL

## 2023-08-14 ENCOUNTER — NON-PROVIDER VISIT (OUTPATIENT)
Dept: CARDIOLOGY | Facility: MEDICAL CENTER | Age: 58
End: 2023-08-14
Payer: COMMERCIAL

## 2023-08-14 ENCOUNTER — APPOINTMENT (OUTPATIENT)
Dept: RADIOLOGY | Facility: REHABILITATION | Age: 58
DRG: 057 | End: 2023-08-14
Attending: PHYSICAL MEDICINE & REHABILITATION
Payer: COMMERCIAL

## 2023-08-14 ENCOUNTER — PATIENT OUTREACH (OUTPATIENT)
Dept: SCHEDULING | Facility: IMAGING CENTER | Age: 58
End: 2023-08-14
Payer: COMMERCIAL

## 2023-08-14 VITALS
HEART RATE: 92 BPM | RESPIRATION RATE: 17 BRPM | DIASTOLIC BLOOD PRESSURE: 85 MMHG | OXYGEN SATURATION: 95 % | WEIGHT: 146.16 LBS | TEMPERATURE: 97.9 F | BODY MASS INDEX: 24.32 KG/M2 | SYSTOLIC BLOOD PRESSURE: 124 MMHG

## 2023-08-14 DIAGNOSIS — I47.10 SVT (SUPRAVENTRICULAR TACHYCARDIA) (HCC): ICD-10-CM

## 2023-08-14 DIAGNOSIS — I49.1 APC (ATRIAL PREMATURE CONTRACTIONS): ICD-10-CM

## 2023-08-14 DIAGNOSIS — I63.9 ISCHEMIC STROKE (HCC): ICD-10-CM

## 2023-08-14 DIAGNOSIS — I49.3 PVC'S (PREMATURE VENTRICULAR CONTRACTIONS): ICD-10-CM

## 2023-08-14 LAB
APPEARANCE UR: CLEAR
BACTERIA #/AREA URNS HPF: NEGATIVE /HPF
BILIRUB UR QL STRIP.AUTO: NEGATIVE
COLOR UR: YELLOW
EKG IMPRESSION: NORMAL
EPI CELLS #/AREA URNS HPF: NEGATIVE /HPF
GLUCOSE BLD STRIP.AUTO-MCNC: 198 MG/DL (ref 65–99)
GLUCOSE BLD STRIP.AUTO-MCNC: 254 MG/DL (ref 65–99)
GLUCOSE BLD STRIP.AUTO-MCNC: 278 MG/DL (ref 65–99)
GLUCOSE UR STRIP.AUTO-MCNC: 500 MG/DL
HYALINE CASTS #/AREA URNS LPF: ABNORMAL /LPF
KETONES UR STRIP.AUTO-MCNC: NEGATIVE MG/DL
LEUKOCYTE ESTERASE UR QL STRIP.AUTO: NEGATIVE
MICRO URNS: ABNORMAL
NITRITE UR QL STRIP.AUTO: NEGATIVE
PH UR STRIP.AUTO: 6.5 [PH] (ref 5–8)
PROT UR QL STRIP: NEGATIVE MG/DL
RBC # URNS HPF: ABNORMAL /HPF
RBC UR QL AUTO: ABNORMAL
SP GR UR STRIP.AUTO: 1.01
UROBILINOGEN UR STRIP.AUTO-MCNC: 0.2 MG/DL
WBC #/AREA URNS HPF: ABNORMAL /HPF

## 2023-08-14 PROCEDURE — 99239 HOSP IP/OBS DSCHRG MGMT >30: CPT | Performed by: INTERNAL MEDICINE

## 2023-08-14 PROCEDURE — 97535 SELF CARE MNGMENT TRAINING: CPT

## 2023-08-14 PROCEDURE — 92526 ORAL FUNCTION THERAPY: CPT

## 2023-08-14 PROCEDURE — 82962 GLUCOSE BLOOD TEST: CPT | Mod: 91

## 2023-08-14 PROCEDURE — A9270 NON-COVERED ITEM OR SERVICE: HCPCS | Performed by: PHYSICAL MEDICINE & REHABILITATION

## 2023-08-14 PROCEDURE — 94760 N-INVAS EAR/PLS OXIMETRY 1: CPT

## 2023-08-14 PROCEDURE — 97110 THERAPEUTIC EXERCISES: CPT

## 2023-08-14 PROCEDURE — 71045 X-RAY EXAM CHEST 1 VIEW: CPT

## 2023-08-14 PROCEDURE — 770010 HCHG ROOM/CARE - REHAB SEMI PRIVAT*

## 2023-08-14 PROCEDURE — 93010 ELECTROCARDIOGRAM REPORT: CPT | Performed by: STUDENT IN AN ORGANIZED HEALTH CARE EDUCATION/TRAINING PROGRAM

## 2023-08-14 PROCEDURE — 99223 1ST HOSP IP/OBS HIGH 75: CPT | Performed by: PHYSICAL MEDICINE & REHABILITATION

## 2023-08-14 PROCEDURE — 97530 THERAPEUTIC ACTIVITIES: CPT

## 2023-08-14 PROCEDURE — 97116 GAIT TRAINING THERAPY: CPT

## 2023-08-14 PROCEDURE — 700102 HCHG RX REV CODE 250 W/ 637 OVERRIDE(OP): Performed by: INTERNAL MEDICINE

## 2023-08-14 PROCEDURE — 93005 ELECTROCARDIOGRAM TRACING: CPT | Performed by: PHYSICAL MEDICINE & REHABILITATION

## 2023-08-14 PROCEDURE — 700111 HCHG RX REV CODE 636 W/ 250 OVERRIDE (IP): Mod: JZ | Performed by: PHYSICAL MEDICINE & REHABILITATION

## 2023-08-14 PROCEDURE — 92523 SPEECH SOUND LANG COMPREHEN: CPT

## 2023-08-14 PROCEDURE — A9270 NON-COVERED ITEM OR SERVICE: HCPCS | Performed by: INTERNAL MEDICINE

## 2023-08-14 PROCEDURE — A9270 NON-COVERED ITEM OR SERVICE: HCPCS | Performed by: HOSPITALIST

## 2023-08-14 PROCEDURE — 81001 URINALYSIS AUTO W/SCOPE: CPT

## 2023-08-14 PROCEDURE — 700102 HCHG RX REV CODE 250 W/ 637 OVERRIDE(OP): Performed by: PHYSICAL MEDICINE & REHABILITATION

## 2023-08-14 PROCEDURE — 700102 HCHG RX REV CODE 250 W/ 637 OVERRIDE(OP): Performed by: HOSPITALIST

## 2023-08-14 RX ORDER — ENOXAPARIN SODIUM 100 MG/ML
40 INJECTION SUBCUTANEOUS DAILY
Status: DISCONTINUED | OUTPATIENT
Start: 2023-08-14 | End: 2023-09-05 | Stop reason: HOSPADM

## 2023-08-14 RX ORDER — BISACODYL 10 MG
10 SUPPOSITORY, RECTAL RECTAL
Status: DISCONTINUED | OUTPATIENT
Start: 2023-08-14 | End: 2023-08-18

## 2023-08-14 RX ORDER — AMOXICILLIN 250 MG
2 CAPSULE ORAL 2 TIMES DAILY
Status: DISCONTINUED | OUTPATIENT
Start: 2023-08-14 | End: 2023-08-18

## 2023-08-14 RX ORDER — ATORVASTATIN CALCIUM 80 MG/1
80 TABLET, FILM COATED ORAL EVERY EVENING
Status: CANCELLED | OUTPATIENT
Start: 2023-08-14

## 2023-08-14 RX ORDER — HYDROXYZINE HYDROCHLORIDE 25 MG/1
50 TABLET, FILM COATED ORAL EVERY 6 HOURS PRN
Status: DISCONTINUED | OUTPATIENT
Start: 2023-08-14 | End: 2023-09-05 | Stop reason: HOSPADM

## 2023-08-14 RX ORDER — POLYETHYLENE GLYCOL 3350 17 G/17G
1 POWDER, FOR SOLUTION ORAL
Status: DISCONTINUED | OUTPATIENT
Start: 2023-08-14 | End: 2023-08-18

## 2023-08-14 RX ORDER — ENOXAPARIN SODIUM 100 MG/ML
40 INJECTION SUBCUTANEOUS DAILY
Status: CANCELLED | OUTPATIENT
Start: 2023-08-14

## 2023-08-14 RX ORDER — MIDAZOLAM HYDROCHLORIDE 5 MG/ML
5 INJECTION INTRAMUSCULAR; INTRAVENOUS PRN
Status: DISCONTINUED | OUTPATIENT
Start: 2023-08-14 | End: 2023-09-05 | Stop reason: HOSPADM

## 2023-08-14 RX ORDER — ECHINACEA PURPUREA EXTRACT 125 MG
2 TABLET ORAL PRN
Status: DISCONTINUED | OUTPATIENT
Start: 2023-08-14 | End: 2023-09-05 | Stop reason: HOSPADM

## 2023-08-14 RX ORDER — DEXTROSE MONOHYDRATE 25 G/50ML
25 INJECTION, SOLUTION INTRAVENOUS
Status: DISCONTINUED | OUTPATIENT
Start: 2023-08-14 | End: 2023-08-15

## 2023-08-14 RX ORDER — ASPIRIN 81 MG/1
81 TABLET ORAL EVERY EVENING
Status: DISCONTINUED | OUTPATIENT
Start: 2023-08-14 | End: 2023-09-05 | Stop reason: HOSPADM

## 2023-08-14 RX ORDER — LANOLIN ALCOHOL/MO/W.PET/CERES
3 CREAM (GRAM) TOPICAL NIGHTLY PRN
Status: DISCONTINUED | OUTPATIENT
Start: 2023-08-14 | End: 2023-09-05 | Stop reason: HOSPADM

## 2023-08-14 RX ORDER — TAMSULOSIN HYDROCHLORIDE 0.4 MG/1
0.4 CAPSULE ORAL
Qty: 30 CAPSULE | Status: ON HOLD
Start: 2023-08-14 | End: 2023-09-05 | Stop reason: SDUPTHER

## 2023-08-14 RX ORDER — POLYETHYLENE GLYCOL 3350 17 G/17G
1 POWDER, FOR SOLUTION ORAL
Status: CANCELLED | OUTPATIENT
Start: 2023-08-14

## 2023-08-14 RX ORDER — TAMSULOSIN HYDROCHLORIDE 0.4 MG/1
0.4 CAPSULE ORAL
Status: DISCONTINUED | OUTPATIENT
Start: 2023-08-15 | End: 2023-09-05 | Stop reason: HOSPADM

## 2023-08-14 RX ORDER — ALUMINA, MAGNESIA, AND SIMETHICONE 2400; 2400; 240 MG/30ML; MG/30ML; MG/30ML
20 SUSPENSION ORAL
Status: DISCONTINUED | OUTPATIENT
Start: 2023-08-14 | End: 2023-09-05 | Stop reason: HOSPADM

## 2023-08-14 RX ORDER — ONDANSETRON 2 MG/ML
4 INJECTION INTRAMUSCULAR; INTRAVENOUS 4 TIMES DAILY PRN
Status: DISCONTINUED | OUTPATIENT
Start: 2023-08-14 | End: 2023-09-05 | Stop reason: HOSPADM

## 2023-08-14 RX ORDER — MECLIZINE HYDROCHLORIDE 25 MG/1
25 TABLET ORAL 3 TIMES DAILY
Qty: 30 TABLET | Refills: 0 | Status: ON HOLD
Start: 2023-08-14 | End: 2023-09-05

## 2023-08-14 RX ORDER — ASPIRIN 81 MG/1
81 TABLET ORAL EVERY EVENING
Status: CANCELLED | OUTPATIENT
Start: 2023-08-14

## 2023-08-14 RX ORDER — MECLIZINE HYDROCHLORIDE 25 MG/1
25 TABLET ORAL 3 TIMES DAILY
Status: DISCONTINUED | OUTPATIENT
Start: 2023-08-14 | End: 2023-08-16

## 2023-08-14 RX ORDER — CLOPIDOGREL BISULFATE 75 MG/1
75 TABLET ORAL DAILY
Status: COMPLETED | OUTPATIENT
Start: 2023-08-15 | End: 2023-08-21

## 2023-08-14 RX ORDER — CARBOXYMETHYLCELLULOSE SODIUM 5 MG/ML
1 SOLUTION/ DROPS OPHTHALMIC PRN
Status: DISCONTINUED | OUTPATIENT
Start: 2023-08-14 | End: 2023-09-05 | Stop reason: HOSPADM

## 2023-08-14 RX ORDER — TAMSULOSIN HYDROCHLORIDE 0.4 MG/1
0.4 CAPSULE ORAL
Status: CANCELLED | OUTPATIENT
Start: 2023-08-15

## 2023-08-14 RX ORDER — CLOPIDOGREL BISULFATE 75 MG/1
75 TABLET ORAL DAILY
Qty: 30 TABLET | Status: ON HOLD
Start: 2023-08-15 | End: 2023-09-05

## 2023-08-14 RX ORDER — MECLIZINE HYDROCHLORIDE 25 MG/1
25 TABLET ORAL 3 TIMES DAILY
Status: CANCELLED | OUTPATIENT
Start: 2023-08-14

## 2023-08-14 RX ORDER — ACETAMINOPHEN 325 MG/1
650 TABLET ORAL EVERY 4 HOURS PRN
Status: DISCONTINUED | OUTPATIENT
Start: 2023-08-14 | End: 2023-09-05 | Stop reason: HOSPADM

## 2023-08-14 RX ORDER — DEXTROSE MONOHYDRATE 25 G/50ML
25 INJECTION, SOLUTION INTRAVENOUS
Status: CANCELLED | OUTPATIENT
Start: 2023-08-14

## 2023-08-14 RX ORDER — LACTULOSE 20 G/30ML
30 SOLUTION ORAL
Status: DISCONTINUED | OUTPATIENT
Start: 2023-08-14 | End: 2023-09-05 | Stop reason: HOSPADM

## 2023-08-14 RX ORDER — BISACODYL 10 MG
10 SUPPOSITORY, RECTAL RECTAL
Status: CANCELLED | OUTPATIENT
Start: 2023-08-14

## 2023-08-14 RX ORDER — ONDANSETRON 4 MG/1
4 TABLET, ORALLY DISINTEGRATING ORAL 4 TIMES DAILY PRN
Status: DISCONTINUED | OUTPATIENT
Start: 2023-08-14 | End: 2023-09-05 | Stop reason: HOSPADM

## 2023-08-14 RX ORDER — OLMESARTAN MEDOXOMIL 20 MG/1
20 TABLET ORAL EVERY EVENING
Status: DISCONTINUED | OUTPATIENT
Start: 2023-08-14 | End: 2023-08-22

## 2023-08-14 RX ORDER — ATORVASTATIN CALCIUM 40 MG/1
80 TABLET, FILM COATED ORAL EVERY EVENING
Status: DISCONTINUED | OUTPATIENT
Start: 2023-08-14 | End: 2023-09-05 | Stop reason: HOSPADM

## 2023-08-14 RX ORDER — TRAZODONE HYDROCHLORIDE 50 MG/1
50 TABLET ORAL
Status: DISCONTINUED | OUTPATIENT
Start: 2023-08-14 | End: 2023-09-05 | Stop reason: HOSPADM

## 2023-08-14 RX ORDER — OLMESARTAN MEDOXOMIL 20 MG/1
20 TABLET ORAL EVERY EVENING
Status: CANCELLED | OUTPATIENT
Start: 2023-08-14

## 2023-08-14 RX ORDER — AMOXICILLIN 250 MG
2 CAPSULE ORAL 2 TIMES DAILY
Status: CANCELLED | OUTPATIENT
Start: 2023-08-14

## 2023-08-14 RX ORDER — CLOPIDOGREL BISULFATE 75 MG/1
75 TABLET ORAL DAILY
Status: CANCELLED | OUTPATIENT
Start: 2023-08-15 | End: 2023-08-22

## 2023-08-14 RX ADMIN — ASPIRIN 81 MG: 81 TABLET, COATED ORAL at 20:27

## 2023-08-14 RX ADMIN — TAMSULOSIN HYDROCHLORIDE 0.4 MG: 0.4 CAPSULE ORAL at 08:25

## 2023-08-14 RX ADMIN — INSULIN HUMAN 1 UNITS: 100 INJECTION, SOLUTION PARENTERAL at 08:22

## 2023-08-14 RX ADMIN — MAGNESIUM HYDROXIDE 30 ML: 1200 LIQUID ORAL at 13:54

## 2023-08-14 RX ADMIN — INSULIN HUMAN 3 UNITS: 100 INJECTION, SOLUTION PARENTERAL at 20:31

## 2023-08-14 RX ADMIN — INSULIN HUMAN 3 UNITS: 100 INJECTION, SOLUTION PARENTERAL at 17:10

## 2023-08-14 RX ADMIN — SENNOSIDES AND DOCUSATE SODIUM 2 TABLET: 50; 8.6 TABLET ORAL at 05:54

## 2023-08-14 RX ADMIN — INSULIN GLARGINE-YFGN 5 UNITS: 100 INJECTION, SOLUTION SUBCUTANEOUS at 20:33

## 2023-08-14 RX ADMIN — MECLIZINE HYDROCHLORIDE 25 MG: 25 TABLET ORAL at 08:25

## 2023-08-14 RX ADMIN — SENNOSIDES AND DOCUSATE SODIUM 2 TABLET: 50; 8.6 TABLET ORAL at 20:27

## 2023-08-14 RX ADMIN — MECLIZINE HYDROCHLORIDE 25 MG: 25 TABLET ORAL at 20:27

## 2023-08-14 RX ADMIN — MECLIZINE HYDROCHLORIDE 25 MG: 25 TABLET ORAL at 16:28

## 2023-08-14 RX ADMIN — ATORVASTATIN CALCIUM 80 MG: 40 TABLET, FILM COATED ORAL at 20:27

## 2023-08-14 RX ADMIN — CLOPIDOGREL BISULFATE 75 MG: 75 TABLET ORAL at 05:53

## 2023-08-14 RX ADMIN — TRAZODONE HYDROCHLORIDE 50 MG: 50 TABLET ORAL at 20:38

## 2023-08-14 RX ADMIN — ACETAMINOPHEN 650 MG: 325 TABLET ORAL at 20:38

## 2023-08-14 RX ADMIN — ENOXAPARIN SODIUM 40 MG: 100 INJECTION SUBCUTANEOUS at 17:30

## 2023-08-14 ASSESSMENT — LIFESTYLE VARIABLES
ALCOHOL_USE: NO
TOTAL SCORE: 0
HAVE YOU EVER FELT YOU SHOULD CUT DOWN ON YOUR DRINKING: NO
ON A TYPICAL DAY WHEN YOU DRINK ALCOHOL HOW MANY DRINKS DO YOU HAVE: 0
EVER HAD A DRINK FIRST THING IN THE MORNING TO STEADY YOUR NERVES TO GET RID OF A HANGOVER: NO
TOTAL SCORE: 0
HOW MANY TIMES IN THE PAST YEAR HAVE YOU HAD 5 OR MORE DRINKS IN A DAY: 0
TOTAL SCORE: 0
CONSUMPTION TOTAL: NEGATIVE
EVER FELT BAD OR GUILTY ABOUT YOUR DRINKING: NO
AVERAGE NUMBER OF DAYS PER WEEK YOU HAVE A DRINK CONTAINING ALCOHOL: 0
HAVE PEOPLE ANNOYED YOU BY CRITICIZING YOUR DRINKING: NO
EVER_SMOKED: YES

## 2023-08-14 ASSESSMENT — COGNITIVE AND FUNCTIONAL STATUS - GENERAL
MOVING FROM LYING ON BACK TO SITTING ON SIDE OF FLAT BED: UNABLE
MOBILITY SCORE: 11
HELP NEEDED FOR BATHING: A LOT
SUGGESTED CMS G CODE MODIFIER DAILY ACTIVITY: CK
DRESSING REGULAR UPPER BODY CLOTHING: A LITTLE
PERSONAL GROOMING: A LITTLE
MOVING TO AND FROM BED TO CHAIR: UNABLE
SUGGESTED CMS G CODE MODIFIER MOBILITY: CL
CLIMB 3 TO 5 STEPS WITH RAILING: A LOT
STANDING UP FROM CHAIR USING ARMS: A LITTLE
TOILETING: A LOT
DAILY ACTIVITIY SCORE: 16
TURNING FROM BACK TO SIDE WHILE IN FLAT BAD: UNABLE
DRESSING REGULAR LOWER BODY CLOTHING: A LOT
WALKING IN HOSPITAL ROOM: A LITTLE

## 2023-08-14 ASSESSMENT — FIBROSIS 4 INDEX: FIB4 SCORE: 1.324509441082082636

## 2023-08-14 ASSESSMENT — PATIENT HEALTH QUESTIONNAIRE - PHQ9
2. FEELING DOWN, DEPRESSED, IRRITABLE, OR HOPELESS: NOT AT ALL
SUM OF ALL RESPONSES TO PHQ9 QUESTIONS 1 AND 2: 0
1. LITTLE INTEREST OR PLEASURE IN DOING THINGS: NOT AT ALL
SUM OF ALL RESPONSES TO PHQ9 QUESTIONS 1 AND 2: 0
1. LITTLE INTEREST OR PLEASURE IN DOING THINGS: NOT AT ALL
2. FEELING DOWN, DEPRESSED, IRRITABLE, OR HOPELESS: NOT AT ALL

## 2023-08-14 ASSESSMENT — GAIT ASSESSMENTS
GAIT LEVEL OF ASSIST: MINIMAL ASSIST
ASSISTIVE DEVICE: HAND HELD ASSIST
DISTANCE (FEET): 6
DEVIATION: DECREASED BASE OF SUPPORT

## 2023-08-14 ASSESSMENT — PAIN DESCRIPTION - PAIN TYPE: TYPE: ACUTE PAIN

## 2023-08-14 NOTE — THERAPY
Speech Language Pathology   Cognitive Evaluation     Patient Name: Jorden Bonilla  AGE:  58 y.o., SEX:  male  Medical Record #: [id number]  Date of Service: 8/14/2023      History of Present Illness  Pt is a 58 y.o. M admitted on 8/10/23 with L UE/LE weakness. Stroke protocol CT without evidence of hemorrhage, large territory stroke, LVO, or critical, flow limiting stenoses.  There is no substantial ischemic penumbra that warrants off-label thrombolytics.     CMHx: Hemiplegia   PMHx: CAD s/p CABG, type 2 diabetes, hypertension    MRI results 8/10/23:  1.  Questionable tiny acute infarct in the anterior right medulla is artifact.  2.  No intracranial hemorrhage or other significant brain abnormality.      General Information  Vitals  O2 Delivery Device: None - Room Air        Prior Living Situation & Level of Function  Prior Services: None, Home-Independent  Housing / Facility: [address] [place] [address]  Lives with - Patient's Self Care Capacity: Spouse  Comments: denies falls; multiple family membesr lives with him and can provide 24/7 assist  Communication: WFL  Swallowing: WFL      Communication Domain(s)  Expressive Language: WFL  Receptive Language: WFL  Cognitive-Linguistic:  (Mild, Moderate STM)  Social/Pragmatic: WFL      Assessment  The patient was seen this date for a cognitive linguistic evaluation.  The Cognistat was administered, assessing functioning in the following cognitive domains: LOC, Orientation, Attention, Language (Comprehension, Repetition and Naming), Memory, Calculations and Reasoning (Similarities and Judgement).        Patient scored the following on the Cognistat:  Orientation: WFL   Attention: WFL  Repetition (Language): WFL  Naming (Language): WFL  Memory: Severe  Calculations: WFL  Similarities (Reasoning): Mild  Judgment (Reasoning): Mild       Clinical Impressions  Pt demonstrated severe deficits with short term memory, and mild deficits with reasoning.  Query if deficits with reasoning could  be in part affected by language difference, however suspect he would benefit from SLP services in the post acute setting to address cognitive therapy.        NOTE: It is not within the scope of practice of Speech-Language Pathologists to determine patient capacity. Please defer to the physician or psych to complete this assessment.       Recommendations  Supervision Needs Upons Discharge: Intermittent assistance with IADLs (see below)  IADLs: Medication management, Financial management, Appointment management         SLP Treatment Plan  Treatment Plan: Cognitive Treatment  SLP Frequency: 3x Per Week  Estimated Duration: Until Therapy Goals Met      Anticipated Discharge Needs  Discharge Recommendations: Recommend post-acute placement for additional speech therapy services prior to discharge home  Therapy Recommendations Upon DC: Cognitive-Linguistic Training, Patient / Family / Caregiver Education         Short Term Goal # 1: Pt will consume soft and bite size solid and thin liquid diet without any overt s/sx of aspiration or change in respiratory status.  Short Term Goal # 2: Pt wll provide appropriate solutions to problem solving scenarios involving safety and IADLs, with min cueing provided.  Short Term Goal # 3: Pt will utilize recall strategies in times of memory recall, given min cueing.      Evert Oglesby MS, CCC-SLP

## 2023-08-14 NOTE — THERAPY
"Occupational Therapy  Daily Treatment     Patient Name: Jorden Bonilla  Age:  58 y.o., Sex:  male  Medical Record #: [id number]  Today's Date: 8/14/2023     Precautions  Precautions: Fall Risk, Swallow Precautions  Comments: L phyllis    Assessment    Pt seen for OT treatment. Pt donned underwear with mod A, performed ADL transfer w/ min A, washed face with min A, brushed teeth with min A, and washed L UE with R UE with min A. Pt noted to have some AROM in the L UE with shoulder elevation and shoulder abduction (given cues to avoid compensatory movement and scaption). Pt was very motivated to participate in therapy this date. Pt educated regarding SROM to L UE, importance of joint protection/positioning, phyllis dressing technique for LB, and the role of OT. Pt current functional performance limited by impaired cognition, impaired balance, and impaired functional use of L UE. Pt will continue to benefit from skilled OT while admitted to acute care.     Plan    Treatment Plan Status: Continue Current Treatment Plan  Type of Treatment: Self Care / Activities of Daily Living, Adaptive Equipment, Neuro Re-Education / Balance, Therapeutic Exercises, Therapeutic Activity  Treatment Frequency: 5 Times per Week  Treatment Duration: Until Therapy Goals Met    DC Equipment Recommendations: Unable to determine at this time  Discharge Recommendations: Recommend post-acute placement for additional occupational therapy services prior to discharge home    Subjective    \"I need to work on this so I can get it to work again.\"     Objective     08/14/23 0921    Services   Is patient using  services for this encounter?   (Pt followed commands in English)   Precautions   Precautions Fall Risk;Swallow Precautions   Pain   Pain Scales 0 to 10 Scale    Pain 0 - 10 Group   Therapist Pain Assessment Post Activity Pain Same as Prior to Activity;Nurse Notified  (no pain reported, not rated)   Cognition    Cognition / " Consciousness X   Speech/ Communication Delayed Responses   Level of Consciousness Alert   Ability To Follow Commands 1 Step   Safety Awareness Impaired   Comments Pleasant, cooperative, motivated   Active ROM Upper Body   Active ROM Upper Body  X   Dominant Hand Right   Shoulder Elevation Asymmetric (See Comments)  (some active shoulder elevation on L UE, but not full range available; R UE WFL)   Comments Pt also has active movement of L shoulder in abduction to ~45 degrees with cues from therapist to not compensate with trunk/shoulder elevation   Upper Body Muscle Tone   Upper Body Muscle Tone  X   Lt Upper Extremity Muscle Tone Non Functional;Hypotonic   Sitting Upper Body Exercises   Sitting Upper Body Exercises Yes   Comments Education on gentle SROM to L UE for elbow, wrist, and digits. Pt educated on joint protection. AROM of shoulder shrugs and shoulder abduction with cues/active assist from therapist to avoid scaption/compensatory movements.   Other Treatments   Other Treatments Provided Practiced ADL transfer to/from chair with heavy focus on awareness of L UE.   Balance   Sitting Balance (Static) Fair -   Sitting Balance (Dynamic) Poor +   Standing Balance (Static) Poor   Standing Balance (Dynamic) Poor -   Weight Shift Sitting Fair   Weight Shift Standing Poor   Skilled Intervention Verbal Cuing;Facilitation;Postural Facilitation;Sequencing   Comments w/ HHA   Bed Mobility    Comments up to chair pre/post   Activities of Daily Living   Grooming Minimal Assist;Seated  (washed face and brushed teeth)   Bathing Minimal Assist  (Washed L UE w/ R UE to provide sensory stimulation)   Lower Body Dressing Moderate Assist  (don underwear, educated on phyllis technique)   Skilled Intervention Verbal Cuing;Facilitation;Compensatory Strategies;Sequencing   Functional Mobility   Sit to Stand Minimal Assist   Bed, Chair, Wheelchair Transfer Minimal Assist   Mobility chair>EOB>chair   Skilled Intervention Verbal  Cuing;Postural Facilitation;Facilitation;Sequencing   Comments w/ HHA, cues to attend to L UE, education on importance of awareness of L UE during ADL transfers   Activity Tolerance   Sitting in Chair up to chair pre/post   Sitting Edge of Bed <2-3 min   Standing ~1-2 min total   Comments limited by weakness   Patient / Family Goals   Patient / Family Goal #1 Family: get better   Goal #1 Outcome Progressing as expected   Short Term Goals   Short Term Goal # 1 xfer to chair with min a   Goal Outcome # 1 Goal met, new goal added   Short Term Goal # 1 B  Pt will perform ADL transfer w/ supv   Short Term Goal # 2 LB dressing with min a   Goal Outcome # 2 Progressing as expected   Short Term Goal # 3 seated grooming with spv   Goal Outcome # 3 Progressing as expected   Education Group   Education Provided Role of Occupational Therapist;Transfers   Role of Occupational Therapist Patient Response Patient;Eager;Acceptance;Explanation;Verbal Demonstration   Joint Protection Patient Response Patient;Acceptance;Eager;Explanation;Demonstration;Verbal Demonstration;Action Demonstration   Upper Ext ROM Patient Response Patient;Eager;Acceptance;Explanation;Demonstration;Verbal Demonstration;Action Demonstration   Transfers Patient Response Patient;Eager;Acceptance;Explanation;Demonstration;Verbal Demonstration;Action Demonstration   Stroke Patient Response Patient;Eager;Acceptance;Explanation;Verbal Demonstration   ADL Patient Response Patient;Eager;Acceptance;Explanation;Demonstration;Verbal Demonstration;Action Demonstration   Occupational Therapy Treatment Plan    O.T. Treatment Plan Continue Current Treatment Plan   Treatment Interventions Self Care / Activities of Daily Living;Adaptive Equipment;Neuro Re-Education / Balance;Therapeutic Exercises;Therapeutic Activity   Treatment Frequency 5 Times per Week   Duration Until Therapy Goals Met

## 2023-08-14 NOTE — CARE PLAN
The patient is Stable - Low risk of patient condition declining or worsening    Shift Goals  Clinical Goals: Stable neuro  Patient Goals: Rest  Family Goals: Rest    Progress made toward(s) clinical / shift goals:    Problem: Knowledge Deficit - Stroke Education  Goal: Patient's knowledge of stroke and risk factors will improve  Outcome: Progressing  Note: Pt demonstrates understanding of plan of care and asks appropriate questions about stroke symptoms and recovery     Problem: Neuro Status  Goal: Neuro status will remain stable or improve  Outcome: Progressing  Note: Patients neuro status remains stable. Improvement in LLE,  able to hold leg against gravity for greater than 5 seconds towards end of shift     Problem: Mobility - Stroke  Goal: Patient's capacity to carry out activities will improve  Outcome: Progressing  Note: Pt able to transfer to chair with assistance of 1. Able to reposition self and move up in bed.

## 2023-08-14 NOTE — DISCHARGE SUMMARY
Discharge Summary    CHIEF COMPLAINT ON ADMISSION  No chief complaint on file.      Reason for Admission  Weakness; Facial Droop    Admission Date  8/10/2023     CODE STATUS  Full Code    HPI & HOSPITAL COURSE  59 yo man with history of CABG, DM, HTN with poor compliance to his medications who woke up with left sided weakness.  CTA head and neck showed 40% left intracranial vertebral artery stenosis, minimal bilateral intracranial artery narrowing.  Neurology was consulted.  He was started on aspirin.  MRI brain showed acute infarct in anterior right medulla.  He had a normal echocardiogram.  Neurology recommended aspirin with 10 days of Plavix, then aspirin thereafter.  Continue on Lipitor.  Zio patch is placed on discharge.  Patient had tinnitus with dizziness that improved with meclizine and will be continued.  Patient had urinary retention and started on Flomax.  He will need a voiding trial.  Continue to monitor his diabetes and glucose levels.  He was initially hyperglycemic but slowly improved.    Therefore, he is discharged in good and stable condition to a long-term acute care hospital.    The patient met 2-midnight criteria for an inpatient stay at the time of discharge.      FOLLOW UP ITEMS POST DISCHARGE  Wean off meclizine as dizziness improves  Started on Flomax and will need voiding trial  Follow-up with neurology stroke clinic  Plavix to stop after completion of 10 days treatment, continue on aspirin thereafter    DISCHARGE DIAGNOSES  Principal Problem:    Hemiplegia (HCC) (POA: Yes)  Active Problems:    Hyponatremia (POA: Yes)    Dyslipidemia (POA: Yes)    Coronary artery disease due to calcified coronary lesion: CABG ×3 in 2015 (POA: Yes)      Overview: Formatting of this note might be different from the original.      2015 CABG x 3    Type 2 diabetes mellitus with hyperglycemia, without long-term current use of insulin (HCC) (POA: Yes)      Overview: Formatting of this note might be different from  the original.      With proteinuria, CAD      3/23/22 A1c 11.1 fasting glucose 179    Primary hypertension (POA: Yes)      Overview: Formatting of this note might be different from the original.      Olmesartan 20 mg    Dehydration (POA: Yes)    Urinary retention (POA: Unknown)  Resolved Problems:    * No resolved hospital problems. *      FOLLOW UP  No follow-up provider specified.    MEDICATIONS ON DISCHARGE     Medication List        START taking these medications        Instructions   clopidogrel 75 MG Tabs  Start taking on: August 15, 2023  Commonly known as: Plavix   Take 1 Tablet by mouth every day for 6 days.  Dose: 75 mg     insulin regular 100 Unit/mL Soln  Commonly known as: Humulin R   Inject 1-6 Units under the skin 4 Times a Day,Before Meals and at Bedtime.  Dose: 1-6 Units     meclizine 25 MG Tabs  Commonly known as: Antivert   Take 1 Tablet by mouth in the morning, at noon, and at bedtime.  Dose: 25 mg     tamsulosin 0.4 MG capsule  Commonly known as: Flomax   Take 1 Capsule by mouth 1/2 hour after breakfast.  Dose: 0.4 mg            CONTINUE taking these medications        Instructions   aspirin 81 MG EC tablet   Take 1 Tab by mouth every day.  Dose: 81 mg     atorvastatin 80 MG tablet  Commonly known as: Lipitor   Take 80 mg by mouth every evening.  Dose: 80 mg     CENTRUM ADULT PO   Take 1 Tablet by mouth every evening.  Dose: 1 Tablet     COQ-10 PO   Take 1 Tablet by mouth every evening.  Dose: 1 Tablet     Jardiance 25 MG Tabs  Generic drug: Empagliflozin   Take 25 mg by mouth every evening.  Dose: 25 mg     metFORMIN  MG Tb24  Commonly known as: Glucophage XR   Take 1,000 mg by mouth 2 times a day.  Dose: 1,000 mg     olmesartan 20 MG Tabs  Commonly known as: Benicar   Take 20 mg by mouth every evening.  Dose: 20 mg     Trulicity 1.5 MG/0.5ML Sopn  Generic drug: Dulaglutide   Inject 1.5 mg under the skin.  Dose: 1.5 mg              Allergies  No Known Allergies    DIET  Orders Placed This  Encounter   Procedures    Diet Order Diet: Level 6 - Soft and Bite Sized (cardiac and CHO); Liquid level: Level 0 - Thin; Second Modifier: (optional): Consistent CHO (Diabetic)     Standing Status:   Standing     Number of Occurrences:   1     Order Specific Question:   Diet:     Answer:   Level 6 - Soft and Bite Sized [23]     Comments:   cardiac and CHO     Order Specific Question:   Liquid level     Answer:   Level 0 - Thin     Order Specific Question:   Second Modifier: (optional)     Answer:   Consistent CHO (Diabetic) [4]       ACTIVITY  As tolerated and directed by rehab.    LINES, DRAINS, AND WOUNDS  This is an automated list. Peripheral IVs will be removed prior to discharge.  Peripheral IV 18 G Right Forearm (Active)   Site Assessment Clean;Dry;Intact 08/13/23 2000   Dressing Type Transparent 08/13/23 2000   Line Status Flushed;Scrubbed the hub prior to access 08/13/23 2000   Dressing Status Clean;Dry;Intact 08/13/23 2000   Dressing Intervention N/A 08/13/23 2000   Infiltration Grading ([facility]) 0 08/13/23 2000   Phlebitis Scale ([facility]) 0 08/13/23 2000     Urethral Catheter Non-latex 16 Fr. (Active)   Site Assessment Clean;Skin intact 08/13/23 2000   Collection Container Standard drainage bag 08/13/23 2000   Urinary Catheter Care Tamper Evident Seal in Place;Drainage Tube Extended;Drainage Bag Not Overfilled;Drainage Tubing Properly Secured;Drainage Bag Below Bladder Level and Not on Floor;Cath Care Done with Soap & Water;Cath Care Done with CHG Wipes 08/13/23 2000   Securement Method Securing device (Describe) 08/13/23 2000   Output (mL) 950 mL 08/14/23 0441         Peripheral IV 18 G Right Forearm (Active)   Site Assessment Clean;Dry;Intact 08/13/23 2000   Dressing Type Transparent 08/13/23 2000   Line Status Flushed;Scrubbed the hub prior to access 08/13/23 2000   Dressing Status Clean;Dry;Intact 08/13/23 2000   Dressing Intervention N/A 08/13/23 2000   Infiltration Grading ([facility]) 0  08/13/23 2000   Phlebitis Scale ([facility] Only) 0 08/13/23 2000           Urethral Catheter Non-latex 16 Fr. (Active)   Site Assessment Clean;Skin intact 08/13/23 2000   Collection Container Standard drainage bag 08/13/23 2000   Urinary Catheter Care Tamper Evident Seal in Place;Drainage Tube Extended;Drainage Bag Not Overfilled;Drainage Tubing Properly Secured;Drainage Bag Below Bladder Level and Not on Floor;Cath Care Done with Soap & Water;Cath Care Done with CHG Wipes 08/13/23 2000   Securement Method Securing device (Describe) 08/13/23 2000   Output (mL) 950 mL 08/14/23 0441        MENTAL STATUS ON TRANSFER   Aox3          CONSULTATIONS  neurology    PROCEDURES  EC-ECHOCARDIOGRAM COMPLETE W/O CONT   Final Result      MR-BRAIN-W/O   Final Result      1.  Questionable tiny acute infarct in the anterior right medulla is artifact.   2.  No intracranial hemorrhage or other significant brain abnormality.      DX-CHEST-PORTABLE (1 VIEW)   Final Result      No acute process.      CT-CEREBRAL PERFUSION ANALYSIS   Final Result      1.  Cerebral blood flow less than 30% likely representing completed infarct = 0 mL.      2.  T Max more than 6 seconds likely representing combination of completed infarct and ischemia = 0 mL.      3.  Mismatched volume likely representing ischemic brain/penumbra = None      4.  Please note that the cerebral perfusion was performed on the limited brain tissue around the basal ganglia region. Infarct/ischemia outside the CT perfusion sections can be missed in this study.      CT-CTA NECK WITH & W/O-POST PROCESSING   Final Result      No significant carotid or vertebral stenosis.      CT-CTA HEAD WITH & W/O-POST PROCESS   Final Result         1. 40% left intracranial vertebral artery stenosis.   2. Minimal bilateral intracranial carotid arterial narrowing secondary to plaque.   3. No large vessel occlusion or aneurysm identified.      CT-HEAD W/O   Final Result      No acute process.                LABORATORY  Lab Results   Component Value Date    SODIUM 142 08/13/2023    POTASSIUM 3.7 08/13/2023    CHLORIDE 106 08/13/2023    CO2 25 08/13/2023    GLUCOSE 141 (H) 08/13/2023    BUN 20 08/13/2023    CREATININE 0.83 08/13/2023        Lab Results   Component Value Date    WBC 9.5 08/13/2023    HEMOGLOBIN 16.1 08/13/2023    HEMATOCRIT 49.4 08/13/2023    PLATELETCT 170 08/13/2023        Total time of the discharge process exceeds 32 minutes.

## 2023-08-14 NOTE — THERAPY
Physical Therapy   Daily Treatment     Patient Name: Jorden Bonilla  Age:  58 y.o., Sex:  male  Medical Record #: [id number]  Today's Date: 8/14/2023     Precautions  Precautions: Fall Risk;Swallow Precautions    Assessment    Pt rec'd alert, in bed, agreeable to work w/ PT.  Today, he is able to sit eob w/o physical assist but does use the side rail.  He is able to stand, by pushing up through the side rail.  Once standing, he is able to maintain his balance, holding on to the side rail.  He did ambulate, 6 ft today, w/ min assist.  Trunk control declined w/ ambulation.  GAit w/ narrow base of support.  He fatigues very quickly, unable to attempt second round.      Plan    Treatment Plan Status: Continue Current Treatment Plan  Type of Treatment: Bed Mobility, Equipment, Gait Training, Manual Therapy, Neuro Re-Education / Balance, Self Care / Home Evaluation, Stair Training, Therapeutic Activities, Therapeutic Exercise  Treatment Frequency: 5 Times per Week  Treatment Duration: Until Therapy Goals Met    DC Equipment Recommendations: Unable to determine at this time  Discharge Recommendations: Recommend post-acute placement for additional physical therapy services prior to discharge home         Objective       08/14/23 0826   Balance   Sitting Balance (Static) Fair   Sitting Balance (Dynamic) Fair   Standing Balance (Static) Poor -   Standing Balance (Dynamic) Trace +   Weight Shift Sitting Fair   Weight Shift Standing Poor   Skilled Intervention Verbal Cuing;Tactile Cuing   Bed Mobility    Supine to Sit Supervised   Skilled Intervention Verbal Cuing;Tactile Cuing;Compensatory Strategies   Comments w/ use of side rail   Gait Analysis   Gait Level Of Assist Minimal Assist   Assistive Device Hand Held Assist   Distance (Feet) 6   Deviation Decreased Base Of Support  (poor foot placement, poor trunk control)   Skilled Intervention Verbal Cuing;Tactile Cuing;Facilitation   Functional Mobility   Sit to Stand Standby  Assist   Bed, Chair, Wheelchair Transfer Minimal Assist   Transfer Method Squat Pivot   Skilled Intervention Verbal Cuing;Tactile Cuing;Facilitation   Short Term Goals    Short Term Goal # 1 Pt will perform supine<>sit from flat HOB/n orailing with supervisoin within 6 visits to ensure independent mobility at home.   Goal Outcome # 1 goal not met   Short Term Goal # 2 Pt will perform sit<>stand with hemiwalker and supervision wihtin 6 visits to ensure independnet mobility at home.   Goal Outcome # 2 Goal not met   Short Term Goal # 3 Pt will ambulate x 150ft with hemiwalker wihtin 6 visits to ensure independent mobility at home.   Goal Outcome # 3 Goal not met   Short Term Goal # 4 Pt will ascend/descend 1 step with min A within 6 visits to enter/exit home.   Goal Outcome # 4 Goal not met   Physical Therapy Treatment Plan   Physical Therapy Treatment Plan Continue Current Treatment Plan   Anticipated Discharge Equipment and Recommendations   DC Equipment Recommendations Unable to determine at this time   Discharge Recommendations Recommend post-acute placement for additional physical therapy services prior to discharge home

## 2023-08-14 NOTE — FLOWSHEET NOTE
08/14/23 1455   Protocol Assessment   Initial Assessment Yes   Patient History   Pulmonary Diagnosis None   Procedures Relevant to Respiratory Status None   Home O2 No   Nocturnal CPAP No   Home Treatments/Frequency No   Protocol Pathways   Protocol Pathways None

## 2023-08-14 NOTE — DISCHARGE PLANNING
Msg placed to Alyssa, spouse to discuss [facility] & D/C resources/support.  Dr. Moreau has medically cleared. Msg placed to insurance -verifying auth remains good.      0817-Spoke with Danielle, daughter regarding [facility] & D/C resources/support.  She is agreeable with an admission.  Jorden lives with his spouse and 18 yo son in a 1LV home with 1St to enter.  Spouse works [phone] F/T.  Son is 18 yo and goes to school.  Patricia's sister and B.I.L. also live there and are able to provide supervision only as they are older.  Danielle works 2 days a week and will assist when not working.     0828-Insurance auth remains good per T[facility] TCN.    0948-Case is under review by Dr. Davila.    1000-Dr. Davila has accepted.  CATHERINE Alonzo to look into transport.     1007-Transport has been arranged via [facility] between [phone]. Dr. Moreau is aware. PC to Danielle, daughter-no answer/no vm.  Msg placed to Samy HAMPTON & Anu BSN.

## 2023-08-14 NOTE — PROGRESS NOTES
Patient discharged to rehab. Gathered all belongings including cell phone and clothes. Left with [facility] via wheelchair.

## 2023-08-14 NOTE — CARE PLAN
The patient is Stable - Low risk of patient condition declining or worsening    Shift Goals  Clinical Goals: Stable neuro exam  Patient Goals: Sleep  Family Goals: MATEO    Progress made toward(s) clinical / shift goals:    Problem: Neuro Status  Goal: Neuro status will remain stable or improve  Outcome: Progressing   Q4hr neuro checks. Neuro status remains stable.    Problem: Mobility - Stroke  Goal: Patient's capacity to carry out activities will improve  Outcome: Progressing   Patient gets up to chair with staff to assist weaker side. Tolerates well.  Problem: Pain - Standard  Goal: Alleviation of pain or a reduction in pain to the patient’s comfort goal  Outcome: Progressing   Pt's pain assessed throughout shift. Patient offered pharmacological and non-pharmacological interventions.      Patient is not progressing towards the following goals:

## 2023-08-14 NOTE — THERAPY
"Speech Language Pathology   Daily Treatment     Patient Name: Jorden Bonilla  AGE:  58 y.o., SEX:  male  Medical Record #: [id number]  Date of Service: 8/14/2023      Precautions:  Precautions: Fall Risk, Swallow Precautions       Subjective  Pt reports he would like to try eating \"regular\" food    Assessment/Clinical Impressions  Pt was seen for dysphagia therapy, and was given trials of regular solids.  Pt consumed regular solids and straw sips of thin liquids with no overt s/sx of aspiration, and no changes in voice were noted.  Mastication was minimally prolonged, but overall functional, and no significant oral residue was noted.  Initiation of swallow was timely with all PO trials.  Pt appears to be at the level to upgrade his diet to regular solids with thin liquids.        Recommendations  Diet Consistency: regular/thins  Instrumentation: None indicated at this time  Medication: Whole with liquid, As tolerated  Supervision: Assist with meal tray set up; Distant supervision - check on patient 2-3 times per meal;  Positioning: Fully upright and midline during oral intake  Risk Management : Small bites/sips, Slow rate of intake  Oral Care: BID       Cognitive Treatment  Supervision Needs Upons Discharge: Intermittent assistance with IADLs (see below)  IADLs: Medication management, Financial management, Appointment management       SLP Treatment Plan  Treatment Plan: Cognitive Treatment  SLP Frequency: 3x Per Week  Estimated Duration: Until Therapy Goals Met      Anticipated Discharge Needs  Discharge Recommendations: Recommend post-acute placement for additional speech therapy services prior to discharge home  Therapy Recommendations Upon DC: Cognitive-Linguistic Training, Patient / Family / Caregiver Education      Patient / Family Goals  Patient / Family Goal #1: \"I haven't eaten at the hospital yet.\"  Goal #1 Outcome: Progressing as expected  Short Term Goals  Short Term Goal # 1: Pt will consume soft and " bite size solid and thin liquid diet without any overt s/sx of aspiration or change in respiratory status.  Goal Outcome # 1: Goal met, new goal added  Short Term Goal # 1 B : Pt will consume a regular diet with thins, without S/Sx of aspiration.  Goal Outcome  # 1 B: Progressing as expected  Short Term Goal # 2: Pt wll provide appropriate solutions to problem solving scenarios involving safety and IADLs, with min cueing provided.  Short Term Goal # 3: Pt will utilize recall strategies in times of memory recall, given min cueing.      Evert Oglesby, MS, CCC-SLP

## 2023-08-14 NOTE — PROGRESS NOTES
Brief Neurology Note    Echocardiogram reviewed, shows LV EF 55% with normal RV. No atrial dilation. Will complete embolic work up with Zio patch on discharge. Continue DAPT with end date of 08/21, monotherapy with ASA 81mg thereafter. Will need to follow up in [facility] upon discharge.    Case reviewed and plan created with Dr. Jimmy Villalobos, Acute Neurology. Please call with any questions.      ROBIN Hightower  Neurology, Acute Care Services

## 2023-08-14 NOTE — DISCHARGE PLANNING
Actual Discharge Information     Discharge Disposition: Disch to IP rehab facility or distinct part unit (62)     Patient accepted at [facility]     [facility] transport at [phone] via .      No other CM needs at this time.

## 2023-08-14 NOTE — DISCHARGE INSTRUCTIONS
"Ischemic Stroke  Discharge Instructions    You experienced an Ischemic Stroke.  Ischemic stroke is the most common type of stroke and happens when an artery in the brain becomes blocked by a plaque fragment or blood clot. Typically, these blockages travel from the heart or larger arteries that supply the brain.  The brain needs a constant supply of blood, which carries oxygen and nutrients it needs to function.  A stroke occurs when one of these arteries to the brain is either blocked or bursts. As a result, part of the brain does not get the blood it needs, so it starts to die.         Stroke Risk Factors    You are at increased risk of having another stroke event.  See your Patient Stroke Guide to help reduce your stroke risk. These are your specific risk factors:  Age - Over 55  Cardiovascular disease  Diabetes  High blood pressure  High Cholesterol and lipids     Get help right away if you have any signs of a stroke.  \"BE FAST\" is an easy way to remember the main warning signs of a stroke:  B - Balance. Dizziness, sudden trouble walking, or loss of balance.  E - Eyes. Trouble seeing or a change in how you see.  F - Face. Sudden weakness or loss of feeling in the face. The face or eyelid may droop on one side.  A - Arms. Weakness or loss of feeling in an arm. This happens all of a sudden and most often on one side of the body.  S - Speech. Sudden trouble speaking, slurred speech, or trouble understanding what people say.  T - Time. Time to call emergency services. Write down what time symptoms started.    "

## 2023-08-14 NOTE — PROGRESS NOTES
4 Eyes Skin Assessment Completed by ANJALI Moon and ANJALI Adhikari.    Head WDL  Ears WDL  Nose WDL  Mouth WDL  Neck WDL  Breast/Chest WDL  Shoulder Blades WDL  Spine WDL  (R) Arm/Elbow/Hand WDL  (L) Arm/Elbow/Hand WDL  Abdomen WDL  Groin WDL  Scrotum/Coccyx/Buttocks WDL  (R) Leg WDL  (L) Leg WDL  (R) Heel/Foot/Toe WDL  (L) Heel/Foot/Toe WDL          Devices In Places Damon      Interventions In Place Pillows    Possible Skin Injury No    Pictures Uploaded Into Epic N/A  Wound Consult Placed N/A  RN Wound Prevention Protocol Ordered No

## 2023-08-14 NOTE — PROGRESS NOTES
Patient admitted to facility at 1220 via Transportation; accompanied by hospital transport.  Patient assisted to room and positioned in bed for comfort and safety; call light within reach.  Patient assisted with stowing belongings and oriented to room and facility.  Admission assessment performed and documented in computer.  Admission paperwork completed; signed copies placed in chart.  Will continue to monitor.

## 2023-08-14 NOTE — PREADMISSION SCREENING NOTE
Pre-Admission Screening Form    Patient Information:   Name: Jorden Bonilla     MRN: [id number]       : 1965      Age: 58 y.o.   Gender: male      Race:  [1]       Marital Status:  [2]  Family Contact: ChadAlyssa Vinson,TiffanieGildardo Huerta        Relationship: Spouse [17]  Sister [14]  Relative [11]  Home Phone: [phone]           Cell Phone:       Advanced Directives: None  Code Status:  FULL  Current Attending Provider: Tiffany Moreau M.D.  Referring Physician: Dr. Barraza  Physiatrist Consult: Dr. Camarena   Referral Date: 08-10-23  Primary Payor Source:  [employer]  Secondary Payor Source:      Medical Information:   Date of Admission to Acute Care [place]/10/2023  Room Number: S183/02  Rehabilitation Diagnosis: 0001.1 - Stroke: Left Body Involvement (Right Brain)  Immunization History   Administered Date(s) Administered    Hepatitis B Vaccine (Adol/Adult) 2012, 2012, 2012    INFLUENZA TIV (IM) 10/01/2014    Influenza Seasonal Injectable - Historical Data 10/01/2014, 2016, 10/19/2017    Influenza Vaccine Quad Inj (Pf) 2022    Influenza, Unspecified - HISTORICAL DATA 2022    PFIZER PURPLE CAP SARS-COV-2 VACCINATION (12+) 2021, 2021    TD Vaccine 2009     No Known Allergies  Past Medical History:   Diagnosis Date    Type II or unspecified type diabetes mellitus without mention of complication, not stated as uncontrolled      Past Surgical History:   Procedure Laterality Date    MULTIPLE CORONARY ARTERY BYPASS ENDO VEIN HARVEST N/A 2015    Procedure: coronary artery bypass grtafting x 3, endoscopic vein harvest right leg, transesophageal echocardiogram;  Surgeon: Henry Baldwin M.D.;  Location: SURGERY [facility];  Service:        History Leading to Admission, Conditions that Caused the Need for Rehab (CMS):     Dr. Barraza H&P:  Chief  Complaint  Left-sided weakness     History of Presenting Illness  Jorden Bonilla is a 58 y.o. male with a past medical history of poorly controlled diabetes and primary hypertension due to poor compliance who presented 8/10/2023 with left-sided weakness.  Patient was last normal and he slept last night around 10:30 PM.  He woke up this morning with left upper and lower extremity weakness.  The patient denies having abnormal jerking movement, tongue biting, fecal or urinary incontinence.    Assessment/Plan:  Justification for Admission Status  I anticipate this patient will require at least two midnights for appropriate medical management, necessitating inpatient admission because the patient has hemiplegia likely secondary to an acute stroke.     Patient will need a Telemetry bed on NEUROLOGY service.  Hemiplegia likely secondary to acute stroke.     * Hemiplegia (HCC)- (present on admission)  Assessment & Plan  Admit to Neuro, with continuous cardiac monitoring  CT, CT-angiography, head, neck showed no acute infarction, or hemorrhage   We will check an echo, and an MRI  NPO, till screened by speech  Aspiration, Fall, and seizure precautions    Neuro checks   Speech. Physical, occupational therapy evaluation ordered  Physiatry consult placed   I will start high intensity statin and aspirin.     Dehydration- (present on admission)  Assessment & Plan  Likely secondary to reduced oral intake and osmotic diuresis secondary to hyperglycemia.  Encourage oral intake as tolerated, antiemetics as needed.  Intravenous hydration until adequate oral intake is achieved     Primary hypertension- (present on admission)  Assessment & Plan  Resume home olmesartan with hold parameters     Type 2 diabetes mellitus with hyperglycemia, without long-term current use of insulin (HCC)- (present on admission)  Assessment & Plan  With hyperglycemia  Last glycated hemoglobin was 8.5%  I will start short acting insulin for now  I will  order Accu-Checks, hypoglycemia protocol  Adjust according to blood sugars trend     Hyponatremia- (present on admission)  Assessment & Plan  With a component of pseudohyponatremia.  Measured sodium is likely low secondary to elevated blood sugars.  Treating hyperglycemia, continue to monitor, anticipate measured sodium to improve once blood sugars are higher controlled.       Dyslipidemia- (present on admission)  Assessment & Plan  Start high intensity statin     VTE prophylaxis: SCDs/TEDs and enoxaparin ppx    Dr. Lopez (Neurology) recommendations:  Assessment and Plan:  Jorden Bonilla is a 58 year old man with multiple vascular risk factors, presenting with L side weakness upon awakening today.  Stroke protocol CT without evidence of hemorrhage, large territory stroke, LVO, or critical, flow limiting stenoses.  There is no substantial ischemic penumbra that warrants off-label thrombolytics.  At this time, will admit for stroke evaluation and therapies.     Problem list:  1.  Stroke by clinical assessment  2.  Type 2 diabetes  3.  CAD  4.  Hypertension     Recommendations:              - admit to Neuroscience, q4h neurochecks/NIHSS              - expedite MRI brain without contrast              - continue ASA 81mg daily              - in absence of ischemic penumbra or critical stenoses, no acute hypertensive response is required. Long-term BP goal is 110-130/60-80, ok to restart PO anti-hypertensives              - stroke labs:  HgbA1c and lipid panel              - start atorvastatin 40mg daily for goal LDL < 70, titrate dose to LDL results              - DM management for goal HgbA1c < 7, acutely aim for FSBS               - may defer TTE and ziopatch monitoring unless MRI brain reveals embolic appearing strokes              - PT/OT/SLP              - stroke bridge clinic follow up    Dr. Camarena (Physiatry) recommendations:  ASSESSMENT:  Patient is a 58 y.o. male admitted with left sided weakness  secondary to stroke by clinical assessment     [facility] Code / Diagnosis to Support: 0001.1 - Stroke: Left Body Involvement (Right Brain)     Rehabilitation: Impaired ADLs and mobility  Patient has had a significant change in function since admission, requires updated therapy notes to evaluate for IPR     All cases are subject to administrative review and recommendations may change     Disposition recommendations:  -Patient's functional debility is severe and is unclear that patient's spouse is able to care for him at this level.  Patient may likely require SNF  -Awaiting updated therapy evaluations  -TCC to verify discharge support  -PMR to follow in the periphery for rehab appropriateness, please reach out with questions or request for medical management     Medical Complexity:     Left-sided weakness  -Initial NIH score 1, which increased to 7 today.  Patient now has dense left-sided hemiplegia.  -Recommend repeat MR brain to assess for new areas of ischemia that can guide rehabilitation strategy  -Neurology following, no additional work-up or interventions planned  -Secondary stroke prevention with aspirin 81 mg daily indefinitely, Plavix 75 mg daily for 10 days, atorvastatin 80 mg daily  -Repeat PT and OT     Diabetes mellitus  -Hemoglobin A1c 11.9  -Sliding scale insulin while in-house  -Glargine 5 units q. Evening     Leukocytosis  -WBC 11.8  -Potential stress reaction     DVT PPX: Lovenox    Dr. Moreau progress note:  Date of Service  8/13/2023     Chief Complaint  Jorden Bonilla is a 58 y.o. male admitted 8/10/2023 with stroke     Hospital Course  59 yo man with history of CABG, DM, HTN with poor compliance to his medications who woke up with left sided weakness.  CTA head and neck showed 40% left intracranial vertebral artery stenosis, minimal bilateral intracranial artery narrowing.  Neurology was consulted.  He was started on aspirin.  MRI brain showed acute infarct in anterior right medulla.     Interval  Problem Update  8/11 - Sinus on telemetry  Hyperglycemia improving  Continues to have severe left-sided weakness.    He complains of dizziness and the room spinning worse with turning his head to the left, also has left ear tinnitus.  Denies hearing loss.  Will start on meclizine.  Wife is bedside.  We discussed further inpatient rehab which she is agreeable to.  Echocardiogram ordered to complete stroke work-up     8/12 -sinus on telemetry.  Leukocytosis 14, afebrile.  Patient says his dizziness is doing better, was able to sleep more last night.  I will increase meclizine dose.  TTE pending.  Continues to have no strength on the left side.  He has recurring urinary retention, ordered for Damon     8/13 -glucose is better controlled.  Leukocytosis resolved.  TTE pending.  He says his dizziness is doing much better, able to sleep.  He is able to raise his left elbow and left knee better today, still no movement of fingers or toes.     I have discussed this patient's plan of care and discharge plan at IDT rounds today with Case Management, Nursing, Nursing leadership, and other members of the IDT team.     Consultants/Specialty  neurology     Code Status  Full Code     Disposition  The patient is not medically cleared for discharge to home or a post-acute facility.  Anticipate discharge to: skilled nursing facility     I have placed the appropriate orders for post-discharge needs.     Review of Systems  Review of Systems   Constitutional:  Positive for malaise/fatigue.   HENT:  Positive for tinnitus. Negative for hearing loss.    Respiratory:  Negative for shortness of breath.    Cardiovascular:  Negative for chest pain.   Gastrointestinal:  Negative for abdominal pain and nausea.   Musculoskeletal:  Negative for myalgias.   Neurological:  Positive for dizziness (Improved), speech change and focal weakness. Negative for headaches.   Psychiatric/Behavioral:  Negative for memory loss.       Physical Exam  Temp:  [36.3  °C (97.3 °F)-36.7 °C (98.1 °F)] 36.6 °C (97.9 °F)  Pulse:  [73-96] 96  Resp:  [16-18] 16  BP: (117-128)/(72-80) 117/77  SpO2:  [95 %-98 %] 95 %     Physical Exam  Vitals and nursing note reviewed.   Constitutional:       Appearance: He is ill-appearing.   HENT:      Head: Normocephalic.      Mouth/Throat:      Mouth: Mucous membranes are moist.   Eyes:      General:         Right eye: No discharge.         Left eye: No discharge.   Cardiovascular:      Rate and Rhythm: Normal rate and regular rhythm.      Heart sounds: Murmur (Systolic) heard.   Pulmonary:      Effort: Pulmonary effort is normal. No respiratory distress.      Breath sounds: No wheezing or rales.   Abdominal:      Palpations: Abdomen is soft.      Tenderness: There is no abdominal tenderness.   Musculoskeletal:         General: No swelling.      Cervical back: Neck supple.   Skin:     General: Skin is warm and dry.   Neurological:      Mental Status: He is alert and oriented to person, place, and time.      Comments: Able to raise left elbow off the bed, no movement of left fingers.  Unable to move left toes, able to weakly raise left knee  Slurred speech, facial droop      Fluids     Intake/Output Summary (Last 24 hours) at 8/13/2023 1441  Last data filed at 8/13/2023 0543  Gross per 24 hour  Intake --  Output 2225 ml  Net -2225 ml     Laboratory  Recent Labs    08/11/23 0658 08/12/23  0051 08/13/23  0336  WBC 11.8* 14.0* 9.5  RBC 5.92 5.79 6.10  HEMOGLOBIN 15.5 15.2 16.1  HEMATOCRIT 47.6 46.4 49.4  MCV 80.4* 80.1* 81.0*  MCH 26.2* 26.3* 26.4*  MCHC 32.6 32.8 32.6  RDW 35.9 36.7 37.0  PLATELETCT 241 257 170  MPV 10.9 10.7 11.5     Recent Labs    08/11/23  0658 08/12/23  0051 08/13/23  0336  SODIUM 139 142 142  POTASSIUM 4.6 3.8 3.7  CHLORIDE 104 105 106  CO2 19* 28 25  GLUCOSE 320* 146* 141*  BUN 17 20 20  CREATININE 0.76 0.88 0.83  CALCIUM 8.7 8.9 9.0     Recent Labs    08/11/23  0658  TRIGLYCERIDE 177*  HDL 51  *      Imaging  EC-ECHOCARDIOGRAM COMPLETE W/O CONT     MR-BRAIN-W/O  Final Result     1.  Questionable tiny acute infarct in the anterior right medulla is artifact.  2.  No intracranial hemorrhage or other significant brain abnormality.     DX-CHEST-PORTABLE (1 VIEW)  Final Result     No acute process.     CT-CEREBRAL PERFUSION ANALYSIS  Final Result     1.  Cerebral blood flow less than 30% likely representing completed infarct = 0 mL.     2.  T Max more than 6 seconds likely representing combination of completed infarct and ischemia = 0 mL.     3.  Mismatched volume likely representing ischemic brain/penumbra = None     4.  Please note that the cerebral perfusion was performed on the limited brain tissue around the basal ganglia region. Infarct/ischemia outside the CT perfusion sections can be missed in this study.     CT-CTA NECK WITH & W/O-POST PROCESSING  Final Result     No significant carotid or vertebral stenosis.     CT-CTA HEAD WITH & W/O-POST PROCESS  Final Result     1. 40% left intracranial vertebral artery stenosis.  2. Minimal bilateral intracranial carotid arterial narrowing secondary to plaque.  3. No large vessel occlusion or aneurysm identified.     CT-HEAD W/O  Final Result     No acute process.     Assessment/Plan  * Hemiplegia (HCC)- (present on admission)  Assessment & Plan  MRI brain showed acute infarct in anterior right medulla  CTA negative for significant large vessel occlusion  Neurology consulted  Plan for aspirin with 10 days of Plavix  Lipitor 80 mg  TTE pending, plan for Zio patch on discharge  PT OT ST, physiatry consult  Telemetry  Meclizine has improved his dizziness, continue     Urinary retention  Assessment & Plan  Damon cath placed 8/12  Start Flomax     Dehydration- (present on admission)  Assessment & Plan  Oral intake as tolerated     Primary hypertension- (present on admission)  Assessment & Plan  Continue home olmesartan with hold parameters.  Avoid hypotension     Type 2  diabetes mellitus with hyperglycemia, without long-term current use of insulin (HCC)- (present on admission)  Assessment & Plan  Was taking Jardiance, Trulicity, metformin   A1c >11%  Hyperglycemia improving  glargine 5 units nightly  Continue ISS     Coronary artery disease due to calcified coronary lesion: CABG ×3 in 2015- (present on admission)  Assessment & Plan  Continue aspirin and Lipitor.  He denies chest pain     Dyslipidemia- (present on admission)  Assessment & Plan  Start high intensity statin     Hyponatremia- (present on admission)  Assessment & Plan  Improved     VTE prophylaxis:    enoxaparin ppx    Brain MRI 08-10-23:  FINDINGS:  There is a questionable tiny focus of restricted diffusion in the anterior right medulla however there is equivocal if any associated T2 hyperintense signal. This could be artifact versus tiny acute infarct.     Otherwise no restricted diffusion is seen. No intracranial hemorrhage or extra-axial fluid collection. No mass effect, midline shift or hydrocephalus.     Ventricles, cisterns and cortical sulci are within normal limits. There are few tiny scattered T2 hyperintensities in the periventricular and subcortical cerebral white matter, and not unexpected in extent for patient's age.     Proximal vascular flow voids are patent.     Both globes demonstrate prior lens surgery.     Paranasal sinuses and mastoids are clear.     IMPRESSION:     1.  Questionable tiny acute infarct in the anterior right medulla is artifact.  2.  No intracranial hemorrhage or other significant brain abnormality.    Co-morbidities:  See PMH  Potential Risk - Complications: Aphasia, Cognitive Impairment, Contractures, Deep Vein Thrombosis, Dysphagia, Incontinence, Malnutrition, Pain, Paralysis, Perceptual Impairment, Pneumonia, Pressure Ulcer, Seizures, and Urinary Tract Infection  Level of Risk: High    Ongoing Medical Management Needed (Medical/Nursing Needs):   Patient Active Problem List     Diagnosis Date Noted    Urinary retention 08/12/2023    Hemiplegia ([facility]) 08/10/2023    Dehydration 08/10/2023    Type 2 diabetes mellitus with hyperglycemia, without long-term current use of insulin ([facility]) 03/30/2022    Vitamin D deficiency 12/22/2021    Primary hypertension 12/21/2021    Mixed hyperlipidemia 12/21/2021    Microalbuminuria due to type 2 diabetes mellitus ([facility]) 12/21/2021    Gastroesophageal reflux disease without esophagitis 12/21/2021    Chronic idiopathic constipation 12/21/2021    Coronary artery disease due to calcified coronary lesion: CABG ×3 in 2015 09/02/2015    S/P CABG x 3 09/02/2015    Abnormal stress test 08/21/2015    Hyponatremia 08/20/2015    Dyslipidemia 08/20/2015    Diabetes mellitus, new onset ([facility]) 08/20/2015    Chest pain 08/19/2015     Alert with cognitive impairment.    Current Vital Signs:   Temperature: 36.6 °C (97.9 °F) Pulse: 92 Respiration: 17 Blood Pressure: 124/85  Weight: 66.3 kg (146 lb 2.6 oz)    Pulse Oximetry: 95 % O2 (LPM): 0      Completed Laboratory Reports:  Recent Labs     08/12/23  0051 08/13/23  0336   WBC 14.0* 9.5   HEMOGLOBIN 15.2 16.1   HEMATOCRIT 46.4 49.4   PLATELETCT 257 170   SODIUM 142 142   POTASSIUM 3.8 3.7   BUN 20 20   CREATININE 0.88 0.83   GLUCOSE 146* 141*     Additional Labs: Not Applicable    Prior Living Situation:   Housing / Facility: 1 Story House  Steps Into Home: 1  Steps In Home: 0  Lives with - Patient's Self Care Capacity: Spouse  Equipment Owned: None    Prior Level of Function / Living Situation:   Physical Therapy: Prior Services: None, Home-Independent  Housing / Facility: 1 Story House  Steps Into Home: 1  Steps In Home: 0  Bathroom Set up: Bathtub / Shower Combination  Equipment Owned: None  Lives with - Patient's Self Care Capacity: Spouse  Bed Mobility: Independent  Transfer Status: Independent  Ambulation: Independent  Stairs: Independent  Current Level of Function:      Supine to Sit: Moderate Assist  Sit to Supine:  Minimal Assist  Scooting: Moderate Assist  Sit to Stand: Minimal Assist (of 2 vs mod of 1)  Bed, Chair, Wheelchair Transfer:  (likely mod; deferred due to ongoing nausea)  Sitting in Chair: NT  Sitting Edge of Bed: >5 mins  Standin mins  Occupational Therapy:   Self Feeding: Independent  Grooming / Hygiene: Independent  Bathing: Independent  Dressing: Independent  Toileting: Independent  Medication Management: Independent  Laundry: Independent  Kitchen Mobility: Independent  Finances: Independent  Home Management: Independent  Shopping: Independent  Prior Level Of Mobility: Independent Without Device in Community  Driving / Transportation: Driving Independent  Prior Services: None, Home-Independent  Housing / Facility: [facility]  Current Level of Function:   Upper Body Dressing: Moderate Assist  Lower Body Dressing: Maximal Assist  Toileting: Moderate Assist (with urinal)  Speech Language Pathology:      Rehabilitation Prognosis/Potential: Good  Estimated Length of Stay: 10-12 days    Nursing:      Incontinent and Damon in Place    Scope/Intensity of Services Recommended:  Physical Therapy: 1 hr / day  5 days / week. Therapeutic Interventions Required: Maximize Endurance, Mobility, Strength, and Safety  Occupational Therapy: 1 hr / day 5 days / week. Therapeutic Interventions Required: Maximize Self Care, ADLs, IADLs, and Energy Conservation  Speech & Language Pathology: 1 hr / day 5 days / week. Therapeutic Interventions Required: Maximize Cognition and Safety  Rehabilitation Nursin/7. Therapeutic Interventions Required: Monitor Pain, Skin, Vital Signs, Intake and Output, Labs, Safety, and Family Training  Rehabilitation Physician: 3 - 5 days / week. Therapeutic Interventions Required: Medical Management    He requires 24-hour rehabilitation nursing to manage bowel and bladder function, skin care, nutrition and fluid intake, pain control, safety, medication management, and patient/family goals. In  addition, rehabilitation nursing will reiterate and reinforce therapy skills and equipment use, including ADLs, as well as provide education to the patient and family. Jorden Bonilla is willing to participate in and is able to tolerate the proposed plan of care.    Rehabilitation Goals and Plan (Expected frequency & duration of treatment in the IRF):   Return to the Community, Minimal Assist Level of Care, and Family Able to Provide 24/7 Assistance  Anticipated Date of Rehabilitation Admission: 08-14-23  Patient/Family oriented IRF level of care/facility/plan: Yes  Patient/Family willing to participate in IRF care/facility/plan: Yes  Patient able to tolerate IRF level of care proposed: Yes  Patient has potential to benefit IRF level of care proposed: Yes  Comments: Not Applicable    Special Needs or Precautions - Medical Necessity:  Safety Concerns/Precautions:  Fall Risk / High Risk for Falls, Balance, Cognition, and Bed / Chair Alarm  Pain Management  Precautions: Fall Risk;Swallow Precautions  Comments: L phyllis  IV Site: Peripheral  Current Medications:    Current Facility-Administered Medications Ordered in Epic   Medication Dose Route Frequency Provider Last Rate Last Admin    clopidogrel (Plavix) tablet 75 mg  75 mg Oral DAILY Tiffany Moreau M.D.   75 mg at 08/14/23 0553    tamsulosin (Flomax) capsule 0.4 mg  0.4 mg Oral AFTER BREAKFAST Tiffany Moreau M.D.   0.4 mg at 08/14/23 0825    meclizine (Antivert) tablet 25 mg  25 mg Oral TID Tiffany Moreau M.D.   25 mg at 08/14/23 0825    atorvastatin (Lipitor) tablet 80 mg  80 mg Oral Q EVENING Anirudh Lopez M.D.   80 mg at 08/13/23 1739    insulin GLARGINE (Lantus,Semglee) injection  5 Units Subcutaneous Q EVENING Tiffany Moreau M.D.   5 Units at 08/13/23 1733    aspirin EC tablet 81 mg  81 mg Oral Q EVENING Steve Barraza M.D.   81 mg at 08/13/23 1734    olmesartan (Benicar) tablet 20 mg  20 mg Oral Q EVENING Steve Barraza M.D.        acetaminophen (Tylenol) tablet 650  mg  650 mg Oral Q6HRS PRN Asem MARCO A Barraza M.D.   650 mg at 08/12/23 1637    senna-docusate (Pericolace Or Senokot S) 8.6-50 MG per tablet 2 Tablet  2 Tablet Oral BID Asem A GOLDIE Barraza   2 Tablet at 08/14/23 0554    And    polyethylene glycol/lytes (Miralax) PACKET 1 Packet  1 Packet Oral QDAY PRN Asem MARCO A Barraza M.D.   1 Packet at 08/13/23 1740    And    magnesium hydroxide (Milk Of Magnesia) suspension 30 mL  30 mL Oral QDAY PRN Asem MARCO A Barraza M.D.        And    bisacodyl (Dulcolax) suppository 10 mg  10 mg Rectal QDAY PRN Asem MARCO A Barraza M.D.        enoxaparin (Lovenox) inj 40 mg  40 mg Subcutaneous DAILY AT 1800 Asem MARCO A Barraza M.D.   40 mg at 08/13/23 1734    ondansetron (Zofran) syringe/vial injection 4 mg  4 mg Intravenous Q4HRS PRN Asem MARCO A Barraza M.D.   4 mg at 08/11/23 0704    ondansetron (Zofran ODT) dispertab 4 mg  4 mg Oral Q4HRS PRN Asem MARCO A Barraza M.D.        promethazine (Phenergan) tablet 12.5-25 mg  12.5-25 mg Oral Q4HRS PRN Asem MARCO A Barraza M.D.        promethazine (Phenergan) suppository 12.5-25 mg  12.5-25 mg Rectal Q4HRS PRN Asem A GOLDIE Barraza        prochlorperazine (Compazine) injection 5-10 mg  5-10 mg Intravenous Q4HRS PRN Asem MARCO A Barraza M.D.   10 mg at 08/11/23 0913    insulin regular (HumuLIN R,NovoLIN R) injection  1-6 Units Subcutaneous 4X/DAY ACHS Asem A GOLDIE Barraza   1 Units at 08/14/23 0822    And    dextrose 50% (D50W) injection 25 g  25 g Intravenous Q15 MIN PRN Asem A GOLDIE Barraza         Current Outpatient Medications Ordered in Epic   Medication Sig Dispense Refill    [START ON 8/15/2023] clopidogrel (PLAVIX) 75 MG Tab Take 1 Tablet by mouth every day for 6 days. 30 Tablet     meclizine (ANTIVERT) 25 MG Tab Take 1 Tablet by mouth in the morning, at noon, and at bedtime. 30 Tablet 0    tamsulosin (FLOMAX) 0.4 MG capsule Take 1 Capsule by mouth 1/2 hour after breakfast. 30 Capsule     insulin regular (HUMULIN R) 100 Unit/mL Solution Inject 1-6 Units under the  skin 4 Times a Day,Before Meals and at Bedtime. 10 mL      Diet:   DIET ORDERS (From admission to next 24h)       Start     Ordered    08/10/23 1356  Diet Order Diet: Level 6 - Soft and Bite Sized (cardiac and CHO); Liquid level: Level 0 - Thin; Second Modifier: (optional): Consistent CHO (Diabetic)  ALL MEALS        Question Answer Comment   Diet: Level 6 - Soft and Bite Sized cardiac and CHO   Liquid level Level 0 - Thin    Second Modifier: (optional) Consistent CHO (Diabetic)        08/10/23 1356                    Anticipated Discharge Destination / Patient/Family Goal:  Destination: Home with Assistance Support System: Spouse and Family   Anticipated home health services: OT, PT, Nursing, Social Work, and Aide  Previously used HH service/ provider: Not Applicable  Anticipated DME Needs: Wheelchair and [employer]  Outpatient Services: OT and PT  Alternative resources to address additional identified needs:   No future appointments.   Pre-Screen Completed: 8/14/2023 9:42 AM Blair Victoria L.P.N.

## 2023-08-14 NOTE — FLOWSHEET NOTE
08/14/23 1451   Events/Summary/Plan   Events/Summary/Plan RT Assessment   Vital Signs   Pulse (!) 108   Respiration 16   Pulse Oximetry 94 %   $ Pulse Oximetry (Spot Check) Yes   Respiratory Assessment   Respiratory Pattern Within Normal Limits   Level of Consciousness Alert   Chest Exam   Work Of Breathing / Effort Within Normal Limits   Oxygen   O2 Delivery Device None - Room Air   Smoking History   Have you ever smoked Yes   Have you smoked in the last 12 months No   Confirm Quit Date   (Quit Dec 31, 1999)

## 2023-08-14 NOTE — H&P
REHABILITATION HISTORY AND PHYSICAL/POST ADMISSION EVALUATION    8/14/2023  1:27 PM  Jorden Bonilla  [id number]  Admission  8/14/2023 12:08 PM  [facility] Code/Reason for admission: 0001.1 - Stroke: Left Body Involvement (Right Brain)   Etiologic diagnosis/problem: Ischemic stroke (HCC)  Chief Complaint: Left-sided weakness    HPI:  The patient is a 58 y.o. male with a past medical history of hypertension, diabetes, hyperlipidemia, coronary artery disease status post CABG, remote tobacco use, noncompliant with medications; now admitted for acute inpatient rehabilitation with severe functional debility after an acute stroke.      On admission the patient and medical record report he presented to the hospital on 8/10 after waking up with left-sided weakness in his arm and leg.  Initial NIH stroke scale in the ED was 1.  Negative head CT, negative CT perfusion scan, CTA of the neck negative, CT of the head with 40% left intracranial vertebral artery stenosis.    Patient was not a candidate for enzymatic therapy or thrombectomy.  He was admitted to complete the stroke work-up.  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  His left-sided weakness worsened overnight, increasing his NIH stroke scale to 7. HgbA1c 11.9, , ECHO EF 55% without any evidence of shunt.  Per neurology, patient started on aspirin 81 mg indefinitely, 10 days of Plavix with an end date of 821, goal for normotension 110-130/60-80.  Zio patch cardiac monitor was placed.    Patient currently reports left-sided weakness that waxes and wanes but has slightly improved.  He is right-hand dominant.  He denies any changes in his vision.  He reports some abnormal sensation in his left fingers.  He had urinary retention at the acute hospital for which Damon catheter was placed.  He reports he last moved his bowels this morning.    Patient was evaluated by Rehab Medicine physician and Physical Therapy, Occupational Therapy, and Speech Therapy and  determined to be appropriate for acute inpatient rehab and was transferred to [facility] on 8/14/2023 12:08 PM.    With this acute therapeutic intervention, this patient hopes to improve his functional status, and return to independent living with the supportive care of spouse.    REVIEW OF SYSTEMS:     A complete review of systems was performed and was negative in detail with the exception of items mentioned elsewhere in this document.    PMH:  Past Medical History:   Diagnosis Date    Coronary artery disease     Diabetes (HCC)     Hyperlipidemia     Hypertension        PSH:  Past Surgical History:   Procedure Laterality Date    MULTIPLE CORONARY ARTERY BYPASS ENDO VEIN HARVEST N/A 8/22/2015    Procedure: coronary artery bypass grtafting x 3, endoscopic vein harvest right leg, transesophageal echocardiogram;  Surgeon: Henry Baldwin M.D.;  Location: SURGERY [facility];  Service:        No family history on file.     MEDICATIONS:  Scheduled Medications   Medication Dose Frequency    aspirin  81 mg Q EVENING    atorvastatin  80 mg Q EVENING    [START ON 8/15/2023] clopidogrel  75 mg DAILY    enoxaparin (LOVENOX) injection  40 mg DAILY AT 1800    insulin GLARGINE  5 Units Q EVENING    insulin regular  1-6 Units 4X/DAY ACHS    meclizine  25 mg TID    olmesartan  20 mg Q EVENING    senna-docusate  2 Tablet BID    [START ON 8/15/2023] tamsulosin  0.4 mg AFTER BREAKFAST       ALLERGIES:  Patient has no known allergies.    PSYCHOSOCIAL HISTORY:  Pre-mobidly, the patient lived in a single level home with 1 steps to enter, in [place] with spouse.  He has 2 children, age 27 and 17.  He quit tobacco many years ago and denies any alcohol or drugs.  He works as a chua.    LEVEL OF FUNCTION PRIOR TO DISABILTY:  Independent     LEVEL OF FUNCTION PRIOR TO ADMISSION to [facility]:  PT 8/14:    Balance   Sitting Balance (Static) Fair   Sitting Balance (Dynamic) Fair   Standing Balance  (Static) Poor -   Standing Balance (Dynamic) Trace +   Weight Shift Sitting Fair   Weight Shift Standing Poor   Skilled Intervention Verbal Cuing;Tactile Cuing   Bed Mobility    Supine to Sit Supervised   Skilled Intervention Verbal Cuing;Tactile Cuing;Compensatory Strategies   Comments w/ use of side rail   Gait Analysis   Gait Level Of Assist Minimal Assist   Assistive Device Hand Held Assist   Distance (Feet) 6   Deviation Decreased Base Of Support  (poor foot placement, poor trunk control)   Skilled Intervention Verbal Cuing;Tactile Cuing;Facilitation   Functional Mobility   Sit to Stand Standby Assist   Bed, Chair, Wheelchair Transfer Minimal Assist   Transfer Method Squat Pivot   Skilled Intervention Verbal Cuing;Tactile Cuing;Facilitation       OT 8/14:    Cognition    Cognition / Consciousness X   Speech/ Communication Delayed Responses   Level of Consciousness Alert   Ability To Follow Commands 1 Step   Safety Awareness Impaired   Comments Pleasant, cooperative, motivated   Active ROM Upper Body   Active ROM Upper Body  X   Dominant Hand Right   Shoulder Elevation Asymmetric (See Comments)  (some active shoulder elevation on L UE, but not full range available; R UE WFL)   Comments Pt also has active movement of L shoulder in abduction to ~45 degrees with cues from therapist to not compensate with trunk/shoulder elevation   Upper Body Muscle Tone   Upper Body Muscle Tone  X   Lt Upper Extremity Muscle Tone Non Functional;Hypotonic   Sitting Upper Body Exercises   Sitting Upper Body Exercises Yes   Comments Education on gentle SROM to L UE for elbow, wrist, and digits. Pt educated on joint protection. AROM of shoulder shrugs and shoulder abduction with cues/active assist from therapist to avoid scaption/compensatory movements.   Other Treatments   Other Treatments Provided Practiced ADL transfer to/from chair with heavy focus on awareness of L UE.   Balance   Sitting Balance (Static) Fair -   Sitting Balance  (Dynamic) Poor +   Standing Balance (Static) Poor   Standing Balance (Dynamic) Poor -   Weight Shift Sitting Fair   Weight Shift Standing Poor   Skilled Intervention Verbal Cuing;Facilitation;Postural Facilitation;Sequencing   Comments w/ HHA   Bed Mobility    Comments up to chair pre/post   Activities of Daily Living   Grooming Minimal Assist;Seated  (washed face and brushed teeth)   Bathing Minimal Assist  (Washed L UE w/ R UE to provide sensory stimulation)   Lower Body Dressing Moderate Assist  (don underwear, educated on phyllis technique)   Skilled Intervention Verbal Cuing;Facilitation;Compensatory Strategies;Sequencing   Functional Mobility   Sit to Stand Minimal Assist   Bed, Chair, Wheelchair Transfer Minimal Assist   Mobility chair>EOB>chair   Skilled Intervention Verbal Cuing;Postural Facilitation;Facilitation;Sequencing   Comments w/ HHA, cues to attend to L UE, education on importance of awareness of L UE during ADL transfers       SLP 8/14:    Assessment  The patient was seen this date for a cognitive linguistic evaluation.  The Cognistat was administered, assessing functioning in the following cognitive domains: LOC, Orientation, Attention, Language (Comprehension, Repetition and Naming), Memory, Calculations and Reasoning (Similarities and Judgement).         Patient scored the following on the Cognistat:  Orientation: WFL   Attention: WFL  Repetition (Language): WFL  Naming (Language): WFL  Memory: Severe  Calculations: WFL  Similarities (Reasoning): Mild  Judgment (Reasoning): Mild         Clinical Impressions  Pt demonstrated severe deficits with short term memory, and mild deficits with reasoning.  Query if deficits with reasoning could be in part affected by language difference, however suspect he would benefit from SLP services in the post acute setting to address cognitive therapy.         CURRENT LEVEL OF FUNCTION:   Same as level of function prior to admission to [facility]  "Hospital    PHYSICAL EXAM:     VITAL SIGNS:   height is 1.651 m (5' 5\") and weight is 63 kg (139 lb). His oral temperature is 36.2 °C (97.2 °F). His blood pressure is 128/88 and his pulse is 112 (abnormal). His respiration is 18 and oxygen saturation is 100%.     GENERAL: No apparent distress  HEENT: Normocephalic/atraumatic, EOMI, PERRL, and No nystagmus  CARDIAC: Tachycardia, regular rhythm, normal S1, S2, no murmurs, no peripheral edema   LUNGS: Clear to auscultation, normal respiratory effort, on room air   ABDOMINAL: bowel sounds present, soft, nontender, and nondistended    EXTREMITIES: no edema or 2+ bilateral DP/PT pulses    NEURO:    Mental status: alert  Speech: fluent, no aphasia or dysarthria    CRANIAL NERVES:  2,3: visual acuity grossly intact, PERRL  3,4,6: EOMI bilaterally, no nystagmus or diplopia  5: intact in all branches  7: no facial asymmetry  8: hearing grossly intact  9,10: symmetric palate elevation  11: SCM/Trapezius strength 5/5 bilaterally  12: tongue protrudes midline    Motor:  Shoulder flexors:  Right -  5/5, Left -  1/5  Elbow flexors:  Right -  5/5, Left -  1/5  Elbow extensors:  Right -  5/5, Left -  0/5  Absent  on the left  Hip flexors:  Right -  5/5, Left -  1/5  Knee ext:  Right -  5/5, Left -  1/5  Dorsiflexors:  Right -  5/5, Left -  0/5  Plantar flexors:  Right -  5/5, Left -  0/5     Sensory:   intact to light touch through out    DTRs:   2+ in bilateral biceps, triceps, brachioradialis  3+ in bilateral patellar tendons and 2+ in bilateral achilles tendons  No clonus at bilateral ankles  Negative babinski b/l  Negative Burnette b/l       RADIOLOGY:    Imaging personally reviewed and interpreted by me.    8/10/2023 10:27 AM     HISTORY/REASON FOR EXAM: .  Left-sided weakness     TECHNIQUE/EXAM DESCRIPTION:  MRI of the brain without contrast.     T1 sagittal, T2 fast spin-echo axial, T1 coronal, FLAIR coronal, diffusion-weighted and apparent diffusion coefficient (ADC map) " axial images were obtained of the whole brain.     The study was performed on a [employer] 1.5 [place] MRI scanner.     COMPARISON:  Head CT 8/10/2023     FINDINGS:  There is a questionable tiny focus of restricted diffusion in the anterior right medulla however there is equivocal if any associated T2 hyperintense signal. This could be artifact versus tiny acute infarct.     Otherwise no restricted diffusion is seen. No intracranial hemorrhage or extra-axial fluid collection. No mass effect, midline shift or hydrocephalus.     Ventricles, cisterns and cortical sulci are within normal limits. There are few tiny scattered T2 hyperintensities in the periventricular and subcortical cerebral white matter, and not unexpected in extent for patient's age.     Proximal vascular flow voids are patent.     Both globes demonstrate prior lens surgery.     Paranasal sinuses and mastoids are clear.     IMPRESSION:     1.  Questionable tiny acute infarct in the anterior right medulla is artifact.  2.  No intracranial hemorrhage or other significant brain abnormality.            LABS:  Recent Labs     08/12/23  0051 08/13/23  0336   SODIUM 142 142   POTASSIUM 3.8 3.7   CHLORIDE 105 106   CO2 28 25   GLUCOSE 146* 141*   BUN 20 20   CREATININE 0.88 0.83   CALCIUM 8.9 9.0     Recent Labs     08/12/23  0051 08/13/23  0336   WBC 14.0* 9.5   RBC 5.79 6.10   HEMOGLOBIN 15.2 16.1   HEMATOCRIT 46.4 49.4   MCV 80.1* 81.0*   MCH 26.3* 26.4*   MCHC 32.8 32.6   RDW 36.7 37.0   PLATELETCT 257 170   MPV 10.7 11.5         PRIMARY REHAB DIAGNOSIS:    This patient is a 58 y.o. male admitted for acute inpatient rehabilitation with Ischemic stroke (HCC).    IMPAIRMENTS:   Cognitive  ADLs/IADLs  Mobility    SECONDARY DIAGNOSIS/MEDICAL CO-MORBIDITIES AFFECTING FUNCTION:    Dizziness  Diabetes with hyperglycemia  Hypertension  Hyperlipidemia  Tachycardia  Urinary retention  Coronary artery disease    RELEVANT CHANGES SINCE PREADMISSION EVALUATION:    Status  unchanged    The patient's rehabilitation potential is Good  The patient's medical prognosis is good    PLAN:   Discussion and Recommendations, discussed with the patient and/or family:   1. The patient requires an acute inpatient rehabilitation program with a coordinated program of care at an intensity and frequency not available at a lower level of care. This recommendation is substantiated by the patient's medical physicians who recommend that the patient's intervention and assessment of medical issues needs to be done at an acute level of care for patient's safety and maximum outcome.     2. A coordinated program of care will be supplied by an interdisciplinary team of physical therapy, occupational therapy, rehab physician, rehab nursing, and, if needed, speech therapy and rehab psychology. Rehab team presents a patient-specific rehabilitation and education program concentrating on prevention of future problems related to accessibility, mobility, skin, bowel, bladder, sexuality, and psychosocial and medical/surgical problems.     3. Need for Rehabilitation Physician: The rehab physician will be evaluating the patient on a multi-weekly basis to help coordinate the program of care. The rehab physician communicates between medical physicians, therapists, and nurses to maximize the patient's potential outcome. Specific areas in which the rehab physician will be providing daily assessment include the following:   A. Assessing the patient's heart rate and blood pressure response (vitals monitoring) to activity and making adjustments in medications or conservative measures as needed.   B. The rehab physician will be assessing the frequency at which the program can be increased to allow the patient to reach optimal functional outcome.   C. The rehab physician will also provide assessments in daily skin care, especially in light of patient's impairments in mobility.   D. The rehab physician will provide special  expertise in understanding how to work with functional impairment and recommend appropriate interventions, compensatory techniques, and education that will facilitate the patient's outcome.     4. Rehab R.N.   The rehab RN will be working with patient to carry over in room mobility and activities of daily living when the patient is not in 3 hours of skilled therapy. Rehab nursing will be working in conjunction with rehab physician to address all the medical issues above and continue to assess laboratory work and discuss abnormalities with the treating physicians, assess vitals, and response to activity, and discuss and report abnormalities with the rehab physician. Rehab RN will also continue daily skin care, supervise bladder/bowel program, instruct in medication administration, and ensure patient safety.     5. Therapies to treat at intensity and frequency of (may change after completion of evaluation by all therapeutic disciplines):       PT:  Physical therapy to address mobility, transfer, gait training and evaluation for adaptive equipment needs 1hour/day at least 5 days/week for the duration of the ELOS (see below)       OT:  Occupational therapy to address ADLs, self-care, home management training, functional mobility/transfers and assistive device evaluation, and community re-integration 1hour/day at least 5 days/week for the duration of the ELOS (see below).        ST/Dysphagia:  Speech therapy to address speech, language, and cognitive deficits as well as swallowing difficulties with retraining/dysphagia management and community re-integration with comprehension, expression, cognitive training 1hour/day at least 5 days/week for the duration of the ELOS (see below).     6. Medical management / Rehabilitation Issues/Adverse Potential affecting function as part of rehabilitation plan.    Dizziness  Continue meclizine    Diabetes with hyperglycemia  Uncontrolled, hemoglobin A1c 11.9  Continue glargine and  sliding scale insulin  Consult hospitalist    Hypertension  Goal normotensive  Continue olmesartan    Hyperlipidemia  Continue atorvastatin    Tachycardia  Check EKG, UA, chest x-ray    Urinary retention  Continue Flomax  Remove Damon in the morning    Coronary artery disease  With history of CABG  Continue aspirin and Plavix    I performed a complete drug regimen review and did not identify any potential clinically significant medication issues.    The patient's CODE STATUS was confirmed as FULL CODE on admission, with the patient and/or family at bedside.    REHABILITATION ISSUES/ADVERSE POTENTIAL:  1.  CVA (Cerebrovascular Accident): Continue aspirin and Plavix for secondary prophylaxis as well as lipid and blood pressure management. Patient demonstrates functional deficits in strength, balance, coordination, and ADL's. Patient is admitted to [facility] for comprehensive rehabilitation therapy as described below.   Rehabilitation nursing monitors bowel and bladder control, educates on medication administration, co-morbidities and monitors patient safety.    2.  DVT prophylaxis:  Patient is on Lovenox for anticoagulation upon transfer. Encourage OOB. Monitor daily for signs and symptoms of DVT including but not limited to swelling and pain to prevent the development of DVT that may interfere with therapies.    3.  Pain: No issues with pain currently / Controlled with as needed oral analgesics.    4.  Nutrition/Dysphagia: Dietician monitors nutrient intake, recommend supplements prn and provide nutrition education to pt/family to promote optimal nutrition for wound healing/recovery.     5.  Bladder/bowel:  Start bowel and bladder program, to prevent constipation, urinary retention (which may lead to UTI), and urinary incontinence (which will impact upon pt's functional independence).   - TV Q3h while awake with post void bladder scans, I&O cath for PVRs >400  - up to commode after meal     6.   Skin/dermal ulcer prophylaxis: Monitor for new skin conditions with q.2 h. turns as required to prevent the development of skin breakdown.     7.  GI prophylaxis: Omeprazole to prevent gastritis or GI bleed that would interfere with therapies.    8. Respiratory therapy: RT performs O2 management prn, breathing retraining, pulmonary hygiene and bronchospasm management prn to optimize participation in therapies.    Pt was seen today for 75 min, and entire time spent in face-to-face contact was >50% in counseling and coordination of care as detailed in A/P above.        GOALS/EXPECTED LEVEL OF FUNCTION BASED ON CURRENT MEDICAL AND FUNCTIONAL STATUS (may change based on patient's medical status and rate of impairment recovery):  Transfers:   Supervision  Mobility/Gait:   Supervision  ADL's:   Minimal Assistance  Cognition: Least verbal cues    DISPOSITION: Discharge to pre-morbid independent living setting with the supportive care of patient's family.      ELOS: 21 days    Sarai Davila M.D.  Physical Medicine and Rehabilitation             Detail Level: Detailed

## 2023-08-15 ENCOUNTER — APPOINTMENT (OUTPATIENT)
Dept: OCCUPATIONAL THERAPY | Facility: REHABILITATION | Age: 58
DRG: 057 | End: 2023-08-15
Attending: PHYSICAL MEDICINE & REHABILITATION
Payer: COMMERCIAL

## 2023-08-15 ENCOUNTER — APPOINTMENT (OUTPATIENT)
Dept: SPEECH THERAPY | Facility: REHABILITATION | Age: 58
DRG: 057 | End: 2023-08-15
Attending: PHYSICAL MEDICINE & REHABILITATION
Payer: COMMERCIAL

## 2023-08-15 ENCOUNTER — APPOINTMENT (OUTPATIENT)
Dept: PHYSICAL THERAPY | Facility: REHABILITATION | Age: 58
DRG: 057 | End: 2023-08-15
Attending: PHYSICAL MEDICINE & REHABILITATION
Payer: COMMERCIAL

## 2023-08-15 PROBLEM — R71.8 MICROCYTOSIS: Status: ACTIVE | Noted: 2023-08-15

## 2023-08-15 LAB
ALBUMIN SERPL BCP-MCNC: 3.7 G/DL (ref 3.2–4.9)
ALBUMIN/GLOB SERPL: 1.4 G/DL
ALP SERPL-CCNC: 69 U/L (ref 30–99)
ALT SERPL-CCNC: 15 U/L (ref 2–50)
ANION GAP SERPL CALC-SCNC: 10 MMOL/L (ref 7–16)
AST SERPL-CCNC: 19 U/L (ref 12–45)
BASOPHILS # BLD AUTO: 0.5 % (ref 0–1.8)
BASOPHILS # BLD: 0.05 K/UL (ref 0–0.12)
BILIRUB SERPL-MCNC: 0.6 MG/DL (ref 0.1–1.5)
BUN SERPL-MCNC: 14 MG/DL (ref 8–22)
CALCIUM ALBUM COR SERPL-MCNC: 9.5 MG/DL (ref 8.5–10.5)
CALCIUM SERPL-MCNC: 9.3 MG/DL (ref 8.5–10.5)
CHLORIDE SERPL-SCNC: 103 MMOL/L (ref 96–112)
CO2 SERPL-SCNC: 28 MMOL/L (ref 20–33)
CREAT SERPL-MCNC: 0.89 MG/DL (ref 0.5–1.4)
EOSINOPHIL # BLD AUTO: 0.28 K/UL (ref 0–0.51)
EOSINOPHIL NFR BLD: 2.9 % (ref 0–6.9)
ERYTHROCYTE [DISTWIDTH] IN BLOOD BY AUTOMATED COUNT: 36.8 FL (ref 35.9–50)
GFR SERPLBLD CREATININE-BSD FMLA CKD-EPI: 98 ML/MIN/1.73 M 2
GLOBULIN SER CALC-MCNC: 2.7 G/DL (ref 1.9–3.5)
GLUCOSE BLD STRIP.AUTO-MCNC: 174 MG/DL (ref 65–99)
GLUCOSE BLD STRIP.AUTO-MCNC: 200 MG/DL (ref 65–99)
GLUCOSE BLD STRIP.AUTO-MCNC: 202 MG/DL (ref 65–99)
GLUCOSE BLD STRIP.AUTO-MCNC: 232 MG/DL (ref 65–99)
GLUCOSE SERPL-MCNC: 186 MG/DL (ref 65–99)
HCT VFR BLD AUTO: 50.9 % (ref 42–52)
HGB BLD-MCNC: 16.5 G/DL (ref 14–18)
IMM GRANULOCYTES # BLD AUTO: 0.04 K/UL (ref 0–0.11)
IMM GRANULOCYTES NFR BLD AUTO: 0.4 % (ref 0–0.9)
LYMPHOCYTES # BLD AUTO: 2.75 K/UL (ref 1–4.8)
LYMPHOCYTES NFR BLD: 28.5 % (ref 22–41)
MAGNESIUM SERPL-MCNC: 2.3 MG/DL (ref 1.5–2.5)
MCH RBC QN AUTO: 26.2 PG (ref 27–33)
MCHC RBC AUTO-ENTMCNC: 32.4 G/DL (ref 32.3–36.5)
MCV RBC AUTO: 80.9 FL (ref 81.4–97.8)
MONOCYTES # BLD AUTO: 1.01 K/UL (ref 0–0.85)
MONOCYTES NFR BLD AUTO: 10.5 % (ref 0–13.4)
NEUTROPHILS # BLD AUTO: 5.53 K/UL (ref 1.82–7.42)
NEUTROPHILS NFR BLD: 57.2 % (ref 44–72)
NRBC # BLD AUTO: 0 K/UL
NRBC BLD-RTO: 0 /100 WBC (ref 0–0.2)
PLATELET # BLD AUTO: 270 K/UL (ref 164–446)
PMV BLD AUTO: 10.9 FL (ref 9–12.9)
POTASSIUM SERPL-SCNC: 4.1 MMOL/L (ref 3.6–5.5)
PROT SERPL-MCNC: 6.4 G/DL (ref 6–8.2)
RBC # BLD AUTO: 6.29 M/UL (ref 4.7–6.1)
SODIUM SERPL-SCNC: 141 MMOL/L (ref 135–145)
WBC # BLD AUTO: 9.7 K/UL (ref 4.8–10.8)

## 2023-08-15 PROCEDURE — 97162 PT EVAL MOD COMPLEX 30 MIN: CPT

## 2023-08-15 PROCEDURE — 700111 HCHG RX REV CODE 636 W/ 250 OVERRIDE (IP): Mod: JZ | Performed by: PHYSICAL MEDICINE & REHABILITATION

## 2023-08-15 PROCEDURE — 700102 HCHG RX REV CODE 250 W/ 637 OVERRIDE(OP): Performed by: PHYSICAL MEDICINE & REHABILITATION

## 2023-08-15 PROCEDURE — A9270 NON-COVERED ITEM OR SERVICE: HCPCS | Performed by: PHYSICAL MEDICINE & REHABILITATION

## 2023-08-15 PROCEDURE — 83735 ASSAY OF MAGNESIUM: CPT

## 2023-08-15 PROCEDURE — 99232 SBSQ HOSP IP/OBS MODERATE 35: CPT | Performed by: PHYSICAL MEDICINE & REHABILITATION

## 2023-08-15 PROCEDURE — 700102 HCHG RX REV CODE 250 W/ 637 OVERRIDE(OP): Performed by: HOSPITALIST

## 2023-08-15 PROCEDURE — 85025 COMPLETE CBC W/AUTO DIFF WBC: CPT

## 2023-08-15 PROCEDURE — 97535 SELF CARE MNGMENT TRAINING: CPT

## 2023-08-15 PROCEDURE — 99254 IP/OBS CNSLTJ NEW/EST MOD 60: CPT | Performed by: HOSPITALIST

## 2023-08-15 PROCEDURE — 97166 OT EVAL MOD COMPLEX 45 MIN: CPT

## 2023-08-15 PROCEDURE — 80053 COMPREHEN METABOLIC PANEL: CPT

## 2023-08-15 PROCEDURE — A9270 NON-COVERED ITEM OR SERVICE: HCPCS | Performed by: HOSPITALIST

## 2023-08-15 PROCEDURE — 92523 SPEECH SOUND LANG COMPREHEN: CPT

## 2023-08-15 PROCEDURE — 770010 HCHG ROOM/CARE - REHAB SEMI PRIVAT*

## 2023-08-15 PROCEDURE — 36415 COLL VENOUS BLD VENIPUNCTURE: CPT

## 2023-08-15 PROCEDURE — 97116 GAIT TRAINING THERAPY: CPT

## 2023-08-15 PROCEDURE — 82962 GLUCOSE BLOOD TEST: CPT | Mod: 91

## 2023-08-15 RX ORDER — DEXTROSE MONOHYDRATE 25 G/50ML
25 INJECTION, SOLUTION INTRAVENOUS
Status: DISCONTINUED | OUTPATIENT
Start: 2023-08-15 | End: 2023-08-30

## 2023-08-15 RX ORDER — METFORMIN HYDROCHLORIDE 500 MG/1
500 TABLET, EXTENDED RELEASE ORAL
Status: DISCONTINUED | OUTPATIENT
Start: 2023-08-15 | End: 2023-08-17

## 2023-08-15 RX ADMIN — MECLIZINE HYDROCHLORIDE 25 MG: 25 TABLET ORAL at 20:24

## 2023-08-15 RX ADMIN — TAMSULOSIN HYDROCHLORIDE 0.4 MG: 0.4 CAPSULE ORAL at 08:48

## 2023-08-15 RX ADMIN — METOPROLOL TARTRATE 12.5 MG: 25 TABLET, FILM COATED ORAL at 17:01

## 2023-08-15 RX ADMIN — SENNOSIDES AND DOCUSATE SODIUM 2 TABLET: 50; 8.6 TABLET ORAL at 20:24

## 2023-08-15 RX ADMIN — INSULIN HUMAN 2 UNITS: 100 INJECTION, SOLUTION PARENTERAL at 08:49

## 2023-08-15 RX ADMIN — SENNOSIDES AND DOCUSATE SODIUM 2 TABLET: 50; 8.6 TABLET ORAL at 08:48

## 2023-08-15 RX ADMIN — ATORVASTATIN CALCIUM 80 MG: 40 TABLET, FILM COATED ORAL at 20:23

## 2023-08-15 RX ADMIN — ENOXAPARIN SODIUM 40 MG: 100 INJECTION SUBCUTANEOUS at 17:02

## 2023-08-15 RX ADMIN — MECLIZINE HYDROCHLORIDE 25 MG: 25 TABLET ORAL at 08:48

## 2023-08-15 RX ADMIN — METFORMIN HYDROCHLORIDE 500 MG: 500 TABLET, EXTENDED RELEASE ORAL at 17:06

## 2023-08-15 RX ADMIN — MECLIZINE HYDROCHLORIDE 25 MG: 25 TABLET ORAL at 14:56

## 2023-08-15 RX ADMIN — CLOPIDOGREL BISULFATE 75 MG: 75 TABLET ORAL at 08:48

## 2023-08-15 RX ADMIN — ASPIRIN 81 MG: 81 TABLET, COATED ORAL at 20:24

## 2023-08-15 RX ADMIN — OLMESARTAN MEDOXOMIL 20 MG: 20 TABLET ORAL at 20:24

## 2023-08-15 RX ADMIN — METOPROLOL TARTRATE 12.5 MG: 25 TABLET, FILM COATED ORAL at 11:59

## 2023-08-15 ASSESSMENT — GAIT ASSESSMENTS
ASSISTIVE DEVICE: OTHER (COMMENTS)
DISTANCE (FEET): 28
DEVIATION: ATAXIC;DECREASED BASE OF SUPPORT;BRADYKINETIC;DECREASED HEEL STRIKE;DECREASED TOE OFF
GAIT LEVEL OF ASSIST: TOTAL ASSIST X 2

## 2023-08-15 ASSESSMENT — ACTIVITIES OF DAILY LIVING (ADL)
BED_CHAIR_WHEELCHAIR_TRANSFER_DESCRIPTION: SET-UP OF EQUIPMENT;SQUAT PIVOT TRANSFER TO WHEELCHAIR;SUPERVISION FOR SAFETY
TOILETING: INDEPENDENT
TUB_SHOWER_TRANSFER_DESCRIPTION: SET-UP OF EQUIPMENT;SUPERVISION FOR SAFETY;VERBAL CUEING;GRAB BAR;SHOWER BENCH
TOILET_TRANSFER_DESCRIPTION: GRAB BAR;SET-UP OF EQUIPMENT;SUPERVISION FOR SAFETY;VERBAL CUEING
BED_CHAIR_WHEELCHAIR_TRANSFER_DESCRIPTION: INCREASED TIME;SET-UP OF EQUIPMENT;SUPERVISION FOR SAFETY;VERBAL CUEING

## 2023-08-15 ASSESSMENT — BRIEF INTERVIEW FOR MENTAL STATUS (BIMS)
ASKED TO RECALL SOCK: YES, NO CUE REQUIRED
WHAT MONTH IS IT: ACCURATE WITHIN 5 DAYS
ASKED TO RECALL SOCK: YES, AFTER CUEING (SOMETHING TO WEAR")"
BIMS SUMMARY SCORE: 15
WHAT YEAR IS IT: CORRECT
ASKED TO RECALL BLUE: YES, NO CUE REQUIRED
INITIAL REPETITION OF BED BLUE SOCK - FIRST ATTEMPT: 3
WHAT MONTH IS IT: ACCURATE WITHIN 5 DAYS
WHAT DAY OF THE WEEK IS IT: CORRECT
INITIAL REPETITION OF BED BLUE SOCK - FIRST ATTEMPT: 3
ASKED TO RECALL BED: NO, COULD NOT RECALL
ASKED TO RECALL BLUE: YES, NO CUE REQUIRED
WHAT DAY OF THE WEEK IS IT: CORRECT
BIMS SUMMARY SCORE: 12
ASKED TO RECALL BED: YES, NO CUE REQUIRED
WHAT YEAR IS IT: CORRECT

## 2023-08-15 ASSESSMENT — ENCOUNTER SYMPTOMS
VOMITING: 0
PALPITATIONS: 0
FOCAL WEAKNESS: 1
ABDOMINAL PAIN: 0
SHORTNESS OF BREATH: 0
MUSCULOSKELETAL NEGATIVE: 1
EYES NEGATIVE: 1
NAUSEA: 0
FEVER: 0
BRUISES/BLEEDS EASILY: 0
CHILLS: 0
COUGH: 0
POLYDIPSIA: 0

## 2023-08-15 ASSESSMENT — PATIENT HEALTH QUESTIONNAIRE - PHQ9
SUM OF ALL RESPONSES TO PHQ9 QUESTIONS 1 AND 2: 0
1. LITTLE INTEREST OR PLEASURE IN DOING THINGS: NOT AT ALL
2. FEELING DOWN, DEPRESSED, IRRITABLE, OR HOPELESS: NOT AT ALL

## 2023-08-15 ASSESSMENT — PAIN DESCRIPTION - PAIN TYPE
TYPE: ACUTE PAIN
TYPE: ACUTE PAIN

## 2023-08-15 NOTE — CONSULTS
HOSPITAL MEDICINE CONSULTATION    Requesting Physician:  Dr. Davila    Reason for Consult:  Hypertension, Diabetes    History of Present Illness:  The patient is a 58-year-old male with past medical history significant for coronary artery disease status post coronary artery bypass graft, hypertension, and diabetes.  He was admitted to [facility] on 8/10/23 for left sided weakness.  MR imaging revealed tiny acute infarct in the anterior right medulla.  CT angiogram of the head and neck demonstrated 40% left intracranial vertebral artery stenosis.  Echocardiogram showed ejection fraction 55%.  He was placed on dual antiplatelet and high intensity statin therapy as well as ZioPatch cardiac monitor.  Due to his ongoing functional debility, the patient was transferred to [facility] on 8/14/23.  Hospital Medicine consultation is requested to assist in the management of this patient's HTN and DM.  He is also noted to have microcytosis without anemia.    Review of Systems:  Review of Systems   Constitutional:  Negative for chills and fever.   HENT: Negative.     Eyes: Negative.    Respiratory:  Negative for cough and shortness of breath.    Cardiovascular:  Negative for chest pain and palpitations.   Gastrointestinal:  Negative for abdominal pain, nausea and vomiting.   Musculoskeletal: Negative.    Skin:  Negative for itching and rash.   Neurological:  Positive for focal weakness.   Endo/Heme/Allergies:  Negative for polydipsia. Does not bruise/bleed easily.   All other systems reviewed and are negative.      Allergies:  No Known Allergies    Medications:    Current Facility-Administered Medications:     insulin regular (HumuLIN R,NovoLIN R) injection, 2-12 Units, Subcutaneous, 4X/DAY ACHS **AND** POC blood glucose manual result, , , Q AC AND BEDTIME(S) **AND** NOTIFY MD and PharmD, , , Once **AND** Administer 20 grams of glucose (approximately 8 ounces of fruit juice) every 15  minutes PRN FSBG less than 70 mg/dL, , , PRN **AND** dextrose 50% (D50W) injection 25 g, 25 g, Intravenous, Q15 MIN PRN, Ruby Moreira M.D.    metFORMIN ER (Glucophage XR) tablet 500 mg, 500 mg, Oral, PM MEAL, Ruby Moreira M.D.    metoprolol tartrate (Lopressor) tablet 12.5 mg, 12.5 mg, Oral, TWICE DAILY, Ruby Moreira M.D.    hydrOXYzine HCl (Atarax) tablet 50 mg, 50 mg, Oral, Q6HRS PRN, Sarai Davila M.D.    melatonin tablet 3 mg, 3 mg, Oral, HS PRN, Sarai Davila M.D.    Respiratory Therapy Consult, , Nebulization, Continuous RT, Sarai Davila M.D.    acetaminophen (Tylenol) tablet 650 mg, 650 mg, Oral, Q4HRS PRN, Sarai Davila M.D., 650 mg at 08/14/23 2038    lactulose 20 GM/30ML solution 30 mL, 30 mL, Oral, QDAY PRN, Sarai Davila M.D.    docusate sodium (Enemeez) enema 283 mg, 283 mg, Rectal, QDAY PRN, Sarai Davila M.D.    carboxymethylcellulose (Refresh Tears) 0.5 % ophthalmic drops 1 Drop, 1 Drop, Both Eyes, PRN, Sarai Davila M.D.    benzocaine-menthol (Cepacol) lozenge 1 Lozenge, 1 Lozenge, Mouth/Throat, Q2HRS PRN, Sarai Davila M.D.    mag hydrox-al hydrox-simeth (Maalox Plus Es Or Mylanta Ds) suspension 20 mL, 20 mL, Oral, Q2HRS PRN, Sarai Davila M.D.    ondansetron (Zofran ODT) dispertab 4 mg, 4 mg, Oral, 4X/DAY PRN **OR** ondansetron (Zofran) syringe/vial injection 4 mg, 4 mg, Intramuscular, 4X/DAY PRN, Sarai Davila M.D.    traZODone (Desyrel) tablet 50 mg, 50 mg, Oral, QHS PRN, Sarai Davila M.D., 50 mg at 08/14/23 2038    sodium chloride (Ocean) 0.65 % nasal spray 2 Spray, 2 Spray, Nasal, PRN, Sarai Davila M.D.    midazolam (VERSED) 5 mg/mL (1 mL vial), 5 mg, Nasal, PRN, Sarai Davila M.D.    aspirin EC tablet 81 mg, 81 mg, Oral, Q EVENING, Sarai Davila M.D., 81 mg at 08/14/23 2027    atorvastatin (Lipitor) tablet 80 mg, 80 mg, Oral, Q EVENING, Sarai Davila M.D., 80 mg at 08/14/23 2027    clopidogrel (Plavix) tablet 75 mg, 75 mg,  Oral, DAILY, Sarai Davila M.D., 75 mg at 08/15/23 0848    enoxaparin (Lovenox) inj 40 mg, 40 mg, Subcutaneous, DAILY AT 1800, Sarai Davila M.D., 40 mg at 23 1730    meclizine (Antivert) tablet 25 mg, 25 mg, Oral, TID, Sarai Davila M.D., 25 mg at 08/15/23 0848    olmesartan (Benicar) tablet 20 mg, 20 mg, Oral, Q EVENING, Sarai Davila M.D.    senna-docusate (Pericolace Or Senokot S) 8.6-50 MG per tablet 2 Tablet, 2 Tablet, Oral, BID, 2 Tablet at 08/15/23 0848 **AND** polyethylene glycol/lytes (Miralax) PACKET 1 Packet, 1 Packet, Oral, QDAY PRN **AND** magnesium hydroxide (Milk Of Magnesia) suspension 30 mL, 30 mL, Oral, QDAY PRN, 30 mL at 23 1354 **AND** bisacodyl (Dulcolax) suppository 10 mg, 10 mg, Rectal, QDAY PRN, Sarai Davila M.D.    tamsulosin (Flomax) capsule 0.4 mg, 0.4 mg, Oral, AFTER BREAKFAST, Sarai Davila M.D., 0.4 mg at 08/15/23 0848    Past Medical/Surgical History:  Past Medical History:   Diagnosis Date    Coronary artery disease     Diabetes (HCC)     Hyperlipidemia     Hypertension      Past Surgical History:   Procedure Laterality Date    MULTIPLE CORONARY ARTERY BYPASS ENDO VEIN HARVEST N/A 2015    Procedure: coronary artery bypass grtafting x 3, endoscopic vein harvest right leg, transesophageal echocardiogram;  Surgeon: Henry Baldwin M.D.;  Location: SURGERY [facility];  Service:        Social History:  Social History     Socioeconomic History    Marital status:      Spouse name: Not on file    Number of children: Not on file    Years of education: Not on file    Highest education level: Not on file   Occupational History    Not on file   Tobacco Use    Smoking status: Former     Years: 25.00     Types: Cigarettes     Quit date: 1999     Years since quittin.9    Smokeless tobacco: Never   Substance and Sexual Activity    Alcohol use: No    Drug use: No    Sexual activity: Not on file   Other Topics Concern    Not on file    Social History Narrative    Not on file     Social Determinants of Health     Financial Resource Strain: Not on file   Food Insecurity: Not on file   Transportation Needs: Not on file   Physical Activity: Not on file   Stress: Not on file   Social Connections: Not on file   Intimate Partner Violence: Not on file   Housing Stability: Not on file       Family History:  No family history on file.    Physical Examination:   Vitals:    08/14/23 1240 08/14/23 1451 08/14/23 1930 08/15/23 0649   BP: 128/88  110/71 112/71   Pulse: (!) 112 (!) 108 (!) 102 84   Resp:  16 18 18   Temp:   36.8 °C (98.2 °F) 36.6 °C (97.8 °F)   TempSrc:   Oral Oral   SpO2:  94% 95% 95%   Weight:       Height:           Physical Exam  Vitals reviewed.   Constitutional:       General: He is not in acute distress.     Appearance: Normal appearance. He is not ill-appearing.   HENT:      Head: Normocephalic and atraumatic.      Right Ear: External ear normal.      Left Ear: External ear normal.      Nose: Nose normal.      Mouth/Throat:      Pharynx: Oropharynx is clear.   Eyes:      General:         Right eye: No discharge.         Left eye: No discharge.      Extraocular Movements: Extraocular movements intact.      Conjunctiva/sclera: Conjunctivae normal.   Cardiovascular:      Rate and Rhythm: Normal rate and regular rhythm.   Pulmonary:      Effort: Pulmonary effort is normal. No respiratory distress.      Breath sounds: Normal breath sounds. No wheezing.   Abdominal:      General: Bowel sounds are normal. There is no distension.      Palpations: Abdomen is soft.      Tenderness: There is no abdominal tenderness.   Musculoskeletal:      Cervical back: Normal range of motion and neck supple.      Right lower leg: No edema.      Left lower leg: No edema.   Skin:     General: Skin is warm and dry.   Neurological:      Mental Status: He is alert and oriented to person, place, and time.         Laboratory Data:  Recent Labs     08/13/23  0336  08/15/23  0538   WBC 9.5 9.7   RBC 6.10 6.29*   HEMOGLOBIN 16.1 16.5   HEMATOCRIT 49.4 50.9   MCV 81.0* 80.9*   MCH 26.4* 26.2*   MCHC 32.6 32.4   RDW 37.0 36.8   PLATELETCT 170 270   MPV 11.5 10.9     Recent Labs     08/13/23  0336 08/15/23  0538   SODIUM 142 141   POTASSIUM 3.7 4.1   CHLORIDE 106 103   CO2 25 28   GLUCOSE 141* 186*   BUN 20 14   CREATININE 0.83 0.89   CALCIUM 9.0 9.3       Imaging:  DX-CHEST-LIMITED (1 VIEW)   Final Result      1.  No acute cardiac or pulmonary abnormalities are identified.          Impressions/Recommendations:  Type 2 diabetes mellitus with hyperglycemia, without long-term current use of insulin (HCC)  HbA1c 11.9  Resume low dose Metformin XR  D/C Lantus  Increase SSI  Outpt meds include Metformin  mg bid, Jardiance 25 mg qd, and Trulicity 1.5 mg SQ qwk    Coronary artery disease due to calcified coronary lesion: CABG ×3 in 2015  H/O CABG  On ASA, Lipitor, and Olmesartan  Start low dose Metoprolol to optimize medical management  Cardiology F/U    Primary hypertension  Start low dose Metoprolol  Continue Olmesartan  Monitor for orthostatic hypotension on Flomax    Ischemic stroke (HCC)  Presented w/ L HP  S/P ZioPatch  On ASA, Plavix, and Lipitor  Management per Physiatry    Microcytosis  Check Fe Panel next draw  Follow H/H    Full Code    Thank you for the opportunity to assist in this patient's care.  We will continue to follow along with you.

## 2023-08-15 NOTE — THERAPY
Occupational Therapy   Initial Evaluation     Patient Name: Jorden Bonilla  Age:  58 y.o., Sex:  male  Medical Record #: [id number]  Today's Date: 8/15/2023     Subjective    Patient was awake in bed and agreeable to OT evaluation and shower.      Objective       08/15/23 0701   OT Charge Group   Charges Yes   OT Self Care / ADL (Units) 1   OT Evaluation OT Evaluation Mod   OT Total Time Spent   OT Individual Total Time Spent (Mins) 60   Prior Living Situation   Prior Services Home-Independent   Housing / Facility 1 Story House   Steps Into Home 1  (the one step is ramped)   Steps In Home 0   Bathroom Set up Bathtub / Shower Combination;Grab Bars  (gb on outside of tub/shower and gb by toilet)   Equipment Owned Grab Bar(s) In Tub / Shower;Grab Bar(s) By Toilet;Ramp  (has a FWW which was a brother's who passed away)   Lives with - Patient's Self Care Capacity Spouse;Child Less than 18 Years of Age;Related Adult  (spouse (works), 17 year old son, sister (retired) and brother in law (works))   Prior Level of ADL Function   Self Feeding Independent   Grooming / Hygiene Independent   Bathing Independent   Dressing Independent   Toileting Independent   Prior Level of IADL Function   Medication Management Independent   Laundry Independent   Kitchen Mobility Independent   Finances Independent   Home Management Independent   Shopping Independent   Prior Level Of Mobility Independent Without Device in Community   Driving / Transportation Driving Independent   Occupation (Pre-Hospital Vocational) Employed Full Time  ([employer])   Leisure Interests Other (Comments)  (relaxing)   Prior Functioning: Everyday Activities   Self Care Independent   Indoor Mobility (Ambulation) Independent   Stairs Independent   Functional Cognition Independent   Prior Device Use None of the given options   Vitals   O2 Delivery Device None - Room Air   Pain 0 - 10 Group   Therapist Pain Assessment 0   Cognition    Orientation Level Oriented x 4  "  Level of Consciousness Alert   ABS (Agitated Behavior Scale)   Agitated Behavior Scale Performed No   Cognitive Pattern Assessment   Cognitive Pattern Assessment Used BIMS   Brief Interview for Mental Status (BIMS)   Repetition of Three Words (First Attempt) 3   Temporal Orientation: Year Correct   Temporal Orientation: Month Accurate within 5 days   Temporal Orientation: Day Correct   Recall: \"Sock\" Yes, after cueing (\"something to wear\")   Recall: \"Blue\" Yes, no cue required   Recall: \"Bed\" No, could not recall   BIMS Summary Score 12   Confusion Assessment Method (CAM)   Is there evidence of an acute change in mental status from the patient's baseline? No   Inattention Behavior not present   Disorganized thinking Behavior not present   Altered level of consciousness Behavior not present   Vision Screen   Vision Not tested   Passive ROM Upper Body   Passive ROM Upper Body WDL   Active ROM Upper Body   Active ROM Upper Body  X   Dominant Hand Right   Comments see strength testing below   Strength Upper Body   Upper Body Strength  X   Lt Shoulder Flexion Strength 1 (T)   Lt Shoulder Extension Strength 2- (P-)   Lt Shoulder Abduction Strength 2- (P-)   Lt Shoulder Adduction Strength 2- (P-)   Lt Elbow Flexion Strength 0 (Zero)   Lt Elbow Extension Strength 0 (Zero)   Lt Forearm Supination Strength 0 (Zero)   Lt Forearm Pronation Strength 0 (Zero)   Lt Wrist Flexion Strength 0 (Zero)   Lt Wrist Extension Strength 0 (Zero)   Left  Absent   Left 2 Point Pinch Absent   Left 3 Point Pinch Absent   Left Lateral Pinch Absent   Sensation Upper Body   Upper Extremity Sensation  Not Tested   Upper Body Muscle Tone   Upper Body Muscle Tone  X   Lt Upper Extremity Muscle Tone Hypotonic;Non Functional   Balance Assessment   Sitting Balance (Static) Good   Sitting Balance (Dynamic) Fair +   Standing Balance (Static) Poor +  (with UE support)   Standing Balance (Dynamic) Trace +  (with UE support)   Weight Shift Sitting Fair "   Weight Shift Standing Poor   Bed Mobility    Supine to Sit Minimal Assist  (assist w/ L LE)   Sit to Stand Contact Guard Assist   Scooting Standby Assist   Coordination Upper Body   Coordination X   Fine Motor Coordination absent   Gross Motor Coordination severely impaired/absent   Eating   Assistance Needed Set-up / clean-up   CARE Score - Eating 5   Eating Discharge Goal   Discharge Goal 6   Oral Hygiene   Assistance Needed Supervision   CARE Score - Oral Hygiene 4   Oral Hygiene Discharge Goal   Discharge Goal 6   Shower/Bathe Self   Assistance Needed Physical assistance   Physical Assistance Level 26%-50%   CARE Score - Shower/Bathe Self 3   Shower/Bathe Self Discharge Goal   Discharge Goal 4   Upper Body Dressing   Assistance Needed Set-up / clean-up;Supervision   CARE Score - Upper Body Dressing 4   Upper Body Dressing Discharge Goal   Discharge Goal 6   Lower Body Dressing   Assistance Needed Physical assistance   Physical Assistance Level 26%-50%   CARE Score - Lower Body Dressing 3   Lower Body Dressing Discharge Goal   Discharge Goal 4   Putting On/Taking Off Footwear   Assistance Needed Physical assistance   Physical Assistance Level 26%-50%   CARE Score - Putting On/Taking Off Footwear 3   Putting On/Taking Off Footwear Discharge Goal   Discharge Goal 6   Toileting Hygiene   Assistance Needed Physical assistance   Physical Assistance Level Total assistance   CARE Score - Toileting Hygiene 1   Toileting Hygiene Discharge Goal   Discharge Goal 4   Toilet Transfer   Assistance Needed Physical assistance   Physical Assistance Level 25% or less   CARE Score - Toilet Transfer 3   Toilet Transfer Discharge Goal   Discharge Goal 6   Hearing, Speech, and Vision   Ability to Hear Adequate   Ability to See in Adequate Light Adequate   Expression of Ideas and Wants Without difficulty   Understanding Verbal and Non-Verbal Content Understands   Functional Level of Assist   Eating Supervision   Eating Description  Set-up of equipment or meal/tube feeding   Grooming Supervision;Seated   Bathing Moderate Assist   Bathing Description Grab bar;Hand held shower;Tub bench;Set-up of equipment;Supervision for safety;Verbal cueing;Assit wtih lower extremities;Assit with perineal  (assist w/ R UE, L LE and bottom)   Upper Body Dressing Stand by Assist   Upper Body Dressing Description Set-up of equipment;Supervision for safety  (to don pullover shirt)   Lower Body Dressing Moderate Assist   Lower Body Dressing Description Set-up of equipment;Supervision for safety;Verbal cueing;Grab bar   Toileting Total Assist   Bed, Chair, Wheelchair Transfer Minimal Assist   Bed Chair Wheelchair Transfer Description Set-up of equipment;Squat pivot transfer to wheelchair;Supervision for safety   Toilet Transfers Minimal Assist   Toilet Transfer Description Grab bar;Set-up of equipment;Supervision for safety;Verbal cueing   Tub / Shower Transfers Minimal Assist   Tub Shower Transfer Description Set-up of equipment;Supervision for safety;Verbal cueing;Grab bar;Shower bench   Problem List   Problem List Decreased Active Daily Living Skills;Decreased Homemaking Skills;Decreased Upper Extremity Strength Left;Decreased Upper Extremity AROM Left;Decreased Functional Mobility;Impaired Coordination Left Upper Extremity;Impaired Sensation Left Upper Extremity;Impaired Upper Extremity Tone Left;Impaired Postural Control / Balance   Precautions   Precautions Fall Risk;Other (See Comments)   Comments Zio Patch, HTN, DM   Current Discharge Plan   Current Discharge Plan Return to Prior Living Situation   Benefit    Therapy Benefit Patient Would Benefit from Inpatient Rehab Occupational Therapy to Maximize [place] with ADLs, IADLs and Functional Mobility.   Interdisciplinary Plan of Care Collaboration   IDT Collaboration with  Physical Therapist;Occupational Therapist   Patient Position at End of Therapy Seated;Call Light within Reach;Tray Table within  Reach;Phone within Reach;Self Releasing Lap Belt Applied   Collaboration Comments CLOF   Equipment Needs   Assistive Device / DME Parallel Bars;Kavon Walker;Wheelchair;Shower Chair;Grab Bars In Shower / Tub;Grab Bars By Toilet   Adaptive Equipment None   Strengths & Barriers   Strengths Able to follow instructions;Alert and oriented;Effective communication skills;Good carryover of learning;Good insight into deficits/needs;Independent prior level of function;Manages pain appropriately;Motivated for self care and independence;Pleasant and cooperative;Supportive family;Willingly participates in therapeutic activities   Barriers Hemiplegia;Hypertension;Impaired balance;Limited mobility   Occupational Therapist Assigned   Assigned OT / Treatment Time / Comments SA primary OT       Assessment  Patient is 58 y.o. male with a diagnosis of R CVA.  Additional factors influencing patient status / progress (ie: cognitive factors, co-morbidities, social support, etc): HTN, DM, CAD with CABG, lives with family.      Plan  Recommend Occupational Therapy  minutes per day 5-7 days per week for 14-17 days for the following treatments:  OT E Stim Attended, OT Group Therapy, OT Self Care/ADL, OT Cognitive Skill Dev, OT Community Reintegration, OT Manual Ther Technique, OT Neuro Re-Ed/Balance, OT Sensory Int Techniques, OT Therapeutic Activity, OT Evaluation, and OT Therapeutic Exercise.    Passport items to be completed:  Perform bathroom transfers, complete dressing, complete feeding, get ready for the day, prepare a simple meal, participate in household tasks, adapt home for safety needs, demonstrate home exercise program, complete caregiver training     Goals:  Long term and short term goals have been discussed with patient and they are in agreement.    Occupational Therapy Goals (Active)       Problem: Bathing       Dates: Start:  08/15/23         Goal: STG-Within one week, patient will bathe body with min A using AE/AD prn        Dates: Start:  08/15/23               Problem: Dressing       Dates: Start:  08/15/23         Goal: STG-Within one week, patient will dress LB with min A       Dates: Start:  08/15/23               Problem: Functional Transfers       Dates: Start:  08/15/23         Goal: STG-Within one week, patient will transfer to toilet with CGA using grab bar       Dates: Start:  08/15/23               Problem: OT Long Term Goals       Dates: Start:  08/15/23         Goal: LTG-By discharge, patient will complete basic self care tasks with SBA/supervision       Dates: Start:  08/15/23            Goal: LTG-By discharge, patient will perform bathroom transfers with supervision/mod I       Dates: Start:  08/15/23               Problem: Toileting       Dates: Start:  08/15/23         Goal: STG-Within one week, patient will complete toileting tasks with max A       Dates: Start:  08/15/23

## 2023-08-15 NOTE — CARE PLAN
The patient is Stable - Low risk of patient condition declining or worsening    Problem: Knowledge Deficit - Standard  Goal: Patient and family/care givers will demonstrate understanding of plan of care, disease process/condition, diagnostic tests and medications  Outcome: Progressing. Reviewed POC, all questions answered.        Problem: Fall Risk - Rehab  Goal: Patient will remain free from falls  Outcome: Progressing. Call light within reach, pt educated to use for assistance for safe transferring. Pt demonstrates good safety technique at this time.        Shift Goals  Clinical Goals: Safety  Patient Goals: Restful sleep

## 2023-08-15 NOTE — ASSESSMENT & PLAN NOTE
Fe 34, may start supplement if no contraindications  FOB+, recommend outpt GI F/U  Follow H/H  Check F/U labs in AM

## 2023-08-15 NOTE — PROGRESS NOTES
"Rehab Progress Note     Date of Service: 8/15/2023  Chief Complaint: Follow-up stroke    Interval Events (Subjective)    Patient seen and examined today in his room.  He continues to report left-sided weakness.  He did get some sleep last night.  He is already been assessed by occupational and speech therapy today.  Physical therapy is scheduled for this afternoon.  Damon catheter will be removed this morning.  Hospitalist has been consulted for his medical comorbidities.    Patient has no other new questions, concerns, or complaints today.     Objective:  VITAL SIGNS: /71   Pulse 84   Temp 36.6 °C (97.8 °F) (Oral)   Resp 18   Ht 1.651 m (5' 5\")   Wt 63 kg (139 lb)   SpO2 95%   BMI 23.13 kg/m²   Gen: alert, no apparent distress  CV: Regular rate, regular rhythm  Resp: Clear to auscultation bilaterally  Neuro: Left hemiparesis    Recent Results (from the past 72 hour(s))   POCT glucose device results    Collection Time: 08/12/23 12:20 PM   Result Value Ref Range    POC Glucose, Blood 175 (H) 65 - 99 mg/dL   POCT glucose device results    Collection Time: 08/12/23  5:15 PM   Result Value Ref Range    POC Glucose, Blood 201 (H) 65 - 99 mg/dL   POCT glucose device results    Collection Time: 08/12/23  8:43 PM   Result Value Ref Range    POC Glucose, Blood 168 (H) 65 - 99 mg/dL   CBC WITH DIFFERENTIAL    Collection Time: 08/13/23  3:36 AM   Result Value Ref Range    WBC 9.5 4.8 - 10.8 K/uL    RBC 6.10 4.70 - 6.10 M/uL    Hemoglobin 16.1 14.0 - 18.0 g/dL    Hematocrit 49.4 42.0 - 52.0 %    MCV 81.0 (L) 81.4 - 97.8 fL    MCH 26.4 (L) 27.0 - 33.0 pg    MCHC 32.6 32.3 - 36.5 g/dL    RDW 37.0 35.9 - 50.0 fL    Platelet Count 170 164 - 446 K/uL    MPV 11.5 9.0 - 12.9 fL    Neutrophils-Polys 60.70 44.00 - 72.00 %    Lymphocytes 27.30 22.00 - 41.00 %    Monocytes 10.00 0.00 - 13.40 %    Eosinophils 1.30 0.00 - 6.90 %    Basophils 0.40 0.00 - 1.80 %    Immature Granulocytes 0.30 0.00 - 0.90 %    Nucleated RBC 0.00 " 0.00 - 0.20 /100 WBC    Neutrophils (Absolute) 5.75 1.82 - 7.42 K/uL    Lymphs (Absolute) 2.59 1.00 - 4.80 K/uL    Monos (Absolute) 0.95 (H) 0.00 - 0.85 K/uL    Eos (Absolute) 0.12 0.00 - 0.51 K/uL    Baso (Absolute) 0.04 0.00 - 0.12 K/uL    Immature Granulocytes (abs) 0.03 0.00 - 0.11 K/uL    NRBC (Absolute) 0.00 K/uL   Basic Metabolic Panel    Collection Time: 23  3:36 AM   Result Value Ref Range    Sodium 142 135 - 145 mmol/L    Potassium 3.7 3.6 - 5.5 mmol/L    Chloride 106 96 - 112 mmol/L    Co2 25 20 - 33 mmol/L    Glucose 141 (H) 65 - 99 mg/dL    Bun 20 8 - 22 mg/dL    Creatinine 0.83 0.50 - 1.40 mg/dL    Calcium 9.0 8.5 - 10.5 mg/dL    Anion Gap 11.0 7.0 - 16.0   ESTIMATED GFR    Collection Time: 23  3:36 AM   Result Value Ref Range    GFR (CKD-EPI) 100 >60 mL/min/1.73 m 2   POCT glucose device results    Collection Time: 23  8:10 AM   Result Value Ref Range    POC Glucose, Blood 285 (H) 65 - 99 mg/dL   POCT glucose device results    Collection Time: 23 12:20 PM   Result Value Ref Range    POC Glucose, Blood 159 (H) 65 - 99 mg/dL   EC-ECHOCARDIOGRAM COMPLETE W/O CONT    Collection Time: 23  1:21 PM   Result Value Ref Range    Left Ventrical Ejection Fraction 55    POCT glucose device results    Collection Time: 23  5:19 PM   Result Value Ref Range    POC Glucose, Blood 197 (H) 65 - 99 mg/dL   POCT glucose device results    Collection Time: 23  9:11 PM   Result Value Ref Range    POC Glucose, Blood 183 (H) 65 - 99 mg/dL   POCT glucose device results    Collection Time: 23  7:37 AM   Result Value Ref Range    POC Glucose, Blood 198 (H) 65 - 99 mg/dL   EKG    Collection Time: 23  2:41 PM   Result Value Ref Range    Report       [facility]    Test Date:  2023  Pt Name:    KENDRA GAFFNEY                 Department: [facility]  MRN:        [id number]                      Room:       [facility]  Gender:     Male                         Technician: [id number] WT  :         1965                   Requested By:TIFFANIE SAINI  Order #:    [id number]                    Reading MD: Oxana Mckeon MD    Measurements  Intervals                                Axis  Rate:       108                          P:          20  OK:         139                          QRS:        6  QRSD:       77                           T:          34  QT:         324  QTc:        435    Interpretive Statements  Sinus tachycardia  Left atrial enlargement  Electronically Signed On 08- 15:00:59 PDT by Oxana Mckeon MD     URINALYSIS    Collection Time: 08/14/23  3:45 PM   Result Value Ref Range    Color Yellow     Character Clear     Specific Gravity 1.010 <1.035    Ph 6.5 5.0 - 8.0    Glucose 500 (A) Negative mg/dL    Ketones Negative Negative mg/dL    Protein Negative Negative mg/dL    Bilirubin Negative Negative    Urobilinogen, Urine 0.2 Negative    Nitrite Negative Negative    Leukocyte Esterase Negative Negative    Occult Blood Large (A) Negative    Micro Urine Req Microscopic    URINE MICROSCOPIC (W/UA)    Collection Time: 08/14/23  3:45 PM   Result Value Ref Range    WBC 0-2 (A) /hpf    RBC 2-5 (A) /hpf    Bacteria Negative None /hpf    Epithelial Cells Negative /hpf    Hyaline Cast 0-2 /lpf   POCT glucose device results    Collection Time: 08/14/23  5:10 PM   Result Value Ref Range    POC Glucose, Blood 254 (H) 65 - 99 mg/dL   POCT glucose device results    Collection Time: 08/14/23  7:28 PM   Result Value Ref Range    POC Glucose, Blood 278 (H) 65 - 99 mg/dL   CBC with Differential    Collection Time: 08/15/23  5:38 AM   Result Value Ref Range    WBC 9.7 4.8 - 10.8 K/uL    RBC 6.29 (H) 4.70 - 6.10 M/uL    Hemoglobin 16.5 14.0 - 18.0 g/dL    Hematocrit 50.9 42.0 - 52.0 %    MCV 80.9 (L) 81.4 - 97.8 fL    MCH 26.2 (L) 27.0 - 33.0 pg    MCHC 32.4 32.3 - 36.5 g/dL    RDW 36.8 35.9 - 50.0 fL    Platelet Count 270 164 - 446 K/uL    MPV 10.9 9.0 - 12.9 fL    Neutrophils-Polys 57.20 44.00 - 72.00 %     Lymphocytes 28.50 22.00 - 41.00 %    Monocytes 10.50 0.00 - 13.40 %    Eosinophils 2.90 0.00 - 6.90 %    Basophils 0.50 0.00 - 1.80 %    Immature Granulocytes 0.40 0.00 - 0.90 %    Nucleated RBC 0.00 0.00 - 0.20 /100 WBC    Neutrophils (Absolute) 5.53 1.82 - 7.42 K/uL    Lymphs (Absolute) 2.75 1.00 - 4.80 K/uL    Monos (Absolute) 1.01 (H) 0.00 - 0.85 K/uL    Eos (Absolute) 0.28 0.00 - 0.51 K/uL    Baso (Absolute) 0.05 0.00 - 0.12 K/uL    Immature Granulocytes (abs) 0.04 0.00 - 0.11 K/uL    NRBC (Absolute) 0.00 K/uL   Comp Metabolic Panel (CMP)    Collection Time: 08/15/23  5:38 AM   Result Value Ref Range    Sodium 141 135 - 145 mmol/L    Potassium 4.1 3.6 - 5.5 mmol/L    Chloride 103 96 - 112 mmol/L    Co2 28 20 - 33 mmol/L    Anion Gap 10.0 7.0 - 16.0    Glucose 186 (H) 65 - 99 mg/dL    Bun 14 8 - 22 mg/dL    Creatinine 0.89 0.50 - 1.40 mg/dL    Calcium 9.3 8.5 - 10.5 mg/dL    Correct Calcium 9.5 8.5 - 10.5 mg/dL    AST(SGOT) 19 12 - 45 U/L    ALT(SGPT) 15 2 - 50 U/L    Alkaline Phosphatase 69 30 - 99 U/L    Total Bilirubin 0.6 0.1 - 1.5 mg/dL    Albumin 3.7 3.2 - 4.9 g/dL    Total Protein 6.4 6.0 - 8.2 g/dL    Globulin 2.7 1.9 - 3.5 g/dL    A-G Ratio 1.4 g/dL   Magnesium    Collection Time: 08/15/23  5:38 AM   Result Value Ref Range    Magnesium 2.3 1.5 - 2.5 mg/dL   ESTIMATED GFR    Collection Time: 08/15/23  5:38 AM   Result Value Ref Range    GFR (CKD-EPI) 98 >60 mL/min/1.73 m 2   POCT glucose device results    Collection Time: 08/15/23  6:28 AM   Result Value Ref Range    POC Glucose, Blood 202 (H) 65 - 99 mg/dL       Scheduled Medications   Medication Dose Frequency    aspirin  81 mg Q EVENING    atorvastatin  80 mg Q EVENING    clopidogrel  75 mg DAILY    enoxaparin (LOVENOX) injection  40 mg DAILY AT 1800    insulin GLARGINE  5 Units Q EVENING    insulin regular  1-6 Units 4X/DAY ACHS    meclizine  25 mg TID    olmesartan  20 mg Q EVENING    senna-docusate  2 Tablet BID    tamsulosin  0.4 mg AFTER  BREAKFAST       Current Diet Order   Procedures    Diet Order Diet: Level 6 - Soft and Bite Sized (cardiac and CHO); Liquid level: Level 0 - Thin; Second Modifier: (optional): Consistent CHO (Diabetic)               Radiology    Imaging personally reviewed and interpreted by me.      DX-CHEST-LIMITED (1 VIEW)   Final Result      1.  No acute cardiac or pulmonary abnormalities are identified.            Assessment:    This patient is a 58 y.o. male admitted for acute inpatient rehabilitation with Ischemic stroke (HCC).    Problem List/Medical Decision Making and Plan:    Right anterior medullary stroke  Left hemiparesis  Cognitive impairment  Dysphagia  Continue full rehab program  PT/OT/SLP, 1 hr each discipline, 5 days per week    Continue aspirin and Plavix for secondary stroke prophylaxis until 8/21, then aspirin alone    Continue atorvastatin    Patient with cardiac monitor in place    Dizziness  Continue meclizine     Diabetes with hyperglycemia  Uncontrolled, hemoglobin A1c 11.9  Glargine discontinued  Started on metformin  Continue sliding scale insulin  Consult hospitalist     Hypertension  Goal normotensive  Continue olmesartan  Started on metoprolol     Hyperlipidemia  Continue atorvastatin     Tachycardia  EKG was sinus rhythm  Started on metoprolol     Urinary retention  Continue Flomax  Remove Damon  Check postvoid residuals  Intermittent catheterization for volumes over 400     Coronary artery disease  With history of CABG  Continue aspirin and Plavix    DVT prophylaxis  Continue Lovenox    Sarai Davila M.D.  Physical Medicine and Rehabilitation

## 2023-08-15 NOTE — ASSESSMENT & PLAN NOTE
Blood pressure controlled on Metoprolol  Olmesartan discontinued for low blood pressures  Monitor for orthostatic hypotension on Flomax

## 2023-08-15 NOTE — THERAPY
Physical Therapy   Initial Evaluation     Patient Name: Jorden Bonilla  Age:  58 y.o., Sex:  male  Medical Record #: [id number]  Today's Date: 8/15/2023     Subjective    Pt received resting in bed, pleasant and agreeable to participate. Pt's wife present and supportive throughout session.     Objective       08/15/23 1301   PT Charge Group   PT Gait Training (Units) 1   PT Evaluation PT Evaluation Mod   PT Total Time Spent   PT Individual Total Time Spent (Mins) 60   Prior Living Situation   Prior Services Home-Independent   Housing / Facility 1 Story House   Steps Into Home 1  (ramped)   Steps In Home 0   Equipment Owned 4-Wheel Walker;Ramp   Lives with - Patient's Self Care Capacity Spouse;Child Less than 18 Years of Age;Related Adult  (wife (works nights M-F), 16 yo son (in high school), RAMIRO (works days M-F as security), sister (retired))   Comments Lives in [place] with wife, 16 yo son, RAMIRO, and sister. Works as a chua   Prior Level of Functional Mobility   Bed Mobility Independent   Transfer Status Independent   Ambulation Independent   Distance Ambulation (Feet)   (community)   Assistive Devices Used None   Stairs Independent   Prior Functioning: Everyday Activities   Self Care Independent   Indoor Mobility (Ambulation) Independent   Stairs Independent   Functional Cognition Independent   Prior Device Use None of the given options   Passive ROM Lower Body   Passive ROM Lower Body WDL   Active ROM Lower Body    Active ROM Lower Body  X   Comments LLE grossly limited by hemiparesis   Strength Lower Body   Lower Body Strength  X   Lt Hip Flexion Strength 3- (F-)   Lt Knee Extension Strength 3 (F)   Lt Ankle Dorsiflexion Strength 0 (Zero)   Comments RLE grossly 5/5   Sensation Lower Body   Lower Extremity Sensation   WDL   Comments intact BLE light touch, B simultaneous stimulation, big toe proprioception   Lower Body Muscle Tone   Lower Body Muscle Tone  WDL   Modified Yunier Scale Left Lower Extremity 0    Comments tested at LLE ankle DF (slightly hypertonic), knee flexion and ext   Balance Assessment   Sitting Balance (Static) Good   Sitting Balance (Dynamic) Fair +   Standing Balance (Static) Poor +   Standing Balance (Dynamic) Trace +   Weight Shift Sitting Fair   Weight Shift Standing Poor   Comments standing with RUE support   Bed Mobility    Supine to Sit Standby Assist  (extra time c/ RLE hooked under LLE)   Sit to Supine Standby Assist  (extra time c/ RLE hooked under LLE)   Sit to Stand Minimal Assist  (min A for steadying)   Scooting Standby Assist  (bed)   Rolling Minimal Assist to Rt.  (SBA to L, min A to R for LUE mgt)   Neurological Concerns   Neurological Concerns Yes   Footdrop Present   Comments L footdrop, no clonus upon testing   Roll Left and Right   Assistance Needed Physical assistance   Physical Assistance Level 25% or less   Comment to R side   CARE Score - Roll Left and Right 3   Roll Left and Right Discharge Goal   Discharge Goal 6   Sit to Lying   Assistance Needed Supervision   CARE Score - Sit to Lying 4   Sit to Lying Discharge Goal   Discharge Goal 6   Lying to Sitting on Side of Bed   Assistance Needed Supervision   CARE Score - Lying to Sitting on Side of Bed 4   Lying to Sitting on Side of Bed Discharge Goal   Discharge Goal 6   Sit to Stand   Assistance Needed Physical assistance   Physical Assistance Level 25% or less   CARE Score - Sit to Stand 3   Sit to Stand Discharge Goal   Discharge Goal 6   Chair/Bed-to-Chair Transfer   Assistance Needed Physical assistance   Physical Assistance Level 25% or less   CARE Score - Chair/Bed-to-Chair Transfer 3   Chair/Bed-to-Chair Transfer Discharge Goal   Discharge Goal 6   Car Transfer   Reason if not Attempted Environmental limitations   CARE Score - Car Transfer 10   Car Transfer Discharge Goal   Discharge Goal 5   Walk 10 Feet   Assistance Needed Physical assistance   Physical Assistance Level Total assistance   CARE Score - Walk 10 Feet  1   Walk 10 Feet Discharge Goal   Discharge Goal 6   Walk 50 Feet with Two Turns   Reason if not Attempted Safety concerns   CARE Score - Walk 50 Feet with Two Turns 88   Walk 50 Feet with Two Turns Discharge Goal   Discharge Goal 6   Walk 150 Feet   Reason if not Attempted Safety concerns   CARE Score - Walk 150 Feet 88   Walk 150 Feet Discharge Goal   Discharge Goal 6   Walking 10 Feet on Uneven Surfaces   Reason if not Attempted Safety concerns   CARE Score - Walking 10 Feet on Uneven Surfaces 88   Walking 10 Feet on Uneven Surfaces Discharge Goal   Discharge Goal 6   1 Step (Curb)   Reason if not Attempted Safety concerns   CARE Score - 1 Step (Curb) 88   1 Step (Curb) Discharge Goal   Discharge Goal 6   4 Steps   Reason if not Attempted Safety concerns   CARE Score - 4 Steps 88   4 Steps Discharge Goal   Discharge Goal 4   12 Steps   Reason if not Attempted Safety concerns   CARE Score - 12 Steps 88   12 Steps Discharge Goal   Discharge Goal 4   Picking Up Object   Reason if not Attempted Safety concerns   CARE Score - Picking Up Object 88   Picking Up Object Discharge Goal   Discharge Goal 5   Wheel 50 Feet with Two Turns   Reason if not Attempted Activity not applicable   CARE Score - Wheel 50 Feet with Two Turns 9   Wheel 50 Feet with Two Turns Discharge Goal   Discharge Goal 9   Wheel 150 Feet   Reason if not Attempted Activity not applicable   CARE Score - Wheel 150 Feet 9   Wheel 150 Feet Discharge Goal   Discharge Goal 9   Gait Functional Level of Assist    Gait Level Of Assist Total Assist X 2  (mod A x1 plus wc follow)   Assistive Device Other (Comments)  (R hallway rail, L Rosaohcole sling, mirror feedback)   Distance (Feet) 28   # of Times Distance was Traveled 3   Deviation Ataxic;Decreased Base Of Support;Bradykinetic;Decreased Heel Strike;Decreased Toe Off  (assist c/ blocking L knee to prevent knee buckling and knee hyperext, maintaining upright trunk to prevent retropulsion, verbal cueing for wider  MARIO and decreased L toe out, observed L foot drag)   Stairs Functional Level of Assist   Level of Assist with Stairs Unable to Participate   Transfer Functional Level of Assist   Bed, Chair, Wheelchair Transfer Minimal Assist   Bed Chair Wheelchair Transfer Description Increased time;Set-up of equipment;Supervision for safety;Verbal cueing  (stand pivot to the R no AD)   Problem List    Problems Impaired Bed Mobility;Impaired Transfers;Impaired Ambulation;Functional ROM Deficit;Functional Strength Deficit;Impaired Balance;Impaired Coordination;Decreased Activity Tolerance   Precautions   Precautions Fall Risk   Comments L phyllis, Zio patch, jauqez, HTN, DM   Current Discharge Plan   Current Discharge Plan Return to Prior Living Situation   Interdisciplinary Plan of Care Collaboration   IDT Collaboration with  Occupational Therapist;Family / Caregiver   Patient Position at End of Therapy In Bed;Bed Alarm On;Call Light within Reach;Tray Table within Reach;Phone within Reach;Family / Friend in Room   Collaboration Comments CLOF c/ OT; wife present and involved throughout session   Benefit   Therapy Benefit Patient Would Benefit from Inpatient Rehabilitation Physical Therapy to Maximize Functional [place] with ADLs, IADLs and Mobility.   Strengths & Barriers   Strengths Able to follow instructions;Alert and oriented;Effective communication skills;Good carryover of learning;Good insight into deficits/needs;Independent prior level of function;Making steady progress towards goals;Motivated for self care and independence;Pleasant and cooperative;Supportive family;Willingly participates in therapeutic activities   Barriers Bladder retention;Decreased endurance;Hemiparesis;Impaired activity tolerance;Impaired balance;Language barrier, non-fluent English;Limited mobility     Pt was oriented to role of PT, expectations of POC, and tour of facility.     Provided L wc brake extender and L arm trough to improve pt comfort, pt  expressed appreciation.    Assessment  Patient is 58 y.o. male with a diagnosis of right anterior medullary infarction.  Additional factors influencing patient status / progress (ie: cognitive factors, co-morbidities, social support, etc): PMH of hypertension, diabetes, hyperlipidemia, coronary artery disease status post CABG, remote tobacco use, noncompliant with medications; indep PLOF with ADLs/IADLs and no AD at baseline; working as chua and still driving; lives with multiple family members.      Pt presents with L hemiparesis and ataxia that impair his bed mobility, txfers, and amb. Able to amb nearly 30 ft at a time with R hallway rail and mod A x1 plus wc follow. Benefited from L Givmohr during upright mobility d/t significant LUE weakness. Would likely be a good candidate for FES training to facilitate return of LUE/LLE motor function. Pt is performing below their baseline level of function and should benefit from inpatient rehabilitation PT services to address the above listed deficits and maximize functional independence.     Plan  Recommend Physical Therapy  minutes per day 5-7 days per week for 2-3 weeks for the following treatments:  PT Orthotics Training, PT Gait Training, PT Therapeutic Exercises, PT Neuro Re-Ed/Balance, PT Therapeutic Activity, PT Manual Therapy, and PT Evaluation.    Passport items to be completed:  Get in/out of bed safely, in/out of a vehicle, safely use mobility device, walk or wheel around home/community, navigate up and down stairs, show how to get up/down from the ground, ensure home is accessible, demonstrate HEP, complete caregiver training    Goals:  Long term and short term goals have been discussed with patient and spouse and they are in agreement.    Physical Therapy Problems (Active)       Problem: Mobility       Dates: Start:  08/15/23         Goal: STG-Within one week, patient will ambulate household distance 50 ft with HW and CGA.       Dates: Start:   08/15/23            Goal: STG-Within one week, patient will ambulate up/down a curb with HW and min A.       Dates: Start:  08/15/23               Problem: Mobility Transfers       Dates: Start:  08/15/23         Goal: STG-Within one week, patient will perform bed mobility with SPV consistently.       Dates: Start:  08/15/23            Goal: STG-Within one week, patient will transfer bed to chair stand pivot with HW and CGA.       Dates: Start:  08/15/23               Problem: PT-Long Term Goals       Dates: Start:  08/15/23         Goal: LTG-By discharge, patient will ambulate 150 ft with QC vs SPC and SBA.       Dates: Start:  08/15/23            Goal: LTG-By discharge, patient will transfer one surface to another with QC vs SPC and SPV.       Dates: Start:  08/15/23            Goal: LTG-By discharge, patient will ambulate up/down a curb with QC vs SPC and SBA.       Dates: Start:  08/15/23            Goal: LTG-By discharge, patient will transfer in/out of a car with QC vs SPC and SBA after setup.       Dates: Start:  08/15/23            Goal: LTG-By discharge, patient will ambulate up/down a ramp with QC vs SPC and CGA to simulate home entrance.       Dates: Start:  08/15/23

## 2023-08-15 NOTE — THERAPY
"Speech Language Pathology   Initial Assessment     Patient Name: Jorden Bonilla  AGE:  58 y.o., SEX:  male  Medical Record #: [id number]  Today's Date: 8/15/2023     Subjective    Per H&P \"The patient is a 58 y.o. male with a past medical history of hypertension, diabetes, hyperlipidemia, coronary artery disease status post CABG, remote tobacco use, noncompliant with medications; now admitted for acute inpatient rehabilitation with severe functional debility after an acute stroke.       On admission the patient and medical record report he presented to the hospital on 8/10 after waking up with left-sided weakness in his arm and leg.  Initial NIH stroke scale in the ED was 1.  Negative head CT, negative CT perfusion scan, CTA of the neck negative, CT of the head with 40% left intracranial vertebral artery stenosis.     Patient was not a candidate for enzymatic therapy or thrombectomy.  He was admitted to complete the stroke work-up.  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  His left-sided weakness worsened overnight, increasing his NIH stroke scale to 7. HgbA1c 11.9, , ECHO EF 55% without any evidence of shunt.  Per neurology, patient started on aspirin 81 mg indefinitely, 10 days of Plavix with an end date of 821, goal for normotension 110-130/60-80.  Zio patch cardiac monitor was placed.     Patient currently reports left-sided weakness that waxes and wanes but has slightly improved.  He is right-hand dominant.  He denies any changes in his vision.  He reports some abnormal sensation in his left fingers.  He had urinary retention at the acute hospital for which Damon catheter was placed.  He reports he last moved his bowels this morning.     Patient was evaluated by Rehab Medicine physician and Physical Therapy, Occupational Therapy, and Speech Therapy and determined to be appropriate for acute inpatient rehab and was transferred to [facility] on 8/14/2023 12:08 PM.     With " "this acute therapeutic intervention, this patient hopes to improve his functional status, and return to independent living with the supportive care of spouse.\"     Objective       08/15/23 0831   Evaluation Charges   Charges Yes   SLP Speech Language Evaluation Speech Sound Language Comprehension   SLP Total Time Spent   SLP Individual Total Time Spent (Mins) 60   Prior Living Situation   Prior Services Home-Independent   Housing / Facility 1 Story House   Lives with - Patient's Self Care Capacity Spouse;Child Less than 18 Years of Age;Related Adult  (spouse (works), 17 year old son, sister (retired) and brother in law (works))   Prior Level Of Function   Communication Impaired  (Pt reports mild memory deficits at baseline)   Dentures None   Hearing Within Functional Limits for Evaluation   Hearing Aid None   Vision Wears Corrective Lenses;Reading ;Distance   Patient's Primary Language Tagalog   Occupation (Pre-Hospital Vocational) Employed Full Time   Receptive Language / Auditory Comprehension   Receptive Language / Auditory Comprehension WDL   Expressive Language   Expressive Language (WDL) WDL   Reading Comprehension    Reading Comprehension (WDL) WDL   Written Language Expression   Written Language Expression (WDL) WDL   Cognition   Cognitive-Linguistic (WDL) X   Functional Memory Activities Minimal (4)   ABS (Agitated Behavior Scale)   Agitated Behavior Scale Performed No   Cognitive Pattern Assessment   Cognitive Pattern Assessment Used BIMS   Brief Interview for Mental Status (BIMS)   Repetition of Three Words (First Attempt) 3   Temporal Orientation: Year Correct   Temporal Orientation: Month Accurate within 5 days   Temporal Orientation: Day Correct   Recall: \"Sock\" Yes, no cue required   Recall: \"Blue\" Yes, no cue required   Recall: \"Bed\" Yes, no cue required   BIMS Summary Score 15   Confusion Assessment Method (CAM)   Is there evidence of an acute change in mental status from the patient's baseline? No "   Inattention Behavior not present   Disorganized thinking Behavior not present   Altered level of consciousness Behavior not present   Social / Pragmatic Communication   Social / Pragmatic Communication WDL   Swallowing/Nutritional Status   Swallowing/Nutritional Status Modified food consistency   Functional Level of Assist   Comprehension Independent   Expression Independent   Social Interaction Independent   Problem Solving Independent   Memory Supervision   Memory Description Verbal cueing;Therapy schedule;Increased time;Bed/chair alarm;Seat belt   Outcome Measures   Outcome Measures Utilized SCCAN   SCCAN (Scales of Cognitive and Communicative Ability for Neurorehabilitation)   Oral Expression - Raw Score 16   Oral Expression - Scale Performance Score 84   Orientation - Raw Score 12   Orientation - Scale Performance Score 100   Memory - Raw Score 14   Memory - Scale Performance Score 74   Speech Comprehension - Raw Score 13   Speech Comprehension - Scale Performance Score 100   Reading Comprehension - Raw Score 11   Reading Comprehension - Scale Performance Score 92   Writing - Raw Score 7   Writing - Scale Performance Score 100   Attention - Raw Score 14   Attention - Scale Performance Score 88   Problem Solving - Raw Score 21   Problem Solving - Scale Performance Score 91   SCCAN Total Raw Score 84   SCCAN Degree of Severity Mild Impairment   Problem List   Problem List Memory Deficit   Current Discharge Plan   Current Discharge Plan Return to Prior Living Situation   Benefit   Therapy Benefit Patient would not benefit from Inpatient Rehab Speech-Language Pathology.  No further Speech Therapy recommended at this time.   Interdisciplinary Plan of Care Collaboration   IDT Collaboration with  Physician   Patient Position at End of Therapy Seated;Chair Alarm On;Call Light within Reach;Tray Table within Reach;Phone within Reach   Collaboration Comments No ST recommended for cognition, swallow evaluation to be  completed tomorrow   Strengths & Barriers   Strengths Able to follow instructions;Alert and oriented;Effective communication skills;Good insight into deficits/needs;Motivated for self care and independence;Pleasant and cooperative;Supportive family;Willingly participates in therapeutic activities   Barriers Impaired carryover of learning   Speech Language Pathologist Assigned   Assigned SLP / Treatment Time / Comments No ST for cognition, swallow eval to be completed on 8/16       Assessment    Patient is 58 y.o. male with a diagnosis of stroke.  Additional factors influencing patient status/progress (ie: cognitive factors, co-morbidities, social support, etc): mild memory deficits at baseline, slight language barrier, pleasant and cooperative.      Cognitive evaluation was completed using the SCCAN.  The Scales of Cognitive and Communicative Ability for Neurorehabilitation (SCCAN) assesses cognitive-communicative deficits and functional ability in patients in rehabilitation hospitals, clinics, and skilled nursing facilities. The SCCAN is appropriate for a broad range of neurological patients, provides a measure of both impairment and functional ability.     SCCAN (Scales of Cognitive and Communicative Ability for Neurorehabilitation)  Oral Expression - Raw Score: 16  Oral Expression - Scale Performance Score: 84  Orientation - Raw Score: 12  Orientation - Scale Performance Score: 100  Memory - Raw Score: 14  Memory - Scale Performance Score: 74  Speech Comprehension - Raw Score: 13  Speech Comprehension - Scale Performance Score: 100  Reading Comprehension - Raw Score: 11  Reading Comprehension - Scale Performance Score: 92  Writing - Raw Score: 7  Writing - Scale Performance Score: 100  Attention - Raw Score: 14  Attention - Scale Performance Score: 88  Problem Solving - Raw Score: 21  Problem Solving - Scale Performance Score: 91  SCCAN Total Raw Score: 84  SCCAN Degree of Severity: Mild Impairment    Overall  score of 84/94 achieved indicating a mild cognitive impairment with the only area of difficulty being memory (74%).  Pt reported mild memory deficits at baseline and has not noticed any changes in his memory since his stroke.  He requested that his time at rehab be spent on his physical recovery and declined ST services at this time.  Pt was previously managing his medications and was driving, his sister manages finances for his household (Pt lives with his wife, son, sister and brother in law).  No further ST recommended for cognition at this time.  Pt is currently on a 6- Soft & Bite Sized diet and pt would like his diet upgraded to 7- Regular Textures.  Swallow evaluation to be completed on 8/16 to assess pt with 7- Regular Textures.        Plan  Recommend Speech Therapy  0  minutes per day  0  days per week for 0 days for the following treatments:  No further ST recommended for cognition.      Speech Therapy Problems (Active)       There are no active problems.

## 2023-08-15 NOTE — ASSESSMENT & PLAN NOTE
HbA1c 11.9  Serum glucose controlled on Metformin XR and Januvia  Will discontinue FSBS and SSI  Outpt meds include Metformin  mg bid, Jardiance 25 mg qd, and Trulicity 1.5 mg SQ qwk  May resume outpt diabetic regimen upon discharge if taking PO well

## 2023-08-16 ENCOUNTER — APPOINTMENT (OUTPATIENT)
Dept: PHYSICAL THERAPY | Facility: REHABILITATION | Age: 58
DRG: 057 | End: 2023-08-16
Attending: PHYSICAL MEDICINE & REHABILITATION
Payer: COMMERCIAL

## 2023-08-16 ENCOUNTER — APPOINTMENT (OUTPATIENT)
Dept: SPEECH THERAPY | Facility: REHABILITATION | Age: 58
DRG: 057 | End: 2023-08-16
Attending: PHYSICAL MEDICINE & REHABILITATION
Payer: COMMERCIAL

## 2023-08-16 ENCOUNTER — APPOINTMENT (OUTPATIENT)
Dept: OCCUPATIONAL THERAPY | Facility: REHABILITATION | Age: 58
DRG: 057 | End: 2023-08-16
Attending: PHYSICAL MEDICINE & REHABILITATION
Payer: COMMERCIAL

## 2023-08-16 DIAGNOSIS — I63.9 ISCHEMIC STROKE (HCC): ICD-10-CM

## 2023-08-16 LAB
GLUCOSE BLD STRIP.AUTO-MCNC: 172 MG/DL (ref 65–99)
GLUCOSE BLD STRIP.AUTO-MCNC: 212 MG/DL (ref 65–99)
GLUCOSE BLD STRIP.AUTO-MCNC: 238 MG/DL (ref 65–99)
GLUCOSE BLD STRIP.AUTO-MCNC: 254 MG/DL (ref 65–99)

## 2023-08-16 PROCEDURE — A9270 NON-COVERED ITEM OR SERVICE: HCPCS | Performed by: HOSPITALIST

## 2023-08-16 PROCEDURE — A9270 NON-COVERED ITEM OR SERVICE: HCPCS | Performed by: PHYSICAL MEDICINE & REHABILITATION

## 2023-08-16 PROCEDURE — 82962 GLUCOSE BLOOD TEST: CPT | Mod: 91

## 2023-08-16 PROCEDURE — 700111 HCHG RX REV CODE 636 W/ 250 OVERRIDE (IP): Mod: JZ | Performed by: PHYSICAL MEDICINE & REHABILITATION

## 2023-08-16 PROCEDURE — 92610 EVALUATE SWALLOWING FUNCTION: CPT

## 2023-08-16 PROCEDURE — 97116 GAIT TRAINING THERAPY: CPT

## 2023-08-16 PROCEDURE — 700102 HCHG RX REV CODE 250 W/ 637 OVERRIDE(OP): Performed by: PHYSICAL MEDICINE & REHABILITATION

## 2023-08-16 PROCEDURE — 770010 HCHG ROOM/CARE - REHAB SEMI PRIVAT*

## 2023-08-16 PROCEDURE — 99232 SBSQ HOSP IP/OBS MODERATE 35: CPT | Performed by: PHYSICAL MEDICINE & REHABILITATION

## 2023-08-16 PROCEDURE — 97112 NEUROMUSCULAR REEDUCATION: CPT

## 2023-08-16 PROCEDURE — 99232 SBSQ HOSP IP/OBS MODERATE 35: CPT | Performed by: HOSPITALIST

## 2023-08-16 PROCEDURE — 97032 APPL MODALITY 1+ESTIM EA 15: CPT

## 2023-08-16 PROCEDURE — 700102 HCHG RX REV CODE 250 W/ 637 OVERRIDE(OP): Performed by: HOSPITALIST

## 2023-08-16 PROCEDURE — 97530 THERAPEUTIC ACTIVITIES: CPT

## 2023-08-16 RX ORDER — MECLIZINE HYDROCHLORIDE 25 MG/1
25 TABLET ORAL 3 TIMES DAILY PRN
Status: DISCONTINUED | OUTPATIENT
Start: 2023-08-16 | End: 2023-09-05 | Stop reason: HOSPADM

## 2023-08-16 RX ADMIN — OLMESARTAN MEDOXOMIL 20 MG: 20 TABLET ORAL at 20:02

## 2023-08-16 RX ADMIN — ENOXAPARIN SODIUM 40 MG: 100 INJECTION SUBCUTANEOUS at 17:09

## 2023-08-16 RX ADMIN — METOPROLOL TARTRATE 12.5 MG: 25 TABLET, FILM COATED ORAL at 17:10

## 2023-08-16 RX ADMIN — CLOPIDOGREL BISULFATE 75 MG: 75 TABLET ORAL at 08:10

## 2023-08-16 RX ADMIN — METOPROLOL TARTRATE 12.5 MG: 25 TABLET, FILM COATED ORAL at 05:00

## 2023-08-16 RX ADMIN — ATORVASTATIN CALCIUM 80 MG: 40 TABLET, FILM COATED ORAL at 20:03

## 2023-08-16 RX ADMIN — MECLIZINE HYDROCHLORIDE 25 MG: 25 TABLET ORAL at 08:10

## 2023-08-16 RX ADMIN — METFORMIN HYDROCHLORIDE 500 MG: 500 TABLET, EXTENDED RELEASE ORAL at 17:09

## 2023-08-16 RX ADMIN — ASPIRIN 81 MG: 81 TABLET, COATED ORAL at 20:03

## 2023-08-16 RX ADMIN — SENNOSIDES AND DOCUSATE SODIUM 2 TABLET: 50; 8.6 TABLET ORAL at 20:03

## 2023-08-16 RX ADMIN — MECLIZINE HYDROCHLORIDE 25 MG: 25 TABLET ORAL at 15:27

## 2023-08-16 RX ADMIN — TAMSULOSIN HYDROCHLORIDE 0.4 MG: 0.4 CAPSULE ORAL at 08:10

## 2023-08-16 SDOH — ECONOMIC STABILITY: TRANSPORTATION INSECURITY
IN THE PAST 12 MONTHS, HAS LACK OF RELIABLE TRANSPORTATION KEPT YOU FROM MEDICAL APPOINTMENTS, MEETINGS, WORK OR FROM GETTING THINGS NEEDED FOR DAILY LIVING?: NO

## 2023-08-16 SDOH — ECONOMIC STABILITY: TRANSPORTATION INSECURITY
IN THE PAST 12 MONTHS, HAS THE LACK OF TRANSPORTATION KEPT YOU FROM MEDICAL APPOINTMENTS OR FROM GETTING MEDICATIONS?: NO

## 2023-08-16 ASSESSMENT — GAIT ASSESSMENTS
DISTANCE (FEET): 30
DEVIATION: ATAXIC;DECREASED BASE OF SUPPORT;DECREASED HEEL STRIKE;DECREASED TOE OFF
GAIT LEVEL OF ASSIST: MODERATE ASSIST

## 2023-08-16 ASSESSMENT — ENCOUNTER SYMPTOMS
NAUSEA: 0
EYES NEGATIVE: 1
BRUISES/BLEEDS EASILY: 0
FOCAL WEAKNESS: 1
MUSCULOSKELETAL NEGATIVE: 1
VOMITING: 0
FEVER: 0
POLYDIPSIA: 0
ABDOMINAL PAIN: 0
SHORTNESS OF BREATH: 0
CHILLS: 0
PALPITATIONS: 0
COUGH: 0

## 2023-08-16 ASSESSMENT — PATIENT HEALTH QUESTIONNAIRE - PHQ9
1. LITTLE INTEREST OR PLEASURE IN DOING THINGS: NOT AT ALL
SUM OF ALL RESPONSES TO PHQ9 QUESTIONS 1 AND 2: 0
2. FEELING DOWN, DEPRESSED, IRRITABLE, OR HOPELESS: NOT AT ALL

## 2023-08-16 ASSESSMENT — PAIN DESCRIPTION - PAIN TYPE: TYPE: ACUTE PAIN

## 2023-08-16 NOTE — DIETARY
Nutrition Services: Diabetes Diet Education Class  Day 2 of admit.  Jorden Bonilla is a 58 y.o. male with admitting DX of Ischemic stroke (HCC) [I63.9]    Pt attended bi-monthly diabetes diet education class. RD discussed food groups associated w/ CHO-containing foods, simple vs complex CHO, CHO servings of foods, reading nutrition facts labels to determine CHO servings provided per food serving, plate method for building balanced meals, symptoms of hypoglycemia and 15-15 rule, symptoms of hyperglycemia. RD provided handout reinforcing topics discussed, including sample menu. RD able to answer all questions to patient's satisfaction.     No other education needs identified at this time. Consider referral to outpatient nutrition services for continuation of education as indicated or per pt preferences.     Please re-consult RD as indicated.

## 2023-08-16 NOTE — PROGRESS NOTES
NURSING DAILY NOTE    Name: Jorden Bonilla   Date of Admission: 8/14/2023   Admitting Diagnosis: Ischemic stroke (HCC)  Attending Physician: Sarai Davila M.d.  Allergies: Patient has no known allergies.    Safety  Patient Assist     Patient Precautions  Fall Risk  Precaution Comments  L phyllis, Zio patch, HTN, DM  Bed Transfer Status  Moderate Assist  Toilet Transfer Status   Minimal Assist  Assistive Devices  Rails  Oxygen  None - Room Air  Diet/Therapeutic Dining  Current Diet Order   Procedures    Diet Order Diet: Consistent CHO (Diabetic) (cardiac and CHO); Second Modifier: (optional): Cardiac     Pill Administration  whole  Agitated Behavioral Scale     ABS Level of Severity       Fall Risk  Has the patient had a fall this admission?   No  Melani Galeano Fall Risk Scoring  19, HIGH RISK  Fall Risk Safety Measures  bed alarm and chair alarm    Vitals  Temperature: 36.4 °C (97.6 °F)  Temp src: Oral  Pulse: 90  Respiration: 17  Blood Pressure: 105/69  Blood Pressure MAP (Calculated): 81 MM HG  BP Location: Right, Upper Arm  Patient BP Position: Supine     Oxygen  Pulse Oximetry: 97 %  O2 (LPM): 0  O2 Delivery Device: None - Room Air    Bowel and Bladder  Last Bowel Movement  08/14/23  Stool Type     Bowel Device     Continent  Bladder: Continent void   Bowel: Continent movement  Bladder Function  Urine Void (mL):  (Moderate)  Urine Color: Red (It has some blood)  Genitourinary Assessment   Bladder Assessment (WDL):  WDL Except  Damon Catheter: Present with Active Order  Damon Care: Given with Soap and Water  Urinary Elimination: Catheter (Document on LDA)  Urine Color: Red (It has some blood)  Bladder Scan: Post Void  $ Bladder Scan Results (mL): 5    Skin  Cooper Score   15  Sensory Interventions   Bed Types: Standard/Trauma Mattress  Skin Preventative Measures: Pillows in Use for Support / Positioning  Moisture Interventions  Moisturizers/Barriers:  Barrier Wipes      Pain  Pain Rating Scale  0 - No Pain  Pain Location  Generalized  Pain Location Orientation     Pain Interventions   Declines    ADLs    Bathing   Shower  Linen Change   Partial  Personal Hygiene     Chlorhexidine Bath      Oral Care     Teeth/Dentures     Shave     Nutrition Percentage Eaten  *  * Meal *  *, Breakfast, Between % Consumed (100%)  Environmental Precautions     Patient Turns/Positioning  Patient Turns Self from Side to Side  Patient Turns Assistance/Tolerance     Bed Positions     Head of Bed Elevated         Psychosocial/Neurologic Assessment  Psychosocial Assessment  Psychosocial (WDL):  Within Defined Limits  Neurologic Assessment  Neuro (WDL): Within Defined Limits  Level of Consciousness: Alert  EENT (WDL):  WDL Except    Cardio/Pulmonary Assessment  Edema      Respiratory Breath Sounds     Cardiac Assessment   Cardiac (WDL):  WDL Except (tachycardia, Hx-CAD, HTN, mult. coronary artery by pass)

## 2023-08-16 NOTE — DISCHARGE PLANNING
Case Management/IDT follow up.   IDT continues to recommend IRF level of care as patient continue to make progress with all therapies. Ben dc'd; pt on a regal diet; no cognitive deficits, slp has signed off; trails w/ off the shelf AFO; attend diabetic education class.     Projected dc date set for 09/05//2023    Recommendations made for Rehab w/o [facility] (will confirm benefits) or home health for PT/OT/RN; follow up with pcp/neurology/stroke bridge clinic.     Met with pt  providing update from IDT and discussed plan of care.    Plan:  Continue to follow

## 2023-08-16 NOTE — DISCHARGE PLANNING
CASE MANAGEMENT INITIAL ASSESSMENT    Admit Date:  8/14/2023     I met with pt to discuss role of case management / discharge planning / team conference.   Patient is a  58 y.o. male transferred from [facility] where he was hospitalized at [facility] from .    Diagnosis: Ischemic stroke (HCC) [I63.9]    Co-morbidities:   Patient Active Problem List    Diagnosis Date Noted    Microcytosis 08/15/2023    Ischemic stroke (HCC) 08/14/2023    Urinary retention 08/12/2023    Hemiplegia ([facility]) 08/10/2023    Dehydration 08/10/2023    Tachycardia 07/01/2022    Type 2 diabetes mellitus with hyperglycemia, without long-term current use of insulin ([facility]) 03/30/2022    Vitamin D deficiency 12/22/2021    Primary hypertension 12/21/2021    Mixed hyperlipidemia 12/21/2021    Microalbuminuria due to type 2 diabetes mellitus ([facility]) 12/21/2021    Gastroesophageal reflux disease without esophagitis 12/21/2021    Chronic idiopathic constipation 12/21/2021    Coronary artery disease due to calcified coronary lesion: CABG ×3 in 2015 09/02/2015    S/P CABG x 3 09/02/2015    Abnormal stress test 08/21/2015    Hyponatremia 08/20/2015    Dyslipidemia 08/20/2015    Diabetes mellitus, new onset (HCC) 08/20/2015    Chest pain 08/19/2015     Prior Living Situation:  Housing / Facility: 1 Story House in [place], [place] ;  1 step to enter is ramped   Lives with - Patient's Self Care Capacity: Spouse, sister in law Tiffanie Vinson and 17 year old son    Prior Level of Function:  Medication Management: Independent  Finances: Independent  Home Management: Independent  Shopping: Independent  Prior Level Of Mobility: Independent Without Device in Community  Driving / Transportation: Driving Independent  Pt. Description Of Current ADL Function: Activities Of Daily Living / Self Care Restrictions    Support Systems:  Primary : Alyssa Bonilla Spouse   Other support systems: Tiffanie Vinson    Advance Directives: No  Power of  (Name & Phone):  yes    Previous Services Utilized:   Equipment Owned: Front-Wheel Walker  Prior Services: Home-Independent    Other Information:  Occupation (Pre-Hospital Vocational): Employed Full Time  Primary Payor Source: [employer]    Patient / Family Goal:  Patient / Family Goal: Return home.    Plan:  1. Continue to follow patient through hospitalization and provide discharge planning in collaboration with patient, family, physicians and ancillary services.     2. Utilize community resources to ensure a safe discharge.

## 2023-08-16 NOTE — PROGRESS NOTES
NURSING DAILY NOTE    Name: Jorden Bonilla   Date of Admission: 8/14/2023   Admitting Diagnosis: Ischemic stroke (HCC)  Attending Physician: Sarai Davila M.d.  Allergies: Patient has no known allergies.    Safety  Patient Assist     Patient Precautions  Fall Risk  Precaution Comments  L phyllis, Zio patch, jaquez, HTN, DM  Bed Transfer Status  Minimal Assist  Toilet Transfer Status   Minimal Assist  Assistive Devices  Rails  Oxygen  None - Room Air  Diet/Therapeutic Dining  Current Diet Order   Procedures    Diet Order Diet: Level 6 - Soft and Bite Sized (cardiac and CHO); Liquid level: Level 0 - Thin; Second Modifier: (optional): Consistent CHO (Diabetic)     Pill Administration  whole  Agitated Behavioral Scale     ABS Level of Severity       Fall Risk  Has the patient had a fall this admission?   No  Melani Galeano Fall Risk Scoring  19, HIGH RISK  Fall Risk Safety Measures  bed alarm, chair alarm, poor balance, and low vision/ hearing    Vitals  Temperature: 36.8 °C (98.3 °F)  Temp src: Oral  Pulse: 83  Respiration: 18  Blood Pressure: 107/68  Blood Pressure MAP (Calculated): 81 MM HG  BP Location: Right, Upper Arm  Patient BP Position: Sitting (Recline sitting position in bed)     Oxygen  Pulse Oximetry: 94 %  O2 (LPM): 0  O2 Delivery Device: None - Room Air    Bowel and Bladder  Last Bowel Movement  08/14/23  Stool Type     Bowel Device     Continent  Bladder: Continent void   Bowel: Continent movement  Bladder Function  Urine Void (mL): 400 ml  Urine Color: Red  Genitourinary Assessment   Bladder Assessment (WDL):  WDL Except  Jaquez Catheter: Present with Active Order  Jaquez Care: Given with Soap and Water  Urinary Elimination: Catheter (Document on LDA)  Urine Color: Red  Bladder Scan: Post Void  $ Bladder Scan Results (mL): 1    Skin  Cooper Score   15  Sensory Interventions   Bed Types: Standard/Trauma Mattress  Skin Preventative Measures:  Pillows in Use for Support / Positioning  Moisture Interventions  Moisturizers/Barriers: Barrier Wipes      Pain  Pain Rating Scale  0 - No Pain  Pain Location  Generalized  Pain Location Orientation     Pain Interventions   Declines    ADLs    Bathing   Shower  Linen Change   Partial  Personal Hygiene     Chlorhexidine Bath      Oral Care     Teeth/Dentures     Shave     Nutrition Percentage Eaten  Lunch, Between % Consumed  Environmental Precautions     Patient Turns/Positioning  Patient Turns Self from Side to Side  Patient Turns Assistance/Tolerance     Bed Positions     Head of Bed Elevated         Psychosocial/Neurologic Assessment  Psychosocial Assessment  Psychosocial (WDL):  Within Defined Limits  Neurologic Assessment  Neuro (WDL): Within Defined Limits  Level of Consciousness: Alert  EENT (WDL):  WDL Except    Cardio/Pulmonary Assessment  Edema      Respiratory Breath Sounds     Cardiac Assessment   Cardiac (WDL):  WDL Except (tachycardia, Hx-CAD, HTN, mult. coronary artery by pass)

## 2023-08-16 NOTE — THERAPY
Speech Language Pathology   Initial Assessment     Patient Name: Jorden Bonilla  AGE:  58 y.o., SEX:  male  Medical Record #: [id number]  Today's Date: 8/16/2023     Subjective    Swallow evaluation completed, pt admitted on a 6- Soft & Bite Sized diet with 0-Thin liquids and would like to be on a regular texture diet.       Objective       08/16/23 1131   Evaluation Charges   SLP Oral Pharyngeal Evaluation Oral Pharyngeal Evaluation   SLP Total Time Spent   SLP Individual Total Time Spent (Mins) 30   Tracheostomy   Tracheostomy No   Speech Mechanisms / Voice Production   Speech Mechanisms / Voice Production (WDL) WDL   Labial Function   Labial Function (WDL) WDL   Lingual Function   Lingual Function (WDL) WDL   Jaw Function   Jaw Function (WDL) WDL   Velar Function   Velar Function (WDL) WDL   Laryngeal Function   Laryngeal Function (WDL) WDL   Swallowing   Swallowing (WDL) WDL   Dysphagia Strategies / Recommendations   Strategies / Interventions Recommended (Yes / No) No   Swallowing/Nutritional Status   Swallowing/Nutritional Status Regular food   Speech Language Pathologist Assigned   Assigned SLP / Treatment Time / Comments [facility] 8/16         Assessment    Patient is 58 y.o. male with a diagnosis of stroke.  Additional factors influencing patient status/progress (ie: cognitive factors, co-morbidities, social support, etc): pleasant and cooperative, strong family support.      Swallow evaluation completed.  Pt was admitted on a 6- Soft & Bite Sized diet with 0-Thin liquids and would like to consume regular textured food.  [facility] all structures to be WFL's in terms of strength and ROM.  Pt consumed trials of 7- Regular Textures and 0-Thin liquids for lunch without s/sx of aspiration.  No difficulty chewing noted.  Recommend advancing pt's diet to 7- Regular Textures with 0-Thin liquids with meat cut into bite sized pieces d/t difficulty cutting food with LUE weakness.  No further ST  warranted at this time.      Plan  Recommend Speech Therapy  0  minutes per day  0  days per week for 0 days for the following treatments:  No further ST recommended at this time.          Speech Therapy Problems (Active)       There are no active problems.

## 2023-08-16 NOTE — PROGRESS NOTES
Hospital Medicine Daily Progress Note        Chief Complaint  Hypertension  Diabetes    Interval Problem Update  Pt denies new complaints.    Review of Systems  Review of Systems   Constitutional:  Negative for chills and fever.   HENT: Negative.     Eyes: Negative.    Respiratory:  Negative for cough and shortness of breath.    Cardiovascular:  Negative for chest pain and palpitations.   Gastrointestinal:  Negative for abdominal pain, nausea and vomiting.   Musculoskeletal: Negative.    Skin:  Negative for itching and rash.   Neurological:  Positive for focal weakness.   Endo/Heme/Allergies:  Negative for polydipsia. Does not bruise/bleed easily.        Physical Exam  Temp:  [36.6 °C (97.9 °F)-36.8 °C (98.3 °F)] 36.6 °C (97.9 °F)  Pulse:  [] 88  Resp:  [17-18] 17  BP: (100-126)/(65-86) 104/71  SpO2:  [94 %-100 %] 100 %    Physical Exam  Vitals reviewed.   Constitutional:       General: He is not in acute distress.     Appearance: Normal appearance. He is not ill-appearing.   HENT:      Head: Normocephalic and atraumatic.      Right Ear: External ear normal.      Left Ear: External ear normal.      Nose: Nose normal.      Mouth/Throat:      Pharynx: Oropharynx is clear.   Eyes:      General:         Right eye: No discharge.         Left eye: No discharge.      Extraocular Movements: Extraocular movements intact.      Conjunctiva/sclera: Conjunctivae normal.   Cardiovascular:      Rate and Rhythm: Normal rate and regular rhythm.   Pulmonary:      Effort: Pulmonary effort is normal. No respiratory distress.      Breath sounds: Normal breath sounds. No wheezing.   Abdominal:      General: Bowel sounds are normal. There is no distension.      Palpations: Abdomen is soft.      Tenderness: There is no abdominal tenderness.   Musculoskeletal:      Cervical back: Normal range of motion and neck supple.      Right lower leg: No edema.      Left lower leg: No edema.   Skin:     General: Skin is warm and dry.    Neurological:      Mental Status: He is alert and oriented to person, place, and time.         Fluids    Intake/Output Summary (Last 24 hours) at 8/16/2023 1015  Last data filed at 8/16/2023 0846  Gross per 24 hour   Intake 960 ml   Output 1000 ml   Net -40 ml       Laboratory  Recent Labs     08/15/23  0538   WBC 9.7   RBC 6.29*   HEMOGLOBIN 16.5   HEMATOCRIT 50.9   MCV 80.9*   MCH 26.2*   MCHC 32.4   RDW 36.8   PLATELETCT 270   MPV 10.9     Recent Labs     08/15/23  0538   SODIUM 141   POTASSIUM 4.1   CHLORIDE 103   CO2 28   GLUCOSE 186*   BUN 14   CREATININE 0.89   CALCIUM 9.3                   Assessment/Plan  * Ischemic stroke (HCC)- (present on admission)  Assessment & Plan  Presented w/ L HP  S/P ZioPatch  On ASA, Plavix, and Lipitor  Management per Physiatry    Microcytosis  Assessment & Plan  Check Fe Panel next draw  Follow H/H    Primary hypertension- (present on admission)  Assessment & Plan  Continue Olmesartan and Metoprolol  Observe blood pressure trends  Monitor for orthostatic hypotension on Flomax    Type 2 diabetes mellitus with hyperglycemia, without long-term current use of insulin (HCC)- (present on admission)  Assessment & Plan  HbA1c 11.9  Resumed low dose Metformin XR  Discontinued Lantus, continue SSI  Outpt meds include Metformin  mg bid, Jardiance 25 mg qd, and Trulicity 1.5 mg SQ qwk    Coronary artery disease due to calcified coronary lesion: CABG ×3 in 2015- (present on admission)  Assessment & Plan  H/O CABG  Continue ASA, Lipitor, Olmesartan, and Metoprolol  Cardiology F/U       Full Code

## 2023-08-16 NOTE — PROGRESS NOTES
NURSING DAILY NOTE    Name: Jorden Bonilla   Date of Admission: 8/14/2023   Admitting Diagnosis: Ischemic stroke (HCC)  Attending Physician: Sarai Davila M.d.  Allergies: Patient has no known allergies.    Safety  Patient Assist     Patient Precautions  Fall Risk  Precaution Comments  L phyllis, Zio patch, jaquez, HTN, DM  Bed Transfer Status  Minimal Assist  Toilet Transfer Status   Minimal Assist  Assistive Devices  Rails  Oxygen  None - Room Air  Diet/Therapeutic Dining  Current Diet Order   Procedures    Diet Order Diet: Level 6 - Soft and Bite Sized (cardiac and CHO); Liquid level: Level 0 - Thin; Second Modifier: (optional): Consistent CHO (Diabetic)     Pill Administration  whole  Agitated Behavioral Scale     ABS Level of Severity       Fall Risk  Has the patient had a fall this admission?   No  Melani Galeano Fall Risk Scoring  18, HIGH RISK  Fall Risk Safety Measures  bed alarm, chair alarm, poor balance, and low vision/ hearing    Vitals  Temperature: 36.6 °C (97.9 °F)  Temp src: Oral  Pulse: 92  Respiration: 18  Blood Pressure: 119/78  Blood Pressure MAP (Calculated): 92 MM HG  BP Location: Right, Upper Arm  Patient BP Position: Supine     Oxygen  Pulse Oximetry: 95 %  O2 (LPM): 0  O2 Delivery Device: None - Room Air    Bowel and Bladder  Last Bowel Movement  08/14/23  Stool Type     Bowel Device     Continent  Bladder: Continent void   Bowel: Continent movement  Bladder Function  Urine Void (mL): 1000 ml  Urine Color: Yellow  Genitourinary Assessment   Bladder Assessment (WDL):  WDL Except  Jaquez Catheter: Present with Active Order  Jaquez Care: Given with Soap and Water  Urinary Elimination: Catheter (Document on LDA)  Urine Color: Yellow    Skin  Cooper Score   16  Sensory Interventions   Bed Types: Standard/Trauma Mattress  Skin Preventative Measures: Pillows in Use for Support / Positioning  Moisture Interventions  Moisturizers/Barriers:  Barrier Wipes      Pain  Pain Rating Scale  0 - No Pain  Pain Location  Generalized  Pain Location Orientation     Pain Interventions   Declines    ADLs    Bathing   Shower  Linen Change   Partial  Personal Hygiene     Chlorhexidine Bath      Oral Care     Teeth/Dentures     Shave     Nutrition Percentage Eaten  Lunch, Between % Consumed  Environmental Precautions     Patient Turns/Positioning  Patient Turns Self from Side to Side  Patient Turns Assistance/Tolerance     Bed Positions     Head of Bed Elevated         Psychosocial/Neurologic Assessment  Psychosocial Assessment  Psychosocial (WDL):  Within Defined Limits  Neurologic Assessment  Neuro (WDL): Within Defined Limits  Level of Consciousness: Alert  EENT (WDL):  WDL Except    Cardio/Pulmonary Assessment  Edema      Respiratory Breath Sounds     Cardiac Assessment   Cardiac (WDL):  WDL Except (tachycardia, Hx-CAD, HTN, mult. coronary artery by pass)

## 2023-08-16 NOTE — CARE PLAN
The patient is Stable - Low risk of patient condition declining or worsening    Shift Goals  Clinical Goals: safety  Patient Goals: safety, sleep/rest      Problem: Knowledge Deficit - Standard  Goal: Patient and family/care givers will demonstrate understanding of plan of care, disease process/condition, diagnostic tests and medications  Outcome: Progressing     Problem: Fall Risk - Rehab  Goal: Patient will remain free from falls  Outcome: Progressing

## 2023-08-16 NOTE — THERAPY
Occupational Therapy  Daily Treatment     Patient Name: Jorden Bonilla  Age:  58 y.o., Sex:  male  Medical Record #: [id number]  Today's Date: 8/16/2023     Precautions  Precautions: Fall Risk  Comments: L phyllis, Zio patch, HTN, DM         Subjective    Pt seated in w/c upon arrival, pleasant and cooperative, agreeable to therapy       Objective       08/16/23 0931   OT Charge Group   OT Neuromuscular Re-education / Balance (Units) 2   OT Total Time Spent   OT Individual Total Time Spent (Mins) 30   Interdisciplinary Plan of Care Collaboration   Patient Position at End of Therapy Seated  (drop off in diabetes class)   Occupational Therapist Assigned   Assigned OT / Treatment Time / Comments  30/60     LUE neuro re-ed:  - reviewed AAROM self ROM exercises to complete in room - pt able to demo back properly and reports that he has been doing them as often as he can in his room  - AAROM at hand flexion, wrist extension, elbow flexion, tricep extension with vibration and v/c's 2 sets x 10 each  - AROM shoulder elevation, shoulder extension, shoulder IR/ER x 20 each    Assessment    Pt is highly motivated/compliant with HEP and completed LUE neuro re-ed therex to the best of his abilities, no c/o pain    Strengths: Able to follow instructions, Alert and oriented, Effective communication skills, Good carryover of learning, Good insight into deficits/needs, Independent prior level of function, Manages pain appropriately, Motivated for self care and independence, Pleasant and cooperative, Supportive family, Willingly participates in therapeutic activities  Barriers: Hemiplegia, Hypertension, Impaired balance, Limited mobility    Plan    LUE neuro re-ed, standing tolerance, phyllis-techniques     Occupational Therapy Goals (Active)       Problem: Bathing       Dates: Start:  08/15/23         Goal: STG-Within one week, patient will bathe body with min A using AE/AD prn       Dates: Start:  08/15/23               Problem:  Dressing       Dates: Start:  08/15/23         Goal: STG-Within one week, patient will dress LB with min A       Dates: Start:  08/15/23               Problem: Functional Transfers       Dates: Start:  08/15/23         Goal: STG-Within one week, patient will transfer to toilet with CGA using grab bar       Dates: Start:  08/15/23               Problem: OT Long Term Goals       Dates: Start:  08/15/23         Goal: LTG-By discharge, patient will complete basic self care tasks with SBA/supervision       Dates: Start:  08/15/23            Goal: LTG-By discharge, patient will perform bathroom transfers with supervision/mod I       Dates: Start:  08/15/23               Problem: Toileting       Dates: Start:  08/15/23         Goal: STG-Within one week, patient will complete toileting tasks with max A       Dates: Start:  08/15/23

## 2023-08-16 NOTE — THERAPY
Physical Therapy   Daily Treatment     Patient Name: Jorden Bonilla  Age:  58 y.o., Sex:  male  Medical Record #: [id number]  Today's Date: 8/16/2023     Precautions  Precautions: (P) Fall Risk  Comments: (P) L phyllis, Zio patch, HTN, DM    Subjective    Patient seated in w/c and agreeable to therapy.     Objective       08/16/23 0701   PT Charge Group   PT Electrical Stimulation Attended (Units) 3   PT Therapeutic Activities (Units) 1   PT Total Time Spent   PT Individual Total Time Spent (Mins) 60   Precautions   Precautions Fall Risk   Comments L phyllis, Zio patch, HTN, DM   Interdisciplinary Plan of Care Collaboration   IDT Collaboration with  Occupational Therapist   Patient Position at End of Therapy Seated;Chair Alarm On;Self Releasing Lap Belt Applied  (in dining room)   Collaboration Comments CLOF   Physical Therapist Assigned   Assigned PT / Treatment Time / Comments EL primary; NFC  LLE 2x/week     Initial assessment and set up of LLE ; patient educated on process, expectations, and purpose. Stimulation parameters set for LLE glutes, quads, hamstrings, tib anterior, and gastrocs; see patient profile for details. Adjustment of stimulation amplitude required intermittently throughout session for hamstrings, tib anterior, and gastrocs to improve patient's comfort.   Distance: 2.51 miles   Average Power: 3.4 W   Average Asymmetry: Left 23%   Time: total 21:05 minutes; time active 17:51 minutes      Assessment    Patient tolerated  set up well, demonstrating strong contractions at all muscle groups. Receptive to education about  and no adverse effects noted throughout treatment. Appropriate for handoff for adjunct tech program.    Strengths: Able to follow instructions, Alert and oriented, Effective communication skills, Good carryover of learning, Good insight into deficits/needs, Independent prior level of function, Making steady progress towards goals, Motivated for self care and  independence, Pleasant and cooperative, Supportive family, Willingly participates in therapeutic activities  Barriers: Bladder retention, Decreased endurance, Hemiparesis, Impaired activity tolerance, Impaired balance, Language barrier, non-fluent English, Limited mobility    Plan    Bed mobility with phyllis techniques, transfer training, gait training with wall rail progressing as appropriate (trial carbon fiber AFO), standing balance, neuro re-ed for R hemibody strengthening, family training/education.     [facility]  therapy tech program 2x/week.    Passport items to be completed:  Get in/out of bed safely, in/out of a vehicle, safely use mobility device, walk or wheel around home/community, navigate up and down stairs, show how to get up/down from the ground, ensure home is accessible, demonstrate HEP, complete caregiver training    Physical Therapy Problems (Active)       Problem: Mobility       Dates: Start:  08/15/23         Goal: STG-Within one week, patient will ambulate household distance 50 ft with HW and CGA.       Dates: Start:  08/15/23            Goal: STG-Within one week, patient will ambulate up/down a curb with HW and min A.       Dates: Start:  08/15/23               Problem: Mobility Transfers       Dates: Start:  08/15/23         Goal: STG-Within one week, patient will perform bed mobility with SPV consistently.       Dates: Start:  08/15/23            Goal: STG-Within one week, patient will transfer bed to chair stand pivot with HW and CGA.       Dates: Start:  08/15/23               Problem: PT-Long Term Goals       Dates: Start:  08/15/23         Goal: LTG-By discharge, patient will ambulate 150 ft with QC vs SPC and SBA.       Dates: Start:  08/15/23            Goal: LTG-By discharge, patient will transfer one surface to another with QC vs SPC and SPV.       Dates: Start:  08/15/23            Goal: LTG-By discharge, patient will ambulate up/down a curb with QC vs SPC and SBA.       Dates: Start:   08/15/23            Goal: LTG-By discharge, patient will transfer in/out of a car with QC vs SPC and SBA after setup.       Dates: Start:  08/15/23            Goal: LTG-By discharge, patient will ambulate up/down a ramp with QC vs SPC and CGA to simulate home entrance.       Dates: Start:  08/15/23

## 2023-08-16 NOTE — CARE PLAN
Problem: Bathing  Goal: STG-Within one week, patient will bathe body with min A using AE/AD prn  Outcome: Not Met  Note: New admit     Problem: Dressing  Goal: STG-Within one week, patient will dress LB with min A  Outcome: Not Met  Note: New admit     Problem: Toileting  Goal: STG-Within one week, patient will complete toileting tasks with max A  Outcome: Not Met  Note: New admit     Problem: Functional Transfers  Goal: STG-Within one week, patient will transfer to toilet with CGA using grab bar  Outcome: Not Met  Note: New admit

## 2023-08-16 NOTE — PROGRESS NOTES
"Rehab Progress Note     Date of Service: 8/16/2023  Chief Complaint: Follow-up stroke    Interval Events (Subjective)    Patient seen and examined today in his room.  His wife and daughter present.  Reports he did not go to sleep last night until late.  His Daomn catheter was removed and initially he had some hematuria which has now resolved.  He is not retaining with his last postvoid residuals very low.  Patient attended the diabetic education class today.  He has had some return of function in the left proximal leg and left proximal shoulder.  He last moved his bowels on the 14th.  We will have his weekly conference this afternoon to discuss a discharge date.    Patient has no other new questions, concerns, or complaints today.       Objective:  VITAL SIGNS: /71   Pulse 88   Temp 36.6 °C (97.9 °F) (Oral)   Resp 17   Ht 1.651 m (5' 5\")   Wt 63 kg (139 lb)   SpO2 100%   BMI 23.13 kg/m²   Gen: alert, no apparent distress  CV: Regular rate, regular rhythm  Resp: Clear to auscultation bilaterally  Neuro: Left hemiparesis    Recent Results (from the past 72 hour(s))   POCT glucose device results    Collection Time: 08/13/23 12:20 PM   Result Value Ref Range    POC Glucose, Blood 159 (H) 65 - 99 mg/dL   EC-ECHOCARDIOGRAM COMPLETE W/O CONT    Collection Time: 08/13/23  1:21 PM   Result Value Ref Range    Left Ventrical Ejection Fraction 55    POCT glucose device results    Collection Time: 08/13/23  5:19 PM   Result Value Ref Range    POC Glucose, Blood 197 (H) 65 - 99 mg/dL   POCT glucose device results    Collection Time: 08/13/23  9:11 PM   Result Value Ref Range    POC Glucose, Blood 183 (H) 65 - 99 mg/dL   POCT glucose device results    Collection Time: 08/14/23  7:37 AM   Result Value Ref Range    POC Glucose, Blood 198 (H) 65 - 99 mg/dL   EKG    Collection Time: 08/14/23  2:41 PM   Result Value Ref Range    Report       [facility]    Test Date:  2023-08-14  Pt Name:    KENDRA GAFFNEY"    Department: [facility]  MRN:        [id number]                      Room:       [facility]  Gender:     Male                         Technician: [id number] WT  :        1965                   Requested By:TIFFANIE SAINI  Order #:    [id number]                    Reading MD: Oxana Mckeon MD    Measurements  Intervals                                Axis  Rate:       108                          P:          20  MT:         139                          QRS:        6  QRSD:       77                           T:          34  QT:         324  QTc:        435    Interpretive Statements  Sinus tachycardia  Left atrial enlargement  Electronically Signed On 2023 15:00:59 PDT by Oxana Mckeon MD     URINALYSIS    Collection Time: 23  3:45 PM   Result Value Ref Range    Color Yellow     Character Clear     Specific Gravity 1.010 <1.035    Ph 6.5 5.0 - 8.0    Glucose 500 (A) Negative mg/dL    Ketones Negative Negative mg/dL    Protein Negative Negative mg/dL    Bilirubin Negative Negative    Urobilinogen, Urine 0.2 Negative    Nitrite Negative Negative    Leukocyte Esterase Negative Negative    Occult Blood Large (A) Negative    Micro Urine Req Microscopic    URINE MICROSCOPIC (W/UA)    Collection Time: 23  3:45 PM   Result Value Ref Range    WBC 0-2 (A) /hpf    RBC 2-5 (A) /hpf    Bacteria Negative None /hpf    Epithelial Cells Negative /hpf    Hyaline Cast 0-2 /lpf   POCT glucose device results    Collection Time: 23  5:10 PM   Result Value Ref Range    POC Glucose, Blood 254 (H) 65 - 99 mg/dL   POCT glucose device results    Collection Time: 23  7:28 PM   Result Value Ref Range    POC Glucose, Blood 278 (H) 65 - 99 mg/dL   CBC with Differential    Collection Time: 08/15/23  5:38 AM   Result Value Ref Range    WBC 9.7 4.8 - 10.8 K/uL    RBC 6.29 (H) 4.70 - 6.10 M/uL    Hemoglobin 16.5 14.0 - 18.0 g/dL    Hematocrit 50.9 42.0 - 52.0 %    MCV 80.9 (L) 81.4 - 97.8 fL    MCH 26.2 (L) 27.0 - 33.0 pg    MCHC 32.4  32.3 - 36.5 g/dL    RDW 36.8 35.9 - 50.0 fL    Platelet Count 270 164 - 446 K/uL    MPV 10.9 9.0 - 12.9 fL    Neutrophils-Polys 57.20 44.00 - 72.00 %    Lymphocytes 28.50 22.00 - 41.00 %    Monocytes 10.50 0.00 - 13.40 %    Eosinophils 2.90 0.00 - 6.90 %    Basophils 0.50 0.00 - 1.80 %    Immature Granulocytes 0.40 0.00 - 0.90 %    Nucleated RBC 0.00 0.00 - 0.20 /100 WBC    Neutrophils (Absolute) 5.53 1.82 - 7.42 K/uL    Lymphs (Absolute) 2.75 1.00 - 4.80 K/uL    Monos (Absolute) 1.01 (H) 0.00 - 0.85 K/uL    Eos (Absolute) 0.28 0.00 - 0.51 K/uL    Baso (Absolute) 0.05 0.00 - 0.12 K/uL    Immature Granulocytes (abs) 0.04 0.00 - 0.11 K/uL    NRBC (Absolute) 0.00 K/uL   Comp Metabolic Panel (CMP)    Collection Time: 08/15/23  5:38 AM   Result Value Ref Range    Sodium 141 135 - 145 mmol/L    Potassium 4.1 3.6 - 5.5 mmol/L    Chloride 103 96 - 112 mmol/L    Co2 28 20 - 33 mmol/L    Anion Gap 10.0 7.0 - 16.0    Glucose 186 (H) 65 - 99 mg/dL    Bun 14 8 - 22 mg/dL    Creatinine 0.89 0.50 - 1.40 mg/dL    Calcium 9.3 8.5 - 10.5 mg/dL    Correct Calcium 9.5 8.5 - 10.5 mg/dL    AST(SGOT) 19 12 - 45 U/L    ALT(SGPT) 15 2 - 50 U/L    Alkaline Phosphatase 69 30 - 99 U/L    Total Bilirubin 0.6 0.1 - 1.5 mg/dL    Albumin 3.7 3.2 - 4.9 g/dL    Total Protein 6.4 6.0 - 8.2 g/dL    Globulin 2.7 1.9 - 3.5 g/dL    A-G Ratio 1.4 g/dL   Magnesium    Collection Time: 08/15/23  5:38 AM   Result Value Ref Range    Magnesium 2.3 1.5 - 2.5 mg/dL   ESTIMATED GFR    Collection Time: 08/15/23  5:38 AM   Result Value Ref Range    GFR (CKD-EPI) 98 >60 mL/min/1.73 m 2   POCT glucose device results    Collection Time: 08/15/23  6:28 AM   Result Value Ref Range    POC Glucose, Blood 202 (H) 65 - 99 mg/dL   POCT glucose device results    Collection Time: 08/15/23 11:29 AM   Result Value Ref Range    POC Glucose, Blood 200 (H) 65 - 99 mg/dL   POCT glucose device results    Collection Time: 08/15/23  4:51 PM   Result Value Ref Range    POC Glucose, Blood  174 (H) 65 - 99 mg/dL   POCT glucose device results    Collection Time: 08/15/23  8:28 PM   Result Value Ref Range    POC Glucose, Blood 232 (H) 65 - 99 mg/dL   POCT glucose device results    Collection Time: 08/16/23  7:05 AM   Result Value Ref Range    POC Glucose, Blood 212 (H) 65 - 99 mg/dL       Scheduled Medications   Medication Dose Frequency    insulin regular  2-12 Units 4X/DAY ACHS    metFORMIN ER  500 mg PM MEAL    metoprolol tartrate  12.5 mg TWICE DAILY    aspirin  81 mg Q EVENING    atorvastatin  80 mg Q EVENING    clopidogrel  75 mg DAILY    enoxaparin (LOVENOX) injection  40 mg DAILY AT 1800    meclizine  25 mg TID    olmesartan  20 mg Q EVENING    senna-docusate  2 Tablet BID    tamsulosin  0.4 mg AFTER BREAKFAST       Current Diet Order   Procedures    Diet Order Diet: Level 6 - Soft and Bite Sized (cardiac and CHO); Liquid level: Level 0 - Thin; Second Modifier: (optional): Consistent CHO (Diabetic)       Assessment:    This patient is a 58 y.o. male admitted for acute inpatient rehabilitation with Ischemic stroke (HCC).      I, Sarai Davila M.D./MD., was present and led the interdisciplinary team conference on 8/16/2023.  I led the IDT conference and agree with the IDT conference documentation and plan of care as noted below.     Nursing:  Diet Current Diet Order   Procedures    Diet Order Diet: Consistent CHO (Diabetic) (cardiac and CHO); Second Modifier: (optional): Cardiac       Eating ADL Supervision  Set-up of equipment or meal/tube feeding   % of Last Meal  Oral Nutrition: *  * Meal *  *, Breakfast, Between % Consumed (100%)   Sleep No issues   Bowel Last BM: 08/14/23   Bladder Post Void  5   Barriers to Discharge Home: left hemiplegia      Physical Therapy:  Bed Mobility    Transfers Minimal Assist  Increased time, Set-up of equipment, Supervision for safety, Verbal cueing (stand pivot to the R no AD)   Mobility Total Assist X 2 (mod A x1 plus wc follow)   Stairs Unable to  Participate   Barriers to Discharge Home: left hemiplegia       Occupational Therapy:  Grooming Supervision, Seated   Bathing Moderate Assist   UB Dressing Stand by Assist   LB Dressing Moderate Assist   Toileting Total Assist   Shower & Tub Transfer Minimal Assist   Barriers to Discharge Home: left hemiplegia      Speech-Language Pathology:  Comprehension:  Independent  Comprehension Description:     Expression:  Independent  Expression Description:     Social Interaction:  Independent  Social Interaction Description:     Problem Solving:  Independent  Problem Solving Description:     Memory:  Supervision  Memory Description:  Verbal cueing, Therapy schedule, Increased time, Bed/chair alarm, Seat belt  Barriers to Discharge Home: none, signed off    Respiratory Therapy:  O2 (LPM): 0  O2 Delivery Device: None - Room Air    Case Management:  Continues to work on disposition and DME needs.      Discharge Date/Disposition:    9/5    HH: PT/OT/SLP/RN, ?rehab without walls    Equip: TBD    Follow-ups: PCP, stroke bridge, cardiology      Problem List/Medical Decision Making and Plan:    Right anterior medullary stroke  Left hemiparesis  Cognitive impairment, mild  Dysphagia  Continue full rehab program  PT/OT/SLP, 1 hr each discipline, 5 days per week  8/15: SLP discontinued, PT/OT, 1.5 hr each discipline, 5 days per week    Continue aspirin and Plavix for secondary stroke prophylaxis until 8/21, then aspirin alone    Continue atorvastatin    Patient with cardiac monitor in place    Outpatient follow-up with stroke Bridge clinic and cardiology, referrals made    Dizziness, resolved  Change meclizine to as needed     Diabetes with hyperglycemia  Uncontrolled, hemoglobin A1c 11.9  Glargine discontinued  Continue metformin and sliding scale insulin  Appreciate hospitalist assistance     Hypertension  Goal normotensive  Continue olmesartan and metoprolol  Appreciate hospitalist assistance     Hyperlipidemia  Continue  atorvastatin     Tachycardia, improved  EKG with sinus rhythm  Continue metoprolol  Appreciate hospitalist assistance     Urinary retention, resolved  Hematuria, resolved  Damon removed 8/15  Continue Flomax  Check postvoid residuals  Intermittent catheterization for volumes over 400, not requiring     Coronary artery disease  With history of CABG  Continue aspirin and Plavix as well as atorvastatin    DVT prophylaxis  Continue Lovenox    Sarai Davila M.D.  Physical Medicine and Rehabilitation

## 2023-08-16 NOTE — CARE PLAN
"The patient is Stable - Low risk of patient condition declining or worsening    Shift Goals  Clinical Goals: safety  Patient Goals: safety, sleep/rest    Problem: Knowledge Deficit - Standard  Goal: Patient and family/care givers will demonstrate understanding of plan of care, disease process/condition, diagnostic tests and medications  Outcome: Progressing  Note: Damon catheter was discontinued yesterday with bloody urine and complaint of pain after voiding.  Bladder scan with zero results. Will continue to monitor.      Problem: Fall Risk - Rehab  Goal: Patient will remain free from falls  Outcome: Progressing  Note: Melani Galeano Fall risk Assessment Score: 19    High fall risk Interventions   - Alarming seatbelt  - Bed and strip alarm   - Yellow sign by the door   - Yellow wrist band \"Fall risk\"  - Room near to the nurse station  - Do not leave patient unattended in the bathroom  - Fall risk education provided  Patient uses call light consistently and appropriately this shift.  Waits for assistance when needed and does not attempt self transfer.  Able to verbalize needs.  Will continue to monitor.            "

## 2023-08-16 NOTE — THERAPY
Physical Therapy   Daily Treatment     Patient Name: Jorden Bonilla  Age:  58 y.o., Sex:  male  Medical Record #: [id number]  Today's Date: 8/16/2023     Precautions  Precautions: Fall Risk  Comments: L phyllis, Zio patch, HTN, DM    Subjective    Pt resting in bed, willing to participate.     Objective       08/16/23 1331   PT Charge Group   PT Gait Training (Units) 2   PT Neuromuscular Re-Education / Balance (Units) 2   PT Total Time Spent   PT Individual Total Time Spent (Mins) 60   Gait Functional Level of Assist    Gait Level Of Assist Moderate Assist   Assistive Device   (galvez rail / L giv-shanti sling/ chair at end of rail for pt to sit)   Distance (Feet) 30   # of Times Distance was Traveled 5   Deviation Ataxic;Decreased Base Of Support;Decreased Heel Strike;Decreased Toe Off  (assist c/ blocking L knee to prevent knee buckling and knee hyperext, L phyllis-weakness)   Transfer Functional Level of Assist   Bed, Chair, Wheelchair Transfer Moderate Assist   Supine Lower Body Exercise   Supine Lower Body Exercises Yes   Bridges Two Legged;3 sets of 15   Hip Flexion 3 sets of 15  (alternating marching from hooklying)   Other Exercises closed chain strength training at shuttle for B squats 2 x 15 with 4 bands/ sinlge limb squats 2 x 15 with 3 bands.   Bed Mobility    Supine to Sit Minimal Assist   Sit to Supine Minimal Assist   Sit to Stand Minimal Assist   Neuro-Muscular Treatments   Neuro-Muscular Treatments Co-Contraction;Facilitation;Joint Approximation;Postural Facilitation;Sequencing;Tactile Cuing;Verbal Cuing;Weight Shift Right;Weight Shift Left   Comments neuro re-ed gait training with RUE support. Neuro re-ed strength training for supine exercises/ closed chain strength training as noted. Pt requires manual cues and joint approximation to LLE throughout activity to maintain proper form and pelvic stabilization.   Interdisciplinary Plan of Care Collaboration   Patient Position at End of Therapy In Bed;Bed Alarm  On;Call Light within Reach;Tray Table within Reach;Phone within Reach         Assessment    Pt tolerated gait and strength training well as noted above but requires significant assist due to L hemiparesis, requires mod A transfers when fatigued for lift/pivot.     Strengths: Able to follow instructions, Alert and oriented, Effective communication skills, Good carryover of learning, Good insight into deficits/needs, Independent prior level of function, Making steady progress towards goals, Motivated for self care and independence, Pleasant and cooperative, Supportive family, Willingly participates in therapeutic activities  Barriers: Bladder retention, Decreased endurance, Hemiparesis, Impaired activity tolerance, Impaired balance, Language barrier, non-fluent English, Limited mobility    Plan    Bed mobility with phyllis techniques, transfer training, gait training with wall rail progressing as appropriate (trial carbon fiber AFO), standing balance, neuro re-ed for L hemibody strengthening, family training/education.      E  therapy tech program 2x/week.     Passport items to be completed:  Get in/out of bed safely, in/out of a vehicle, safely use mobility device, walk or wheel around home/community, navigate up and down stairs, show how to get up/down from the ground, ensure home is accessible, demonstrate HEP, complete caregiver training    Physical Therapy Problems (Active)       Problem: Mobility       Dates: Start:  08/15/23         Goal: STG-Within one week, patient will ambulate household distance 50 ft with HW and CGA.       Dates: Start:  08/15/23            Goal: STG-Within one week, patient will ambulate up/down a curb with HW and min A.       Dates: Start:  08/15/23               Problem: Mobility Transfers       Dates: Start:  08/15/23         Goal: STG-Within one week, patient will perform bed mobility with SPV consistently.       Dates: Start:  08/15/23            Goal: STG-Within one week,  patient will transfer bed to chair stand pivot with HW and CGA.       Dates: Start:  08/15/23               Problem: PT-Long Term Goals       Dates: Start:  08/15/23         Goal: LTG-By discharge, patient will ambulate 150 ft with QC vs SPC and SBA.       Dates: Start:  08/15/23            Goal: LTG-By discharge, patient will transfer one surface to another with QC vs SPC and SPV.       Dates: Start:  08/15/23            Goal: LTG-By discharge, patient will ambulate up/down a curb with QC vs SPC and SBA.       Dates: Start:  08/15/23            Goal: LTG-By discharge, patient will transfer in/out of a car with QC vs SPC and SBA after setup.       Dates: Start:  08/15/23            Goal: LTG-By discharge, patient will ambulate up/down a ramp with QC vs SPC and CGA to simulate home entrance.       Dates: Start:  08/15/23

## 2023-08-16 NOTE — CARE PLAN
Problem: Knowledge Deficit - Standard  Goal: Patient and family/care givers will demonstrate understanding of plan of care, disease process/condition, diagnostic tests and medications  Outcome: Progressing     Problem: Skin Integrity  Goal: Skin integrity is maintained or improved  Note: Patient's skin remains intact and free from new or accidental injury this shift.  Will continue to monitor.    The patient is Stable - Low risk of patient condition declining or worsening    Shift Goals  Clinical Goals: Safety  Patient Goals: Restful sleep

## 2023-08-16 NOTE — CARE PLAN
Problem: Mobility  Goal: STG-Within one week, patient will ambulate household distance 50 ft with HW and CGA.  Outcome: Progressing  Goal: STG-Within one week, patient will ambulate up/down a curb with HW and min A.  Outcome: Progressing     Problem: Mobility Transfers  Goal: STG-Within one week, patient will perform bed mobility with SPV consistently.  Outcome: Progressing  Goal: STG-Within one week, patient will transfer bed to chair stand pivot with HW and CGA.  Outcome: Progressing     Evaluation completed yesterday, goals still appropriate

## 2023-08-17 ENCOUNTER — APPOINTMENT (OUTPATIENT)
Dept: PHYSICAL THERAPY | Facility: REHABILITATION | Age: 58
DRG: 057 | End: 2023-08-17
Attending: PHYSICAL MEDICINE & REHABILITATION
Payer: COMMERCIAL

## 2023-08-17 ENCOUNTER — APPOINTMENT (OUTPATIENT)
Dept: OCCUPATIONAL THERAPY | Facility: REHABILITATION | Age: 58
DRG: 057 | End: 2023-08-17
Attending: PHYSICAL MEDICINE & REHABILITATION
Payer: COMMERCIAL

## 2023-08-17 LAB
GLUCOSE BLD STRIP.AUTO-MCNC: 167 MG/DL (ref 65–99)
GLUCOSE BLD STRIP.AUTO-MCNC: 217 MG/DL (ref 65–99)
GLUCOSE BLD STRIP.AUTO-MCNC: 235 MG/DL (ref 65–99)
GLUCOSE BLD STRIP.AUTO-MCNC: 247 MG/DL (ref 65–99)

## 2023-08-17 PROCEDURE — A9270 NON-COVERED ITEM OR SERVICE: HCPCS | Performed by: PHYSICAL MEDICINE & REHABILITATION

## 2023-08-17 PROCEDURE — 700102 HCHG RX REV CODE 250 W/ 637 OVERRIDE(OP): Performed by: PHYSICAL MEDICINE & REHABILITATION

## 2023-08-17 PROCEDURE — 99231 SBSQ HOSP IP/OBS SF/LOW 25: CPT | Performed by: HOSPITALIST

## 2023-08-17 PROCEDURE — 99232 SBSQ HOSP IP/OBS MODERATE 35: CPT | Performed by: PHYSICAL MEDICINE & REHABILITATION

## 2023-08-17 PROCEDURE — A9270 NON-COVERED ITEM OR SERVICE: HCPCS | Performed by: HOSPITALIST

## 2023-08-17 PROCEDURE — 700102 HCHG RX REV CODE 250 W/ 637 OVERRIDE(OP): Performed by: HOSPITALIST

## 2023-08-17 PROCEDURE — 82962 GLUCOSE BLOOD TEST: CPT | Mod: 91

## 2023-08-17 PROCEDURE — 97112 NEUROMUSCULAR REEDUCATION: CPT

## 2023-08-17 PROCEDURE — 97116 GAIT TRAINING THERAPY: CPT | Mod: CQ

## 2023-08-17 PROCEDURE — 97112 NEUROMUSCULAR REEDUCATION: CPT | Mod: CQ

## 2023-08-17 PROCEDURE — 97110 THERAPEUTIC EXERCISES: CPT

## 2023-08-17 PROCEDURE — 700111 HCHG RX REV CODE 636 W/ 250 OVERRIDE (IP): Mod: JZ | Performed by: PHYSICAL MEDICINE & REHABILITATION

## 2023-08-17 PROCEDURE — 770010 HCHG ROOM/CARE - REHAB SEMI PRIVAT*

## 2023-08-17 RX ORDER — METFORMIN HYDROCHLORIDE 500 MG/1
500 TABLET, EXTENDED RELEASE ORAL 2 TIMES DAILY WITH MEALS
Status: DISCONTINUED | OUTPATIENT
Start: 2023-08-17 | End: 2023-09-05 | Stop reason: HOSPADM

## 2023-08-17 RX ADMIN — SENNOSIDES AND DOCUSATE SODIUM 2 TABLET: 50; 8.6 TABLET ORAL at 20:50

## 2023-08-17 RX ADMIN — SENNOSIDES AND DOCUSATE SODIUM 2 TABLET: 50; 8.6 TABLET ORAL at 07:52

## 2023-08-17 RX ADMIN — CLOPIDOGREL BISULFATE 75 MG: 75 TABLET ORAL at 07:52

## 2023-08-17 RX ADMIN — OLMESARTAN MEDOXOMIL 20 MG: 20 TABLET ORAL at 20:50

## 2023-08-17 RX ADMIN — ENOXAPARIN SODIUM 40 MG: 100 INJECTION SUBCUTANEOUS at 17:49

## 2023-08-17 RX ADMIN — POLYETHYLENE GLYCOL 3350 1 PACKET: 17 POWDER, FOR SOLUTION ORAL at 06:15

## 2023-08-17 RX ADMIN — METOPROLOL TARTRATE 12.5 MG: 25 TABLET, FILM COATED ORAL at 05:37

## 2023-08-17 RX ADMIN — METOPROLOL TARTRATE 12.5 MG: 25 TABLET, FILM COATED ORAL at 17:48

## 2023-08-17 RX ADMIN — ATORVASTATIN CALCIUM 80 MG: 40 TABLET, FILM COATED ORAL at 20:50

## 2023-08-17 RX ADMIN — ASPIRIN 81 MG: 81 TABLET, COATED ORAL at 20:50

## 2023-08-17 RX ADMIN — METFORMIN HYDROCHLORIDE 500 MG: 500 TABLET, EXTENDED RELEASE ORAL at 17:48

## 2023-08-17 RX ADMIN — TAMSULOSIN HYDROCHLORIDE 0.4 MG: 0.4 CAPSULE ORAL at 07:52

## 2023-08-17 ASSESSMENT — ENCOUNTER SYMPTOMS
EYES NEGATIVE: 1
BRUISES/BLEEDS EASILY: 0
MUSCULOSKELETAL NEGATIVE: 1
COUGH: 0
POLYDIPSIA: 0
VOMITING: 0
NAUSEA: 0
CHILLS: 0
FOCAL WEAKNESS: 1
SHORTNESS OF BREATH: 0
PALPITATIONS: 0
FEVER: 0
ABDOMINAL PAIN: 0

## 2023-08-17 ASSESSMENT — PATIENT HEALTH QUESTIONNAIRE - PHQ9
2. FEELING DOWN, DEPRESSED, IRRITABLE, OR HOPELESS: NOT AT ALL
SUM OF ALL RESPONSES TO PHQ9 QUESTIONS 1 AND 2: 0
1. LITTLE INTEREST OR PLEASURE IN DOING THINGS: NOT AT ALL

## 2023-08-17 ASSESSMENT — GAIT ASSESSMENTS
GAIT LEVEL OF ASSIST: MODERATE ASSIST
DEVIATION: ATAXIC;DECREASED BASE OF SUPPORT

## 2023-08-17 ASSESSMENT — ACTIVITIES OF DAILY LIVING (ADL)
BED_CHAIR_WHEELCHAIR_TRANSFER_DESCRIPTION: INCREASED TIME;SUPERVISION FOR SAFETY;VERBAL CUEING;SET-UP OF EQUIPMENT
BED_CHAIR_WHEELCHAIR_TRANSFER_DESCRIPTION: INCREASED TIME;SET-UP OF EQUIPMENT;SUPERVISION FOR SAFETY
BED_CHAIR_WHEELCHAIR_TRANSFER_DESCRIPTION: ADAPTIVE EQUIPMENT;INCREASED TIME;INITIAL PREPARATION FOR TASK;SET-UP OF EQUIPMENT;SUPERVISION FOR SAFETY;VERBAL CUEING

## 2023-08-17 ASSESSMENT — PAIN DESCRIPTION - PAIN TYPE: TYPE: ACUTE PAIN

## 2023-08-17 NOTE — CARE PLAN
The patient is Stable - Low risk of patient condition declining or worsening    Problem: Knowledge Deficit - Standard  Goal: Patient and family/care givers will demonstrate understanding of plan of care, disease process/condition, diagnostic tests and medications  Outcome: Progressing. Reviewed POC, all questions answered.        Problem: Skin Integrity  Goal: Skin integrity is maintained or improved  Outcome: Progressing. Pt's skin remains free from new or accidental injury. Skin protective measures in place.        Shift Goals  Clinical Goals: Safety  Patient Goals: Participate in therapy

## 2023-08-17 NOTE — CARE PLAN
"    The patient is Stable - Low risk of patient condition declining or worsening    Shift Goals  Clinical Goals: safety  Patient Goals: safety, rest/sleep    Problem: Fall Risk - Rehab  Goal: Patient will remain free from falls  8/17/2023 0035 by Maggie Romero R.N.  Outcome: Progressing  Note: Melani Galeano Fall risk Assessment Score: 19    High fall risk Interventions   - Alarming seatbelt  - Bed and strip alarm   - Yellow sign by the door   - Yellow wrist band \"Fall risk\"  - Room near to the nurse station  - Do not leave patient unattended in the bathroom  - Fall risk education provided  Episode of getting out of bed with no calling for help. Reminded to use call light when in need of help for fall precaution and understood. Call light place within reach and will continue on close monitoring       Problem: Skin Integrity  Goal: Skin integrity is maintained or improved  Outcome: Progressing  Note: Skin clean, dry and intact.Patient's skin remains intact and free from new or accidental injury this shift.  Will continue to monitor.   "

## 2023-08-17 NOTE — PROGRESS NOTES
NURSING DAILY NOTE    Name: Jorden Bonilla   Date of Admission: 8/14/2023   Admitting Diagnosis: Ischemic stroke (HCC)  Attending Physician: Sarai Davila M.d.  Allergies: Patient has no known allergies.    Safety  Patient Assist     Patient Precautions  Fall Risk  Precaution Comments  L phyllis, Zio patch, HTN, DM  Bed Transfer Status  Moderate Assist  Toilet Transfer Status   Minimal Assist  Assistive Devices  Rails, Wheelchair  Oxygen  None - Room Air  Diet/Therapeutic Dining  Current Diet Order   Procedures    Diet Order Diet: Consistent CHO (Diabetic) (cardiac and CHO); Second Modifier: (optional): Cardiac     Pill Administration  whole  Agitated Behavioral Scale     ABS Level of Severity       Fall Risk  Has the patient had a fall this admission?   No  Melani Galeano Fall Risk Scoring  19, HIGH RISK  Fall Risk Safety Measures  bed alarm, chair alarm, poor balance, and low vision/ hearing    Vitals  Temperature: 36.9 °C (98.5 °F)  Temp src: Oral  Pulse: 86  Respiration: 17  Blood Pressure: 113/74  Blood Pressure MAP (Calculated): 87 MM HG  BP Location: Right, Upper Arm  Patient BP Position: Sitting (Recline siting position in bed)     Oxygen  Pulse Oximetry: 94 %  O2 (LPM): 0  O2 Delivery Device: None - Room Air    Bowel and Bladder  Last Bowel Movement  08/14/23  Stool Type     Bowel Device     Continent  Bladder: Continent void   Bowel: Continent movement  Bladder Function  Urine Void (mL):  (Large)  Urine Color: Yellow  Genitourinary Assessment   Bladder Assessment (WDL):  WDL Except  Damon Catheter: Present with Active Order  Damon Care: Given with Soap and Water  Urinary Elimination: Catheter (Document on LDA)  Urine Color: Yellow  Bladder Scan: Post Void  $ Bladder Scan Results (mL): 18    Skin  Cooper Score   15  Sensory Interventions   Bed Types: Standard/Trauma Mattress  Skin Preventative Measures: Pillows in Use for Support /  Positioning  Moisture Interventions  Moisturizers/Barriers: Barrier Wipes      Pain  Pain Rating Scale  0 - No Pain  Pain Location  Generalized  Pain Location Orientation     Pain Interventions   Declines    ADLs    Bathing   Shower  Linen Change   Partial  Personal Hygiene     Chlorhexidine Bath      Oral Care     Teeth/Dentures     Shave     Nutrition Percentage Eaten  Snack  Environmental Precautions  Treaded Slipper Socks on Patient, Bed in Low Position  Patient Turns/Positioning  Patient Turns Self from Side to Side  Patient Turns Assistance/Tolerance     Bed Positions  Bed Controls On, Bed Locked  Head of Bed Elevated  Greater or equal to 30 degrees      Psychosocial/Neurologic Assessment  Psychosocial Assessment  Psychosocial (WDL):  Within Defined Limits  Neurologic Assessment  Neuro (WDL): Within Defined Limits  Level of Consciousness: Alert  EENT (WDL):  WDL Except    Cardio/Pulmonary Assessment  Edema      Respiratory Breath Sounds     Cardiac Assessment   Cardiac (WDL):  WDL Except (tachycardia, Hx-CAD, HTN, mult. coronary artery by pass)

## 2023-08-17 NOTE — PROGRESS NOTES
"Rehab Progress Note     Date of Service: 2023  Chief Complaint: Follow-up stroke    Interval Events (Subjective)    Patient seen and examined today in the therapy gym.  He is doing some ambulation with physical therapy.  He reports the new onset of some left-sided lower back pain, new since his stroke.  He slept well last night.  He is constipated without a bowel movement in the last 3 days.  Continues to have low postvoid residuals.  We discussed his estimated discharge date which is been set for .    Patient has no other new questions, concerns, or complaints today.       Objective:  VITAL SIGNS: /68   Pulse 86   Temp 36.4 °C (97.6 °F) (Oral)   Resp 17   Ht 1.651 m (5' 5\")   Wt 63 kg (139 lb)   SpO2 96%   BMI 23.13 kg/m²   Gen: alert, no apparent distress  CV: Regular rate, regular rhythm  Resp: Clear to auscultation bilaterally  Neuro: Left hemiparesis   MSK: Left paraspinal muscle spasms with tenderness to palpation    Recent Results (from the past 72 hour(s))   EKG    Collection Time: 23  2:41 PM   Result Value Ref Range    Report       [facility]    Test Date:  2023  Pt Name:    KENDRA GAFFNEY                 Department: [facility]  MRN:        [id number]                      Room:       [facility]  Gender:     Male                         Technician: [id number] WT  :        1965                   Requested By:TIFFANIE SAINI  Order #:    [id number]                    Reading MD: Oxana Mckeon MD    Measurements  Intervals                                Axis  Rate:       108                          P:          20  NE:         139                          QRS:        6  QRSD:       77                           T:          34  QT:         324  QTc:        435    Interpretive Statements  Sinus tachycardia  Left atrial enlargement  Electronically Signed On 2023 15:00:59 PDT by Oxana Mckeon MD     URINALYSIS    Collection Time: 23  3:45 PM   Result Value Ref Range    " Color Yellow     Character Clear     Specific Gravity 1.010 <1.035    Ph 6.5 5.0 - 8.0    Glucose 500 (A) Negative mg/dL    Ketones Negative Negative mg/dL    Protein Negative Negative mg/dL    Bilirubin Negative Negative    Urobilinogen, Urine 0.2 Negative    Nitrite Negative Negative    Leukocyte Esterase Negative Negative    Occult Blood Large (A) Negative    Micro Urine Req Microscopic    URINE MICROSCOPIC (W/UA)    Collection Time: 08/14/23  3:45 PM   Result Value Ref Range    WBC 0-2 (A) /hpf    RBC 2-5 (A) /hpf    Bacteria Negative None /hpf    Epithelial Cells Negative /hpf    Hyaline Cast 0-2 /lpf   POCT glucose device results    Collection Time: 08/14/23  5:10 PM   Result Value Ref Range    POC Glucose, Blood 254 (H) 65 - 99 mg/dL   POCT glucose device results    Collection Time: 08/14/23  7:28 PM   Result Value Ref Range    POC Glucose, Blood 278 (H) 65 - 99 mg/dL   CBC with Differential    Collection Time: 08/15/23  5:38 AM   Result Value Ref Range    WBC 9.7 4.8 - 10.8 K/uL    RBC 6.29 (H) 4.70 - 6.10 M/uL    Hemoglobin 16.5 14.0 - 18.0 g/dL    Hematocrit 50.9 42.0 - 52.0 %    MCV 80.9 (L) 81.4 - 97.8 fL    MCH 26.2 (L) 27.0 - 33.0 pg    MCHC 32.4 32.3 - 36.5 g/dL    RDW 36.8 35.9 - 50.0 fL    Platelet Count 270 164 - 446 K/uL    MPV 10.9 9.0 - 12.9 fL    Neutrophils-Polys 57.20 44.00 - 72.00 %    Lymphocytes 28.50 22.00 - 41.00 %    Monocytes 10.50 0.00 - 13.40 %    Eosinophils 2.90 0.00 - 6.90 %    Basophils 0.50 0.00 - 1.80 %    Immature Granulocytes 0.40 0.00 - 0.90 %    Nucleated RBC 0.00 0.00 - 0.20 /100 WBC    Neutrophils (Absolute) 5.53 1.82 - 7.42 K/uL    Lymphs (Absolute) 2.75 1.00 - 4.80 K/uL    Monos (Absolute) 1.01 (H) 0.00 - 0.85 K/uL    Eos (Absolute) 0.28 0.00 - 0.51 K/uL    Baso (Absolute) 0.05 0.00 - 0.12 K/uL    Immature Granulocytes (abs) 0.04 0.00 - 0.11 K/uL    NRBC (Absolute) 0.00 K/uL   Comp Metabolic Panel (CMP)    Collection Time: 08/15/23  5:38 AM   Result Value Ref Range     Sodium 141 135 - 145 mmol/L    Potassium 4.1 3.6 - 5.5 mmol/L    Chloride 103 96 - 112 mmol/L    Co2 28 20 - 33 mmol/L    Anion Gap 10.0 7.0 - 16.0    Glucose 186 (H) 65 - 99 mg/dL    Bun 14 8 - 22 mg/dL    Creatinine 0.89 0.50 - 1.40 mg/dL    Calcium 9.3 8.5 - 10.5 mg/dL    Correct Calcium 9.5 8.5 - 10.5 mg/dL    AST(SGOT) 19 12 - 45 U/L    ALT(SGPT) 15 2 - 50 U/L    Alkaline Phosphatase 69 30 - 99 U/L    Total Bilirubin 0.6 0.1 - 1.5 mg/dL    Albumin 3.7 3.2 - 4.9 g/dL    Total Protein 6.4 6.0 - 8.2 g/dL    Globulin 2.7 1.9 - 3.5 g/dL    A-G Ratio 1.4 g/dL   Magnesium    Collection Time: 08/15/23  5:38 AM   Result Value Ref Range    Magnesium 2.3 1.5 - 2.5 mg/dL   ESTIMATED GFR    Collection Time: 08/15/23  5:38 AM   Result Value Ref Range    GFR (CKD-EPI) 98 >60 mL/min/1.73 m 2   POCT glucose device results    Collection Time: 08/15/23  6:28 AM   Result Value Ref Range    POC Glucose, Blood 202 (H) 65 - 99 mg/dL   POCT glucose device results    Collection Time: 08/15/23 11:29 AM   Result Value Ref Range    POC Glucose, Blood 200 (H) 65 - 99 mg/dL   POCT glucose device results    Collection Time: 08/15/23  4:51 PM   Result Value Ref Range    POC Glucose, Blood 174 (H) 65 - 99 mg/dL   POCT glucose device results    Collection Time: 08/15/23  8:28 PM   Result Value Ref Range    POC Glucose, Blood 232 (H) 65 - 99 mg/dL   POCT glucose device results    Collection Time: 08/16/23  7:05 AM   Result Value Ref Range    POC Glucose, Blood 212 (H) 65 - 99 mg/dL   POCT glucose device results    Collection Time: 08/16/23 11:40 AM   Result Value Ref Range    POC Glucose, Blood 238 (H) 65 - 99 mg/dL   POCT glucose device results    Collection Time: 08/16/23  5:09 PM   Result Value Ref Range    POC Glucose, Blood 172 (H) 65 - 99 mg/dL   POCT glucose device results    Collection Time: 08/16/23  8:06 PM   Result Value Ref Range    POC Glucose, Blood 254 (H) 65 - 99 mg/dL   POCT glucose device results    Collection Time: 08/17/23   7:30 AM   Result Value Ref Range    POC Glucose, Blood 235 (H) 65 - 99 mg/dL       Scheduled Medications   Medication Dose Frequency    insulin regular  2-12 Units 4X/DAY ACHS    metFORMIN ER  500 mg PM MEAL    metoprolol tartrate  12.5 mg TWICE DAILY    aspirin  81 mg Q EVENING    atorvastatin  80 mg Q EVENING    clopidogrel  75 mg DAILY    enoxaparin (LOVENOX) injection  40 mg DAILY AT 1800    olmesartan  20 mg Q EVENING    senna-docusate  2 Tablet BID    tamsulosin  0.4 mg AFTER BREAKFAST       Current Diet Order   Procedures    Diet Order Diet: Consistent CHO (Diabetic) (cardiac and CHO); Second Modifier: (optional): Cardiac       Assessment:    This patient is a 58 y.o. male admitted for acute inpatient rehabilitation with Ischemic stroke (HCC).      I, Sarai Davila M.D./MD., was present and led the interdisciplinary team conference on 8/16/2023.  I led the IDT conference and agree with the IDT conference documentation and plan of care as noted below.     Nursing:  Diet Current Diet Order   Procedures    Diet Order Diet: Consistent CHO (Diabetic) (cardiac and CHO); Second Modifier: (optional): Cardiac       Eating ADL Supervision  Set-up of equipment or meal/tube feeding   % of Last Meal  Oral Nutrition: *  * Meal *  *, Breakfast, Between % Consumed (100%)   Sleep No issues   Bowel Last BM: 08/14/23   Bladder Post Void  5   Barriers to Discharge Home: left hemiplegia      Physical Therapy:  Bed Mobility    Transfers Minimal Assist  Increased time, Set-up of equipment, Supervision for safety, Verbal cueing (stand pivot to the R no AD)   Mobility Total Assist X 2 (mod A x1 plus wc follow)   Stairs Unable to Participate   Barriers to Discharge Home: left hemiplegia       Occupational Therapy:  Grooming Supervision, Seated   Bathing Moderate Assist   UB Dressing Stand by Assist   LB Dressing Moderate Assist   Toileting Total Assist   Shower & Tub Transfer Minimal Assist   Barriers to Discharge Home:  left hemiplegia      Speech-Language Pathology:  Comprehension:  Independent  Comprehension Description:     Expression:  Independent  Expression Description:     Social Interaction:  Independent  Social Interaction Description:     Problem Solving:  Independent  Problem Solving Description:     Memory:  Supervision  Memory Description:  Verbal cueing, Therapy schedule, Increased time, Bed/chair alarm, Seat belt  Barriers to Discharge Home: none, signed off    Respiratory Therapy:  O2 (LPM): 0  O2 Delivery Device: None - Room Air    Case Management:  Continues to work on disposition and DME needs.      Discharge Date/Disposition:    9/5    HH: PT/OT/SLP/RN, ?rehab without walls    Equip: TBD    Follow-ups: PCP, stroke bridge, cardiology      Problem List/Medical Decision Making and Plan:    Right anterior medullary stroke  Left hemiparesis  Cognitive impairment, mild  Dysphagia  Continue full rehab program  PT/OT/SLP, 1 hr each discipline, 5 days per week  8/15: SLP discontinued, PT/OT, 1.5 hr each discipline, 5 days per week    Continue aspirin and Plavix for secondary stroke prophylaxis until 8/21, then aspirin alone    Continue atorvastatin    Patient with cardiac monitor in place    Outpatient follow-up with stroke Bridge clinic and cardiology, referrals made    Dizziness, resolved  Continue meclizine as needed    Left paraspinal muscle spasms  Heat as needed  Continue Tylenol as needed     Diabetes with hyperglycemia  Uncontrolled, hemoglobin A1c 11.9  Continue metformin and sliding scale insulin  Appreciate hospitalist assistance     Hypertension  Continue olmesartan and metoprolol  Appreciate hospitalist assistance     Hyperlipidemia  Continue atorvastatin     Sinus tachycardia, improved  Continue metoprolol  Appreciate hospitalist assistance     Urinary retention, resolved  Hematuria, resolved  Damon removed 8/15  Continue Flomax, may be able to discontinue prior to discharge  Low postvoid  residuals  Intermittent catheterization for volumes over 400, not requiring     Coronary artery disease  With history of CABG  Continue aspirin, Plavix, atorvastatin    Constipation  Continue Senokot MiraLAX  Last bowel movement 8/14    DVT prophylaxis  Continue Lovenox    Sarai Davila M.D.  Physical Medicine and Rehabilitation

## 2023-08-17 NOTE — THERAPY
Physical Therapy   Daily Treatment     Patient Name: Jorden Bonilla  Age:  58 y.o., Sex:  male  Medical Record #: [id number]  Today's Date: 8/17/2023     Precautions  Precautions: Fall Risk  Comments: L phyllis, Zio patch, HTN, DM    Subjective    Pt agreeable to therapy. States he speaks Tagalog fluently. States wife had a fall last night with arm extended that resulted in a broken hand.      Objective       08/17/23 0831   PT Charge Group   PT Therapeutic Exercise (Units) 3   PT Total Time Spent   PT Individual Total Time Spent (Mins) 45   Transfer Functional Level of Assist   Bed, Chair, Wheelchair Transfer Moderate Assist   Bed Chair Wheelchair Transfer Description Increased time;Set-up of equipment;Supervision for safety  (Squat pivot)   Sitting Lower Body Exercises   Long Arc Quad 2 sets of 10  (4# RLE. Active assist LLE)   Marching 2 sets of 10  (4# RLE. Active assist LLE)   Hamstring Curl 2 sets of 10  (Blue band RLE. Hamstring set LLE against therapist resistance)   Sit to Stand 2 sets of 10  (no UE support.)   Bed Mobility    Supine to Sit Minimal Assist   Sit to Stand Contact Guard Assist  (Tibial block LLE. With verbal cues to lean forward)   Interdisciplinary Plan of Care Collaboration   IDT Collaboration with  Physical Therapist   Patient Position at End of Therapy Seated;Chair Alarm On;Tray Table within Reach;Call Light within Reach;Phone within Reach   Collaboration Comments CLOF         Assessment    Today's session focused on LE strengthening and sit to stand sequencing. Initially, patient presented with a slight posterior lean with sit to stands and L knee hyperextension in sit to stands. With verbal cues to bend at the hips and bring weight forward, patient was able to correctly demonstrate sit to stands. RLE can tolerate therex with 4# ankle weight. LLE requires active assist during therex. He requires tactile and verbal cues to contract hamstrings on LLE.     Strengths: Able to follow  instructions, Alert and oriented, Effective communication skills, Good carryover of learning, Good insight into deficits/needs, Independent prior level of function, Making steady progress towards goals, Motivated for self care and independence, Pleasant and cooperative, Supportive family, Willingly participates in therapeutic activities  Barriers: Bladder retention, Decreased endurance, Hemiparesis, Impaired activity tolerance, Impaired balance, Language barrier, non-fluent English, Limited mobility    Plan    Bed mobility with phyllis techniques, transfer training, gait training with wall rail progressing as appropriate (trial carbon fiber AFO), standing balance, neuro re-ed for R hemibody strengthening, family training/education.      [facility]  therapy tech program 2x/week    Passport items to be completed:  Get in/out of bed safely, in/out of a vehicle, safely use mobility device, walk or wheel around home/community, navigate up and down stairs, show how to get up/down from the ground, ensure home is accessible, demonstrate HEP, complete caregiver training    Physical Therapy Problems (Active)       Problem: Mobility       Dates: Start:  08/15/23         Goal: STG-Within one week, patient will ambulate household distance 50 ft with HW and CGA.       Dates: Start:  08/15/23            Goal: STG-Within one week, patient will ambulate up/down a curb with HW and min A.       Dates: Start:  08/15/23               Problem: Mobility Transfers       Dates: Start:  08/15/23         Goal: STG-Within one week, patient will perform bed mobility with SPV consistently.       Dates: Start:  08/15/23            Goal: STG-Within one week, patient will transfer bed to chair stand pivot with HW and CGA.       Dates: Start:  08/15/23               Problem: PT-Long Term Goals       Dates: Start:  08/15/23         Goal: LTG-By discharge, patient will ambulate 150 ft with QC vs SPC and SBA.       Dates: Start:  08/15/23            Goal:  LTG-By discharge, patient will transfer one surface to another with QC vs SPC and SPV.       Dates: Start:  08/15/23            Goal: LTG-By discharge, patient will ambulate up/down a curb with QC vs SPC and SBA.       Dates: Start:  08/15/23            Goal: LTG-By discharge, patient will transfer in/out of a car with QC vs SPC and SBA after setup.       Dates: Start:  08/15/23            Goal: LTG-By discharge, patient will ambulate up/down a ramp with QC vs SPC and CGA to simulate home entrance.       Dates: Start:  08/15/23

## 2023-08-17 NOTE — PROGRESS NOTES
Hospital Medicine Daily Progress Note        Chief Complaint  Hypertension  Diabetes    Interval Problem Update  Doing well; no new issues.    Review of Systems  Review of Systems   Constitutional:  Negative for chills and fever.   HENT: Negative.     Eyes: Negative.    Respiratory:  Negative for cough and shortness of breath.    Cardiovascular:  Negative for chest pain and palpitations.   Gastrointestinal:  Negative for abdominal pain, nausea and vomiting.   Musculoskeletal: Negative.    Skin:  Negative for itching and rash.   Neurological:  Positive for focal weakness.   Endo/Heme/Allergies:  Negative for polydipsia. Does not bruise/bleed easily.        Physical Exam  Temp:  [36.4 °C (97.6 °F)-36.9 °C (98.5 °F)] 36.4 °C (97.6 °F)  Pulse:  [86-96] 86  Resp:  [17] 17  BP: (105-115)/(68-74) 105/68  SpO2:  [94 %-97 %] 96 %    Physical Exam  Vitals reviewed.   Constitutional:       General: He is not in acute distress.     Appearance: Normal appearance. He is not ill-appearing.   HENT:      Head: Normocephalic and atraumatic.      Right Ear: External ear normal.      Left Ear: External ear normal.      Nose: Nose normal.      Mouth/Throat:      Pharynx: Oropharynx is clear.   Eyes:      General:         Right eye: No discharge.         Left eye: No discharge.      Extraocular Movements: Extraocular movements intact.      Conjunctiva/sclera: Conjunctivae normal.   Cardiovascular:      Rate and Rhythm: Normal rate and regular rhythm.   Pulmonary:      Effort: Pulmonary effort is normal. No respiratory distress.      Breath sounds: Normal breath sounds. No wheezing.   Abdominal:      General: Bowel sounds are normal. There is no distension.      Palpations: Abdomen is soft.      Tenderness: There is no abdominal tenderness.   Musculoskeletal:      Cervical back: Normal range of motion and neck supple.      Right lower leg: No edema.      Left lower leg: No edema.   Skin:     General: Skin is warm and dry.   Neurological:       Mental Status: He is alert and oriented to person, place, and time.         Fluids    Intake/Output Summary (Last 24 hours) at 8/17/2023 1357  Last data filed at 8/17/2023 0735  Gross per 24 hour   Intake 600 ml   Output 1050 ml   Net -450 ml       Laboratory  Recent Labs     08/15/23  0538   WBC 9.7   RBC 6.29*   HEMOGLOBIN 16.5   HEMATOCRIT 50.9   MCV 80.9*   MCH 26.2*   MCHC 32.4   RDW 36.8   PLATELETCT 270   MPV 10.9     Recent Labs     08/15/23  0538   SODIUM 141   POTASSIUM 4.1   CHLORIDE 103   CO2 28   GLUCOSE 186*   BUN 14   CREATININE 0.89   CALCIUM 9.3                   Assessment/Plan  * Ischemic stroke (HCC)- (present on admission)  Assessment & Plan  Presented w/ L HP  S/P ZioPatch  On ASA, Plavix, and Lipitor  Management per Physiatry    Microcytosis  Assessment & Plan  Check Fe Panel next draw  Follow H/H  Check F/U labs in AM    Primary hypertension- (present on admission)  Assessment & Plan  Continue Olmesartan and Metoprolol  Monitor for orthostatic hypotension on Flomax    Type 2 diabetes mellitus with hyperglycemia, without long-term current use of insulin (HCC)- (present on admission)  Assessment & Plan  HbA1c 11.9  Will increase Metformin XR to optimize serum glucose control  Discontinued Lantus, continue SSI  Outpt meds include Metformin  mg bid, Jardiance 25 mg qd, and Trulicity 1.5 mg SQ qwk    Coronary artery disease due to calcified coronary lesion: CABG ×3 in 2015- (present on admission)  Assessment & Plan  H/O CABG  Continue ASA, Lipitor, Olmesartan, and Metoprolol  Cardiology F/U       Full Code

## 2023-08-17 NOTE — THERAPY
Occupational Therapy  Daily Treatment     Patient Name: Jorden Bonilla  Age:  58 y.o., Sex:  male  Medical Record #: [id number]  Today's Date: 8/17/2023     Precautions  Precautions: (P) Fall Risk  Comments: (P) L phyllis, Zio patch, HTN, DM         Subjective    Agreeable to tx activity options presented this session      Objective       08/17/23 1102   OT Charge Group   OT Neuromuscular Re-education / Balance (Units) 2   OT Total Time Spent   OT Individual Total Time Spent (Mins) 30   Precautions   Precautions Fall Risk   Comments L phyllis, Zio patch, HTN, DM   Functional Level of Assist   Bed, Chair, Wheelchair Transfer Moderate Assist   Neuro-Muscular Treatments   Neuro-Muscular Treatments Electrical Stimulation;Tapping;Verbal Cuing;Weight Shift Left   Comments seated edge of mat  weight bearing through left UE while reaching with the right  trace to trace plus active scapular elevation  and  elbow/triceps extension   with mod/ max cues to attend to the rigth  and muscle belly tapping.   NMES  x 7 minutes right forearm  26 milliamps    for   left wrist and finger extension.    able to achieve some finger extension and  wrist extension to neutral   Supine Lower Body Exercise   Supine Lower Body Exercises No   Interdisciplinary Plan of Care Collaboration   Patient Position at End of Therapy Seated;Self Releasing Lap Belt Applied;Chair Alarm On  (in dining room for lunch)         Assessment     Participates to the best of his ability   tolerated NMES  no complaints     Strengths: Able to follow instructions, Alert and oriented, Effective communication skills, Good carryover of learning, Good insight into deficits/needs, Independent prior level of function, Manages pain appropriately, Motivated for self care and independence, Pleasant and cooperative, Supportive family, Willingly participates in therapeutic activities  Barriers: Hemiplegia, Hypertension, Impaired balance, Limited mobility    Plan     Aggressive  left UE   neuro re ed   ADL  related mobility and transfers     Occupational Therapy Goals (Active)       Problem: Bathing       Dates: Start:  08/15/23         Goal: STG-Within one week, patient will bathe body with min A using AE/AD prn       Dates: Start:  08/15/23         Goal Note filed on 08/16/23 1249 by Pricilla Bazzi MS,OTR/L       New admit                 Problem: Dressing       Dates: Start:  08/15/23         Goal: STG-Within one week, patient will dress LB with min A       Dates: Start:  08/15/23         Goal Note filed on 08/16/23 1249 by Pricilla Bazzi MS,OTR/L       New admit                 Problem: Functional Transfers       Dates: Start:  08/15/23         Goal: STG-Within one week, patient will transfer to toilet with CGA using grab bar       Dates: Start:  08/15/23         Goal Note filed on 08/16/23 1249 by Pricilla Bazzi MS,OTR/L       New admit                 Problem: OT Long Term Goals       Dates: Start:  08/15/23         Goal: LTG-By discharge, patient will complete basic self care tasks with SBA/supervision       Dates: Start:  08/15/23            Goal: LTG-By discharge, patient will perform bathroom transfers with supervision/mod I       Dates: Start:  08/15/23               Problem: Toileting       Dates: Start:  08/15/23         Goal: STG-Within one week, patient will complete toileting tasks with max A       Dates: Start:  08/15/23         Goal Note filed on 08/16/23 1249 by Pricilla Bazzi MS,OTR/L       New admit

## 2023-08-17 NOTE — THERAPY
Occupational Therapy  Daily Treatment     Patient Name: Jorden Bonilla  Age:  58 y.o., Sex:  male  Medical Record #: [id number]  Today's Date: 8/17/2023     Precautions  Precautions: Fall Risk  Comments: L phyllis, Zio patch, HTN, DM         Subjective    Pt supine in bed upon arrival. Pt pleasant and cooperative, agreeable to therapy.     Objective       08/17/23 1401   OT Total Time Spent   OT Individual Total Time Spent (Mins) 60   Precautions   Precautions Fall Risk   Comments L phyllis, Zio patch, HTN, DM   Vitals   O2 Delivery Device None - Room Air   Cognition    Level of Consciousness Alert   Sleep/Wake Cycle   Sleep & Rest Awake   Functional Level of Assist   Bed, Chair, Wheelchair Transfer Moderate Assist   Bed Chair Wheelchair Transfer Description Adaptive equipment;Increased time;Initial preparation for task;Set-up of equipment;Supervision for safety;Verbal cueing  (stand pivot from bed to w/c.)   Neuro-Muscular Treatments   Neuro-Muscular Treatments Electrical Stimulation   Comments , see note for details   Interdisciplinary Plan of Care Collaboration   Patient Position at End of Therapy Seated;Chair Alarm On;Self Releasing Lap Belt Applied;Call Light within Reach;Tray Table within Reach;Phone within Reach     Neuro-Muscular Treatments  LUE neuro re-ed using  FES cycling with BUE's. Electrodes placed at LUE shoulder, scapular stabilizers, biceps/triceps, forearm extensors/flexors. Distance travelled: 2.98 mile, energy expended: 0.2 kcal, energy per hour: 0.5 kcal/hour, avg power: 0.5 w, avg asymmetry 0 %, total therapy time: 30 min including warm-up/cool-down, time active (off motor): 5:36 min, time passive (on motor) 24:24 min.      Assessment    Pt completed  FES cycling for LUE neuro re-edu, ROM, sensory input. Pt tolerated session well, good muscle contraction observed, and no complaints of pain.       Strengths: Able to follow instructions, Alert and oriented, Effective communication  skills, Good carryover of learning, Good insight into deficits/needs, Independent prior level of function, Manages pain appropriately, Motivated for self care and independence, Pleasant and cooperative, Supportive family, Willingly participates in therapeutic activities  Barriers: Hemiplegia, Hypertension, Impaired balance, Limited mobility    Plan    LUE neuro re-ed. ADL/IADL training. Increase overall strength and endurance.     Passport items to be completed:  Perform bathroom transfers, complete dressing, complete feeding, get ready for the day, prepare a simple meal, participate in household tasks, adapt home for safety needs, demonstrate home exercise program, complete caregiver training     Occupational Therapy Goals (Active)       Problem: Bathing       Dates: Start:  08/15/23         Goal: STG-Within one week, patient will bathe body with min A using AE/AD prn       Dates: Start:  08/15/23         Goal Note filed on 08/16/23 1249 by Pricilla Bazzi, MS,OTR/L       New admit                 Problem: Dressing       Dates: Start:  08/15/23         Goal: STG-Within one week, patient will dress LB with min A       Dates: Start:  08/15/23         Goal Note filed on 08/16/23 1249 by Pricilla Bazzi MS,OTR/L       New admit                 Problem: Functional Transfers       Dates: Start:  08/15/23         Goal: STG-Within one week, patient will transfer to toilet with CGA using grab bar       Dates: Start:  08/15/23         Goal Note filed on 08/16/23 1249 by Pricilla Bazzi, MS,OTR/L       New admit                 Problem: OT Long Term Goals       Dates: Start:  08/15/23         Goal: LTG-By discharge, patient will complete basic self care tasks with SBA/supervision       Dates: Start:  08/15/23            Goal: LTG-By discharge, patient will perform bathroom transfers with supervision/mod I       Dates: Start:  08/15/23               Problem: Toileting       Dates: Start:  08/15/23         Goal:  STG-Within one week, patient will complete toileting tasks with max A       Dates: Start:  08/15/23         Goal Note filed on 08/16/23 1249 by Pricilla Bazzi MS,OTR/L       New admit

## 2023-08-17 NOTE — CARE PLAN
The patient is Stable - Low risk of patient condition declining or worsening    Shift Goals  Clinical Goals: safety  Patient Goals: safety, rest/sleep

## 2023-08-17 NOTE — THERAPY
"Physical Therapy   Daily Treatment     Patient Name: Jorden Bonilla  Age:  58 y.o., Sex:  male  Medical Record #: [id number]  Today's Date: 8/17/2023     Precautions  Precautions: Fall Risk  Comments: L phyllis, Zio patch, HTN, DM    Subjective    \"I have no control of this leg\"     Objective       08/17/23 1001   PT Charge Group   PT Gait Training (Units) 2   PT Neuromuscular Re-Education / Balance (Units) 2   Supervising Physical Therapist Deepthi Cabrera   PT Total Time Spent   PT Individual Total Time Spent (Mins) 60   Gait Functional Level of Assist    Gait Level Of Assist Moderate Assist   Assistive Device Other (Comments);Quad Cane  (L givmohr, L carbon fiber AFO + gait slider)   Distance (Feet)   (30' x 1 and 40' x 2)   # of Times Distance was Traveled 3   Deviation Ataxic;Decreased Base Of Support   Stairs Functional Level of Assist   Level of Assist with Stairs Unable to Participate   # of Stairs Climbed 0   Stairs Description Safety concerns   Transfer Functional Level of Assist   Bed, Chair, Wheelchair Transfer Minimal Assist   Bed Chair Wheelchair Transfer Description Increased time;Supervision for safety;Verbal cueing;Set-up of equipment   Sitting Lower Body Exercises   Long Arc Quad 2 sets of 10;Left   Bed Mobility    Sit to Stand Contact Guard Assist   Neuro-Muscular Treatments   Neuro-Muscular Treatments Postural Facilitation;Weight Shift Right;Sequencing;Tactile Cuing   Interdisciplinary Plan of Care Collaboration   IDT Collaboration with  Physical Therapist;Physician;Certified O.T. Assistant  (LEE)   Patient Position at End of Therapy Seated;Self Releasing Lap Belt Applied   Collaboration Comments PT CLOF, LEE transferred care to, MD observed pt amb     Progresses from hallway HR to LBQC due to no HW available, pt amb with modA, postural facilitation for ws to the R for L LE advancement. Pt attempting to vault on the R foot for L foot clearance    Forced wbing/joint approximation standing on L " "foot with R LE on 6\" step with stabilization of UE on LBQC in R hand 2 x 10    Assessment    Pt tolerated progression to LBQC this session with use of a carbon fiber AFO, pt amb with 3 point step to pattern, emerging quad control with less knee hyper extension noted this session  Strengths: Able to follow instructions, Alert and oriented, Effective communication skills, Good carryover of learning, Good insight into deficits/needs, Independent prior level of function, Making steady progress towards goals, Motivated for self care and independence, Pleasant and cooperative, Supportive family, Willingly participates in therapeutic activities  Barriers: Bladder retention, Decreased endurance, Hemiparesis, Impaired activity tolerance, Impaired balance, Language barrier, non-fluent English, Limited mobility    Plan    Bed mobility with phyllis techniques, transfer training, gait training with wall rail progressing as appropriate (trial carbon fiber AFO), standing balance, neuro re-ed for R hemibody strengthening, family training/education.      E  therapy tech program 2x/week     Passport items to be completed:  Get in/out of bed safely, in/out of a vehicle, safely use mobility device, walk or wheel around home/community, navigate up and down stairs, show how to get up/down from the ground, ensure home is accessible, demonstrate HEP, complete caregiver training    Physical Therapy Problems (Active)       Problem: Mobility       Dates: Start:  08/15/23         Goal: STG-Within one week, patient will ambulate household distance 50 ft with HW and CGA.       Dates: Start:  08/15/23            Goal: STG-Within one week, patient will ambulate up/down a curb with HW and min A.       Dates: Start:  08/15/23               Problem: Mobility Transfers       Dates: Start:  08/15/23         Goal: STG-Within one week, patient will perform bed mobility with SPV consistently.       Dates: Start:  08/15/23            Goal: STG-Within " one week, patient will transfer bed to chair stand pivot with HW and CGA.       Dates: Start:  08/15/23               Problem: PT-Long Term Goals       Dates: Start:  08/15/23         Goal: LTG-By discharge, patient will ambulate 150 ft with QC vs SPC and SBA.       Dates: Start:  08/15/23            Goal: LTG-By discharge, patient will transfer one surface to another with QC vs SPC and SPV.       Dates: Start:  08/15/23            Goal: LTG-By discharge, patient will ambulate up/down a curb with QC vs SPC and SBA.       Dates: Start:  08/15/23            Goal: LTG-By discharge, patient will transfer in/out of a car with QC vs SPC and SBA after setup.       Dates: Start:  08/15/23            Goal: LTG-By discharge, patient will ambulate up/down a ramp with QC vs SPC and CGA to simulate home entrance.       Dates: Start:  08/15/23

## 2023-08-18 ENCOUNTER — APPOINTMENT (OUTPATIENT)
Dept: OCCUPATIONAL THERAPY | Facility: REHABILITATION | Age: 58
DRG: 057 | End: 2023-08-18
Attending: PHYSICAL MEDICINE & REHABILITATION
Payer: COMMERCIAL

## 2023-08-18 ENCOUNTER — APPOINTMENT (OUTPATIENT)
Dept: PHYSICAL THERAPY | Facility: REHABILITATION | Age: 58
DRG: 057 | End: 2023-08-18
Attending: PHYSICAL MEDICINE & REHABILITATION
Payer: COMMERCIAL

## 2023-08-18 ENCOUNTER — APPOINTMENT (OUTPATIENT)
Dept: RADIOLOGY | Facility: REHABILITATION | Age: 58
DRG: 057 | End: 2023-08-18
Attending: PHYSICAL MEDICINE & REHABILITATION
Payer: COMMERCIAL

## 2023-08-18 ENCOUNTER — ANCILLARY PROCEDURE (OUTPATIENT)
Dept: CARDIOLOGY | Facility: REHABILITATION | Age: 58
DRG: 057 | End: 2023-08-18
Attending: PHYSICAL MEDICINE & REHABILITATION
Payer: COMMERCIAL

## 2023-08-18 PROBLEM — D72.829 LEUKOCYTOSIS: Status: ACTIVE | Noted: 2023-08-18

## 2023-08-18 LAB
ANION GAP SERPL CALC-SCNC: 12 MMOL/L (ref 7–16)
APPEARANCE UR: CLEAR
BACTERIA #/AREA URNS HPF: ABNORMAL /HPF
BILIRUB UR QL STRIP.AUTO: NEGATIVE
BUN SERPL-MCNC: 19 MG/DL (ref 8–22)
CALCIUM SERPL-MCNC: 9.3 MG/DL (ref 8.5–10.5)
CHLORIDE SERPL-SCNC: 100 MMOL/L (ref 96–112)
CO2 SERPL-SCNC: 25 MMOL/L (ref 20–33)
COLOR UR: YELLOW
CREAT SERPL-MCNC: 0.75 MG/DL (ref 0.5–1.4)
EPI CELLS #/AREA URNS HPF: NEGATIVE /HPF
ERYTHROCYTE [DISTWIDTH] IN BLOOD BY AUTOMATED COUNT: 37.4 FL (ref 35.9–50)
GFR SERPLBLD CREATININE-BSD FMLA CKD-EPI: 103 ML/MIN/1.73 M 2
GLUCOSE BLD STRIP.AUTO-MCNC: 147 MG/DL (ref 65–99)
GLUCOSE BLD STRIP.AUTO-MCNC: 188 MG/DL (ref 65–99)
GLUCOSE BLD STRIP.AUTO-MCNC: 202 MG/DL (ref 65–99)
GLUCOSE BLD STRIP.AUTO-MCNC: 225 MG/DL (ref 65–99)
GLUCOSE SERPL-MCNC: 153 MG/DL (ref 65–99)
GLUCOSE UR STRIP.AUTO-MCNC: 250 MG/DL
HCT VFR BLD AUTO: 49.3 % (ref 42–52)
HGB BLD-MCNC: 15.9 G/DL (ref 14–18)
HYALINE CASTS #/AREA URNS LPF: ABNORMAL /LPF
IRON SATN MFR SERPL: 13 % (ref 15–55)
IRON SERPL-MCNC: 34 UG/DL (ref 50–180)
KETONES UR STRIP.AUTO-MCNC: ABNORMAL MG/DL
LEUKOCYTE ESTERASE UR QL STRIP.AUTO: ABNORMAL
MCH RBC QN AUTO: 26.4 PG (ref 27–33)
MCHC RBC AUTO-ENTMCNC: 32.3 G/DL (ref 32.3–36.5)
MCV RBC AUTO: 81.8 FL (ref 81.4–97.8)
MICRO URNS: ABNORMAL
NITRITE UR QL STRIP.AUTO: POSITIVE
PH UR STRIP.AUTO: 6.5 [PH] (ref 5–8)
PHOSPHATE SERPL-MCNC: 3.2 MG/DL (ref 2.5–4.5)
PLATELET # BLD AUTO: 284 K/UL (ref 164–446)
PMV BLD AUTO: 11.1 FL (ref 9–12.9)
POTASSIUM SERPL-SCNC: 4.1 MMOL/L (ref 3.6–5.5)
PROT UR QL STRIP: NEGATIVE MG/DL
RBC # BLD AUTO: 6.03 M/UL (ref 4.7–6.1)
RBC # URNS HPF: ABNORMAL /HPF
RBC UR QL AUTO: NEGATIVE
SODIUM SERPL-SCNC: 137 MMOL/L (ref 135–145)
SP GR UR STRIP.AUTO: 1.02
TIBC SERPL-MCNC: 272 UG/DL (ref 250–450)
UIBC SERPL-MCNC: 238 UG/DL (ref 110–370)
UROBILINOGEN UR STRIP.AUTO-MCNC: 0.2 MG/DL
WBC # BLD AUTO: 16.3 K/UL (ref 4.8–10.8)
WBC #/AREA URNS HPF: ABNORMAL /HPF

## 2023-08-18 PROCEDURE — 97116 GAIT TRAINING THERAPY: CPT

## 2023-08-18 PROCEDURE — 82272 OCCULT BLD FECES 1-3 TESTS: CPT

## 2023-08-18 PROCEDURE — 85027 COMPLETE CBC AUTOMATED: CPT

## 2023-08-18 PROCEDURE — 93970 EXTREMITY STUDY: CPT

## 2023-08-18 PROCEDURE — 87086 URINE CULTURE/COLONY COUNT: CPT

## 2023-08-18 PROCEDURE — 700111 HCHG RX REV CODE 636 W/ 250 OVERRIDE (IP): Mod: JZ | Performed by: PHYSICAL MEDICINE & REHABILITATION

## 2023-08-18 PROCEDURE — 97112 NEUROMUSCULAR REEDUCATION: CPT

## 2023-08-18 PROCEDURE — 87077 CULTURE AEROBIC IDENTIFY: CPT

## 2023-08-18 PROCEDURE — 99232 SBSQ HOSP IP/OBS MODERATE 35: CPT | Performed by: HOSPITALIST

## 2023-08-18 PROCEDURE — 99232 SBSQ HOSP IP/OBS MODERATE 35: CPT | Performed by: PHYSICAL MEDICINE & REHABILITATION

## 2023-08-18 PROCEDURE — 36415 COLL VENOUS BLD VENIPUNCTURE: CPT

## 2023-08-18 PROCEDURE — 84100 ASSAY OF PHOSPHORUS: CPT

## 2023-08-18 PROCEDURE — 83550 IRON BINDING TEST: CPT

## 2023-08-18 PROCEDURE — 81001 URINALYSIS AUTO W/SCOPE: CPT

## 2023-08-18 PROCEDURE — A9270 NON-COVERED ITEM OR SERVICE: HCPCS | Performed by: PHYSICAL MEDICINE & REHABILITATION

## 2023-08-18 PROCEDURE — 80048 BASIC METABOLIC PNL TOTAL CA: CPT

## 2023-08-18 PROCEDURE — 83540 ASSAY OF IRON: CPT

## 2023-08-18 PROCEDURE — 74018 RADEX ABDOMEN 1 VIEW: CPT

## 2023-08-18 PROCEDURE — 97530 THERAPEUTIC ACTIVITIES: CPT

## 2023-08-18 PROCEDURE — 700102 HCHG RX REV CODE 250 W/ 637 OVERRIDE(OP): Performed by: PHYSICAL MEDICINE & REHABILITATION

## 2023-08-18 PROCEDURE — A9270 NON-COVERED ITEM OR SERVICE: HCPCS | Performed by: HOSPITALIST

## 2023-08-18 PROCEDURE — 700102 HCHG RX REV CODE 250 W/ 637 OVERRIDE(OP): Performed by: HOSPITALIST

## 2023-08-18 PROCEDURE — 71045 X-RAY EXAM CHEST 1 VIEW: CPT

## 2023-08-18 PROCEDURE — 87186 SC STD MICRODIL/AGAR DIL: CPT

## 2023-08-18 PROCEDURE — 97110 THERAPEUTIC EXERCISES: CPT

## 2023-08-18 PROCEDURE — 770010 HCHG ROOM/CARE - REHAB SEMI PRIVAT*

## 2023-08-18 PROCEDURE — 97112 NEUROMUSCULAR REEDUCATION: CPT | Mod: CQ

## 2023-08-18 PROCEDURE — 82962 GLUCOSE BLOOD TEST: CPT

## 2023-08-18 RX ORDER — POLYETHYLENE GLYCOL 3350 17 G/17G
1 POWDER, FOR SOLUTION ORAL DAILY
Status: DISCONTINUED | OUTPATIENT
Start: 2023-08-19 | End: 2023-08-23

## 2023-08-18 RX ORDER — BISACODYL 10 MG
10 SUPPOSITORY, RECTAL RECTAL
Status: DISCONTINUED | OUTPATIENT
Start: 2023-08-18 | End: 2023-08-23

## 2023-08-18 RX ORDER — AMOXICILLIN 250 MG
2 CAPSULE ORAL 2 TIMES DAILY
Status: DISCONTINUED | OUTPATIENT
Start: 2023-08-18 | End: 2023-08-23

## 2023-08-18 RX ADMIN — MAGNESIUM HYDROXIDE 30 ML: 1200 LIQUID ORAL at 04:57

## 2023-08-18 RX ADMIN — METFORMIN HYDROCHLORIDE 500 MG: 500 TABLET, EXTENDED RELEASE ORAL at 07:20

## 2023-08-18 RX ADMIN — ACETAMINOPHEN 650 MG: 325 TABLET ORAL at 07:21

## 2023-08-18 RX ADMIN — TAMSULOSIN HYDROCHLORIDE 0.4 MG: 0.4 CAPSULE ORAL at 07:57

## 2023-08-18 RX ADMIN — METFORMIN HYDROCHLORIDE 500 MG: 500 TABLET, EXTENDED RELEASE ORAL at 17:10

## 2023-08-18 RX ADMIN — METOPROLOL TARTRATE 12.5 MG: 25 TABLET, FILM COATED ORAL at 04:57

## 2023-08-18 RX ADMIN — ENOXAPARIN SODIUM 40 MG: 100 INJECTION SUBCUTANEOUS at 17:09

## 2023-08-18 RX ADMIN — SENNOSIDES AND DOCUSATE SODIUM 2 TABLET: 50; 8.6 TABLET ORAL at 07:58

## 2023-08-18 RX ADMIN — CLOPIDOGREL BISULFATE 75 MG: 75 TABLET ORAL at 07:58

## 2023-08-18 RX ADMIN — SENNOSIDES AND DOCUSATE SODIUM 2 TABLET: 50; 8.6 TABLET ORAL at 20:12

## 2023-08-18 RX ADMIN — ATORVASTATIN CALCIUM 80 MG: 40 TABLET, FILM COATED ORAL at 20:12

## 2023-08-18 RX ADMIN — ASPIRIN 81 MG: 81 TABLET, COATED ORAL at 20:12

## 2023-08-18 RX ADMIN — METOPROLOL TARTRATE 12.5 MG: 25 TABLET, FILM COATED ORAL at 17:09

## 2023-08-18 RX ADMIN — OLMESARTAN MEDOXOMIL 20 MG: 20 TABLET ORAL at 20:12

## 2023-08-18 ASSESSMENT — ACTIVITIES OF DAILY LIVING (ADL)
BED_CHAIR_WHEELCHAIR_TRANSFER_DESCRIPTION: ADAPTIVE EQUIPMENT;INCREASED TIME;INITIAL PREPARATION FOR TASK;SET-UP OF EQUIPMENT;SUPERVISION FOR SAFETY;VERBAL CUEING

## 2023-08-18 ASSESSMENT — ENCOUNTER SYMPTOMS
NAUSEA: 0
CHILLS: 0
EYES NEGATIVE: 1
COUGH: 0
MUSCULOSKELETAL NEGATIVE: 1
SHORTNESS OF BREATH: 0
POLYDIPSIA: 0
VOMITING: 0
FEVER: 0
ABDOMINAL PAIN: 0
BRUISES/BLEEDS EASILY: 0
FOCAL WEAKNESS: 1
PALPITATIONS: 0

## 2023-08-18 ASSESSMENT — PAIN DESCRIPTION - PAIN TYPE
TYPE: ACUTE PAIN
TYPE: ACUTE PAIN

## 2023-08-18 ASSESSMENT — GAIT ASSESSMENTS
ASSISTIVE DEVICE: OTHER (COMMENTS)
DEVIATION: DECREASED BASE OF SUPPORT;DECREASED HEEL STRIKE;DECREASED TOE OFF
GAIT LEVEL OF ASSIST: MODERATE ASSIST
DISTANCE (FEET): 30

## 2023-08-18 NOTE — THERAPY
"Occupational Therapy  Daily Treatment     Patient Name: Jorden Bonilla  Age:  58 y.o., Sex:  male  Medical Record #: [id number]  Today's Date: 8/18/2023     Precautions  Precautions: (P) Fall Risk  Comments: (P) L phyllis, Zio patch, HTN, DM         Subjective    \"The last one was the hardest.\"  (STS with dowel behind back with elbows in extension, shoulders in external rotation, and Dycem for L hand)     Objective       08/18/23 1431   OT Charge Group   Charges Yes   OT Neuromuscular Re-education / Balance (Units) 2   OT Total Time Spent   OT Individual Total Time Spent (Mins) 30   Precautions   Precautions Fall Risk   Comments L phyllis, Zio patch, HTN, DM   Cognition    Cognition / Consciousness WDL   Orientation Level Oriented x 4   Level of Consciousness Alert   Sitting Upper Body Exercises   Sitting Upper Body Exercises Yes   Chest Press 2 sets of 10;Bilateral;Weight (See Comments for lbs)  (4# dowel + isometric resistance from therapist)   Front Arm Raise 2 sets of 10;Bilateral;Weight (See Comments for lbs)  (4# dowel)   Shoulder Press 2 sets of 10;Bilateral;Weight (See Comments for lbs)  (4# dowel)   Tricep Press 2 sets of 10;Bilateral  (body weight)   Elbow Extension 2 sets of 10;Bilateral;Weight (See Comments for lbs)  (4 # dowel)   Pronation / Supination 2 sets of 10;Bilateral  (4 # dowel)   Wrist Flexion / Extension 2 sets of 10;Bilateral;Weight (See Comments for lbs)  (4# dowel with shoulders at 90 and bilateral elbow extension)   Comments Active assist and therapist kurtis with LUE elbow extension. Dycem and wrap utilized to maintain grasp.   Sitting Lower Body Exercises   Sit to Stand 2 sets of 10  (4# dowel behind back with bilateral grasp in anatomical position and elbows in extension)   Balance   Sitting Balance (Static) Good   Sitting Balance (Dynamic) Fair +   Standing Balance (Static) Fair -   Standing Balance (Dynamic) Poor +   Weight Shift Sitting Fair   Weight Shift Standing Poor   Skilled " Intervention Compensatory Strategies   Interdisciplinary Plan of Care Collaboration   IDT Collaboration with  Family / Caregiver   Patient Position at End of Therapy Seated;Family / Friend in Room   Collaboration Comments Spouse present for tx         Assessment    Patient tolerated treatment well with increased tricep activation with active assist shoulder press, tricep press EOM with use of pommel handles, and chest press against heavy therapist resistance alternating with 4# dowel press. Inconsistent wrist flexor activation with front arm raise/wrist flexion/extension activity.       Strengths: Able to follow instructions, Alert and oriented, Effective communication skills, Good carryover of learning, Good insight into deficits/needs, Independent prior level of function, Manages pain appropriately, Motivated for self care and independence, Pleasant and cooperative, Supportive family, Willingly participates in therapeutic activities  Barriers: Hemiplegia, Hypertension, Impaired balance, Limited mobility    Plan    Cont OT for balance building, ADL retraining, endurance building, L neuro re-ed.     Passport items to be completed:  Perform bathroom transfers, complete dressing, complete feeding, get ready for the day, prepare a simple meal, participate in household tasks, adapt home for safety needs, demonstrate home exercise program, complete caregiver training     Occupational Therapy Goals (Active)       Problem: Bathing       Dates: Start:  08/15/23         Goal: STG-Within one week, patient will bathe body with min A using AE/AD prn       Dates: Start:  08/15/23         Goal Note filed on 08/16/23 1249 by Pricilla Bazzi MS,OTR/L       New admit                 Problem: Dressing       Dates: Start:  08/15/23         Goal: STG-Within one week, patient will dress LB with min A       Dates: Start:  08/15/23         Goal Note filed on 08/16/23 1249 by Pricilla Bazzi MS,OTR/L       New admit                  Problem: Functional Transfers       Dates: Start:  08/15/23         Goal: STG-Within one week, patient will transfer to toilet with CGA using grab bar       Dates: Start:  08/15/23         Goal Note filed on 08/16/23 1249 by Pricilla Bazzi MS,OTR/L       New admit                 Problem: OT Long Term Goals       Dates: Start:  08/15/23         Goal: LTG-By discharge, patient will complete basic self care tasks with SBA/supervision       Dates: Start:  08/15/23            Goal: LTG-By discharge, patient will perform bathroom transfers with supervision/mod I       Dates: Start:  08/15/23               Problem: Toileting       Dates: Start:  08/15/23         Goal: STG-Within one week, patient will complete toileting tasks with max A       Dates: Start:  08/15/23         Goal Note filed on 08/16/23 1249 by Pricilla Bazzi MS,OTR/L       New admit

## 2023-08-18 NOTE — PROGRESS NOTES
"Rehab Progress Note     Date of Service: 8/18/2023  Chief Complaint: Follow-up stroke    Interval Events (Subjective)    Patient seen and examined today in the therapy gym.  He is working on exercises of the left upper extremity.  He is noted to have a leukocytosis and is tachycardic today.  He denies any dysuria or cough.  Plan of care discussed with hospitalist.  He is constipated without a bowel movement since the 14th.    Patient has no other new questions, concerns, or complaints today.       Objective:  VITAL SIGNS: /64   Pulse (!) 114   Temp 36.7 °C (98.1 °F) (Oral)   Resp 16   Ht 1.651 m (5' 5\")   Wt 63 kg (139 lb)   SpO2 96%   BMI 23.13 kg/m²   Gen: alert, no apparent distress  CV: Regular rate, regular rhythm  Resp: Clear to auscultation bilaterally  Neuro: Left hemiparesis    Recent Results (from the past 72 hour(s))   POCT glucose device results    Collection Time: 08/15/23 11:29 AM   Result Value Ref Range    POC Glucose, Blood 200 (H) 65 - 99 mg/dL   POCT glucose device results    Collection Time: 08/15/23  4:51 PM   Result Value Ref Range    POC Glucose, Blood 174 (H) 65 - 99 mg/dL   POCT glucose device results    Collection Time: 08/15/23  8:28 PM   Result Value Ref Range    POC Glucose, Blood 232 (H) 65 - 99 mg/dL   POCT glucose device results    Collection Time: 08/16/23  7:05 AM   Result Value Ref Range    POC Glucose, Blood 212 (H) 65 - 99 mg/dL   POCT glucose device results    Collection Time: 08/16/23 11:40 AM   Result Value Ref Range    POC Glucose, Blood 238 (H) 65 - 99 mg/dL   POCT glucose device results    Collection Time: 08/16/23  5:09 PM   Result Value Ref Range    POC Glucose, Blood 172 (H) 65 - 99 mg/dL   POCT glucose device results    Collection Time: 08/16/23  8:06 PM   Result Value Ref Range    POC Glucose, Blood 254 (H) 65 - 99 mg/dL   POCT glucose device results    Collection Time: 08/17/23  7:30 AM   Result Value Ref Range    POC Glucose, Blood 235 (H) 65 - 99 mg/dL "   POCT glucose device results    Collection Time: 08/17/23 11:33 AM   Result Value Ref Range    POC Glucose, Blood 217 (H) 65 - 99 mg/dL   POCT glucose device results    Collection Time: 08/17/23  4:43 PM   Result Value Ref Range    POC Glucose, Blood 247 (H) 65 - 99 mg/dL   POCT glucose device results    Collection Time: 08/17/23  8:53 PM   Result Value Ref Range    POC Glucose, Blood 167 (H) 65 - 99 mg/dL   CBC WITHOUT DIFFERENTIAL    Collection Time: 08/18/23  5:51 AM   Result Value Ref Range    WBC 16.3 (H) 4.8 - 10.8 K/uL    RBC 6.03 4.70 - 6.10 M/uL    Hemoglobin 15.9 14.0 - 18.0 g/dL    Hematocrit 49.3 42.0 - 52.0 %    MCV 81.8 81.4 - 97.8 fL    MCH 26.4 (L) 27.0 - 33.0 pg    MCHC 32.3 32.3 - 36.5 g/dL    RDW 37.4 35.9 - 50.0 fL    Platelet Count 284 164 - 446 K/uL    MPV 11.1 9.0 - 12.9 fL   Basic Metabolic Panel    Collection Time: 08/18/23  5:51 AM   Result Value Ref Range    Sodium 137 135 - 145 mmol/L    Potassium 4.1 3.6 - 5.5 mmol/L    Chloride 100 96 - 112 mmol/L    Co2 25 20 - 33 mmol/L    Glucose 153 (H) 65 - 99 mg/dL    Bun 19 8 - 22 mg/dL    Creatinine 0.75 0.50 - 1.40 mg/dL    Calcium 9.3 8.5 - 10.5 mg/dL    Anion Gap 12.0 7.0 - 16.0   PHOSPHORUS    Collection Time: 08/18/23  5:51 AM   Result Value Ref Range    Phosphorus 3.2 2.5 - 4.5 mg/dL   IRON/TOTAL IRON BIND    Collection Time: 08/18/23  5:51 AM   Result Value Ref Range    Iron 34 (L) 50 - 180 ug/dL    Total Iron Binding 272 250 - 450 ug/dL    Unsat Iron Binding 238 110 - 370 ug/dL    % Saturation 13 (L) 15 - 55 %   ESTIMATED GFR    Collection Time: 08/18/23  5:51 AM   Result Value Ref Range    GFR (CKD-EPI) 103 >60 mL/min/1.73 m 2   POCT glucose device results    Collection Time: 08/18/23  7:14 AM   Result Value Ref Range    POC Glucose, Blood 188 (H) 65 - 99 mg/dL       Scheduled Medications   Medication Dose Frequency    metFORMIN ER  500 mg BID WITH MEALS    insulin regular  2-12 Units 4X/DAY ACHS    metoprolol tartrate  12.5 mg TWICE  DAILY    aspirin  81 mg Q EVENING    atorvastatin  80 mg Q EVENING    clopidogrel  75 mg DAILY    enoxaparin (LOVENOX) injection  40 mg DAILY AT 1800    olmesartan  20 mg Q EVENING    senna-docusate  2 Tablet BID    tamsulosin  0.4 mg AFTER BREAKFAST       Current Diet Order   Procedures    Diet Order Diet: Consistent CHO (Diabetic) (cardiac and CHO); Second Modifier: (optional): Cardiac       Radiology    Imaging personally reviewed and interpreted by me.    Per my read, mild to moderate constipation in the transverse and descending colon.    US-EXTREMITY VENOUS LOWER BILAT   Final Result      DX-CHEST-LIMITED (1 VIEW)   Final Result      1.  No acute cardiac or pulmonary abnormalities are identified.      [id number] VIEW   Final Result      No evidence of bowel obstruction.      DX-CHEST-LIMITED (1 VIEW)   Final Result      1.  No acute cardiac or pulmonary abnormalities are identified.            Assessment:    This patient is a 58 y.o. male admitted for acute inpatient rehabilitation with Ischemic stroke (HCC).      I, Sarai Davila M.D./MD., was present and led the interdisciplinary team conference on 8/16/2023.  I led the IDT conference and agree with the IDT conference documentation and plan of care as noted below.     Nursing:  Diet Current Diet Order   Procedures    Diet Order Diet: Consistent CHO (Diabetic) (cardiac and CHO); Second Modifier: (optional): Cardiac       Eating ADL Supervision  Set-up of equipment or meal/tube feeding   % of Last Meal  Oral Nutrition: *  * Meal *  *, Breakfast, Between % Consumed (100%)   Sleep No issues   Bowel Last BM: 08/14/23   Bladder Post Void  5   Barriers to Discharge Home: left hemiplegia      Physical Therapy:  Bed Mobility    Transfers Minimal Assist  Increased time, Set-up of equipment, Supervision for safety, Verbal cueing (stand pivot to the R no AD)   Mobility Total Assist X 2 (mod A x1 plus wc follow)   Stairs Unable to Participate   Barriers to  Discharge Home: left hemiplegia       Occupational Therapy:  Grooming Supervision, Seated   Bathing Moderate Assist   UB Dressing Stand by Assist   LB Dressing Moderate Assist   Toileting Total Assist   Shower & Tub Transfer Minimal Assist   Barriers to Discharge Home: left hemiplegia      Speech-Language Pathology:  Comprehension:  Independent  Comprehension Description:     Expression:  Independent  Expression Description:     Social Interaction:  Independent  Social Interaction Description:     Problem Solving:  Independent  Problem Solving Description:     Memory:  Supervision  Memory Description:  Verbal cueing, Therapy schedule, Increased time, Bed/chair alarm, Seat belt  Barriers to Discharge Home: none, signed off    Respiratory Therapy:  O2 (LPM): 0  O2 Delivery Device: None - Room Air    Case Management:  Continues to work on disposition and DME needs.      Discharge Date/Disposition:    9/5    HH: PT/OT/SLP/RN, ?rehab without walls    Equip: TBD    Follow-ups: PCP, stroke bridge, cardiology      Problem List/Medical Decision Making and Plan:    Right anterior medullary stroke  Left hemiparesis  Cognitive impairment, mild  Dysphagia  Continue full rehab program  PT/OT/SLP, 1 hr each discipline, 5 days per week  8/15: SLP discontinued, PT/OT, 1.5 hr each discipline, 5 days per week    Continue aspirin and Plavix for secondary stroke prophylaxis until 8/21, then aspirin alone    Continue atorvastatin    Patient with cardiac monitor in place    Outpatient follow-up with stroke Bridge clinic and cardiology, referrals made    Dizziness, resolved  Continue meclizine as needed    Left paraspinal muscle spasms  Heat as needed  Continue Tylenol as needed     Diabetes with hyperglycemia  Uncontrolled, hemoglobin A1c 11.9  Continue metformin and sliding scale insulin  Appreciate hospitalist assistance     Hypertension  Continue olmesartan and metoprolol  Appreciate hospitalist assistance     Hyperlipidemia  Continue  atorvastatin     Sinus tachycardia, improved  Continue metoprolol  Appreciate hospitalist assistance  Check Doppler ultrasound     Urinary retention, resolved  Hematuria, resolved  Damon removed 8/15  Continue Flomax, may be able to discontinue prior to discharge  Low postvoid residuals  Intermittent catheterization for volumes over 400, not requiring     Coronary artery disease  With history of CABG  Continue aspirin, Plavix, atorvastatin    Leukocytosis  Check UA and chest x-ray    Constipation, continues  Increase bowel medications  Last bowel movement 8/14  KUB per above    Iron deficiency  Check occult stool    DVT prophylaxis  Continue Lovenox    Sarai Davila M.D.  Physical Medicine and Rehabilitation

## 2023-08-18 NOTE — THERAPY
Physical Therapy   Daily Treatment     Patient Name: Jorden Bonilla  Age:  58 y.o., Sex:  male  Medical Record #: [id number]  Today's Date: 8/18/2023     Precautions  Precautions: Fall Risk  Comments: L phyllis, Zio patch, HTN, DM    Subjective    Pt received in bed eating breakfast upon 8:30 arrival, missed 15 minutes of scheduled PT, scheduling notified.     Objective       08/18/23 0845   Therapy Missed   Missed Therapy (Minutes) 15   Reason For Missed Therapy Non-Medical - Other (Please Comment)  (eating breakfast)   PT Charge Group   PT Gait Training (Units) 2   PT Therapeutic Exercise (Units) 1   PT Total Time Spent   PT Individual Total Time Spent (Mins) 45   Gait Functional Level of Assist    Gait Level Of Assist Moderate Assist  (min/mod A for LLE control. stabilization and swing)   Assistive Device Other (Comments)  (galvez rail, carbon fiber AFO/ foot slider)   Distance (Feet) 30   # of Times Distance was Traveled 10   Deviation Decreased Base Of Support;Decreased Heel Strike;Decreased Toe Off  (L phyllis-weakness)   Sitting Lower Body Exercises   Other Exercises lion-med bike pedals x 10 minutes for LE strength/ endurance and reciprocal patterning training.   Bed Mobility    Supine to Sit Supervised   Sit to Supine Minimal Assist   Sit to Stand Minimal Assist   Neuro-Muscular Treatments   Neuro-Muscular Treatments Anterior weight shift;Facilitation;Joint Approximation;Sequencing;Tactile Cuing;Verbal Cuing;Weight Shift Right;Weight Shift Left   Comments neuro re-ed gait training as noted, joint approximation to LLE with sit<>stand transitions         Assessment    Pt demonstrated significant improvement with LLE motor control and strength since last visit from this PT (2 days ago) but remains with L hemiparesis.    Strengths: Able to follow instructions, Alert and oriented, Effective communication skills, Good carryover of learning, Good insight into deficits/needs, Independent prior level of function, Making  steady progress towards goals, Motivated for self care and independence, Pleasant and cooperative, Supportive family, Willingly participates in therapeutic activities  Barriers: Bladder retention, Decreased endurance, Hemiparesis, Impaired activity tolerance, Impaired balance, Language barrier, non-fluent English, Limited mobility    Plan    Bed mobility with phyllis techniques, transfer training, gait training with wall rail progressing as appropriate (trial carbon fiber AFO), standing balance, neuro re-ed for R hemibody strengthening, family training/education.      [facility]  therapy tech program 2x/week     Passport items to be completed:  Get in/out of bed safely, in/out of a vehicle, safely use mobility device, walk or wheel around home/community, navigate up and down stairs, show how to get up/down from the ground, ensure home is accessible, demonstrate HEP, complete caregiver training    Physical Therapy Problems (Active)       Problem: Mobility       Dates: Start:  08/15/23         Goal: STG-Within one week, patient will ambulate household distance 50 ft with HW and CGA.       Dates: Start:  08/15/23            Goal: STG-Within one week, patient will ambulate up/down a curb with HW and min A.       Dates: Start:  08/15/23               Problem: Mobility Transfers       Dates: Start:  08/15/23         Goal: STG-Within one week, patient will perform bed mobility with SPV consistently.       Dates: Start:  08/15/23            Goal: STG-Within one week, patient will transfer bed to chair stand pivot with HW and CGA.       Dates: Start:  08/15/23               Problem: PT-Long Term Goals       Dates: Start:  08/15/23         Goal: LTG-By discharge, patient will ambulate 150 ft with QC vs SPC and SBA.       Dates: Start:  08/15/23            Goal: LTG-By discharge, patient will transfer one surface to another with QC vs SPC and SPV.       Dates: Start:  08/15/23            Goal: LTG-By discharge, patient will  ambulate up/down a curb with QC vs SPC and SBA.       Dates: Start:  08/15/23            Goal: LTG-By discharge, patient will transfer in/out of a car with QC vs SPC and SBA after setup.       Dates: Start:  08/15/23            Goal: LTG-By discharge, patient will ambulate up/down a ramp with QC vs SPC and CGA to simulate home entrance.       Dates: Start:  08/15/23

## 2023-08-18 NOTE — CARE PLAN
Problem: Skin Integrity  Goal: Skin integrity is maintained or improved  Outcome: Progressing     Problem: Pain - Standard  Goal: Alleviation of pain or a reduction in pain to the patient’s comfort goal  Flowsheets (Taken 8/18/2023 1414)  Pain Rating Scale (NPRS): 3  Note: Pt able to participate in therapies and activities this shift. Will continue to monitor.    The patient is Watcher - Medium risk of patient condition declining or worsening    Shift Goals  Clinical Goals: Safety  Patient Goals: Safety, sleep

## 2023-08-18 NOTE — THERAPY
Physical Therapy   Daily Treatment     Patient Name: Jorden Bonilla  Age:  58 y.o., Sex:  male  Medical Record #: [id number]  Today's Date: 8/18/2023     Precautions  Precautions: Fall Risk  Comments: L phyllis, Zio patch, HTN, DM    Subjective    Pt seated in w/c w/ SO in room, agreeable to session.     Objective       08/18/23 1401   PT Charge Group   PT Neuromuscular Re-Education / Balance (Units) 2   Supervising Physical Therapist Moriah Cabrera   PT Total Time Spent   PT Individual Total Time Spent (Mins) 30   Cognition    Level of Consciousness Alert   Sitting Lower Body Exercises   Sit to Stand   (2x5 w/ RLE slightly in front for forced use)   Standing Lower Body Exercises   Mini Squat Partial;1 set of 10   Bed Mobility    Sit to Stand Minimal Assist  (in // bars)   Neuro-Muscular Treatments   Neuro-Muscular Treatments Anterior weight shift;Weight Shift Right;Weight Shift Left   Comments pregait in // bars w/ BUE support, RLE/LLE single leg step + anterior WS 1x10 w/ PTA blocking L knee during LLE stance. 1x10 each side.   Interdisciplinary Plan of Care Collaboration   IDT Collaboration with  Occupational Therapist;Family / Caregiver   Patient Position at End of Therapy Seated;Family / Friend in Room  (transition care to OT)   Collaboration Comments SO presented throughout session         Assessment    Pt tolerated session well, L knee hyperext during stance and required tactile and verbal cues for awareness. He required several seated RB during session but is highly motivated and pleasant throughout.    Strengths: Able to follow instructions, Alert and oriented, Effective communication skills, Good carryover of learning, Good insight into deficits/needs, Independent prior level of function, Making steady progress towards goals, Motivated for self care and independence, Pleasant and cooperative, Supportive family, Willingly participates in therapeutic activities  Barriers: Bladder retention, Decreased endurance,  Hemiparesis, Impaired activity tolerance, Impaired balance, Language barrier, non-fluent English, Limited mobility    Plan    Bed mobility with phyllis techniques, transfer training, gait training with wall rail progressing as appropriate (trial carbon fiber AFO), standing balance, neuro re-ed for L hemibody strengthening, family training/education.      [facility]  therapy tech program 2x/week     Passport items to be completed:  Get in/out of bed safely, in/out of a vehicle, safely use mobility device, walk or wheel around home/community, navigate up and down stairs, show how to get up/down from the ground, ensure home is accessible, demonstrate HEP, complete caregiver training    Physical Therapy Problems (Active)       Problem: Mobility       Dates: Start:  08/15/23         Goal: STG-Within one week, patient will ambulate household distance 50 ft with HW and CGA.       Dates: Start:  08/15/23            Goal: STG-Within one week, patient will ambulate up/down a curb with HW and min A.       Dates: Start:  08/15/23               Problem: Mobility Transfers       Dates: Start:  08/15/23         Goal: STG-Within one week, patient will perform bed mobility with SPV consistently.       Dates: Start:  08/15/23            Goal: STG-Within one week, patient will transfer bed to chair stand pivot with HW and CGA.       Dates: Start:  08/15/23               Problem: PT-Long Term Goals       Dates: Start:  08/15/23         Goal: LTG-By discharge, patient will ambulate 150 ft with QC vs SPC and SBA.       Dates: Start:  08/15/23            Goal: LTG-By discharge, patient will transfer one surface to another with QC vs SPC and SPV.       Dates: Start:  08/15/23            Goal: LTG-By discharge, patient will ambulate up/down a curb with QC vs SPC and SBA.       Dates: Start:  08/15/23            Goal: LTG-By discharge, patient will transfer in/out of a car with QC vs SPC and SBA after setup.       Dates: Start:  08/15/23             Goal: LTG-By discharge, patient will ambulate up/down a ramp with QC vs SPC and CGA to simulate home entrance.       Dates: Start:  08/15/23

## 2023-08-18 NOTE — THERAPY
Physical Therapy   Daily Treatment     Patient Name: Jorden Bonilla  Age:  58 y.o., Sex:  male  Medical Record #: [id number]  Today's Date: 8/18/2023     Precautions  Precautions: Fall Risk  Comments: L phyllis, Zio patch, HTN, DM    Subjective    Patient agreeable to makeup PT; received supine in bed.     Objective       08/18/23 1015   PT Charge Group   PT Therapeutic Activities (Units) 1   PT Total Time Spent   PT Individual Total Time Spent (Mins) 15   Vitals   O2 (LPM) 0   O2 Delivery Device None - Room Air   Transfer Functional Level of Assist   Bed, Chair, Wheelchair Transfer Moderate Assist   Bed Chair Wheelchair Transfer Description Adaptive equipment;Increased time;Initial preparation for task;Set-up of equipment;Supervision for safety;Verbal cueing   Sitting Lower Body Exercises   Sitting Lower Body Exercises   (5 lbs RLE, 1 lb LLE)   Long Arc Quad 1 set of 15   Marching 1 set of 10  (Mod AAROM at LLE)   Bed Mobility    Supine to Sit Standby Assist   Sit to Stand Moderate Assist   Scooting Standby Assist   Interdisciplinary Plan of Care Collaboration   IDT Collaboration with  Therapy Tech   Patient Position at End of Therapy Seated;Chair Alarm On;Self Releasing Lap Belt Applied;Other (Comments)  (in gym for OT session)   Collaboration Comments madeup missed time from earlier PT session         Assessment    Patient has improving use of LLE; benefits from cueing for decreased compensation of RUE during LLE ther ex; poor standing balance with LOB to L during transfer; L hemiplegia present; good participation within structured setting.    Strengths: Able to follow instructions, Alert and oriented, Effective communication skills, Good carryover of learning, Good insight into deficits/needs, Independent prior level of function, Making steady progress towards goals, Motivated for self care and independence, Pleasant and cooperative, Supportive family, Willingly participates in therapeutic activities  Barriers:  Bladder retention, Decreased endurance, Hemiparesis, Impaired activity tolerance, Impaired balance, Language barrier, non-fluent English, Limited mobility    Plan    Bed mobility with phyllis techniques, transfer training, gait training with wall rail progressing as appropriate (trial carbon fiber AFO), standing balance, neuro re-ed for R hemibody strengthening, family training/education.      [facility]  therapy tech program 2x/week     Passport items to be completed:  Get in/out of bed safely, in/out of a vehicle, safely use mobility device, walk or wheel around home/community, navigate up and down stairs, show how to get up/down from the ground, ensure home is accessible, demonstrate HEP, complete caregiver training    Physical Therapy Problems (Active)       Problem: Mobility       Dates: Start:  08/15/23         Goal: STG-Within one week, patient will ambulate household distance 50 ft with HW and CGA.       Dates: Start:  08/15/23            Goal: STG-Within one week, patient will ambulate up/down a curb with HW and min A.       Dates: Start:  08/15/23               Problem: Mobility Transfers       Dates: Start:  08/15/23         Goal: STG-Within one week, patient will perform bed mobility with SPV consistently.       Dates: Start:  08/15/23            Goal: STG-Within one week, patient will transfer bed to chair stand pivot with HW and CGA.       Dates: Start:  08/15/23               Problem: PT-Long Term Goals       Dates: Start:  08/15/23         Goal: LTG-By discharge, patient will ambulate 150 ft with QC vs SPC and SBA.       Dates: Start:  08/15/23            Goal: LTG-By discharge, patient will transfer one surface to another with QC vs SPC and SPV.       Dates: Start:  08/15/23            Goal: LTG-By discharge, patient will ambulate up/down a curb with QC vs SPC and SBA.       Dates: Start:  08/15/23            Goal: LTG-By discharge, patient will transfer in/out of a car with QC vs SPC and SBA after  setup.       Dates: Start:  08/15/23            Goal: LTG-By discharge, patient will ambulate up/down a ramp with QC vs SPC and CGA to simulate home entrance.       Dates: Start:  08/15/23

## 2023-08-18 NOTE — PROGRESS NOTES
NURSING DAILY NOTE    Name: Jorden Bonilla   Date of Admission: 8/14/2023   Admitting Diagnosis: Ischemic stroke (HCC)  Attending Physician: Sarai Davila M.d.  Allergies: Patient has no known allergies.    Safety  Patient Assist  Min A  Patient Precautions  Fall Risk  Precaution Comments  L phyllis, Zio patch, HTN, DM  Bed Transfer Status  Moderate Assist  Toilet Transfer Status   Minimal Assist  Assistive Devices  Rails  Oxygen  None - Room Air  Diet/Therapeutic Dining  Current Diet Order   Procedures    Diet Order Diet: Consistent CHO (Diabetic) (cardiac and CHO); Second Modifier: (optional): Cardiac     Pill Administration  whole  Agitated Behavioral Scale  14  ABS Level of Severity  No Agitation    Fall Risk  Has the patient had a fall this admission?   No  Melani Galeano Fall Risk Scoring  17, HIGH RISK  Fall Risk Safety Measures  bed alarm, chair alarm, poor balance, and low vision/ hearing    Vitals  Temperature: 36.5 °C (97.7 °F)  Temp src: Oral  Pulse: 74  Respiration: 18  Blood Pressure: 101/69  Blood Pressure MAP (Calculated): 80 MM HG  BP Location: Right, Upper Arm  Patient BP Position: Sitting     Oxygen  Pulse Oximetry: 98 %  O2 (LPM): 0  O2 Delivery Device: None - Room Air    Bowel and Bladder  Last Bowel Movement  08/14/23  Stool Type     Bowel Device     Continent  Bladder: Continent void   Bowel: Continent movement  Bladder Function  Urine Void (mL): 300 ml  Number of Times Voided: 1  Urine Color: Yellow  Genitourinary Assessment   Bladder Assessment (WDL):  Within Defined Limits  Damon Catheter: Not Applicable  Damon Care: Given with Soap and Water  Urinary Elimination: Catheter (Document on LDA)  Urine Color: Yellow  Bladder Device: Urinal  Bladder Scan: Post Void  $ Bladder Scan Results (mL): 83    Skin  Cooper Score   16  Sensory Interventions   Bed Types: Standard/Trauma Mattress  Skin Preventative Measures: Pillows in Use for  Support / Positioning  Moisture Interventions  Moisturizers/Barriers: Barrier Wipes      Pain  Pain Rating Scale  3 - Sometimes distracts me  Pain Location  Back  Pain Location Orientation     Pain Interventions   Medication (see MAR)    ADLs    Bathing   Shower  Linen Change   Partial  Personal Hygiene     Chlorhexidine Bath      Oral Care     Teeth/Dentures     Shave     Nutrition Percentage Eaten  *  * Meal *  *, Lunch, Between 50-75% Consumed  Environmental Precautions  Treaded Slipper Socks on Patient, Personal Belongings, Wastebasket, [employer] etc. in Easy Reach, Bed in Low Position  Patient Turns/Positioning  Patient Turns Self from Side to Side, Supine  Patient Turns Assistance/Tolerance     Bed Positions  Bed Controls On, Bed Locked  Head of Bed Elevated  Greater or equal to 30 degrees      Psychosocial/Neurologic Assessment  Psychosocial Assessment  Psychosocial (WDL):  Within Defined Limits  Neurologic Assessment  Neuro (WDL): Within Defined Limits  Level of Consciousness: Alert  EENT (WDL):  WDL Except    Cardio/Pulmonary Assessment  Edema      Respiratory Breath Sounds     Cardiac Assessment   Cardiac (WDL):  WDL Except (tachycardia, Hx-CAD, HTN, mult. coronary artery by pass)

## 2023-08-18 NOTE — PROGRESS NOTES
Hospital Medicine Daily Progress Note        Chief Complaint  Hypertension  Diabetes    Interval Problem Update  Pt denies new complaints and has been afebrile although labs show new leukocytosis.    Review of Systems  Review of Systems   Constitutional:  Negative for chills and fever.   HENT: Negative.     Eyes: Negative.    Respiratory:  Negative for cough and shortness of breath.    Cardiovascular:  Negative for chest pain and palpitations.   Gastrointestinal:  Negative for abdominal pain, nausea and vomiting.   Musculoskeletal: Negative.    Skin:  Negative for itching and rash.   Neurological:  Positive for focal weakness.   Endo/Heme/Allergies:  Negative for polydipsia. Does not bruise/bleed easily.        Physical Exam  Temp:  [36.7 °C (98.1 °F)-36.8 °C (98.2 °F)] 36.7 °C (98.1 °F)  Pulse:  [] 114  Resp:  [16-18] 16  BP: (104-107)/(64-71) 105/64  SpO2:  [96 %-97 %] 96 %    Physical Exam  Vitals reviewed.   Constitutional:       General: He is not in acute distress.     Appearance: Normal appearance. He is not ill-appearing.   HENT:      Head: Normocephalic and atraumatic.      Right Ear: External ear normal.      Left Ear: External ear normal.      Nose: Nose normal.      Mouth/Throat:      Pharynx: Oropharynx is clear.   Eyes:      General:         Right eye: No discharge.         Left eye: No discharge.      Extraocular Movements: Extraocular movements intact.      Conjunctiva/sclera: Conjunctivae normal.   Cardiovascular:      Rate and Rhythm: Normal rate and regular rhythm.   Pulmonary:      Effort: Pulmonary effort is normal. No respiratory distress.      Breath sounds: Normal breath sounds. No wheezing.   Abdominal:      General: Bowel sounds are normal. There is no distension.      Palpations: Abdomen is soft.      Tenderness: There is no abdominal tenderness.   Musculoskeletal:      Cervical back: Normal range of motion and neck supple.      Right lower leg: No edema.      Left lower leg: No  edema.   Skin:     General: Skin is warm and dry.   Neurological:      Mental Status: He is alert and oriented to person, place, and time.         Fluids    Intake/Output Summary (Last 24 hours) at 8/18/2023 0941  Last data filed at 8/18/2023 0457  Gross per 24 hour   Intake 480 ml   Output --   Net 480 ml       Laboratory  Recent Labs     08/18/23  0551   WBC 16.3*   RBC 6.03   HEMOGLOBIN 15.9   HEMATOCRIT 49.3   MCV 81.8   MCH 26.4*   MCHC 32.3   RDW 37.4   PLATELETCT 284   MPV 11.1     Recent Labs     08/18/23  0551   SODIUM 137   POTASSIUM 4.1   CHLORIDE 100   CO2 25   GLUCOSE 153*   BUN 19   CREATININE 0.75   CALCIUM 9.3                   Assessment/Plan  * Ischemic stroke (HCC)- (present on admission)  Assessment & Plan  Presented w/ L HP  S/P ZioPatch  On ASA, Plavix, and Lipitor  Management per Physiatry    Leukocytosis  Assessment & Plan  Check PCT, UA, and CXR  Follow Temp and WBC    Microcytosis  Assessment & Plan  Fe 34, may start supplement if no contraindications  Check FOB x 3  Follow H/H    Primary hypertension- (present on admission)  Assessment & Plan  Continue Olmesartan and Metoprolol  Monitor for orthostatic hypotension on Flomax    Type 2 diabetes mellitus with hyperglycemia, without long-term current use of insulin (HCC)- (present on admission)  Assessment & Plan  HbA1c 11.9  Continue Metformin XR and SSI  Now off Lantus  Outpt meds include Metformin  mg bid, Jardiance 25 mg qd, and Trulicity 1.5 mg SQ qwk    Coronary artery disease due to calcified coronary lesion: CABG ×3 in 2015- (present on admission)  Assessment & Plan  H/O CABG  Continue ASA, Lipitor, Olmesartan, and Metoprolol  Cardiology F/U       Full Code    Reviewed w/ Dr. Davila

## 2023-08-18 NOTE — PROGRESS NOTES
NURSING DAILY NOTE    Name: Jorden Bonilla   Date of Admission: 8/14/2023   Admitting Diagnosis: Ischemic stroke (HCC)  Attending Physician: Sarai Davila M.d.  Allergies: Patient has no known allergies.    Safety  Patient Assist  Min A  Patient Precautions  Fall Risk  Precaution Comments  L phyllis, Zio patch, HTN, DM  Bed Transfer Status  Moderate Assist  Toilet Transfer Status   Minimal Assist  Assistive Devices  Rails, Wheelchair  Oxygen  None - Room Air  Diet/Therapeutic Dining  Current Diet Order   Procedures    Diet Order Diet: Consistent CHO (Diabetic) (cardiac and CHO); Second Modifier: (optional): Cardiac     Pill Administration  whole  Agitated Behavioral Scale     ABS Level of Severity       Fall Risk  Has the patient had a fall this admission?   No  Melani Galeano Fall Risk Scoring  20, HIGH RISK  Fall Risk Safety Measures  bed alarm and chair alarm    Vitals  Temperature: 36.8 °C (98.2 °F)  Temp src: Oral  Pulse: (!) 102  Respiration: 18  Blood Pressure: 107/66  Blood Pressure MAP (Calculated): 80 MM HG  BP Location: Right, Upper Arm  Patient BP Position: Sitting     Oxygen  Pulse Oximetry: 96 %  O2 (LPM): 0  O2 Delivery Device: None - Room Air    Bowel and Bladder  Last Bowel Movement  08/14/23  Stool Type     Bowel Device     Continent  Bladder: Continent void   Bowel: Continent movement  Bladder Function  Urine Void (mL): 400 ml  Number of Times Voided: 1  Urine Color: Yellow  Genitourinary Assessment   Bladder Assessment (WDL):  Within Defined Limits  Damon Catheter: Present with Active Order  Damon Care: Given with Soap and Water  Urinary Elimination: Catheter (Document on LDA)  Urine Color: Yellow  Bladder Scan: Post Void  $ Bladder Scan Results (mL): 18    Skin  Cooper Score   16  Sensory Interventions   Bed Types: Standard/Trauma Mattress  Skin Preventative Measures: Pillows in Use for Support / Positioning  Moisture  Interventions  Moisturizers/Barriers: Barrier Wipes      Pain  Pain Rating Scale  0 - No Pain  Pain Location  Generalized  Pain Location Orientation     Pain Interventions   Declines    ADLs    Bathing   Shower  Linen Change   Partial  Personal Hygiene     Chlorhexidine Bath      Oral Care     Teeth/Dentures     Shave     Nutrition Percentage Eaten  *  * Meal *  *, Dinner, Between 25-50% Consumed (30%)  Environmental Precautions  Treaded Slipper Socks on Patient, Bed in Low Position  Patient Turns/Positioning  Patient Turns Self from Side to Side  Patient Turns Assistance/Tolerance     Bed Positions  Bed Controls On, Bed Locked  Head of Bed Elevated  Greater or equal to 30 degrees      Psychosocial/Neurologic Assessment  Psychosocial Assessment  Psychosocial (WDL):  Within Defined Limits  Neurologic Assessment  Neuro (WDL): Within Defined Limits  Level of Consciousness: Alert  EENT (WDL):  WDL Except    Cardio/Pulmonary Assessment  Edema      Respiratory Breath Sounds     Cardiac Assessment   Cardiac (WDL):  WDL Except (tachycardia, Hx-CAD, HTN, mult. coronary artery by pass)

## 2023-08-18 NOTE — CARE PLAN
"The patient is Stable - Low risk of patient condition declining or worsening    Shift Goals  Clinical Goals: safety  Patient Goals: safety, sleep/rest    Problem: Knowledge Deficit - Standard  Goal: Patient and family/care givers will demonstrate understanding of plan of care, disease process/condition, diagnostic tests and medications  Outcome: Progressing  Note: Patient had no BM for 3 days and given Miralax yesterday morning with no results. Assisted to the bathroom several times tonight with no results. Denies any abdominal pain or any discomfort. Encourage to drink more fluids while  awake. Offered another stool softener but stated that he'll take it in the morning when he's awake. Continue on close monitoring.      Problem: Fall Risk - Rehab  Goal: Patient will remain free from falls  Outcome: Progressing  Note: Melani Galeano Fall risk Assessment Score: 20    High fall risk Interventions   - Alarming seatbelt  - Wander guard  - Bed and strip alarm   - Yellow sign by the door   - Yellow wrist band \"Fall risk\"  - Room near to the nurse station  - Do not leave patient unattended in the bathroom  - Fall risk education provided  Episode of getting out of bed without calling the staff. Reminded to use call light when in need of help for fall precaution and understood. Chair and bed alarm on to alert staff. Needs  attended and met and will continue on close monitoring            "

## 2023-08-19 ENCOUNTER — APPOINTMENT (OUTPATIENT)
Dept: OCCUPATIONAL THERAPY | Facility: REHABILITATION | Age: 58
DRG: 057 | End: 2023-08-19
Attending: PHYSICAL MEDICINE & REHABILITATION
Payer: COMMERCIAL

## 2023-08-19 ENCOUNTER — APPOINTMENT (OUTPATIENT)
Dept: PHYSICAL THERAPY | Facility: REHABILITATION | Age: 58
DRG: 057 | End: 2023-08-19
Attending: PHYSICAL MEDICINE & REHABILITATION
Payer: COMMERCIAL

## 2023-08-19 LAB
ERYTHROCYTE [DISTWIDTH] IN BLOOD BY AUTOMATED COUNT: 37.3 FL (ref 35.9–50)
GLUCOSE BLD STRIP.AUTO-MCNC: 173 MG/DL (ref 65–99)
GLUCOSE BLD STRIP.AUTO-MCNC: 185 MG/DL (ref 65–99)
GLUCOSE BLD STRIP.AUTO-MCNC: 203 MG/DL (ref 65–99)
GLUCOSE BLD STRIP.AUTO-MCNC: 267 MG/DL (ref 65–99)
HCT VFR BLD AUTO: 46.2 % (ref 42–52)
HEMOCCULT STL QL: POSITIVE
HGB BLD-MCNC: 14.5 G/DL (ref 14–18)
MCH RBC QN AUTO: 25.8 PG (ref 27–33)
MCHC RBC AUTO-ENTMCNC: 31.4 G/DL (ref 32.3–36.5)
MCV RBC AUTO: 82.2 FL (ref 81.4–97.8)
PLATELET # BLD AUTO: 260 K/UL (ref 164–446)
PMV BLD AUTO: 10.6 FL (ref 9–12.9)
PROCALCITONIN SERPL-MCNC: <0.05 NG/ML
RBC # BLD AUTO: 5.62 M/UL (ref 4.7–6.1)
WBC # BLD AUTO: 14.5 K/UL (ref 4.8–10.8)

## 2023-08-19 PROCEDURE — A9270 NON-COVERED ITEM OR SERVICE: HCPCS | Performed by: HOSPITALIST

## 2023-08-19 PROCEDURE — 99231 SBSQ HOSP IP/OBS SF/LOW 25: CPT | Performed by: HOSPITALIST

## 2023-08-19 PROCEDURE — 97535 SELF CARE MNGMENT TRAINING: CPT

## 2023-08-19 PROCEDURE — 97116 GAIT TRAINING THERAPY: CPT

## 2023-08-19 PROCEDURE — A9270 NON-COVERED ITEM OR SERVICE: HCPCS | Performed by: PHYSICAL MEDICINE & REHABILITATION

## 2023-08-19 PROCEDURE — 85027 COMPLETE CBC AUTOMATED: CPT

## 2023-08-19 PROCEDURE — 84145 PROCALCITONIN (PCT): CPT

## 2023-08-19 PROCEDURE — 97530 THERAPEUTIC ACTIVITIES: CPT

## 2023-08-19 PROCEDURE — 770010 HCHG ROOM/CARE - REHAB SEMI PRIVAT*

## 2023-08-19 PROCEDURE — 700102 HCHG RX REV CODE 250 W/ 637 OVERRIDE(OP): Performed by: HOSPITALIST

## 2023-08-19 PROCEDURE — 700102 HCHG RX REV CODE 250 W/ 637 OVERRIDE(OP): Performed by: PHYSICAL MEDICINE & REHABILITATION

## 2023-08-19 PROCEDURE — 82962 GLUCOSE BLOOD TEST: CPT | Mod: 91

## 2023-08-19 PROCEDURE — 700111 HCHG RX REV CODE 636 W/ 250 OVERRIDE (IP): Mod: JZ | Performed by: PHYSICAL MEDICINE & REHABILITATION

## 2023-08-19 PROCEDURE — 36415 COLL VENOUS BLD VENIPUNCTURE: CPT

## 2023-08-19 PROCEDURE — 97112 NEUROMUSCULAR REEDUCATION: CPT

## 2023-08-19 RX ADMIN — SENNOSIDES AND DOCUSATE SODIUM 2 TABLET: 50; 8.6 TABLET ORAL at 19:34

## 2023-08-19 RX ADMIN — METFORMIN HYDROCHLORIDE 500 MG: 500 TABLET, EXTENDED RELEASE ORAL at 08:02

## 2023-08-19 RX ADMIN — METFORMIN HYDROCHLORIDE 500 MG: 500 TABLET, EXTENDED RELEASE ORAL at 17:12

## 2023-08-19 RX ADMIN — ACETAMINOPHEN 650 MG: 325 TABLET ORAL at 19:33

## 2023-08-19 RX ADMIN — POLYETHYLENE GLYCOL 3350 1 PACKET: 17 POWDER, FOR SOLUTION ORAL at 08:10

## 2023-08-19 RX ADMIN — ATORVASTATIN CALCIUM 80 MG: 40 TABLET, FILM COATED ORAL at 19:33

## 2023-08-19 RX ADMIN — SENNOSIDES AND DOCUSATE SODIUM 2 TABLET: 50; 8.6 TABLET ORAL at 08:02

## 2023-08-19 RX ADMIN — METOPROLOL TARTRATE 12.5 MG: 25 TABLET, FILM COATED ORAL at 05:31

## 2023-08-19 RX ADMIN — ENOXAPARIN SODIUM 40 MG: 100 INJECTION SUBCUTANEOUS at 17:14

## 2023-08-19 RX ADMIN — TAMSULOSIN HYDROCHLORIDE 0.4 MG: 0.4 CAPSULE ORAL at 08:02

## 2023-08-19 RX ADMIN — CLOPIDOGREL BISULFATE 75 MG: 75 TABLET ORAL at 08:02

## 2023-08-19 RX ADMIN — ASPIRIN 81 MG: 81 TABLET, COATED ORAL at 19:34

## 2023-08-19 RX ADMIN — METOPROLOL TARTRATE 12.5 MG: 25 TABLET, FILM COATED ORAL at 17:12

## 2023-08-19 ASSESSMENT — GAIT ASSESSMENTS
ASSISTIVE DEVICE: FRONT WHEEL WALKER
DISTANCE (FEET): 40
GAIT LEVEL OF ASSIST: TOTAL ASSIST

## 2023-08-19 ASSESSMENT — ACTIVITIES OF DAILY LIVING (ADL)
TUB_SHOWER_TRANSFER_DESCRIPTION: GRAB BAR;SHOWER BENCH;INCREASED TIME;INITIAL PREPARATION FOR TASK;SET-UP OF EQUIPMENT;SUPERVISION FOR SAFETY;VERBAL CUEING

## 2023-08-19 NOTE — PROGRESS NOTES
NURSING DAILY NOTE    Name: Jorden Bonilla   Date of Admission: 8/14/2023   Admitting Diagnosis: Ischemic stroke (HCC)  Attending Physician: Sarai Davila M.d.  Allergies: Patient has no known allergies.    Safety  Patient Assist  CGA  Patient Precautions  Fall Risk  Precaution Comments  L phyllis, Zio patch, HTN, DM  Bed Transfer Status  Moderate Assist  Toilet Transfer Status   Minimal Assist  Assistive Devices  Rails, Wheelchair  Oxygen  None - Room Air  Diet/Therapeutic Dining  Current Diet Order   Procedures    Diet Order Diet: Consistent CHO (Diabetic) (cardiac and CHO); Second Modifier: (optional): Cardiac     Pill Administration  whole  Agitated Behavioral Scale  14  ABS Level of Severity  No Agitation    Fall Risk  Has the patient had a fall this admission?   No  Melani Galeano Fall Risk Scoring  17, HIGH RISK  Fall Risk Safety Measures  bed alarm, chair alarm, poor balance, and low vision/ hearing    Vitals  Temperature: 37.4 °C (99.4 °F)  Temp src: Oral  Pulse: 100  Respiration: 18  Blood Pressure: 108/72  Blood Pressure MAP (Calculated): 84 MM HG  BP Location: Right, Upper Arm  Patient BP Position: Sitting     Oxygen  Pulse Oximetry: 94 %  O2 (LPM): 0  O2 Delivery Device: None - Room Air    Bowel and Bladder  Last Bowel Movement  08/18/23  Stool Type     Bowel Device     Continent  Bladder: Continent void   Bowel: Continent movement  Bladder Function  Urine Void (mL): 300 ml  Number of Times Voided: 1  Urine Color: Yellow  Genitourinary Assessment   Bladder Assessment (WDL):  Within Defined Limits  Damno Catheter: Not Applicable  Damon Care: Given with Soap and Water  Urinary Elimination: Catheter (Document on LDA)  Urine Color: Yellow  Bladder Device: Urinal  Bladder Scan: Post Void  $ Bladder Scan Results (mL): 83    Skin  Cooper Score   15  Sensory Interventions   Bed Types: Standard/Trauma Mattress  Skin Preventative Measures: Pillows in  Use for Support / Positioning  Moisture Interventions  Moisturizers/Barriers: Barrier Wipes      Pain  Pain Rating Scale  0 - No Pain  Pain Location  Back  Pain Location Orientation     Pain Interventions   Declines    ADLs    Bathing   Shower  Linen Change   Partial  Personal Hygiene     Chlorhexidine Bath      Oral Care     Teeth/Dentures     Shave     Nutrition Percentage Eaten  *  * Meal *  *, Lunch, Between 50-75% Consumed  Environmental Precautions  Treaded Slipper Socks on Patient, Personal Belongings, Wastebasket, Call Bell etc. in Easy Reach, Bed in Low Position  Patient Turns/Positioning  Patient Turns Self from Side to Side, Supine  Patient Turns Assistance/Tolerance     Bed Positions  Bed Controls On, Bed Locked  Head of Bed Elevated  Greater or equal to 30 degrees      Psychosocial/Neurologic Assessment  Psychosocial Assessment  Psychosocial (WDL):  Within Defined Limits  Neurologic Assessment  Neuro (WDL): Within Defined Limits  Level of Consciousness: Alert  EENT (WDL):  WDL Except    Cardio/Pulmonary Assessment  Edema      Respiratory Breath Sounds     Cardiac Assessment   Cardiac (WDL):  WDL Except (tachycardia, Hx-CAD, HTN, mult. coronary artery by pass)

## 2023-08-19 NOTE — CARE PLAN
"The patient is Stable - Low risk of patient condition declining or worsening    Shift Goals  Clinical Goals: safety  Patient Goals: safety, sleep/rest    Progress made toward(s) clinical / shift goals:      Problem: Fall Risk - Rehab  Goal: Patient will remain free from falls  Outcome: Progressing  Note: Melani Galeano Fall risk Assessment Score: 17    High fall risk Interventions   - Bed and strip alarm   - Yellow sign by the door   - Yellow wrist band \"Fall risk\"  - Do not leave patient unattended in the bathroom  - Fall risk education provided  Pt oriented x4, using call light appropriately for assist with transfers. Monitoring in progress.    Problem: Diabetes Management  Goal: Patient will achieve and maintain glucose in satisfactory range  Outcome: Progressing: FSBG today = 203, 185, and 267. SS insulin and scheduled metformin administered per MAR.             "

## 2023-08-19 NOTE — THERAPY
"Occupational Therapy  Daily Treatment     Patient Name: Jorden Bonilla  Age:  58 y.o., Sex:  male  Medical Record #: [id number]  Today's Date: 8/19/2023     Precautions  Precautions: Fall Risk  Comments: L phyllis, Zio patch, HTN, DM         Subjective    \"That would feel nice.\" Pt reported about a shower      Objective       08/19/23 0931   OT Charge Group   OT Self Care / ADL (Units) 3   OT Neuromuscular Re-education / Balance (Units) 1   OT Total Time Spent   OT Individual Total Time Spent (Mins) 60   Pain   Intervention Declines   Cognition    Level of Consciousness Alert   Functional Level of Assist   Bathing Moderate Assist   Bathing Description Grab bar;Hand held shower;Tub bench;Assit with back;Assit wtih lower extremities;Assit with perineal;Increased time;Initial preparation for task;Set-up of equipment;Set up for shower sleeve;Supervision for safety;Verbal cueing   Upper Body Dressing Stand by Assist   Upper Body Dressing Description Increased time;Initial preparation for task;Set-up of equipment;Supervision for safety;Verbal cueing   Lower Body Dressing Moderate Assist   Lower Body Dressing Description Grab bar;Assist with threading into pant leg;Increased time;Initial preparation for task;Set-up of equipment;Supervision for safety;Verbal cueing   Tub / Shower Transfers Minimal Assist   Tub Shower Transfer Description Grab bar;Shower bench;Increased time;Initial preparation for task;Set-up of equipment;Supervision for safety;Verbal cueing   Neuro-Muscular Treatments   Neuro-Muscular Treatments Electrical Stimulation;Facilitation;Verbal Cuing   Comments Saebomas for LUE- elbow placed on tension level 7 with ace wrap. Pt engaged in active horizontal ab/adduction and elbow flex/ext with shoulder flexion at 90 degrees; e-stim placed on L bicep (29mA)/triceps (23mA).   Interdisciplinary Plan of Care Collaboration   Patient Position at End of Therapy Seated;Chair Alarm On;Self Releasing Lap Belt Applied;Call " Light within Reach;Tray Table within Reach;Phone within Reach         Assessment    Pt tolerated session well. Pt completed shower/adl assessment with min-mod a needed. Noted impulsivity and pt attempting to stand without therapist next to pt and required mod a to correct sitting posture on tub bench to prevent fall to L side.. Pt having increased difficulty with LBD and being able to don socks/shoes on LLE due to difficulty with figure 4 positioning 2/2 spasticity.     Strengths: Able to follow instructions, Alert and oriented, Effective communication skills, Good carryover of learning, Good insight into deficits/needs, Independent prior level of function, Manages pain appropriately, Motivated for self care and independence, Pleasant and cooperative, Supportive family, Willingly participates in therapeutic activities  Barriers: Hemiplegia, Hypertension, Impaired balance, Limited mobility    Plan    Cont OT for balance building, ADL retraining, endurance building, L neuro re-ed.      Passport items to be completed:  Perform bathroom transfers, complete dressing, complete feeding, get ready for the day, prepare a simple meal, participate in household tasks, adapt home for safety needs, demonstrate home exercise program, complete caregiver training     Occupational Therapy Goals (Active)       Problem: Bathing       Dates: Start:  08/15/23         Goal: STG-Within one week, patient will bathe body with min A using AE/AD prn       Dates: Start:  08/15/23         Goal Note filed on 08/16/23 1249 by Pricilla Bazzi MS,OTR/L       New admit                 Problem: Dressing       Dates: Start:  08/15/23         Goal: STG-Within one week, patient will dress LB with min A       Dates: Start:  08/15/23         Goal Note filed on 08/16/23 1249 by Pricilla Bazzi MS,OTR/L       New admit                 Problem: Functional Transfers       Dates: Start:  08/15/23         Goal: STG-Within one week, patient will transfer  to toilet with CGA using grab bar       Dates: Start:  08/15/23         Goal Note filed on 08/16/23 1249 by Pricilla Bazzi MS,OTR/L       New admit                 Problem: OT Long Term Goals       Dates: Start:  08/15/23         Goal: LTG-By discharge, patient will complete basic self care tasks with SBA/supervision       Dates: Start:  08/15/23            Goal: LTG-By discharge, patient will perform bathroom transfers with supervision/mod I       Dates: Start:  08/15/23               Problem: Toileting       Dates: Start:  08/15/23         Goal: STG-Within one week, patient will complete toileting tasks with max A       Dates: Start:  08/15/23         Goal Note filed on 08/16/23 1249 by Pricilla Bazzi MS,OTR/L       New admit

## 2023-08-19 NOTE — THERAPY
"Physical Therapy   Daily Treatment     Patient Name: Jorden Bonilla  Age:  58 y.o., Sex:  male  Medical Record #: [id number]  Today's Date: 8/19/2023     Precautions  Precautions: (P) Fall Risk  Comments: L phyllis, Zio patch, HTN, DM    Subjective    Pt received in room in bed, dressed with shoes on. States he slept ok. Ready for PT. Has already been up to use the bathroom.     Objective       08/19/23 0701   PT Charge Group   PT Gait Training (Units) 3   PT Therapeutic Activities (Units) 1   PT Total Time Spent   PT Individual Total Time Spent (Mins) 60   Precautions   Precautions Fall Risk   Cognition    Level of Consciousness Alert   Sleep/Wake Cycle   Sleep & Rest Awake   Gait Functional Level of Assist    Gait Level Of Assist Total Assist   Assistive Device Front Wheel Walker  (+ Vector with ABC Max and 25# dynamic and manual unloading)   Distance (Feet) 40   Deviation   (discontinuous pattern, reciprocal, inc time in DLS)   Transfer Functional Level of Assist   Bed, Chair, Wheelchair Transfer Moderate Assist  (SPT with mod A to pt's L)   Bed Mobility    Sit to Stand Contact Guard Assist  (in // bars and to FWW)   Interdisciplinary Plan of Care Collaboration   IDT Collaboration with  Nursing   Patient Position at End of Therapy Seated;Chair Alarm On;Self Releasing Lap Belt Applied;Other (Comments)   Collaboration Comments tx of care to RN at completion of session. pt in dining room for breakfast     Setup in West trolley vector, harness size L (a medium may fit better). Pt stood in // bars to don harness.L DF ace-wrap. Initiated with fall prevention mode distance, transitioned to ABC Max.   -pregait training with fall prevention mode: distance 4\":   -Mini-squats with emphasis on equal B LE WBing and L knee control  -step-throughs with R LE to facilitate L knee control during stance phase. PT intermittently blocked L knee  -sidestepping both directions , x4 laps in // bars  -fwd walking in // with B hold support, " ~8 laps in //bars,  PT provided SBA  *Total time 38:54 min, training time 28:54 min, distance ~400'.   Progressed to gait with FWW with Vector ABC Max x40'. Min A for L hand placement on FWW handle.     Assessment    Pt progressed to gait with FWW with use of Vector support system this session. He as able to intermittently achieve a reciprocal pattern, however does exhibit increased time in DLS, slow speed, and intermittent L knee buckling, although L knee control significantly improved during session. Pt limited by fatigue and will benefit from increased time OOB, in standing, and working on CV endurance.     Strengths: Able to follow instructions, Alert and oriented, Effective communication skills, Good carryover of learning, Good insight into deficits/needs, Independent prior level of function, Making steady progress towards goals, Motivated for self care and independence, Pleasant and cooperative, Supportive family, Willingly participates in therapeutic activities  Barriers: Bladder retention, Decreased endurance, Hemiparesis, Impaired activity tolerance, Impaired balance, Language barrier, non-fluent English, Limited mobility    Plan    Progress gait in Vector in // bars and FWW, initiate BWSTT, L LE neuro re-ed with focus on improving L knee control in stance phase of gait, endurance        Physical Therapy Problems (Active)       Problem: Mobility       Dates: Start:  08/15/23         Goal: STG-Within one week, patient will ambulate household distance 50 ft with HW and CGA.       Dates: Start:  08/15/23            Goal: STG-Within one week, patient will ambulate up/down a curb with HW and min A.       Dates: Start:  08/15/23               Problem: Mobility Transfers       Dates: Start:  08/15/23         Goal: STG-Within one week, patient will perform bed mobility with SPV consistently.       Dates: Start:  08/15/23            Goal: STG-Within one week, patient will transfer bed to chair stand pivot with HW  and CGA.       Dates: Start:  08/15/23               Problem: PT-Long Term Goals       Dates: Start:  08/15/23         Goal: LTG-By discharge, patient will ambulate 150 ft with QC vs SPC and SBA.       Dates: Start:  08/15/23            Goal: LTG-By discharge, patient will transfer one surface to another with QC vs SPC and SPV.       Dates: Start:  08/15/23            Goal: LTG-By discharge, patient will ambulate up/down a curb with QC vs SPC and SBA.       Dates: Start:  08/15/23            Goal: LTG-By discharge, patient will transfer in/out of a car with QC vs SPC and SBA after setup.       Dates: Start:  08/15/23            Goal: LTG-By discharge, patient will ambulate up/down a ramp with QC vs SPC and CGA to simulate home entrance.       Dates: Start:  08/15/23

## 2023-08-19 NOTE — CARE PLAN
"The patient is Stable - Low risk of patient condition declining or worsening    Shift Goals  Clinical Goals: safety  Patient Goals: safety, sleep/rest    Problem: Fall Risk - Rehab  Goal: Patient will remain free from falls  Outcome: Progressing  Note: Melani Galeano Fall risk Assessment Score: 19    High fall risk Interventions   - Alarming seatbelt  - Bed and strip alarm   - Yellow sign by the door   - Yellow wrist band \"Fall risk\"  - Do not leave patient unattended in the bathroom  - Fall risk education provided  Reminded and  instructed patient to use call light when in need of help. Bed and chair alarm in place to alert staff. Call light within reach and will continue on close monitoring.              "

## 2023-08-20 ENCOUNTER — APPOINTMENT (OUTPATIENT)
Dept: OCCUPATIONAL THERAPY | Facility: REHABILITATION | Age: 58
DRG: 057 | End: 2023-08-20
Attending: PHYSICAL MEDICINE & REHABILITATION
Payer: COMMERCIAL

## 2023-08-20 LAB
AMORPH CRY #/AREA URNS HPF: PRESENT /HPF
APPEARANCE UR: CLEAR
BACTERIA #/AREA URNS HPF: NEGATIVE /HPF
BILIRUB UR QL STRIP.AUTO: NEGATIVE
COLOR UR: YELLOW
EPI CELLS #/AREA URNS HPF: NEGATIVE /HPF
GLUCOSE BLD STRIP.AUTO-MCNC: 128 MG/DL (ref 65–99)
GLUCOSE BLD STRIP.AUTO-MCNC: 154 MG/DL (ref 65–99)
GLUCOSE BLD STRIP.AUTO-MCNC: 167 MG/DL (ref 65–99)
GLUCOSE BLD STRIP.AUTO-MCNC: 207 MG/DL (ref 65–99)
GLUCOSE UR STRIP.AUTO-MCNC: 250 MG/DL
HEMOCCULT SP1 STL QL: NEGATIVE
HYALINE CASTS #/AREA URNS LPF: ABNORMAL /LPF
KETONES UR STRIP.AUTO-MCNC: NEGATIVE MG/DL
LEUKOCYTE ESTERASE UR QL STRIP.AUTO: ABNORMAL
MICRO URNS: ABNORMAL
NITRITE UR QL STRIP.AUTO: NEGATIVE
PH UR STRIP.AUTO: 6.5 [PH] (ref 5–8)
PROT UR QL STRIP: NEGATIVE MG/DL
RBC # URNS HPF: ABNORMAL /HPF
RBC UR QL AUTO: NEGATIVE
SP GR UR STRIP.AUTO: 1.01
UROBILINOGEN UR STRIP.AUTO-MCNC: 0.2 MG/DL
WBC #/AREA URNS HPF: ABNORMAL /HPF

## 2023-08-20 PROCEDURE — A9270 NON-COVERED ITEM OR SERVICE: HCPCS | Performed by: HOSPITALIST

## 2023-08-20 PROCEDURE — 82962 GLUCOSE BLOOD TEST: CPT | Mod: 91

## 2023-08-20 PROCEDURE — 82270 OCCULT BLOOD FECES: CPT

## 2023-08-20 PROCEDURE — A9270 NON-COVERED ITEM OR SERVICE: HCPCS | Performed by: PHYSICAL MEDICINE & REHABILITATION

## 2023-08-20 PROCEDURE — 99232 SBSQ HOSP IP/OBS MODERATE 35: CPT | Performed by: HOSPITALIST

## 2023-08-20 PROCEDURE — 700102 HCHG RX REV CODE 250 W/ 637 OVERRIDE(OP): Performed by: PHYSICAL MEDICINE & REHABILITATION

## 2023-08-20 PROCEDURE — 770010 HCHG ROOM/CARE - REHAB SEMI PRIVAT*

## 2023-08-20 PROCEDURE — 97112 NEUROMUSCULAR REEDUCATION: CPT

## 2023-08-20 PROCEDURE — 700102 HCHG RX REV CODE 250 W/ 637 OVERRIDE(OP): Performed by: HOSPITALIST

## 2023-08-20 PROCEDURE — 700111 HCHG RX REV CODE 636 W/ 250 OVERRIDE (IP): Mod: JZ | Performed by: PHYSICAL MEDICINE & REHABILITATION

## 2023-08-20 PROCEDURE — 81001 URINALYSIS AUTO W/SCOPE: CPT

## 2023-08-20 RX ORDER — CEFDINIR 300 MG/1
300 CAPSULE ORAL EVERY 12 HOURS
Status: COMPLETED | OUTPATIENT
Start: 2023-08-20 | End: 2023-08-24

## 2023-08-20 RX ADMIN — ATORVASTATIN CALCIUM 80 MG: 40 TABLET, FILM COATED ORAL at 21:10

## 2023-08-20 RX ADMIN — POLYETHYLENE GLYCOL 3350 1 PACKET: 17 POWDER, FOR SOLUTION ORAL at 08:18

## 2023-08-20 RX ADMIN — CLOPIDOGREL BISULFATE 75 MG: 75 TABLET ORAL at 08:16

## 2023-08-20 RX ADMIN — ASPIRIN 81 MG: 81 TABLET, COATED ORAL at 21:08

## 2023-08-20 RX ADMIN — TAMSULOSIN HYDROCHLORIDE 0.4 MG: 0.4 CAPSULE ORAL at 08:16

## 2023-08-20 RX ADMIN — SENNOSIDES AND DOCUSATE SODIUM 2 TABLET: 50; 8.6 TABLET ORAL at 08:16

## 2023-08-20 RX ADMIN — METFORMIN HYDROCHLORIDE 500 MG: 500 TABLET, EXTENDED RELEASE ORAL at 17:42

## 2023-08-20 RX ADMIN — ACETAMINOPHEN 650 MG: 325 TABLET ORAL at 08:22

## 2023-08-20 RX ADMIN — CEFDINIR 300 MG: 300 CAPSULE ORAL at 21:09

## 2023-08-20 RX ADMIN — SENNOSIDES AND DOCUSATE SODIUM 2 TABLET: 50; 8.6 TABLET ORAL at 21:09

## 2023-08-20 RX ADMIN — METOPROLOL TARTRATE 12.5 MG: 25 TABLET, FILM COATED ORAL at 06:07

## 2023-08-20 RX ADMIN — METOPROLOL TARTRATE 12.5 MG: 25 TABLET, FILM COATED ORAL at 17:42

## 2023-08-20 RX ADMIN — ENOXAPARIN SODIUM 40 MG: 100 INJECTION SUBCUTANEOUS at 17:42

## 2023-08-20 RX ADMIN — METFORMIN HYDROCHLORIDE 500 MG: 500 TABLET, EXTENDED RELEASE ORAL at 08:16

## 2023-08-20 RX ADMIN — CEFDINIR 300 MG: 300 CAPSULE ORAL at 14:39

## 2023-08-20 ASSESSMENT — ENCOUNTER SYMPTOMS
EYES NEGATIVE: 1
SHORTNESS OF BREATH: 0
NAUSEA: 0
POLYDIPSIA: 0
VOMITING: 0
PALPITATIONS: 0
BRUISES/BLEEDS EASILY: 0
FEVER: 0
FOCAL WEAKNESS: 1
COUGH: 0
CHILLS: 0
MUSCULOSKELETAL NEGATIVE: 1
ABDOMINAL PAIN: 0

## 2023-08-20 ASSESSMENT — PAIN DESCRIPTION - PAIN TYPE
TYPE: ACUTE PAIN
TYPE: ACUTE PAIN

## 2023-08-20 ASSESSMENT — ACTIVITIES OF DAILY LIVING (ADL)
TOILET_TRANSFER_DESCRIPTION: ADAPTIVE EQUIPMENT;GRAB BAR;INCREASED TIME;SUPERVISION FOR SAFETY;VERBAL CUEING;SET-UP OF EQUIPMENT

## 2023-08-20 NOTE — PROGRESS NOTES
..                                                         NURSING DAILY NOTE    Name: Jorden Bonilla   Date of Admission: 8/14/2023   Admitting Diagnosis: Ischemic stroke (HCC)  Attending Physician: Sarai Davila M.d.  Allergies: Patient has no known allergies.    Safety  Patient Assist  Mod A  Patient Precautions  Fall Risk  Precaution Comments  L phyllis, Zio patch, HTN, DM  Bed Transfer Status  Moderate Assist (SPT with mod A to pt's L)  Toilet Transfer Status   Minimal Assist  Assistive Devices  Rails, Wheelchair  Oxygen  None - Room Air  Diet/Therapeutic Dining  Current Diet Order   Procedures    Diet Order Diet: Consistent CHO (Diabetic) (cardiac and CHO); Second Modifier: (optional): Cardiac     Pill Administration  whole  Agitated Behavioral Scale  14  ABS Level of Severity  No Agitation    Fall Risk  Has the patient had a fall this admission?   No  Melani Galeano Fall Risk Scoring  11, MODERATE RISK  Fall Risk Safety Measures  bed alarm, chair alarm, poor balance, and low vision/ hearing    Vitals  Temperature: 37.3 °C (99.1 °F)  Temp src: Oral  Pulse: 87  Respiration: 16  Blood Pressure: 110/65  Blood Pressure MAP (Calculated): 80 MM HG  BP Location: Right, Upper Arm  Patient BP Position: Supine     Oxygen  Pulse Oximetry: 95 %  O2 (LPM): 0  O2 Delivery Device: None - Room Air    Bowel and Bladder  Last Bowel Movement  08/20/23  Stool Type  Type 2: Sausage shaped, but lumpy  Bowel Device     Continent  Bladder: Continent void   Bowel: Continent movement  Bladder Function  Urine Void (mL): 600 ml (urinal)  Number of Times Voided: 1  Urine Color: Yellow  Genitourinary Assessment   Bladder Assessment (WDL):  Within Defined Limits  Damon Catheter: Not Applicable  Damon Care: Given with Soap and Water  Urinary Elimination: Catheter (Document on LDA)  Urine Color: Yellow  Bladder Device: Bathroom  Bladder Scan: Post Void  $ Bladder Scan Results (mL): 51    Skin  Cooper Score   18  Sensory Interventions    Bed Types: Standard/Trauma Mattress  Skin Preventative Measures: Pillows in Use for Support / Positioning  Moisture Interventions  Moisturizers/Barriers: Barrier Wipes      Pain  Pain Rating Scale  1 - Hardly Notice Pain  Pain Location  Back  Pain Location Orientation  Lower  Pain Interventions   Declines    ADLs    Bathing   Shower, * * With Assistance from, Staff  Linen Change   Partial  Personal Hygiene  Moist Mireya Wipes, Perineal Care  Chlorhexidine Bath      Oral Care     Teeth/Dentures     Shave     Nutrition Percentage Eaten  *  * Meal *  *, Lunch, Between 25-50% Consumed  Environmental Precautions  Treaded Slipper Socks on Patient  Patient Turns/Positioning  Patient Turns Self from Side to Side, Supine  Patient Turns Assistance/Tolerance     Bed Positions  Bed Controls On, Bed Locked  Head of Bed Elevated  Greater or equal to 30 degrees      Psychosocial/Neurologic Assessment  Psychosocial Assessment  Psychosocial (WDL):  Within Defined Limits  Neurologic Assessment  Neuro (WDL): Exceptions to WDL  Level of Consciousness: Alert  Orientation Level: Oriented X4  Muscle Strength Left Arm: Fair Strength against Gravity but No Resistance  Muscle Strength Left Leg: Fair Strength against Gravity but No Resistance  EENT (WDL):  WDL Except    Cardio/Pulmonary Assessment  Edema      Respiratory Breath Sounds     Cardiac Assessment   Cardiac (WDL):  WDL Except (tachycardia, Hx-CAD, HTN, mult. coronary artery by pass)

## 2023-08-20 NOTE — CARE PLAN
The patient is Stable - Low risk of patient condition declining or worsening    Problem: Knowledge Deficit - Standard  Goal: Patient and family/care givers will demonstrate understanding of plan of care, disease process/condition, diagnostic tests and medications  Outcome: Progressing. Reviewed POC, all questions answered.        Problem: Skin Integrity  Goal: Skin integrity is maintained or improved  Outcome: Progressing. Pt's skin remains free from new or accidental injury.        Shift Goals  Clinical Goals: Safety  Patient Goals: Participate in therapy

## 2023-08-20 NOTE — CARE PLAN
"The patient is Stable - Low risk of patient condition declining or worsening    Shift Goals  Clinical Goals: safety  Patient Goals: safety, sleep/rest    Patient is not progressing towards the following goals:    Problem: Fall Risk - Rehab  Goal: Patient will remain free from falls  Outcome: Not Met  Note: Melani Galeano Fall risk Assessment Score: 11    Moderate fall risk Interventions  - Bed and strip alarm   - Yellow sign by the door   - Yellow wrist band \"Fall risk\"  - Fall risk education provided     "

## 2023-08-20 NOTE — PROGRESS NOTES
Hospital Medicine Daily Progress Note        Chief Complaint  Hypertension  Diabetes    Interval Problem Update  Remains afebrile with stable VS.  Lab and imaging results reviewed and discussed.    Review of Systems  Review of Systems   Constitutional:  Negative for chills and fever.   HENT: Negative.     Eyes: Negative.    Respiratory:  Negative for cough and shortness of breath.    Cardiovascular:  Negative for chest pain and palpitations.   Gastrointestinal:  Negative for abdominal pain, nausea and vomiting.   Genitourinary:  Negative for dysuria, frequency, hematuria and urgency.   Musculoskeletal: Negative.    Skin:  Negative for itching and rash.   Neurological:  Positive for focal weakness.   Endo/Heme/Allergies:  Negative for polydipsia. Does not bruise/bleed easily.        Physical Exam  Temp:  [37.2 °C (98.9 °F)-37.9 °C (100.2 °F)] 37.3 °C (99.1 °F)  Pulse:  [] 87  Resp:  [16-18] 16  BP: ()/(64-68) 107/66  SpO2:  [95 %-98 %] 96 %    Physical Exam  Vitals reviewed.   Constitutional:       General: He is not in acute distress.     Appearance: Normal appearance. He is not ill-appearing.   HENT:      Head: Normocephalic and atraumatic.      Right Ear: External ear normal.      Left Ear: External ear normal.      Nose: Nose normal.      Mouth/Throat:      Pharynx: Oropharynx is clear.   Eyes:      General:         Right eye: No discharge.         Left eye: No discharge.      Extraocular Movements: Extraocular movements intact.      Conjunctiva/sclera: Conjunctivae normal.   Cardiovascular:      Rate and Rhythm: Normal rate and regular rhythm.   Pulmonary:      Effort: Pulmonary effort is normal. No respiratory distress.      Breath sounds: Normal breath sounds. No wheezing.   Abdominal:      General: Bowel sounds are normal. There is no distension.      Palpations: Abdomen is soft.      Tenderness: There is no abdominal tenderness.   Musculoskeletal:      Cervical back: Normal range of motion and  neck supple.      Right lower leg: No edema.      Left lower leg: No edema.   Skin:     General: Skin is warm and dry.   Neurological:      Mental Status: He is alert and oriented to person, place, and time.             Laboratory  Recent Labs     08/18/23  0551 08/19/23  0540   WBC 16.3* 14.5*   RBC 6.03 5.62   HEMOGLOBIN 15.9 14.5   HEMATOCRIT 49.3 46.2   MCV 81.8 82.2   MCH 26.4* 25.8*   MCHC 32.3 31.4*   RDW 37.4 37.3   PLATELETCT 284 260   MPV 11.1 10.6     Recent Labs     08/18/23  0551   SODIUM 137   POTASSIUM 4.1   CHLORIDE 100   CO2 25   GLUCOSE 153*   BUN 19   CREATININE 0.75   CALCIUM 9.3                   Assessment/Plan  * Ischemic stroke (HCC)- (present on admission)  Assessment & Plan  Presented w/ L HP  S/P ZioPatch  On ASA, Plavix, and Lipitor  Management per Physiatry    Leukocytosis  Assessment & Plan  Pt afebrile and non-toxic appearing  PCT <0.05  UA 20-50 WBC w/ mod bacteria  Request UCx  CXR negative acute  Follow Temp and WBC  No compelling indication to initiate empiric antimicrobial therapy at this time    Microcytosis  Assessment & Plan  Fe 34, may start supplement if no contraindications  FOB+, recommend outpt GI F/U  Follow H/H    Primary hypertension- (present on admission)  Assessment & Plan  Continue Olmesartan and Metoprolol  Monitor for orthostatic hypotension on Flomax    Type 2 diabetes mellitus with hyperglycemia, without long-term current use of insulin (HCC)- (present on admission)  Assessment & Plan  HbA1c 11.9  Continue Metformin XR and SSI  Now off Lantus  Outpt meds include Metformin  mg bid, Jardiance 25 mg qd, and Trulicity 1.5 mg SQ qwk    Coronary artery disease due to calcified coronary lesion: CABG ×3 in 2015- (present on admission)  Assessment & Plan  H/O CABG  Continue ASA, Lipitor, Olmesartan, and Metoprolol  Cardiology F/U       Full Code

## 2023-08-20 NOTE — PROGRESS NOTES
NURSING DAILY NOTE    Name: Jorden Bonilla   Date of Admission: 8/14/2023   Admitting Diagnosis: Ischemic stroke (HCC)  Attending Physician: Sarai Davila M.d.  Allergies: Patient has no known allergies.    Safety  Patient Assist  CGA  Patient Precautions  Fall Risk  Precaution Comments  L phyllis, Zio patch, HTN, DM  Bed Transfer Status  Moderate Assist (SPT with mod A to pt's L)  Toilet Transfer Status   Minimal Assist  Assistive Devices  Rails, Wheelchair  Oxygen  None - Room Air  Diet/Therapeutic Dining  Current Diet Order   Procedures    Diet Order Diet: Consistent CHO (Diabetic) (cardiac and CHO); Second Modifier: (optional): Cardiac     Pill Administration  whole  Agitated Behavioral Scale  14  ABS Level of Severity  No Agitation    Fall Risk  Has the patient had a fall this admission?   No  Melani Galeano Fall Risk Scoring  17, HIGH RISK  Fall Risk Safety Measures  bed alarm, chair alarm, and poor balance    Vitals  Temperature: 37.2 °C (98.9 °F)  Temp src: Oral  Pulse: (!) 104  Respiration: 18  Blood Pressure: 112/68  Blood Pressure MAP (Calculated): 83 MM HG  BP Location: Right, Upper Arm  Patient BP Position: Supine     Oxygen  Pulse Oximetry: 98 %  O2 (LPM): 0  O2 Delivery Device: None - Room Air    Bowel and Bladder  Last Bowel Movement  08/18/23  Stool Type     Bowel Device     Continent  Bladder: Continent void   Bowel: Continent movement  Bladder Function  Urine Void (mL): 600 ml (urinal)  Number of Times Voided: 1  Urine Color: Yellow  Genitourinary Assessment   Bladder Assessment (WDL):  Within Defined Limits  Damon Catheter: Not Applicable  Damon Care: Given with Soap and Water  Urinary Elimination: Catheter (Document on LDA)  Urine Color: Yellow  Bladder Device: Bathroom  Bladder Scan: Post Void  $ Bladder Scan Results (mL): 51    Skin  Cooper Score   15  Sensory Interventions   Bed Types: Standard/Trauma Mattress  Skin Preventative  Measures: Pillows in Use for Support / Positioning  Moisture Interventions  Moisturizers/Barriers: Barrier Wipes      Pain  Pain Rating Scale  0 - No Pain  Pain Location  Back  Pain Location Orientation     Pain Interventions   Declines    ADLs    Bathing   Shower, * * With Assistance from, Staff  Linen Change   Partial  Personal Hygiene     Chlorhexidine Bath      Oral Care     Teeth/Dentures     Shave     Nutrition Percentage Eaten  *  * Meal *  *, Lunch, Between 25-50% Consumed  Environmental Precautions  Treaded Slipper Socks on Patient  Patient Turns/Positioning  Patient Turns Self from Side to Side, Supine  Patient Turns Assistance/Tolerance     Bed Positions  Bed Controls On, Bed Locked  Head of Bed Elevated  Greater or equal to 30 degrees      Psychosocial/Neurologic Assessment  Psychosocial Assessment  Psychosocial (WDL):  Within Defined Limits  Neurologic Assessment  Neuro (WDL): Within Defined Limits  Level of Consciousness: Alert  EENT (WDL):  WDL Except    Cardio/Pulmonary Assessment  Edema      Respiratory Breath Sounds     Cardiac Assessment   Cardiac (WDL):  WDL Except (tachycardia, Hx-CAD, HTN, mult. coronary artery by pass)

## 2023-08-20 NOTE — THERAPY
Occupational Therapy  Daily Treatment     Patient Name: Jorden Bonilla  Age:  58 y.o., Sex:  male  Medical Record #: [id number]  Today's Date: 8/20/2023     Precautions  Precautions: (P) Fall Risk  Comments: (P) L phyllis, Zio patch, HTN, DM         Subjective    Pt seated in w/c upon arrival. Pt pleasant and cooperative, agreeable to therapy.     Objective       08/20/23 0931   OT Total Time Spent   OT Individual Total Time Spent (Mins) 60   Precautions   Precautions Fall Risk   Comments L phyllis, Zio patch, HTN, DM   Vitals   O2 Delivery Device None - Room Air   Pain   Intervention Declines   Pain 0 - 10 Group   Pain Rating Scale (NPRS) 0   Cognition    Level of Consciousness Alert   Sleep/Wake Cycle   Sleep & Rest Awake;Out of bed   Functional Level of Assist   Toileting Minimal Assist   Toileting Description Adaptive equipment;Assist for standing balance;Grab bar;Increased time;Set-up of equipment;Supervision for safety   Toilet Transfers Minimal Assist   Toilet Transfer Description Adaptive equipment;Grab bar;Increased time;Supervision for safety;Verbal cueing;Set-up of equipment  (stand pivot from w/c <> toilet)   Neuro-Muscular Treatments   Neuro-Muscular Treatments Anterior weight shift;Weight Shift Right;Weight Shift Left   Comments Weight bearing for LUE- pt stood in front of mat table with Min A with weight being placed through LUE on elbow. Pt laterally leaned R and L to retrieve bean bags with RUE while weight bearing through LUE. Pt threw beans bags to R side into bucket on floor first round. Second round, pt used RUE to throw bean bags into bucket on mat table on L side while weight bearing through LUE. Third round pt used LUE to sweep bean bags off mat table using internal and external rotation while supporting self with RUE. Done standing in front of mat table with Min A.   Interdisciplinary Plan of Care Collaboration   Patient Position at End of Therapy Seated;Chair Alarm On;Self Releasing Lap Belt  Applied;Call Light within Reach;Tray Table within Reach;Phone within Reach     Standing tolerance- Pt completed standing bean bag toss in between parallel bars with Min A. Pt stabilized self with LUE with therapist assist to keep hand on parallel bars. Pt reached with RUE to L side to retrieve bean bag and throw. Second round- pt stabilized self with LUE on parallel bar with therapist assist to keep hand on bars. Pt retrieved bean bag with RUE by completing mini squat to reach bean bags placed on stool in front of him. Pt stood back up then threw bean bags. Pt took sitting breaks in w/c after each round.    Assessment    Pt successful with all activities despite feeling tired. Pt required multiple verbal cues for widening base of support in both weight bearing and standing tolerance activities. Pt Required verbal cues to maintain upright posture and bring chest back during standing tolerance activity as he maintained a forward flexed posture until cued. Pt had multiple anterior and bilateral LOB in both activities. Pt able to self recover in front of mat table, but required physical assistance from therapist during standing balance activity between parallel bars. Pt required Mod A to sweep bean bags off of mat table when completing external rotation. Pt really pushed self today.    Strengths: Able to follow instructions, Alert and oriented, Effective communication skills, Good carryover of learning, Good insight into deficits/needs, Independent prior level of function, Manages pain appropriately, Motivated for self care and independence, Pleasant and cooperative, Supportive family, Willingly participates in therapeutic activities  Barriers: Hemiplegia, Hypertension, Impaired balance, Limited mobility    Plan    LUE neuro re-ed. ADL/IADL training. Increased strength and standing tolerance.     Passport items to be completed:  Perform bathroom transfers, complete dressing, complete feeding, get ready for the day,  prepare a simple meal, participate in household tasks, adapt home for safety needs, demonstrate home exercise program, complete caregiver training     Occupational Therapy Goals (Active)       Problem: Bathing       Dates: Start:  08/15/23         Goal: STG-Within one week, patient will bathe body with min A using AE/AD prn       Dates: Start:  08/15/23    Expected End:  09/05/23         Goal Note filed on 08/16/23 1249 by Pricilla Bazzi MS,OTR/L       New admit                 Problem: Dressing       Dates: Start:  08/15/23         Goal: STG-Within one week, patient will dress LB with min A       Dates: Start:  08/15/23    Expected End:  09/05/23         Goal Note filed on 08/16/23 1249 by Pricilla Bzazi MS,OTR/L       New admit                 Problem: Functional Transfers       Dates: Start:  08/15/23         Goal: STG-Within one week, patient will transfer to toilet with CGA using grab bar       Dates: Start:  08/15/23    Expected End:  09/05/23         Goal Note filed on 08/16/23 1249 by Pricilla Bazzi MS,OTR/L       New admit                 Problem: OT Long Term Goals       Dates: Start:  08/15/23         Goal: LTG-By discharge, patient will complete basic self care tasks with SBA/supervision       Dates: Start:  08/15/23    Expected End:  09/05/23            Goal: LTG-By discharge, patient will perform bathroom transfers with supervision/mod I       Dates: Start:  08/15/23    Expected End:  09/05/23               Problem: Toileting       Dates: Start:  08/15/23         Goal: STG-Within one week, patient will complete toileting tasks with max A       Dates: Start:  08/15/23    Expected End:  09/05/23         Goal Note filed on 08/16/23 1249 by Pricilla Bazzi MS,OTR/L       New admit

## 2023-08-20 NOTE — CARE PLAN
The patient is Stable - Low risk of patient condition declining or worsening    Shift Goals  Clinical Goals: safety  Patient Goals: safety, sleep/rest    Problem: Skin Integrity  Goal: Skin integrity is maintained or improved  Outcome: Progressing     Problem: Fall Risk - Rehab  Goal: Patient will remain free from falls  Outcome: Progressing

## 2023-08-20 NOTE — PROGRESS NOTES
Hospital Medicine Daily Progress Note        Chief Complaint  Hypertension  Diabetes    Interval Problem Update  Pt reports an episode of bloody urine.  Tmax 100.2 overnight.    Review of Systems  Review of Systems   Constitutional:  Negative for chills and fever.   HENT: Negative.     Eyes: Negative.    Respiratory:  Negative for cough and shortness of breath.    Cardiovascular:  Negative for chest pain and palpitations.   Gastrointestinal:  Negative for abdominal pain, nausea and vomiting.   Genitourinary:  Positive for hematuria. Negative for dysuria, frequency and urgency.   Musculoskeletal: Negative.    Skin:  Negative for itching and rash.   Neurological:  Positive for focal weakness.   Endo/Heme/Allergies:  Negative for polydipsia. Does not bruise/bleed easily.        Physical Exam  Temp:  [37.2 °C (98.9 °F)-37.9 °C (100.2 °F)] 37.3 °C (99.1 °F)  Pulse:  [] 87  Resp:  [16-18] 16  BP: ()/(64-68) 107/66  SpO2:  [95 %-98 %] 96 %    Physical Exam  Vitals reviewed.   Constitutional:       General: He is not in acute distress.     Appearance: Normal appearance. He is not ill-appearing.   HENT:      Head: Normocephalic and atraumatic.      Right Ear: External ear normal.      Left Ear: External ear normal.      Nose: Nose normal.      Mouth/Throat:      Pharynx: Oropharynx is clear.   Eyes:      General:         Right eye: No discharge.         Left eye: No discharge.      Extraocular Movements: Extraocular movements intact.      Conjunctiva/sclera: Conjunctivae normal.   Cardiovascular:      Rate and Rhythm: Normal rate and regular rhythm.   Pulmonary:      Effort: Pulmonary effort is normal. No respiratory distress.      Breath sounds: Normal breath sounds. No wheezing.   Abdominal:      General: Bowel sounds are normal. There is no distension.      Palpations: Abdomen is soft.      Tenderness: There is no abdominal tenderness.   Musculoskeletal:      Cervical back: Normal range of motion and neck  supple.      Right lower leg: No edema.      Left lower leg: No edema.   Skin:     General: Skin is warm and dry.   Neurological:      Mental Status: He is alert and oriented to person, place, and time.             Laboratory  Recent Labs     08/18/23  0551 08/19/23  0540   WBC 16.3* 14.5*   RBC 6.03 5.62   HEMOGLOBIN 15.9 14.5   HEMATOCRIT 49.3 46.2   MCV 81.8 82.2   MCH 26.4* 25.8*   MCHC 32.3 31.4*   RDW 37.4 37.3   PLATELETCT 284 260   MPV 11.1 10.6     Recent Labs     08/18/23  0551   SODIUM 137   POTASSIUM 4.1   CHLORIDE 100   CO2 25   GLUCOSE 153*   BUN 19   CREATININE 0.75   CALCIUM 9.3                   Assessment/Plan  * Ischemic stroke (HCC)- (present on admission)  Assessment & Plan  Presented w/ L HP  S/P ZioPatch  On ASA, Plavix, and Lipitor  Management per Physiatry    Leukocytosis  Assessment & Plan  Tmax 100.2 8/19/23  PCT <0.05  UA 20-50 WBC w/ mod bacteria  UCx pending  CXR negative acute  Start empiric abx given low grade temp    Microcytosis  Assessment & Plan  Fe 34, may start supplement if no contraindications  FOB+, recommend outpt GI F/U  Follow H/H    Primary hypertension- (present on admission)  Assessment & Plan  Continue Olmesartan and Metoprolol  Monitor for orthostatic hypotension on Flomax    Type 2 diabetes mellitus with hyperglycemia, without long-term current use of insulin (HCC)- (present on admission)  Assessment & Plan  HbA1c 11.9  Continue Metformin XR and SSI  Lantus discontinued  Outpt meds include Metformin  mg bid, Jardiance 25 mg qd, and Trulicity 1.5 mg SQ qwk    Coronary artery disease due to calcified coronary lesion: CABG ×3 in 2015- (present on admission)  Assessment & Plan  H/O CABG  Continue ASA, Lipitor, Olmesartan, and Metoprolol  Cardiology F/U       Full Code

## 2023-08-21 ENCOUNTER — APPOINTMENT (OUTPATIENT)
Dept: PHYSICAL THERAPY | Facility: REHABILITATION | Age: 58
DRG: 057 | End: 2023-08-21
Attending: PHYSICAL MEDICINE & REHABILITATION
Payer: COMMERCIAL

## 2023-08-21 ENCOUNTER — APPOINTMENT (OUTPATIENT)
Dept: OCCUPATIONAL THERAPY | Facility: REHABILITATION | Age: 58
DRG: 057 | End: 2023-08-21
Attending: PHYSICAL MEDICINE & REHABILITATION
Payer: COMMERCIAL

## 2023-08-21 LAB
GLUCOSE BLD STRIP.AUTO-MCNC: 126 MG/DL (ref 65–99)
GLUCOSE BLD STRIP.AUTO-MCNC: 147 MG/DL (ref 65–99)
GLUCOSE BLD STRIP.AUTO-MCNC: 151 MG/DL (ref 65–99)
GLUCOSE BLD STRIP.AUTO-MCNC: 216 MG/DL (ref 65–99)

## 2023-08-21 PROCEDURE — 97112 NEUROMUSCULAR REEDUCATION: CPT

## 2023-08-21 PROCEDURE — 700102 HCHG RX REV CODE 250 W/ 637 OVERRIDE(OP): Performed by: HOSPITALIST

## 2023-08-21 PROCEDURE — 97530 THERAPEUTIC ACTIVITIES: CPT

## 2023-08-21 PROCEDURE — A9270 NON-COVERED ITEM OR SERVICE: HCPCS | Performed by: HOSPITALIST

## 2023-08-21 PROCEDURE — A9270 NON-COVERED ITEM OR SERVICE: HCPCS | Performed by: PHYSICAL MEDICINE & REHABILITATION

## 2023-08-21 PROCEDURE — 99232 SBSQ HOSP IP/OBS MODERATE 35: CPT | Performed by: HOSPITALIST

## 2023-08-21 PROCEDURE — 700102 HCHG RX REV CODE 250 W/ 637 OVERRIDE(OP): Performed by: PHYSICAL MEDICINE & REHABILITATION

## 2023-08-21 PROCEDURE — 770010 HCHG ROOM/CARE - REHAB SEMI PRIVAT*

## 2023-08-21 PROCEDURE — 97116 GAIT TRAINING THERAPY: CPT

## 2023-08-21 PROCEDURE — 99232 SBSQ HOSP IP/OBS MODERATE 35: CPT | Performed by: PHYSICAL MEDICINE & REHABILITATION

## 2023-08-21 PROCEDURE — 82962 GLUCOSE BLOOD TEST: CPT

## 2023-08-21 PROCEDURE — 700111 HCHG RX REV CODE 636 W/ 250 OVERRIDE (IP): Mod: JZ | Performed by: PHYSICAL MEDICINE & REHABILITATION

## 2023-08-21 RX ADMIN — METOPROLOL TARTRATE 12.5 MG: 25 TABLET, FILM COATED ORAL at 17:44

## 2023-08-21 RX ADMIN — TAMSULOSIN HYDROCHLORIDE 0.4 MG: 0.4 CAPSULE ORAL at 08:44

## 2023-08-21 RX ADMIN — POLYETHYLENE GLYCOL 3350 1 PACKET: 17 POWDER, FOR SOLUTION ORAL at 08:44

## 2023-08-21 RX ADMIN — METFORMIN HYDROCHLORIDE 500 MG: 500 TABLET, EXTENDED RELEASE ORAL at 08:44

## 2023-08-21 RX ADMIN — ACETAMINOPHEN 650 MG: 325 TABLET ORAL at 19:59

## 2023-08-21 RX ADMIN — ENOXAPARIN SODIUM 40 MG: 100 INJECTION SUBCUTANEOUS at 17:44

## 2023-08-21 RX ADMIN — CLOPIDOGREL BISULFATE 75 MG: 75 TABLET ORAL at 08:44

## 2023-08-21 RX ADMIN — ATORVASTATIN CALCIUM 80 MG: 40 TABLET, FILM COATED ORAL at 19:53

## 2023-08-21 RX ADMIN — CEFDINIR 300 MG: 300 CAPSULE ORAL at 19:53

## 2023-08-21 RX ADMIN — CEFDINIR 300 MG: 300 CAPSULE ORAL at 08:44

## 2023-08-21 RX ADMIN — ACETAMINOPHEN 650 MG: 325 TABLET ORAL at 08:48

## 2023-08-21 RX ADMIN — Medication 3 MG: at 19:59

## 2023-08-21 RX ADMIN — METFORMIN HYDROCHLORIDE 500 MG: 500 TABLET, EXTENDED RELEASE ORAL at 17:44

## 2023-08-21 RX ADMIN — SENNOSIDES AND DOCUSATE SODIUM 2 TABLET: 50; 8.6 TABLET ORAL at 08:44

## 2023-08-21 RX ADMIN — METOPROLOL TARTRATE 12.5 MG: 25 TABLET, FILM COATED ORAL at 05:51

## 2023-08-21 RX ADMIN — SENNOSIDES AND DOCUSATE SODIUM 2 TABLET: 50; 8.6 TABLET ORAL at 19:53

## 2023-08-21 RX ADMIN — ASPIRIN 81 MG: 81 TABLET, COATED ORAL at 19:53

## 2023-08-21 ASSESSMENT — ENCOUNTER SYMPTOMS
SHORTNESS OF BREATH: 0
FOCAL WEAKNESS: 1
CHILLS: 0
FEVER: 0
EYES NEGATIVE: 1
VOMITING: 0
POLYDIPSIA: 0
COUGH: 0
MUSCULOSKELETAL NEGATIVE: 1
NAUSEA: 0
PALPITATIONS: 0
ABDOMINAL PAIN: 0
BRUISES/BLEEDS EASILY: 0

## 2023-08-21 ASSESSMENT — GAIT ASSESSMENTS
GAIT LEVEL OF ASSIST: MODERATE ASSIST
ASSISTIVE DEVICE: QUAD CANE;OTHER (COMMENTS)
ASSISTIVE DEVICE: QUAD CANE
DISTANCE (FEET): 20
DEVIATION: STEP TO;DECREASED BASE OF SUPPORT;DECREASED HEEL STRIKE;DECREASED TOE OFF
GAIT LEVEL OF ASSIST: MODERATE ASSIST
DEVIATION: ATAXIC;STEP TO;DECREASED BASE OF SUPPORT;BRADYKINETIC;DECREASED HEEL STRIKE;DECREASED TOE OFF

## 2023-08-21 ASSESSMENT — ACTIVITIES OF DAILY LIVING (ADL)
BED_CHAIR_WHEELCHAIR_TRANSFER_DESCRIPTION: ADAPTIVE EQUIPMENT;INCREASED TIME;INITIAL PREPARATION FOR TASK;SUPERVISION FOR SAFETY;VERBAL CUEING
TOILET_TRANSFER_DESCRIPTION: ADAPTIVE EQUIPMENT;GRAB BAR;INCREASED TIME;INITIAL PREPARATION FOR TASK;SUPERVISION FOR SAFETY;VERBAL CUEING
BED_CHAIR_WHEELCHAIR_TRANSFER_DESCRIPTION: ADAPTIVE EQUIPMENT;INCREASED TIME;INITIAL PREPARATION FOR TASK;SET-UP OF EQUIPMENT

## 2023-08-21 ASSESSMENT — PAIN DESCRIPTION - PAIN TYPE: TYPE: ACUTE PAIN

## 2023-08-21 NOTE — THERAPY
Occupational Therapy  Daily Treatment     Patient Name: Jorden Bonilla  Age:  58 y.o., Sex:  male  Medical Record #: [id number]  Today's Date: 8/21/2023     Precautions  Precautions: Fall Risk  Comments: L phyllis, Zio patch, HTN, DM         Subjective    Pt seated in w/c upon arrival. Pt pleasant and cooperative, agreeable to therapy.     Objective       08/21/23 0931   OT Total Time Spent   OT Individual Total Time Spent (Mins) 60   Precautions   Precautions Fall Risk   Comments L phyllis, Zio patch, HTN, DM   Vitals   O2 Delivery Device None - Room Air   Pain   Intervention Declines   Pain 0 - 10 Group   Pain Rating Scale (NPRS) 0   Cognition    Level of Consciousness Alert   Sleep/Wake Cycle   Sleep & Rest Awake;Out of bed   Neuro-Muscular Treatments   Neuro-Muscular Treatments Electrical Stimulation   Comments , see note for details.   Interdisciplinary Plan of Care Collaboration   Patient Position at End of Therapy Seated;Chair Alarm On;Self Releasing Lap Belt Applied;Call Light within Reach;Tray Table within Reach;Phone within Reach     Neuro-Muscular Treatments  LUE neuro re-ed using  FES cycling with BUE's. Electrodes placed at LUE shoulder, scapular stabilizers, biceps/triceps, forearm extensors/flexors. Distance travelled: 3 mile, energy expended: 0.2 kcal, energy per hour: 0.5 kcal/hour, avg power: 0.5 w, avg asymmetry 0 %, total therapy time: 30:30 min including warm-up/cool-down, time active (off motor): 5:26 min, time passive (on motor) 25:04 min.      Assessment    Pt completed  FES cycling for LUE neuro re-edu, ROM, sensory input. Pt tolerated session well, good muscle contraction observed, and no complaints of pain.       Strengths: Able to follow instructions, Alert and oriented, Effective communication skills, Good carryover of learning, Good insight into deficits/needs, Independent prior level of function, Manages pain appropriately, Motivated for self care and independence, Pleasant  and cooperative, Supportive family, Willingly participates in therapeutic activities  Barriers: Hemiplegia, Hypertension, Impaired balance, Limited mobility    Plan    LUE neuro re-ed. ADL/IADL training. Increase overall strength and endurance.    Passport items to be completed:  Perform bathroom transfers, complete dressing, complete feeding, get ready for the day, prepare a simple meal, participate in household tasks, adapt home for safety needs, demonstrate home exercise program, complete caregiver training     Occupational Therapy Goals (Active)       Problem: Bathing       Dates: Start:  08/15/23         Goal: STG-Within one week, patient will bathe body with min A using AE/AD prn       Dates: Start:  08/15/23    Expected End:  09/05/23         Goal Note filed on 08/16/23 1249 by Pricilla Bazzi MS,OTR/L       New admit                 Problem: Dressing       Dates: Start:  08/15/23         Goal: STG-Within one week, patient will dress LB with min A       Dates: Start:  08/15/23    Expected End:  09/05/23         Goal Note filed on 08/16/23 1249 by Pricilla Bazzi MS,OTR/L       New admit                 Problem: Functional Transfers       Dates: Start:  08/15/23         Goal: STG-Within one week, patient will transfer to toilet with CGA using grab bar       Dates: Start:  08/15/23    Expected End:  09/05/23         Goal Note filed on 08/16/23 1249 by Pricilla Bazzi MS,OTR/L       New admit                 Problem: OT Long Term Goals       Dates: Start:  08/15/23         Goal: LTG-By discharge, patient will complete basic self care tasks with SBA/supervision       Dates: Start:  08/15/23    Expected End:  09/05/23            Goal: LTG-By discharge, patient will perform bathroom transfers with supervision/mod I       Dates: Start:  08/15/23    Expected End:  09/05/23               Problem: Toileting       Dates: Start:  08/15/23         Goal: STG-Within one week, patient will complete toileting  tasks with max A       Dates: Start:  08/15/23    Expected End:  09/05/23         Goal Note filed on 08/16/23 1249 by Pricilla Bazzi MS,OTR/L       New admit

## 2023-08-21 NOTE — THERAPY
"Physical Therapy   Daily Treatment     Patient Name: Jorden Bonilla  Age:  58 y.o., Sex:  male  Medical Record #: [id number]  Today's Date: 8/21/2023     Precautions  Precautions: (P) Fall Risk  Comments: (P) L phyllis, Zio patch, HTN, DM    Subjective    Patient in bed and agreeable to therapy.     Objective       08/21/23 0701   PT Charge Group   PT Gait Training (Units) 1   PT Neuromuscular Re-Education / Balance (Units) 2   PT Therapeutic Activities (Units) 1   PT Total Time Spent   PT Individual Total Time Spent (Mins) 60   Precautions   Precautions Fall Risk   Comments L phyllis, Zio patch, HTN, DM   Gait Functional Level of Assist    Gait Level Of Assist Moderate Assist  (to Min A)   Assistive Device Quad Cane  (LBQC, L Givmohr sling, L carbon fiber AFO with stockinette)   Distance (Feet)   (20 ftx1, 60 ftx1, 75 ftx1)   Deviation Step To;Decreased Base Of Support;Decreased Heel Strike;Decreased Toe Off  (L phyllis gait, occasional hyperextension thrust)   Transfer Functional Level of Assist   Bed, Chair, Wheelchair Transfer Minimal Assist  (to Mod A)   Bed Chair Wheelchair Transfer Description Adaptive equipment;Increased time;Initial preparation for task;Supervision for safety;Verbal cueing  (stand step transfer with quad cane vs. reach pivot)   Toilet Transfers Minimal Assist   Toilet Transfer Description Adaptive equipment;Grab bar;Increased time;Initial preparation for task;Supervision for safety;Verbal cueing  (w/c approach with grab bar)   Bed Mobility    Supine to Sit Standby Assist  (with bed rail)   Sit to Stand Contact Guard Assist   Interdisciplinary Plan of Care Collaboration   IDT Collaboration with  Nursing   Patient Position at End of Therapy Seated;Chair Alarm On;Self Releasing Lap Belt Applied  (in dining room)   Collaboration Comments CLOF     Standing neuro re-ed focusing on LLE motor control:   - Staggered sit<>stands from elevated therapy mat with RLE anterior 1x10, and with RLE anterior on 4\" " "step 1x10   - LLE anterior toe taps onto 4\" step 3x3, cues to reduce RLE vaulting to compensate    Seated BLE hip ABD with resistance provided by therapist 2x10    Assessment    Patient tolerated session well, improved LLE motor control during gait post-interventions however does fatigue easily. Very motivated and receptive to education.     Strengths: Able to follow instructions, Alert and oriented, Effective communication skills, Good carryover of learning, Good insight into deficits/needs, Independent prior level of function, Making steady progress towards goals, Motivated for self care and independence, Pleasant and cooperative, Supportive family, Willingly participates in therapeutic activities  Barriers: Bladder retention, Decreased endurance, Hemiparesis, Impaired activity tolerance, Impaired balance, Language barrier, non-fluent English, Limited mobility    Plan    Bed mobility with phyllis techniques, transfer training, gait training with LBQC vs. SBQC and carbon fiber AFO with stockinette, standing balance, neuro re-ed for L hemibody strengthening, Bioness, BWSTT, family training/education.      LLE  therapy tech program 2x/week     Passport items to be completed:  Get in/out of bed safely, in/out of a vehicle, safely use mobility device, walk or wheel around home/community, navigate up and down stairs, show how to get up/down from the ground, ensure home is accessible, demonstrate HEP, complete caregiver training    Physical Therapy Problems (Active)       Problem: Mobility       Dates: Start:  08/15/23         Goal: STG-Within one week, patient will ambulate household distance 50 ft with HW and CGA.       Dates: Start:  08/15/23    Expected End:  09/05/23            Goal: STG-Within one week, patient will ambulate up/down a curb with HW and min A.       Dates: Start:  08/15/23    Expected End:  09/05/23               Problem: Mobility Transfers       Dates: Start:  08/15/23         Goal: STG-Within one " week, patient will perform bed mobility with SPV consistently.       Dates: Start:  08/15/23    Expected End:  09/05/23            Goal: STG-Within one week, patient will transfer bed to chair stand pivot with HW and CGA.       Dates: Start:  08/15/23    Expected End:  09/05/23               Problem: PT-Long Term Goals       Dates: Start:  08/15/23         Goal: LTG-By discharge, patient will ambulate 150 ft with QC vs SPC and SBA.       Dates: Start:  08/15/23    Expected End:  09/05/23            Goal: LTG-By discharge, patient will transfer one surface to another with QC vs SPC and SPV.       Dates: Start:  08/15/23    Expected End:  09/05/23            Goal: LTG-By discharge, patient will ambulate up/down a curb with QC vs SPC and SBA.       Dates: Start:  08/15/23    Expected End:  09/05/23            Goal: LTG-By discharge, patient will transfer in/out of a car with QC vs SPC and SBA after setup.       Dates: Start:  08/15/23    Expected End:  09/05/23            Goal: LTG-By discharge, patient will ambulate up/down a ramp with QC vs SPC and CGA to simulate home entrance.       Dates: Start:  08/15/23    Expected End:  09/05/23

## 2023-08-21 NOTE — PROGRESS NOTES
Problem: A1C > 8.5% will participate in Milestones program  Goal: Patient will effectively self-manage their DM disease process  Outcome: Progressing  Katie shared that she ran out of her Glipizide and was off of it for one week. Her numbers were higher during this time and now she is back on it. TIR was lower (45%) due to this and we expect it will go back up now that she is back on it. She notes she will start Mounjaro 7.5% this Saturday now that she has used up the Ozempic.  Today's discussion focused on barriers to healthy eating. Katie notes her roommate will often eat the foods she has prepped on her off day for the week. This plus not always feeling like what she has prepped in advance when it it time to eat it. We came up with some potential solutions to these today which she will try out. She plans to let her roommate know she is working to follow her meal plan. She will try making enough for two people on prep day and asking him to eat only half of what is prepared. She also will see what she feel like when it comes time to eat, and be flexible with it. She can always mix and match or choose a simple salad with tuna fish, chick peas for example if that is more appealing on a particular day or choose from other healthy items available. She liked these ideas and we will follow up next visit. She will get her labs done as well before then.      "Rehab Progress Note     Date of Service: 8/21/2023  Chief Complaint: Follow-up stroke    Interval Events (Subjective)    Patient seen and examined today in his room.   He denies any pain.  He is sleeping and eating well.   He last moved his bowels yesterday.    Patient has no other new questions, concerns, or complaints today.         Objective:  VITAL SIGNS: /71   Pulse 78   Temp 36.6 °C (97.8 °F) (Oral)   Resp 18   Ht 1.651 m (5' 5\")   Wt 63 kg (139 lb)   SpO2 99%   BMI 23.13 kg/m²   Gen: alert, no apparent distress  CV: Regular rate, regular rhythm  Resp: Clear to auscultation bilaterally  Neuro: left hemiparesis    Recent Results (from the past 72 hour(s))   POCT glucose device results    Collection Time: 08/18/23 11:55 AM   Result Value Ref Range    POC Glucose, Blood 202 (H) 65 - 99 mg/dL   URINALYSIS    Collection Time: 08/18/23  1:10 PM   Result Value Ref Range    Color Yellow     Character Clear     Specific Gravity 1.020 <1.035    Ph 6.5 5.0 - 8.0    Glucose 250 (A) Negative mg/dL    Ketones Trace (A) Negative mg/dL    Protein Negative Negative mg/dL    Bilirubin Negative Negative    Urobilinogen, Urine 0.2 Negative    Nitrite Positive (A) Negative    Leukocyte Esterase Moderate (A) Negative    Occult Blood Negative Negative    Micro Urine Req Microscopic    URINE MICROSCOPIC (W/UA)    Collection Time: 08/18/23  1:10 PM   Result Value Ref Range    WBC 20-50 (A) /hpf    RBC 0-2 (A) /hpf    Bacteria Moderate (A) None /hpf    Epithelial Cells Negative /hpf    Hyaline Cast 0-2 /lpf   POCT glucose device results    Collection Time: 08/18/23  5:09 PM   Result Value Ref Range    POC Glucose, Blood 225 (H) 65 - 99 mg/dL   POCT glucose device results    Collection Time: 08/18/23  9:21 PM   Result Value Ref Range    POC Glucose, Blood 147 (H) 65 - 99 mg/dL   OCCULT BLOOD STOOL    Collection Time: 08/18/23  9:45 PM   Result Value Ref Range    Occult Blood Feces Positive (A) Negative   CBC WITHOUT " DIFFERENTIAL    Collection Time: 08/19/23  5:40 AM   Result Value Ref Range    WBC 14.5 (H) 4.8 - 10.8 K/uL    RBC 5.62 4.70 - 6.10 M/uL    Hemoglobin 14.5 14.0 - 18.0 g/dL    Hematocrit 46.2 42.0 - 52.0 %    MCV 82.2 81.4 - 97.8 fL    MCH 25.8 (L) 27.0 - 33.0 pg    MCHC 31.4 (L) 32.3 - 36.5 g/dL    RDW 37.3 35.9 - 50.0 fL    Platelet Count 260 164 - 446 K/uL    MPV 10.6 9.0 - 12.9 fL   PROCALCITONIN    Collection Time: 08/19/23  5:40 AM   Result Value Ref Range    Procalcitonin <0.05 <0.25 ng/mL   POCT glucose device results    Collection Time: 08/19/23  8:01 AM   Result Value Ref Range    POC Glucose, Blood 203 (H) 65 - 99 mg/dL   POCT glucose device results    Collection Time: 08/19/23 11:25 AM   Result Value Ref Range    POC Glucose, Blood 185 (H) 65 - 99 mg/dL   POCT glucose device results    Collection Time: 08/19/23  5:12 PM   Result Value Ref Range    POC Glucose, Blood 267 (H) 65 - 99 mg/dL   POCT glucose device results    Collection Time: 08/19/23  8:29 PM   Result Value Ref Range    POC Glucose, Blood 173 (H) 65 - 99 mg/dL   OCCULT BLOOD X2 (STOOL)    Collection Time: 08/20/23 12:15 AM   Result Value Ref Range    Occult Blood 1 Negative Negative   POCT glucose device results    Collection Time: 08/20/23  7:11 AM   Result Value Ref Range    POC Glucose, Blood 207 (H) 65 - 99 mg/dL   POCT glucose device results    Collection Time: 08/20/23 10:59 AM   Result Value Ref Range    POC Glucose, Blood 154 (H) 65 - 99 mg/dL   URINALYSIS    Collection Time: 08/20/23  2:25 PM   Result Value Ref Range    Color Yellow     Character Clear     Specific Gravity 1.014 <1.035    Ph 6.5 5.0 - 8.0    Glucose 250 (A) Negative mg/dL    Ketones Negative Negative mg/dL    Protein Negative Negative mg/dL    Bilirubin Negative Negative    Urobilinogen, Urine 0.2 Negative    Nitrite Negative Negative    Leukocyte Esterase Trace (A) Negative    Occult Blood Negative Negative    Micro Urine Req Microscopic    URINE MICROSCOPIC (W/UA)     Collection Time: 08/20/23  2:25 PM   Result Value Ref Range    WBC 2-5 (A) /hpf    RBC 0-2 (A) /hpf    Bacteria Negative None /hpf    Epithelial Cells Negative /hpf    Amorphous Crystal Present /hpf    Hyaline Cast 0-2 /lpf   POCT glucose device results    Collection Time: 08/20/23  4:27 PM   Result Value Ref Range    POC Glucose, Blood 128 (H) 65 - 99 mg/dL   POCT glucose device results    Collection Time: 08/20/23  9:13 PM   Result Value Ref Range    POC Glucose, Blood 167 (H) 65 - 99 mg/dL   POCT glucose device results    Collection Time: 08/21/23  7:16 AM   Result Value Ref Range    POC Glucose, Blood 147 (H) 65 - 99 mg/dL       Scheduled Medications   Medication Dose Frequency    cefdinir  300 mg Q12HRS    senna-docusate  2 Tablet BID    And    polyethylene glycol/lytes  1 Packet DAILY    metFORMIN ER  500 mg BID WITH MEALS    insulin regular  2-12 Units 4X/DAY ACHS    metoprolol tartrate  12.5 mg TWICE DAILY    aspirin  81 mg Q EVENING    atorvastatin  80 mg Q EVENING    enoxaparin (LOVENOX) injection  40 mg DAILY AT 1800    olmesartan  20 mg Q EVENING    tamsulosin  0.4 mg AFTER BREAKFAST       Current Diet Order   Procedures    Diet Order Diet: Consistent CHO (Diabetic) (cardiac and CHO); Second Modifier: (optional): Cardiac       Assessment:    This patient is a 58 y.o. male admitted for acute inpatient rehabilitation with Ischemic stroke (HCC).      I, Sarai Davila M.D./MD., was present and led the interdisciplinary team conference on 8/16/2023.  I led the IDT conference and agree with the IDT conference documentation and plan of care as noted below.     Nursing:  Diet Current Diet Order   Procedures    Diet Order Diet: Consistent CHO (Diabetic) (cardiac and CHO); Second Modifier: (optional): Cardiac       Eating ADL Supervision  Set-up of equipment or meal/tube feeding   % of Last Meal  Oral Nutrition: *  * Meal *  *, Breakfast, Between % Consumed (100%)   Sleep No issues   Bowel Last BM:  08/14/23   Bladder Post Void  5   Barriers to Discharge Home: left hemiplegia      Physical Therapy:  Bed Mobility    Transfers Minimal Assist  Increased time, Set-up of equipment, Supervision for safety, Verbal cueing (stand pivot to the R no AD)   Mobility Total Assist X 2 (mod A x1 plus wc follow)   Stairs Unable to Participate   Barriers to Discharge Home: left hemiplegia       Occupational Therapy:  Grooming Supervision, Seated   Bathing Moderate Assist   UB Dressing Stand by Assist   LB Dressing Moderate Assist   Toileting Total Assist   Shower & Tub Transfer Minimal Assist   Barriers to Discharge Home: left hemiplegia      Speech-Language Pathology:  Comprehension:  Independent  Comprehension Description:     Expression:  Independent  Expression Description:     Social Interaction:  Independent  Social Interaction Description:     Problem Solving:  Independent  Problem Solving Description:     Memory:  Supervision  Memory Description:  Verbal cueing, Therapy schedule, Increased time, Bed/chair alarm, Seat belt  Barriers to Discharge Home: none, signed off    Respiratory Therapy:  O2 (LPM): 0  O2 Delivery Device: None - Room Air    Case Management:  Continues to work on disposition and DME needs.      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  Left hemiparesis  Cognitive impairment, mild  Dysphagia  Continue full rehab program  PT/OT/SLP, 1 hr each discipline, 5 days per week  8/15: SLP discontinued, PT/OT, 1.5 hr each discipline, 5 days per week    Completed aspirin and Plavix for secondary stroke prophylaxis until 8/21, then aspirin alone    Continue aspirin and atorvastatin     Patient with cardiac monitor in place, return in 2 weeks    Outpatient follow-up with stroke Bridge clinic and cardiology, referrals  made    Dizziness, resolved  Continue meclizine as needed    Left paraspinal muscle spasms  Heat as needed  Continue tylenol as needed      Diabetes with hyperglycemia  Uncontrolled, hemoglobin A1c 11.9  Continue metformin and SSI  Appreciate hospitalist assistance     Hypertension  Continue olmesartan and metoprolol   Appreciate hospitalist assistance     Hyperlipidemia  Continue atorvastatin      Sinus tachycardia, improved  Continue metoprolol   Appreciate hospitalist assistance  Negative doppler US for DVTs     Urinary retention, resolved  Hematuria, resolved  Damon removed 8/15  Continue Flomax  Monitor PVRs - 106, 75  Intermittent catheterization for volumes over 400, not requiring     Coronary artery disease  With history of CABG  Continue aspirin and atorvastatin     Leukocytosis, continues  Negative CXR    Acute cystitis  Continue cefdinir    Constipation, resolved  Continue Senokot and Miralax   Last bowel movement 8/20    Iron deficiency  Positive occult stool  Outpatient follow up with GI    DVT prophylaxis  Continue Lovenox     Sarai Davila M.D.  Physical Medicine and Rehabilitation

## 2023-08-21 NOTE — PROGRESS NOTES
Hospital Medicine Daily Progress Note        Chief Complaint  Hypertension  Diabetes    Interval Problem Update  No new complaints.  Has been afebrile since abx started.    Review of Systems  Review of Systems   Constitutional:  Negative for chills and fever.   HENT: Negative.     Eyes: Negative.    Respiratory:  Negative for cough and shortness of breath.    Cardiovascular:  Negative for chest pain and palpitations.   Gastrointestinal:  Negative for abdominal pain, nausea and vomiting.   Genitourinary:  Negative for dysuria, frequency and urgency.   Musculoskeletal: Negative.    Skin:  Negative for itching and rash.   Neurological:  Positive for focal weakness.   Endo/Heme/Allergies:  Negative for polydipsia. Does not bruise/bleed easily.        Physical Exam  Temp:  [36.6 °C (97.8 °F)-37.2 °C (98.9 °F)] 36.6 °C (97.8 °F)  Pulse:  [78-87] 78  Resp:  [16-18] 18  BP: (109-135)/(66-80) 116/71  SpO2:  [94 %-99 %] 99 %    Physical Exam  Vitals reviewed.   Constitutional:       General: He is not in acute distress.     Appearance: Normal appearance. He is not ill-appearing.   HENT:      Head: Normocephalic and atraumatic.      Right Ear: External ear normal.      Left Ear: External ear normal.      Nose: Nose normal.      Mouth/Throat:      Pharynx: Oropharynx is clear.   Eyes:      General:         Right eye: No discharge.         Left eye: No discharge.      Extraocular Movements: Extraocular movements intact.      Conjunctiva/sclera: Conjunctivae normal.   Cardiovascular:      Rate and Rhythm: Normal rate and regular rhythm.   Pulmonary:      Effort: Pulmonary effort is normal. No respiratory distress.      Breath sounds: Normal breath sounds. No wheezing.   Abdominal:      General: Bowel sounds are normal. There is no distension.      Palpations: Abdomen is soft.      Tenderness: There is no abdominal tenderness.   Musculoskeletal:      Cervical back: Normal range of motion and neck supple.      Right lower leg: No  edema.      Left lower leg: No edema.   Skin:     General: Skin is warm and dry.   Neurological:      Mental Status: He is alert and oriented to person, place, and time.             Laboratory  Recent Labs     08/19/23  0540   WBC 14.5*   RBC 5.62   HEMOGLOBIN 14.5   HEMATOCRIT 46.2   MCV 82.2   MCH 25.8*   MCHC 31.4*   RDW 37.3   PLATELETCT 260   MPV 10.6                     Assessment/Plan  * Ischemic stroke (HCC)- (present on admission)  Assessment & Plan  Presented w/ L HP  S/P ZioPatch  On ASA and Lipitor  Now off Plavix  Management per Physiatry    Leukocytosis  Assessment & Plan  Tmax 100.2 8/19/23  PCT <0.05  UA 20-50 WBC w/ mod bacteria  UCx pending  CXR negative acute  Started empiric abx given low grade temp  Check F/U labs in AM    Microcytosis  Assessment & Plan  Fe 34, may start supplement if no contraindications  FOB+, recommend outpt GI F/U  Follow H/H  Check F/U labs in AM    Primary hypertension- (present on admission)  Assessment & Plan  Continue Olmesartan and Metoprolol  Monitor for orthostatic hypotension on Flomax    Type 2 diabetes mellitus with hyperglycemia, without long-term current use of insulin (HCC)- (present on admission)  Assessment & Plan  HbA1c 11.9  Continue Metformin XR and SSI  Lantus discontinued  Outpt meds include Metformin  mg bid, Jardiance 25 mg qd, and Trulicity 1.5 mg SQ qwk    Coronary artery disease due to calcified coronary lesion: CABG ×3 in 2015- (present on admission)  Assessment & Plan  H/O CABG  Continue ASA, Lipitor, Olmesartan, and Metoprolol  Cardiology F/U       Full Code

## 2023-08-21 NOTE — PROGRESS NOTES
NURSING DAILY NOTE    Name: Jorden Bonilla   Date of Admission: 8/14/2023   Admitting Diagnosis: Ischemic stroke (HCC)  Attending Physician: Sarai Davila M.d.  Allergies: Patient has no known allergies.    Safety  Patient Assist  Mod A  Patient Precautions  Fall Risk  Precaution Comments  L phyllis, Zio patch, HTN, DM  Bed Transfer Status  Moderate Assist (SPT with mod A to pt's L)  Toilet Transfer Status   Minimal Assist  Assistive Devices  Rails  Oxygen  None - Room Air  Diet/Therapeutic Dining  Current Diet Order   Procedures    Diet Order Diet: Consistent CHO (Diabetic) (cardiac and CHO); Second Modifier: (optional): Cardiac     Pill Administration  whole  Agitated Behavioral Scale  14  ABS Level of Severity  No Agitation    Fall Risk  Has the patient had a fall this admission?   No  Melani Galeano Fall Risk Scoring  11, MODERATE RISK  Fall Risk Safety Measures  bed alarm, chair alarm, and poor balance    Vitals  Temperature: 37.2 °C (98.9 °F)  Temp src: Oral  Pulse: 87  Respiration: 16  Blood Pressure: 135/80  Blood Pressure MAP (Calculated): 98 MM HG  BP Location: Right, Upper Arm  Patient BP Position: Supine     Oxygen  Pulse Oximetry: 99 %  O2 (LPM): 0  O2 Delivery Device: None - Room Air    Bowel and Bladder  Last Bowel Movement  08/20/23  Stool Type  Type 2: Sausage shaped, but lumpy  Bowel Device     Continent  Bladder: Continent void   Bowel: Continent movement  Bladder Function  Urine Void (mL): 600 ml  Number of Times Voided: 1  Urine Color: Yellow  Genitourinary Assessment   Bladder Assessment (WDL):  Within Defined Limits  Damon Catheter: Not Applicable  Damon Care: Given with Soap and Water  Urinary Elimination: Catheter (Document on LDA)  Urine Color: Yellow  Bladder Device: Bathroom  Bladder Scan: Post Void  $ Bladder Scan Results (mL): 51    Skin  Cooper Score   18  Sensory Interventions   Bed Types: Standard/Trauma Mattress  Skin  Preventative Measures: Pillows in Use for Support / Positioning  Moisture Interventions  Moisturizers/Barriers: Barrier Wipes      Pain  Pain Rating Scale  0 - No Pain  Pain Location  Back  Pain Location Orientation  Lower  Pain Interventions   Declines    ADLs    Bathing   Shower, * * With Assistance from, Staff  Linen Change   Partial  Personal Hygiene  Moist Mireya Wipes, Perineal Care  Chlorhexidine Bath      Oral Care     Teeth/Dentures     Shave     Nutrition Percentage Eaten  *  * Meal *  *, Lunch, Between 50-75% Consumed  Environmental Precautions  Treaded Slipper Socks on Patient  Patient Turns/Positioning  Patient Turns Self from Side to Side, Supine  Patient Turns Assistance/Tolerance     Bed Positions  Bed Controls On, Bed Locked  Head of Bed Elevated  Greater or equal to 30 degrees      Psychosocial/Neurologic Assessment  Psychosocial Assessment  Psychosocial (WDL):  Within Defined Limits  Neurologic Assessment  Neuro (WDL): Within Defined Limits  Level of Consciousness: Alert  Orientation Level: Oriented X4  Muscle Strength Left Arm: Fair Strength against Gravity but No Resistance  Muscle Strength Left Leg: Fair Strength against Gravity but No Resistance  EENT (WDL):  WDL Except    Cardio/Pulmonary Assessment  Edema      Respiratory Breath Sounds     Cardiac Assessment   Cardiac (WDL):  WDL Except (tachycardia, Hx-CAD, HTN, mult. coronary artery by pass)

## 2023-08-21 NOTE — PROGRESS NOTES
NURSING DAILY NOTE    Name: Jorden Bonilla   Date of Admission: 8/14/2023   Admitting Diagnosis: Ischemic stroke (HCC)  Attending Physician: Sarai Davila M.d.  Allergies: Patient has no known allergies.    Safety  Patient Assist  Mod A  Patient Precautions  Fall Risk  Precaution Comments  L phyllis, Zio patch, HTN, DM  Bed Transfer Status  Moderate Assist (SPT with mod A to pt's L)  Toilet Transfer Status   Minimal Assist  Assistive Devices  Rails  Oxygen  None - Room Air  Diet/Therapeutic Dining  Current Diet Order   Procedures    Diet Order Diet: Consistent CHO (Diabetic) (cardiac and CHO); Second Modifier: (optional): Cardiac     Pill Administration  whole  Agitated Behavioral Scale  14  ABS Level of Severity  No Agitation    Fall Risk  Has the patient had a fall this admission?   No  Melani Galeano Fall Risk Scoring  11, MODERATE RISK  Fall Risk Safety Measures  bed alarm and chair alarm    Vitals  Temperature: 36.6 °C (97.9 °F)  Temp src: Oral  Pulse: 87  Respiration: 18  Blood Pressure: 109/66  Blood Pressure MAP (Calculated): 80 MM HG  BP Location: Right, Upper Arm  Patient BP Position: Supine     Oxygen  Pulse Oximetry: 94 %  O2 (LPM): 0  O2 Delivery Device: None - Room Air    Bowel and Bladder  Last Bowel Movement  08/20/23  Stool Type  Type 2: Sausage shaped, but lumpy  Bowel Device     Continent  Bladder: Continent void   Bowel: Continent movement  Bladder Function  Urine Void (mL): 600 ml  Number of Times Voided: 1  Urine Color: Unable To Evaluate  Genitourinary Assessment   Bladder Assessment (WDL):  Within Defined Limits  Damon Catheter: Not Applicable  Damon Care: Given with Soap and Water  Urinary Elimination: Catheter (Document on LDA)  Urine Color: Unable To Evaluate  Bladder Device: Bathroom  Bladder Scan: Post Void  $ Bladder Scan Results (mL): 51    Skin  Cooper Score   18  Sensory Interventions   Bed Types: Standard/Trauma  Mattress  Skin Preventative Measures: Pillows in Use for Support / Positioning  Moisture Interventions  Moisturizers/Barriers: Barrier Paste      Pain  Pain Rating Scale  0 - No Pain  Pain Location  Back  Pain Location Orientation  Lower  Pain Interventions   Declines    ADLs    Bathing   Shower, * * With Assistance from, Staff  Linen Change   Partial  Personal Hygiene  Moist Mireya Wipes, Perineal Care  Chlorhexidine Bath      Oral Care     Teeth/Dentures     Shave     Nutrition Percentage Eaten  *  * Meal *  *, Lunch, Between 50-75% Consumed  Environmental Precautions  Treaded Slipper Socks on Patient  Patient Turns/Positioning  Patient Turns Self from Side to Side, Supine  Patient Turns Assistance/Tolerance     Bed Positions  Bed Controls On, Bed Locked  Head of Bed Elevated  Greater or equal to 30 degrees      Psychosocial/Neurologic Assessment  Psychosocial Assessment  Psychosocial (WDL):  Within Defined Limits  Neurologic Assessment  Neuro (WDL): Within Defined Limits  Level of Consciousness: Alert  Orientation Level: Oriented X4  Muscle Strength Left Arm: Fair Strength against Gravity but No Resistance  Muscle Strength Left Leg: Fair Strength against Gravity but No Resistance  EENT (WDL):  WDL Except    Cardio/Pulmonary Assessment  Edema      Respiratory Breath Sounds     Cardiac Assessment   Cardiac (WDL):  WDL Except (tachycardia, Hx-CAD, HTN, mult. coronary artery by pass)

## 2023-08-21 NOTE — THERAPY
Physical Therapy   Daily Treatment     Patient Name: Jorden Bonilla  Age:  58 y.o., Sex:  male  Medical Record #: [id number]  Today's Date: 8/21/2023     Precautions  Precautions: (P) Fall Risk  Comments: (P) L phyllis, Zio patch, HTN, DM    Subjective    Pt was interested in Vector training, and agreeable to initiate stair training.      Objective       08/21/23 1401   PT Charge Group   PT Gait Training (Units) 1   PT Neuromuscular Re-Education / Balance (Units) 2   PT Therapeutic Activities (Units) 1   PT Total Time Spent   PT Individual Total Time Spent (Mins) 60   Precautions   Precautions Fall Risk   Comments L phyllis, Zio patch, HTN, DM   Gait Functional Level of Assist    Gait Level Of Assist Moderate Assist  (Emphasis on quality, LLE advancement/ placement, stability in stance)   Assistive Device Quad Cane;Other (Comments)  (Vector harness, 25 lb BWS, L off the shelf AFO)   Distance (Feet) 20   # of Times Distance was Traveled 2   Deviation Ataxic;Step To;Decreased Base Of Support;Bradykinetic;Decreased Heel Strike;Decreased Toe Off   Wheelchair Functional Level of Assist   Wheelchair Assist Supervised   Distance Wheelchair (Feet or Distance) 25  (2x)   Wheelchair Description Impaired coordination;Limited by fatigue  (BLE + RUE propulsion, with off the shelf AFO donned, for BLE motor planning)   Stairs Functional Level of Assist   Level of Assist with Stairs Total Assist  (Vector harness with 25lb BWS)   # of Stairs Climbed 4   Stairs Description Verbal cueing;Safety concerns;Requires assist to advance foot;Limited by fatigue;Extra time;Assist device/equipment  (LBQC, Vector harness, vc for sequencing, facilitation for LLE placement/ neutral rotation)   Transfer Functional Level of Assist   Bed, Chair, Wheelchair Transfer Minimal Assist   Bed Chair Wheelchair Transfer Description Adaptive equipment;Increased time;Initial preparation for task;Set-up of equipment  (SPT to left, with AFO doned)   Bed Mobility   "  Supine to Sit Standby Assist   Sit to Stand Contact Guard Assist   Scooting Standby Assist   Rolling Standby Assist   Neuro-Muscular Treatments   Neuro-Muscular Treatments Weight Shift Left;Weight Shift Right;Verbal Cuing;Sequencing;Joint Approximation   Comments Standing to don Vector harness in // bars CGA x 2 min.   Interdisciplinary Plan of Care Collaboration   IDT Collaboration with  Family / Caregiver   Patient Position at End of Therapy Seated;Chair Alarm On;Self Releasing Lap Belt Applied;Call Light within Reach;Family / Friend in Room   Collaboration Comments POC, CLOF         Assessment    Pt participated in 11 min of active (pre)gait training in Vector harness, with 25lb BWS, including gait training, and 2\" step initiation for sequencing, and motor planning. Pt donned Givemohr sling, off the shelf AFO, and Vector harness throughout functional training.     Strengths: Able to follow instructions, Alert and oriented, Effective communication skills, Good carryover of learning, Good insight into deficits/needs, Independent prior level of function, Making steady progress towards goals, Motivated for self care and independence, Pleasant and cooperative, Supportive family, Willingly participates in therapeutic activities  Barriers: Bladder retention, Decreased endurance, Hemiparesis, Impaired activity tolerance, Impaired balance, Language barrier, non-fluent English, Limited mobility    Plan    Bed mobility with phyllis techniques, transfer training, gait training with LBQC vs. SBQC and carbon fiber AFO with carrol, standing balance, neuro re-ed for L hemibody strengthening, Bioness, BWSTT, family training/education.      E  therapy tech program 2x/week    Passport items to be completed:  Get in/out of bed safely, in/out of a vehicle, safely use mobility device, walk or wheel around home/community, navigate up and down stairs, show how to get up/down from the ground, ensure home is accessible, " demonstrate HEP, complete caregiver training    Physical Therapy Problems (Active)       Problem: Mobility       Dates: Start:  08/15/23         Goal: STG-Within one week, patient will ambulate household distance 50 ft with HW and CGA.       Dates: Start:  08/15/23    Expected End:  09/05/23            Goal: STG-Within one week, patient will ambulate up/down a curb with HW and min A.       Dates: Start:  08/15/23    Expected End:  09/05/23               Problem: Mobility Transfers       Dates: Start:  08/15/23         Goal: STG-Within one week, patient will perform bed mobility with SPV consistently.       Dates: Start:  08/15/23    Expected End:  09/05/23            Goal: STG-Within one week, patient will transfer bed to chair stand pivot with HW and CGA.       Dates: Start:  08/15/23    Expected End:  09/05/23               Problem: PT-Long Term Goals       Dates: Start:  08/15/23         Goal: LTG-By discharge, patient will ambulate 150 ft with QC vs SPC and SBA.       Dates: Start:  08/15/23    Expected End:  09/05/23            Goal: LTG-By discharge, patient will transfer one surface to another with QC vs SPC and SPV.       Dates: Start:  08/15/23    Expected End:  09/05/23            Goal: LTG-By discharge, patient will ambulate up/down a curb with QC vs SPC and SBA.       Dates: Start:  08/15/23    Expected End:  09/05/23            Goal: LTG-By discharge, patient will transfer in/out of a car with QC vs SPC and SBA after setup.       Dates: Start:  08/15/23    Expected End:  09/05/23            Goal: LTG-By discharge, patient will ambulate up/down a ramp with QC vs SPC and CGA to simulate home entrance.       Dates: Start:  08/15/23    Expected End:  09/05/23

## 2023-08-21 NOTE — CARE PLAN
"The patient is Stable - Low risk of patient condition declining or worsening    Shift Goals  Clinical Goals: Safety  Patient Goals: Participate in therapy    Problem: Skin Integrity  Goal: Skin integrity is maintained or improved  Outcome: Progressing  Note:   Cooper Score: 18    Patient's skin remains intact and free from new or accidental injury this shift; no s/s of infection. RN wound protocol checked. Encouraged hydration and educated about the importance of nutrition to keep skin integrity. Will continue to monitor.       Problem: Fall Risk - Rehab  Goal: Patient will remain free from falls  Outcome: Progressing  Note: Melani Galeano Fall risk Assessment Score: 11    High fall risk Interventions   - Bed and strip alarm   - Yellow sign by the door   - Yellow wrist band \"Fall risk\"  - Room near to the nurse station  - Do not leave patient unattended in the bathroom  - Fall risk education provided     "

## 2023-08-21 NOTE — CARE PLAN
"  Problem: Fall Risk - Rehab  Goal: Patient will remain free from falls  Outcome: Progressing   Melani Froylan Fall risk Assessment Score: 11    Moderate fall risk Interventions  - Bed and strip alarm   - Yellow sign by the door   - Yellow wrist band \"Fall risk\"  - Room near to the nurse station  - Do not leave patient unattended in the bathroom  - Fall risk education provided            Problem: Pain - Standard  Goal: Alleviation of pain or a reduction in pain to the patient’s comfort goal  Outcome: Progressing   Patient is able to rate pain on a scale of 1-10.       "

## 2023-08-22 ENCOUNTER — APPOINTMENT (OUTPATIENT)
Dept: PHYSICAL THERAPY | Facility: REHABILITATION | Age: 58
DRG: 057 | End: 2023-08-22
Attending: PHYSICAL MEDICINE & REHABILITATION
Payer: COMMERCIAL

## 2023-08-22 ENCOUNTER — APPOINTMENT (OUTPATIENT)
Dept: OCCUPATIONAL THERAPY | Facility: REHABILITATION | Age: 58
DRG: 057 | End: 2023-08-22
Attending: PHYSICAL MEDICINE & REHABILITATION
Payer: COMMERCIAL

## 2023-08-22 PROBLEM — N39.0 UTI (URINARY TRACT INFECTION): Status: ACTIVE | Noted: 2023-08-18

## 2023-08-22 LAB
ANION GAP SERPL CALC-SCNC: 9 MMOL/L (ref 7–16)
BACTERIA UR CULT: ABNORMAL
BACTERIA UR CULT: ABNORMAL
BUN SERPL-MCNC: 17 MG/DL (ref 8–22)
CALCIUM SERPL-MCNC: 9 MG/DL (ref 8.5–10.5)
CHLORIDE SERPL-SCNC: 103 MMOL/L (ref 96–112)
CO2 SERPL-SCNC: 27 MMOL/L (ref 20–33)
CREAT SERPL-MCNC: 0.89 MG/DL (ref 0.5–1.4)
ERYTHROCYTE [DISTWIDTH] IN BLOOD BY AUTOMATED COUNT: 36.1 FL (ref 35.9–50)
GFR SERPLBLD CREATININE-BSD FMLA CKD-EPI: 98 ML/MIN/1.73 M 2
GLUCOSE BLD STRIP.AUTO-MCNC: 157 MG/DL (ref 65–99)
GLUCOSE BLD STRIP.AUTO-MCNC: 164 MG/DL (ref 65–99)
GLUCOSE BLD STRIP.AUTO-MCNC: 167 MG/DL (ref 65–99)
GLUCOSE BLD STRIP.AUTO-MCNC: 169 MG/DL (ref 65–99)
GLUCOSE SERPL-MCNC: 135 MG/DL (ref 65–99)
HCT VFR BLD AUTO: 44.7 % (ref 42–52)
HGB BLD-MCNC: 14.7 G/DL (ref 14–18)
MCH RBC QN AUTO: 26.6 PG (ref 27–33)
MCHC RBC AUTO-ENTMCNC: 32.9 G/DL (ref 32.3–36.5)
MCV RBC AUTO: 80.8 FL (ref 81.4–97.8)
PLATELET # BLD AUTO: 306 K/UL (ref 164–446)
PMV BLD AUTO: 10.5 FL (ref 9–12.9)
POTASSIUM SERPL-SCNC: 4.1 MMOL/L (ref 3.6–5.5)
RBC # BLD AUTO: 5.53 M/UL (ref 4.7–6.1)
SIGNIFICANT IND 70042: ABNORMAL
SITE SITE: ABNORMAL
SODIUM SERPL-SCNC: 139 MMOL/L (ref 135–145)
SOURCE SOURCE: ABNORMAL
WBC # BLD AUTO: 9.4 K/UL (ref 4.8–10.8)

## 2023-08-22 PROCEDURE — 97116 GAIT TRAINING THERAPY: CPT

## 2023-08-22 PROCEDURE — 700102 HCHG RX REV CODE 250 W/ 637 OVERRIDE(OP): Performed by: HOSPITALIST

## 2023-08-22 PROCEDURE — 97032 APPL MODALITY 1+ESTIM EA 15: CPT

## 2023-08-22 PROCEDURE — 99232 SBSQ HOSP IP/OBS MODERATE 35: CPT | Performed by: HOSPITALIST

## 2023-08-22 PROCEDURE — 700111 HCHG RX REV CODE 636 W/ 250 OVERRIDE (IP): Mod: JZ | Performed by: PHYSICAL MEDICINE & REHABILITATION

## 2023-08-22 PROCEDURE — 82962 GLUCOSE BLOOD TEST: CPT

## 2023-08-22 PROCEDURE — 36415 COLL VENOUS BLD VENIPUNCTURE: CPT

## 2023-08-22 PROCEDURE — 85027 COMPLETE CBC AUTOMATED: CPT

## 2023-08-22 PROCEDURE — 99232 SBSQ HOSP IP/OBS MODERATE 35: CPT | Performed by: PHYSICAL MEDICINE & REHABILITATION

## 2023-08-22 PROCEDURE — 770010 HCHG ROOM/CARE - REHAB SEMI PRIVAT*

## 2023-08-22 PROCEDURE — 80048 BASIC METABOLIC PNL TOTAL CA: CPT

## 2023-08-22 PROCEDURE — 97530 THERAPEUTIC ACTIVITIES: CPT

## 2023-08-22 PROCEDURE — 97535 SELF CARE MNGMENT TRAINING: CPT

## 2023-08-22 PROCEDURE — 700102 HCHG RX REV CODE 250 W/ 637 OVERRIDE(OP): Performed by: PHYSICAL MEDICINE & REHABILITATION

## 2023-08-22 PROCEDURE — 700101 HCHG RX REV CODE 250: Performed by: PHYSICAL MEDICINE & REHABILITATION

## 2023-08-22 PROCEDURE — A9270 NON-COVERED ITEM OR SERVICE: HCPCS | Performed by: HOSPITALIST

## 2023-08-22 PROCEDURE — 97112 NEUROMUSCULAR REEDUCATION: CPT

## 2023-08-22 PROCEDURE — A9270 NON-COVERED ITEM OR SERVICE: HCPCS | Performed by: PHYSICAL MEDICINE & REHABILITATION

## 2023-08-22 RX ORDER — CYCLOBENZAPRINE HCL 10 MG
5 TABLET ORAL 3 TIMES DAILY PRN
Status: DISCONTINUED | OUTPATIENT
Start: 2023-08-22 | End: 2023-09-05 | Stop reason: HOSPADM

## 2023-08-22 RX ORDER — LIDOCAINE 50 MG/G
1 PATCH TOPICAL DAILY
Status: DISCONTINUED | OUTPATIENT
Start: 2023-08-22 | End: 2023-09-05 | Stop reason: HOSPADM

## 2023-08-22 RX ORDER — LIDOCAINE 50 MG/G
1 PATCH TOPICAL EVERY 24 HOURS
Status: DISCONTINUED | OUTPATIENT
Start: 2023-08-22 | End: 2023-08-22

## 2023-08-22 RX ORDER — INSULIN LISPRO 100 [IU]/ML
2-12 INJECTION, SOLUTION INTRAVENOUS; SUBCUTANEOUS
Status: DISCONTINUED | OUTPATIENT
Start: 2023-08-22 | End: 2023-08-30

## 2023-08-22 RX ADMIN — INSULIN LISPRO 2 UNITS: 100 INJECTION, SOLUTION INTRAVENOUS; SUBCUTANEOUS at 11:16

## 2023-08-22 RX ADMIN — INSULIN LISPRO 2 UNITS: 100 INJECTION, SOLUTION INTRAVENOUS; SUBCUTANEOUS at 20:13

## 2023-08-22 RX ADMIN — POLYETHYLENE GLYCOL 3350 1 PACKET: 17 POWDER, FOR SOLUTION ORAL at 08:37

## 2023-08-22 RX ADMIN — ATORVASTATIN CALCIUM 80 MG: 40 TABLET, FILM COATED ORAL at 19:37

## 2023-08-22 RX ADMIN — ENOXAPARIN SODIUM 40 MG: 100 INJECTION SUBCUTANEOUS at 17:18

## 2023-08-22 RX ADMIN — SENNOSIDES AND DOCUSATE SODIUM 2 TABLET: 50; 8.6 TABLET ORAL at 19:37

## 2023-08-22 RX ADMIN — SENNOSIDES AND DOCUSATE SODIUM 2 TABLET: 50; 8.6 TABLET ORAL at 08:37

## 2023-08-22 RX ADMIN — INSULIN LISPRO 2 UNITS: 100 INJECTION, SOLUTION INTRAVENOUS; SUBCUTANEOUS at 17:20

## 2023-08-22 RX ADMIN — CYCLOBENZAPRINE 5 MG: 10 TABLET, FILM COATED ORAL at 19:37

## 2023-08-22 RX ADMIN — METFORMIN HYDROCHLORIDE 500 MG: 500 TABLET, EXTENDED RELEASE ORAL at 08:37

## 2023-08-22 RX ADMIN — METOPROLOL TARTRATE 12.5 MG: 25 TABLET, FILM COATED ORAL at 17:16

## 2023-08-22 RX ADMIN — TAMSULOSIN HYDROCHLORIDE 0.4 MG: 0.4 CAPSULE ORAL at 08:37

## 2023-08-22 RX ADMIN — ACETAMINOPHEN 650 MG: 325 TABLET ORAL at 19:37

## 2023-08-22 RX ADMIN — METFORMIN HYDROCHLORIDE 500 MG: 500 TABLET, EXTENDED RELEASE ORAL at 17:16

## 2023-08-22 RX ADMIN — LIDOCAINE 1 PATCH: 700 PATCH TOPICAL at 11:10

## 2023-08-22 RX ADMIN — CEFDINIR 300 MG: 300 CAPSULE ORAL at 08:37

## 2023-08-22 RX ADMIN — ASPIRIN 81 MG: 81 TABLET, COATED ORAL at 19:37

## 2023-08-22 RX ADMIN — CEFDINIR 300 MG: 300 CAPSULE ORAL at 19:37

## 2023-08-22 RX ADMIN — METOPROLOL TARTRATE 12.5 MG: 25 TABLET, FILM COATED ORAL at 05:06

## 2023-08-22 ASSESSMENT — ENCOUNTER SYMPTOMS
NAUSEA: 0
SHORTNESS OF BREATH: 0
FEVER: 0
NERVOUS/ANXIOUS: 0
ABDOMINAL PAIN: 0
CHILLS: 0
VOMITING: 0
DIARRHEA: 0

## 2023-08-22 ASSESSMENT — GAIT ASSESSMENTS
ASSISTIVE DEVICE: QUAD CANE
GAIT LEVEL OF ASSIST: MODERATE ASSIST

## 2023-08-22 ASSESSMENT — ACTIVITIES OF DAILY LIVING (ADL)
BED_CHAIR_WHEELCHAIR_TRANSFER_DESCRIPTION: ADAPTIVE EQUIPMENT;INCREASED TIME;INITIAL PREPARATION FOR TASK;SUPERVISION FOR SAFETY;VERBAL CUEING

## 2023-08-22 ASSESSMENT — PAIN DESCRIPTION - PAIN TYPE: TYPE: ACUTE PAIN

## 2023-08-22 NOTE — THERAPY
Physical Therapy   Daily Treatment     Patient Name: Jorden Bonilla  Age:  58 y.o., Sex:  male  Medical Record #: [id number]  Today's Date: 8/22/2023     Precautions  Precautions: (P) Fall Risk  Comments: (P) L phyllis, Zio patch, HTN, DM    Subjective    Patient in bed and agreeable to therapy.     Objective       08/22/23 0701   PT Charge Group   PT Electrical Stimulation Attended (Units) 2   PT Gait Training (Units) 1   PT Therapeutic Activities (Units) 1   PT Total Time Spent   PT Individual Total Time Spent (Mins) 60   Precautions   Precautions Fall Risk   Comments L phyllis, Zio patch, HTN, DM   Gait Functional Level of Assist    Gait Level Of Assist Moderate Assist  (to Min A)   Assistive Device Quad Cane  (R LBQC, L AFO/Givmohr sling)   Distance (Feet)   (50 ftx1, 30 ftx2, 40 ftx1)   Deviation Step To;Trendelenberg;Decreased Base Of Support;Decreased Heel Strike;Decreased Toe Off  (L phyllis gait, tendency to progress leg in ER)   Transfer Functional Level of Assist   Bed, Chair, Wheelchair Transfer Minimal Assist   Bed Chair Wheelchair Transfer Description Adaptive equipment;Increased time;Initial preparation for task;Supervision for safety;Verbal cueing  (stand step with LBQC vs. reach pivot)   Bed Mobility    Supine to Sit Standby Assist  (with bed rail and HOB elevated)   Sit to Stand Contact Guard Assist   Interdisciplinary Plan of Care Collaboration   IDT Collaboration with  Nursing   Patient Position at End of Therapy Seated;Chair Alarm On;Self Releasing Lap Belt Applied  (in dining room)   Collaboration Comments CLOF     Initial set up of LLE Bioness training for anterior tib in swing and quad in stance to improve stance control (no hyperextension thrust noted with gait training however did on occasion with use of Bioness). See patient profile for details of parameters. Added use of dorsi wrap for additional dorsi assist due to pad placement/sizing.     Assessment    Patient tolerated session well, extra  time required for set up of Bioness due to small stature and pad placement.     Strengths: Able to follow instructions, Alert and oriented, Effective communication skills, Good carryover of learning, Good insight into deficits/needs, Independent prior level of function, Making steady progress towards goals, Motivated for self care and independence, Pleasant and cooperative, Supportive family, Willingly participates in therapeutic activities  Barriers: Bladder retention, Decreased endurance, Hemiparesis, Impaired activity tolerance, Impaired balance, Language barrier, non-fluent English, Limited mobility    Plan    Bed mobility with phyllis techniques, transfer training, gait training with LBQC vs. SBQC and carbon fiber AFO with stockinette, standing balance, neuro re-ed for L hemibody strengthening, Bioness, BWSTT, family training/education.      [facility]  therapy tech program 2x/week     Passport items to be completed:  Get in/out of bed safely, in/out of a vehicle, safely use mobility device, walk or wheel around home/community, navigate up and down stairs, show how to get up/down from the ground, ensure home is accessible, demonstrate HEP, complete caregiver training    Physical Therapy Problems (Active)       Problem: Mobility       Dates: Start:  08/15/23         Goal: STG-Within one week, patient will ambulate household distance 50 ft with HW and CGA.       Dates: Start:  08/15/23    Expected End:  09/05/23            Goal: STG-Within one week, patient will ambulate up/down a curb with HW and min A.       Dates: Start:  08/15/23    Expected End:  09/05/23               Problem: Mobility Transfers       Dates: Start:  08/15/23         Goal: STG-Within one week, patient will perform bed mobility with SPV consistently.       Dates: Start:  08/15/23    Expected End:  09/05/23            Goal: STG-Within one week, patient will transfer bed to chair stand pivot with HW and CGA.       Dates: Start:  08/15/23    Expected  End:  09/05/23               Problem: PT-Long Term Goals       Dates: Start:  08/15/23         Goal: LTG-By discharge, patient will ambulate 150 ft with QC vs SPC and SBA.       Dates: Start:  08/15/23    Expected End:  09/05/23            Goal: LTG-By discharge, patient will transfer one surface to another with QC vs SPC and SPV.       Dates: Start:  08/15/23    Expected End:  09/05/23            Goal: LTG-By discharge, patient will ambulate up/down a curb with QC vs SPC and SBA.       Dates: Start:  08/15/23    Expected End:  09/05/23            Goal: LTG-By discharge, patient will transfer in/out of a car with QC vs SPC and SBA after setup.       Dates: Start:  08/15/23    Expected End:  09/05/23            Goal: LTG-By discharge, patient will ambulate up/down a ramp with QC vs SPC and CGA to simulate home entrance.       Dates: Start:  08/15/23    Expected End:  09/05/23

## 2023-08-22 NOTE — THERAPY
Occupational Therapy  Daily Treatment     Patient Name: Jorden Bonilla  Age:  58 y.o., Sex:  male  Medical Record #: [id number]  Today's Date: 8/22/2023     Precautions  Precautions: (P) Fall Risk  Comments: (P) L phyllis, Zio patch, HTN, DM         Subjective    Pt supine in bed upon arrival. Pt pleasant and cooperative, agreeable to therapy. Family present for session.     Objective       08/22/23 1331   OT Total Time Spent   OT Individual Total Time Spent (Mins) 30   Precautions   Precautions Fall Risk   Comments L phyllis, Zio patch, HTN, DM   Vitals   O2 Delivery Device None - Room Air   Pain   Intervention Declines   Pain 0 - 10 Group   Pain Rating Scale (NPRS) 0   Cognition    Level of Consciousness Alert   Sleep/Wake Cycle   Sleep & Rest Awake   Sitting Lower Body Exercises   Sit to Stand Other Resistance (See Comments)  (1x5 with BUE up to GB. 3 reps without hands to no GB.)   Bed Mobility    Supine to Sit Standby Assist   Sit to Supine Standby Assist   Interdisciplinary Plan of Care Collaboration   IDT Collaboration with  Family / Caregiver   Patient Position at End of Therapy In Bed;Bed Alarm On;Call Light within Reach;Tray Table within Reach;Phone within Reach;Family / Friend in Room   Collaboration Comments CLOF     Dressing- pt completed simulated UBD and LBD in front of GB. Pt donned blue theraband for LBD with CGA. Pt used GB to stand and aura theraband over hips with no physical assist from therapist. Pt took sitting rest break and doffed. Pt then donned/doffed theraband overhead to simulate UBD seated in w/c.    Pt donned socks with SBA seated EOB. No AE necessary.     Dynamic standing- pt completed dynamic standing balance activity standing in front of GB with CGA. Pt reached BUE (using RUE to assist LUE) in bilateral directions in high and low positions.    Assessment    Pt tolerated session well. Pt overall strength and endurance improving. Pt required minimal verbal cues when completing simulated  dressing activity, and states that he is getting the hang of dressing and is able to aura his shirts in the morning with no physical assist. No LOB noted  during session. Pt required one verbal cue for base of support when performing sit to stands.     Strengths: Able to follow instructions, Alert and oriented, Effective communication skills, Good carryover of learning, Good insight into deficits/needs, Independent prior level of function, Manages pain appropriately, Motivated for self care and independence, Pleasant and cooperative, Supportive family, Willingly participates in therapeutic activities  Barriers: Hemiplegia, Hypertension, Impaired balance, Limited mobility    Plan    LUE neuro re-ed. ADL/IADL training. Increase overall strength and endurance.     Passport items to be completed:  Perform bathroom transfers, complete dressing, complete feeding, get ready for the day, prepare a simple meal, participate in household tasks, adapt home for safety needs, demonstrate home exercise program, complete caregiver training     Occupational Therapy Goals (Active)       Problem: Bathing       Dates: Start:  08/15/23         Goal: STG-Within one week, patient will bathe body with min A using AE/AD prn       Dates: Start:  08/15/23    Expected End:  09/05/23         Goal Note filed on 08/16/23 1249 by Pricilla Bazzi MS,OTR/L       New admit                 Problem: Dressing       Dates: Start:  08/15/23         Goal: STG-Within one week, patient will dress LB with min A       Dates: Start:  08/15/23    Expected End:  09/05/23         Goal Note filed on 08/16/23 1249 by Pricilla Bazzi MS,OTR/L       New admit                 Problem: Functional Transfers       Dates: Start:  08/15/23         Goal: STG-Within one week, patient will transfer to toilet with CGA using grab bar       Dates: Start:  08/15/23    Expected End:  09/05/23         Goal Note filed on 08/16/23 1249 by Pricilla Bazzi MS,OTR/L       New  admit                 Problem: OT Long Term Goals       Dates: Start:  08/15/23         Goal: LTG-By discharge, patient will complete basic self care tasks with SBA/supervision       Dates: Start:  08/15/23    Expected End:  09/05/23            Goal: LTG-By discharge, patient will perform bathroom transfers with supervision/mod I       Dates: Start:  08/15/23    Expected End:  09/05/23               Problem: Toileting       Dates: Start:  08/15/23         Goal: STG-Within one week, patient will complete toileting tasks with max A       Dates: Start:  08/15/23    Expected End:  09/05/23         Goal Note filed on 08/16/23 1249 by Pricilla Bazzi MS,OTR/L       New admit

## 2023-08-22 NOTE — CARE PLAN
"The patient is Stable - Low risk of patient condition declining or worsening    Shift Goals  Clinical Goals: Safety  Patient Goals: Safety    Patient is not progressing towards the following goals:    Problem: Fall Risk - Rehab  Goal: Patient will remain free from falls  Outcome: Not Met  Note: Melani Galeano Fall risk Assessment Score: 11    Moderate fall risk Interventions  - Bed and strip alarm   - Yellow sign by the door   - Yellow wrist band \"Fall risk\"  - Do not leave patient unattended in the bathroom  - Fall risk education provided     Problem: Pain - Standard  Goal: Alleviation of pain or a reduction in pain to the patient’s comfort goal  Outcome: Not Progressing  Note: Patient has lower back pain and is requesting something stronger than Tylenol for future use.     "

## 2023-08-22 NOTE — CARE PLAN
"  Problem: Knowledge Deficit - Standard  Goal: Patient and family/care givers will demonstrate understanding of plan of care, disease process/condition, diagnostic tests and medications  Outcome: Progressing   Patient verbalized understanding plan of care.         Problem: Fall Risk - Rehab  Goal: Patient will remain free from falls  Outcome: Progressing   Melani Galeano Fall risk Assessment Score: 11    Moderate fall risk Interventions  - Bed and strip alarm   - Yellow sign by the door   - Yellow wrist band \"Fall risk\"  - Room near to the nurse station  - Do not leave patient unattended in the bathroom  - Fall risk education provided              "

## 2023-08-22 NOTE — THERAPY
Physical Therapy   Daily Treatment     Patient Name: Jorden Bonilla  Age:  58 y.o., Sex:  male  Medical Record #: [id number]  Today's Date: 8/22/2023     Precautions  Precautions: Fall Risk  Comments: L phyllis, Zio patch, HTN, DM    Subjective    Pt resting in bed, willing to participate     Objective       08/22/23 1401   PT Charge Group   PT Neuromuscular Re-Education / Balance (Units) 2   PT Total Time Spent   PT Individual Total Time Spent (Mins) 30   Transfer Functional Level of Assist   Bed, Chair, Wheelchair Transfer Minimal Assist  (min/ CGA stand-pivot)   Supine Lower Body Exercise   Bridges Two Legged;2 sets of 15   Trunk Rotation 2 sets of 15;Light Resistance Theraband   Hip Abduction Hook Lying;2 sets of 15;Light Resistance Theraband   Knee to Chest 2 sets of 15  (AAROM LLE)   Other Exercises closed chain strength training at shuttle for single limb squats 2 x 15 with 4 bands BLE's   Neuro-Muscular Treatments   Neuro-Muscular Treatments Facilitation;Joint Approximation;Sequencing;Tactile Cuing;Verbal Cuing   Comments neuro re-ed strength training for supine exercises as noted.         Assessment    Pt completed neuro re-ed strength training well, exerts good effort throughout all exercises but remains with LLE phyllis-paresis.    Strengths: Able to follow instructions, Alert and oriented, Effective communication skills, Good carryover of learning, Good insight into deficits/needs, Independent prior level of function, Making steady progress towards goals, Motivated for self care and independence, Pleasant and cooperative, Supportive family, Willingly participates in therapeutic activities  Barriers: Bladder retention, Decreased endurance, Hemiparesis, Impaired activity tolerance, Impaired balance, Language barrier, non-fluent English, Limited mobility    Plan    Bed mobility with phyllis techniques, transfer training, gait training with LBQC vs. SBQC and carbon fiber AFO with carrol, standing balance, neuro  re-ed for L hemibody strengthening, Bioness, BWSTT, family training/education.      [facility]  therapy tech program 2x/week     Passport items to be completed:  Get in/out of bed safely, in/out of a vehicle, safely use mobility device, walk or wheel around home/community, navigate up and down stairs, show how to get up/down from the ground, ensure home is accessible, demonstrate HEP, complete caregiver training      Physical Therapy Problems (Active)       Problem: Mobility       Dates: Start:  08/15/23         Goal: STG-Within one week, patient will ambulate household distance 50 ft with HW and CGA.       Dates: Start:  08/15/23    Expected End:  09/05/23            Goal: STG-Within one week, patient will ambulate up/down a curb with HW and min A.       Dates: Start:  08/15/23    Expected End:  09/05/23               Problem: Mobility Transfers       Dates: Start:  08/15/23         Goal: STG-Within one week, patient will perform bed mobility with SPV consistently.       Dates: Start:  08/15/23    Expected End:  09/05/23            Goal: STG-Within one week, patient will transfer bed to chair stand pivot with HW and CGA.       Dates: Start:  08/15/23    Expected End:  09/05/23               Problem: PT-Long Term Goals       Dates: Start:  08/15/23         Goal: LTG-By discharge, patient will ambulate 150 ft with QC vs SPC and SBA.       Dates: Start:  08/15/23    Expected End:  09/05/23            Goal: LTG-By discharge, patient will transfer one surface to another with QC vs SPC and SPV.       Dates: Start:  08/15/23    Expected End:  09/05/23            Goal: LTG-By discharge, patient will ambulate up/down a curb with QC vs SPC and SBA.       Dates: Start:  08/15/23    Expected End:  09/05/23            Goal: LTG-By discharge, patient will transfer in/out of a car with QC vs SPC and SBA after setup.       Dates: Start:  08/15/23    Expected End:  09/05/23            Goal: LTG-By discharge, patient will ambulate  up/down a ramp with QC vs SPC and CGA to simulate home entrance.       Dates: Start:  08/15/23    Expected End:  09/05/23

## 2023-08-22 NOTE — PROGRESS NOTES
Hospital Medicine Daily Progress Note        Chief Complaint  Hypertension  Diabetes    Interval Problem Update  Discussed about his BS a little elevated but relatively ok.    Review of Systems  Review of Systems   Constitutional:  Negative for chills and fever.   Respiratory:  Negative for shortness of breath.    Cardiovascular:  Negative for chest pain.   Gastrointestinal:  Negative for abdominal pain, diarrhea, nausea and vomiting.   Psychiatric/Behavioral:  The patient is not nervous/anxious.         Physical Exam  Temp:  [36.6 °C (97.8 °F)-36.8 °C (98.3 °F)] 36.6 °C (97.8 °F)  Pulse:  [65-96] 65  Resp:  [17-18] 17  BP: ()/(64-84) 114/72  SpO2:  [95 %-98 %] 98 %    Physical Exam  Vitals and nursing note reviewed.   Constitutional:       Appearance: Normal appearance.   HENT:      Head: Atraumatic.   Eyes:      Conjunctiva/sclera: Conjunctivae normal.      Pupils: Pupils are equal, round, and reactive to light.   Cardiovascular:      Rate and Rhythm: Normal rate and regular rhythm.      Heart sounds: No murmur heard.  Pulmonary:      Effort: Pulmonary effort is normal.      Breath sounds: No stridor. No wheezing or rales.   Abdominal:      General: There is no distension.      Palpations: Abdomen is soft.      Tenderness: There is no abdominal tenderness.   Musculoskeletal:      Cervical back: Normal range of motion and neck supple.      Right lower leg: No edema.      Left lower leg: No edema.   Skin:     General: Skin is warm and dry.      Findings: No rash.   Neurological:      Mental Status: He is alert and oriented to person, place, and time.   Psychiatric:         Mood and Affect: Mood normal.         Behavior: Behavior normal.             Laboratory  Recent Labs     08/22/23  0540   WBC 9.4   RBC 5.53   HEMOGLOBIN 14.7   HEMATOCRIT 44.7   MCV 80.8*   MCH 26.6*   MCHC 32.9   RDW 36.1   PLATELETCT 306   MPV 10.5     Recent Labs     08/22/23  0540   SODIUM 139   POTASSIUM 4.1   CHLORIDE 103   CO2 27    GLUCOSE 135*   BUN 17   CREATININE 0.89   CALCIUM 9.0                   Assessment/Plan  * Ischemic stroke (HCC)- (present on admission)  Assessment & Plan  Presented with left HP  S/P ZioPatch  Cont ASA and Lipitor  Off Plavix    UTI (urinary tract infection)  Assessment & Plan  Cont Omnicef (thru 8/24)    Primary hypertension- (present on admission)  Assessment & Plan  BP ok  Cont Lopressor  Cont Benicar  Note: on Flomax  Cont to monitor    Type 2 diabetes mellitus with hyperglycemia, without long-term current use of insulin (HCC)- (present on admission)  Assessment & Plan  Hba1c: 11.9 (8/10)  -167 (hit 216 x 1)  Cont Metformin XR bid  Will change SS coverage to Lispro  Note: home meds include Metformin  mg bid, Jardiance 25 mg qd, and Trulicity 1.5 mg qwk  Cont to monitor    Coronary artery disease due to calcified coronary lesion: CABG ×3 in 2015- (present on admission)  Assessment & Plan  Hx of CABG  Continue ASA and Lipitor  Cont Lopressor & Benicar  Cont to monitor

## 2023-08-22 NOTE — PROGRESS NOTES
..                                                         NURSING DAILY NOTE    Name: Jorden Bonilla   Date of Admission: 8/14/2023   Admitting Diagnosis: Ischemic stroke (HCC)  Attending Physician: Sarai Davila M.d.  Allergies: Patient has no known allergies.    Safety  Patient Assist  Mod A  Patient Precautions  Fall Risk  Precaution Comments  L phyllis, Zio patch, HTN, DM  Bed Transfer Status  Minimal Assist  Toilet Transfer Status   Minimal Assist  Assistive Devices  Rails  Oxygen  None - Room Air  Diet/Therapeutic Dining  Current Diet Order   Procedures    Diet Order Diet: Consistent CHO (Diabetic) (cardiac and CHO); Second Modifier: (optional): Cardiac     Pill Administration  whole  Agitated Behavioral Scale  14  ABS Level of Severity  No Agitation    Fall Risk  Has the patient had a fall this admission?   No  Melani Galeano Fall Risk Scoring  11, MODERATE RISK  Fall Risk Safety Measures  bed alarm, chair alarm, low vision/ hearing, and ok to leave pt in bathroom    Vitals  Temperature: 36.7 °C (98 °F)  Temp src: Oral  Pulse: 83  Respiration: 18  Blood Pressure: 130/80  Blood Pressure MAP (Calculated): 97 MM HG  BP Location: Left, Upper Arm  Patient BP Position: Supine     Oxygen  Pulse Oximetry: 96 %  O2 (LPM): 0  O2 Delivery Device: None - Room Air    Bowel and Bladder  Last Bowel Movement  08/20/23  Stool Type  Type 2: Sausage shaped, but lumpy  Bowel Device     Continent  Bladder: Continent void   Bowel: Continent movement  Bladder Function  Urine Void (mL): 800 ml  Number of Times Voided: 1  Urine Color: Yellow  Genitourinary Assessment   Bladder Assessment (WDL):  WDL Except  Damon Catheter: Not Applicable  Damon Care: Given with Soap and Water  Urinary Elimination: Catheter (Document on LDA)  Urine Color: Yellow  Bladder Device: Urinal  Bladder Scan: Post Void  $ Bladder Scan Results (mL): 85  Bladder Medications: Yes    Skin  Cooper Score   18  Sensory Interventions   Bed Types: Standard/Trauma  Mattress  Skin Preventative Measures: Pillows in Use for Support / Positioning  Moisture Interventions  Moisturizers/Barriers: Barrier Paste      Pain  Pain Rating Scale  4 - Distracts me, can do usual activities  Pain Location  Back  Pain Location Orientation  Lower  Pain Interventions   Medication (see MAR)    ADLs    Bathing   Shower, * * With Assistance from, Staff  Linen Change   Partial  Personal Hygiene  Moist Mireya Wipes, Perineal Care  Chlorhexidine Bath      Oral Care     Teeth/Dentures     Shave     Nutrition Percentage Eaten  Lunch, Less than 25% Consumed  Environmental Precautions  Treaded Slipper Socks on Patient  Patient Turns/Positioning  Patient Turns Self from Side to Side, Supine  Patient Turns Assistance/Tolerance     Bed Positions  Bed Controls On, Bed Locked  Head of Bed Elevated  Greater or equal to 30 degrees      Psychosocial/Neurologic Assessment  Psychosocial Assessment  Psychosocial (WDL):  Within Defined Limits  Neurologic Assessment  Neuro (WDL): Exceptions to WDL  Level of Consciousness: Alert  Orientation Level: Oriented X4  Muscle Strength Left Arm: Fair Strength against Gravity but No Resistance  Muscle Strength Left Leg: Fair Strength against Gravity but No Resistance  EENT (WDL):  WDL Except    Cardio/Pulmonary Assessment  Edema      Respiratory Breath Sounds     Cardiac Assessment   Cardiac (WDL):  WDL Except (tachycardia, Hx-CAD, HTN, mult. coronary artery by pass)

## 2023-08-22 NOTE — PROGRESS NOTES
"Rehab Progress Note     Date of Service: 8/22/2023  Chief Complaint: Follow-up stroke    Interval Events (Subjective)    Patient seen and evaluated today in his room.  He is getting electrical stimulation on his left forearm.  He continues to report left-sided back pain.  Ice did not make much difference.  We discussed possible interventions to include a Lidoderm patch, Flexeril, and/or TENS machine.  He would like to try the patch first.  Patient last moved his bowels on the 20th. His olmesartan has been held for the last 3 evenings due to how blood pressures.     Patient has no other new questions, concerns, or complaints today.           Objective:  VITAL SIGNS: /72   Pulse 65   Temp 36.6 °C (97.8 °F) (Oral)   Resp 17   Ht 1.651 m (5' 5\")   Wt 63 kg (139 lb)   SpO2 98%   BMI 23.13 kg/m²   Gen: alert, no apparent distress  CV: Regular rate, regular rhythm  Resp: Clear to auscultation bilaterally  Neuro: Left hemiparesis  MSK: Tenderness to palpation over the left lower thoracic paraspinal muscles    Recent Results (from the past 72 hour(s))   POCT glucose device results    Collection Time: 08/19/23 11:25 AM   Result Value Ref Range    POC Glucose, Blood 185 (H) 65 - 99 mg/dL   POCT glucose device results    Collection Time: 08/19/23  5:12 PM   Result Value Ref Range    POC Glucose, Blood 267 (H) 65 - 99 mg/dL   POCT glucose device results    Collection Time: 08/19/23  8:29 PM   Result Value Ref Range    POC Glucose, Blood 173 (H) 65 - 99 mg/dL   OCCULT BLOOD X2 (STOOL)    Collection Time: 08/20/23 12:15 AM   Result Value Ref Range    Occult Blood 1 Negative Negative   POCT glucose device results    Collection Time: 08/20/23  7:11 AM   Result Value Ref Range    POC Glucose, Blood 207 (H) 65 - 99 mg/dL   POCT glucose device results    Collection Time: 08/20/23 10:59 AM   Result Value Ref Range    POC Glucose, Blood 154 (H) 65 - 99 mg/dL   URINALYSIS    Collection Time: 08/20/23  2:25 PM   Result Value " Ref Range    Color Yellow     Character Clear     Specific Gravity 1.014 <1.035    Ph 6.5 5.0 - 8.0    Glucose 250 (A) Negative mg/dL    Ketones Negative Negative mg/dL    Protein Negative Negative mg/dL    Bilirubin Negative Negative    Urobilinogen, Urine 0.2 Negative    Nitrite Negative Negative    Leukocyte Esterase Trace (A) Negative    Occult Blood Negative Negative    Micro Urine Req Microscopic    URINE MICROSCOPIC (W/UA)    Collection Time: 08/20/23  2:25 PM   Result Value Ref Range    WBC 2-5 (A) /hpf    RBC 0-2 (A) /hpf    Bacteria Negative None /hpf    Epithelial Cells Negative /hpf    Amorphous Crystal Present /hpf    Hyaline Cast 0-2 /lpf   POCT glucose device results    Collection Time: 08/20/23  4:27 PM   Result Value Ref Range    POC Glucose, Blood 128 (H) 65 - 99 mg/dL   POCT glucose device results    Collection Time: 08/20/23  9:13 PM   Result Value Ref Range    POC Glucose, Blood 167 (H) 65 - 99 mg/dL   POCT glucose device results    Collection Time: 08/21/23  7:16 AM   Result Value Ref Range    POC Glucose, Blood 147 (H) 65 - 99 mg/dL   POCT glucose device results    Collection Time: 08/21/23 11:13 AM   Result Value Ref Range    POC Glucose, Blood 216 (H) 65 - 99 mg/dL   POCT glucose device results    Collection Time: 08/21/23  5:19 PM   Result Value Ref Range    POC Glucose, Blood 126 (H) 65 - 99 mg/dL   POCT glucose device results    Collection Time: 08/21/23  8:01 PM   Result Value Ref Range    POC Glucose, Blood 151 (H) 65 - 99 mg/dL   CBC WITHOUT DIFFERENTIAL    Collection Time: 08/22/23  5:40 AM   Result Value Ref Range    WBC 9.4 4.8 - 10.8 K/uL    RBC 5.53 4.70 - 6.10 M/uL    Hemoglobin 14.7 14.0 - 18.0 g/dL    Hematocrit 44.7 42.0 - 52.0 %    MCV 80.8 (L) 81.4 - 97.8 fL    MCH 26.6 (L) 27.0 - 33.0 pg    MCHC 32.9 32.3 - 36.5 g/dL    RDW 36.1 35.9 - 50.0 fL    Platelet Count 306 164 - 446 K/uL    MPV 10.5 9.0 - 12.9 fL   Basic Metabolic Panel    Collection Time: 08/22/23  5:40 AM    Result Value Ref Range    Sodium 139 135 - 145 mmol/L    Potassium 4.1 3.6 - 5.5 mmol/L    Chloride 103 96 - 112 mmol/L    Co2 27 20 - 33 mmol/L    Glucose 135 (H) 65 - 99 mg/dL    Bun 17 8 - 22 mg/dL    Creatinine 0.89 0.50 - 1.40 mg/dL    Calcium 9.0 8.5 - 10.5 mg/dL    Anion Gap 9.0 7.0 - 16.0   ESTIMATED GFR    Collection Time: 08/22/23  5:40 AM   Result Value Ref Range    GFR (CKD-EPI) 98 >60 mL/min/1.73 m 2   POCT glucose device results    Collection Time: 08/22/23  7:40 AM   Result Value Ref Range    POC Glucose, Blood 164 (H) 65 - 99 mg/dL       Scheduled Medications   Medication Dose Frequency    insulin lispro  2-12 Units 4X/DAY ACHS    cefdinir  300 mg Q12HRS    senna-docusate  2 Tablet BID    And    polyethylene glycol/lytes  1 Packet DAILY    metFORMIN ER  500 mg BID WITH MEALS    metoprolol tartrate  12.5 mg TWICE DAILY    aspirin  81 mg Q EVENING    atorvastatin  80 mg Q EVENING    enoxaparin (LOVENOX) injection  40 mg DAILY AT 1800    olmesartan  20 mg Q EVENING    tamsulosin  0.4 mg AFTER BREAKFAST       Current Diet Order   Procedures    Diet Order Diet: Consistent CHO (Diabetic) (cardiac and CHO); Second Modifier: (optional): Cardiac       Assessment:    This patient is a 58 y.o. male admitted for acute inpatient rehabilitation with Ischemic stroke (HCC).      I, Sarai Davila M.D./MD., was present and led the interdisciplinary team conference on 8/16/2023.  I led the IDT conference and agree with the IDT conference documentation and plan of care as noted below.     Nursing:  Diet Current Diet Order   Procedures    Diet Order Diet: Consistent CHO (Diabetic) (cardiac and CHO); Second Modifier: (optional): Cardiac       Eating ADL Supervision  Set-up of equipment or meal/tube feeding   % of Last Meal  Oral Nutrition: *  * Meal *  *, Breakfast, Between % Consumed (100%)   Sleep No issues   Bowel Last BM: 08/14/23   Bladder Post Void  5   Barriers to Discharge Home: left  hemiplegia      Physical Therapy:  Bed Mobility    Transfers Minimal Assist  Increased time, Set-up of equipment, Supervision for safety, Verbal cueing (stand pivot to the R no AD)   Mobility Total Assist X 2 (mod A x1 plus wc follow)   Stairs Unable to Participate   Barriers to Discharge Home: left hemiplegia       Occupational Therapy:  Grooming Supervision, Seated   Bathing Moderate Assist   UB Dressing Stand by Assist   LB Dressing Moderate Assist   Toileting Total Assist   Shower & Tub Transfer Minimal Assist   Barriers to Discharge Home: left hemiplegia      Speech-Language Pathology:  Comprehension:  Independent  Comprehension Description:     Expression:  Independent  Expression Description:     Social Interaction:  Independent  Social Interaction Description:     Problem Solving:  Independent  Problem Solving Description:     Memory:  Supervision  Memory Description:  Verbal cueing, Therapy schedule, Increased time, Bed/chair alarm, Seat belt  Barriers to Discharge Home: none, signed off    Respiratory Therapy:  O2 (LPM): 0  O2 Delivery Device: None - Room Air    Case Management:  Continues to work on disposition and DME needs.      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  Left hemiparesis  Cognitive impairment, mild  Dysphagia  Continue full rehab program  PT/OT/SLP, 1 hr each discipline, 5 days per week  8/15: SLP discontinued, PT/OT, 1.5 hr each discipline, 5 days per week    Completed aspirin and Plavix for secondary stroke prophylaxis until 8/21, then aspirin alone    Continue aspirin and atorvastatin    Patient with cardiac monitor in place, return on 8/28    Outpatient follow-up with stroke Bridge clinic and cardiology, referrals made    Dizziness, resolved  Continue meclizine as needed    Left paraspinal muscle  spasms, continues  Heat and/or ice as needed  Continue tylenol as needed   Trial of Lidoderm patch  Added as needed Flexeril     Diabetes with hyperglycemia  Uncontrolled, hemoglobin A1c 11.9  Continue metformin and sliding scale insulin  Appreciate hospitalist assistance     Hypertension  Continue metoprolol  Patient has not received olmesartan since 8/18 due to holding parameters we will discontinue today  Appreciate hospitalist assistance     Hyperlipidemia  Continue atorvastatin     Sinus tachycardia, resolved  Continue metoprolol  Appreciate hospitalist assistance  Negative doppler US for DVTs     Urinary retention, improved  Hematuria, resolved  Damon removed 8/15  Continue Flomax  Monitor PVRs - 106, 75, 85, 214  Intermittent catheterization for volumes over 400, not requiring     Coronary artery disease  With history of CABG  Continue aspirin and atorvastatin    Leukocytosis, continues  Negative CXR    Acute cystitis  Continue cefdinir    Constipation,   Continue Senokot and MiraLAX  Last bowel movement 8/20    Iron deficiency  Positive occult stool  Outpatient follow up with GI    DVT prophylaxis  Continue Lovenox    Sarai Davila M.D.  Physical Medicine and Rehabilitation

## 2023-08-23 ENCOUNTER — APPOINTMENT (OUTPATIENT)
Dept: PHYSICAL THERAPY | Facility: REHABILITATION | Age: 58
DRG: 057 | End: 2023-08-23
Attending: PHYSICAL MEDICINE & REHABILITATION
Payer: COMMERCIAL

## 2023-08-23 ENCOUNTER — APPOINTMENT (OUTPATIENT)
Dept: OCCUPATIONAL THERAPY | Facility: REHABILITATION | Age: 58
DRG: 057 | End: 2023-08-23
Attending: PHYSICAL MEDICINE & REHABILITATION
Payer: COMMERCIAL

## 2023-08-23 LAB
APPEARANCE UR: CLEAR
BILIRUB UR QL STRIP.AUTO: NEGATIVE
COLOR UR: YELLOW
GLUCOSE BLD STRIP.AUTO-MCNC: 124 MG/DL (ref 65–99)
GLUCOSE BLD STRIP.AUTO-MCNC: 140 MG/DL (ref 65–99)
GLUCOSE BLD STRIP.AUTO-MCNC: 179 MG/DL (ref 65–99)
GLUCOSE BLD STRIP.AUTO-MCNC: 244 MG/DL (ref 65–99)
GLUCOSE UR STRIP.AUTO-MCNC: 100 MG/DL
KETONES UR STRIP.AUTO-MCNC: NEGATIVE MG/DL
LEUKOCYTE ESTERASE UR QL STRIP.AUTO: NEGATIVE
MICRO URNS: ABNORMAL
NITRITE UR QL STRIP.AUTO: NEGATIVE
PH UR STRIP.AUTO: 6.5 [PH] (ref 5–8)
PROT UR QL STRIP: NEGATIVE MG/DL
RBC UR QL AUTO: NEGATIVE
SP GR UR STRIP.AUTO: 1.01
UROBILINOGEN UR STRIP.AUTO-MCNC: 0.2 MG/DL

## 2023-08-23 PROCEDURE — 99232 SBSQ HOSP IP/OBS MODERATE 35: CPT | Performed by: PHYSICAL MEDICINE & REHABILITATION

## 2023-08-23 PROCEDURE — 770010 HCHG ROOM/CARE - REHAB SEMI PRIVAT*

## 2023-08-23 PROCEDURE — 82962 GLUCOSE BLOOD TEST: CPT | Mod: 91

## 2023-08-23 PROCEDURE — A9270 NON-COVERED ITEM OR SERVICE: HCPCS | Performed by: PHYSICAL MEDICINE & REHABILITATION

## 2023-08-23 PROCEDURE — A9270 NON-COVERED ITEM OR SERVICE: HCPCS | Performed by: HOSPITALIST

## 2023-08-23 PROCEDURE — 97530 THERAPEUTIC ACTIVITIES: CPT

## 2023-08-23 PROCEDURE — 700102 HCHG RX REV CODE 250 W/ 637 OVERRIDE(OP): Performed by: PHYSICAL MEDICINE & REHABILITATION

## 2023-08-23 PROCEDURE — 700102 HCHG RX REV CODE 250 W/ 637 OVERRIDE(OP): Performed by: HOSPITALIST

## 2023-08-23 PROCEDURE — 700111 HCHG RX REV CODE 636 W/ 250 OVERRIDE (IP): Mod: JZ | Performed by: PHYSICAL MEDICINE & REHABILITATION

## 2023-08-23 PROCEDURE — 97116 GAIT TRAINING THERAPY: CPT

## 2023-08-23 PROCEDURE — 99232 SBSQ HOSP IP/OBS MODERATE 35: CPT | Performed by: HOSPITALIST

## 2023-08-23 PROCEDURE — 97112 NEUROMUSCULAR REEDUCATION: CPT

## 2023-08-23 PROCEDURE — 97535 SELF CARE MNGMENT TRAINING: CPT

## 2023-08-23 PROCEDURE — 97110 THERAPEUTIC EXERCISES: CPT

## 2023-08-23 PROCEDURE — 700101 HCHG RX REV CODE 250: Performed by: PHYSICAL MEDICINE & REHABILITATION

## 2023-08-23 PROCEDURE — 81003 URINALYSIS AUTO W/O SCOPE: CPT

## 2023-08-23 RX ORDER — AMOXICILLIN 250 MG
2 CAPSULE ORAL 2 TIMES DAILY
Status: DISCONTINUED | OUTPATIENT
Start: 2023-08-23 | End: 2023-09-05 | Stop reason: HOSPADM

## 2023-08-23 RX ORDER — BISACODYL 10 MG
10 SUPPOSITORY, RECTAL RECTAL
Status: DISCONTINUED | OUTPATIENT
Start: 2023-08-23 | End: 2023-09-05 | Stop reason: HOSPADM

## 2023-08-23 RX ORDER — POLYETHYLENE GLYCOL 3350 17 G/17G
1 POWDER, FOR SOLUTION ORAL 2 TIMES DAILY
Status: DISCONTINUED | OUTPATIENT
Start: 2023-08-23 | End: 2023-09-05 | Stop reason: HOSPADM

## 2023-08-23 RX ADMIN — SENNOSIDES AND DOCUSATE SODIUM 2 TABLET: 50; 8.6 TABLET ORAL at 20:07

## 2023-08-23 RX ADMIN — LIDOCAINE 1 PATCH: 700 PATCH TOPICAL at 09:17

## 2023-08-23 RX ADMIN — SENNOSIDES AND DOCUSATE SODIUM 2 TABLET: 50; 8.6 TABLET ORAL at 08:16

## 2023-08-23 RX ADMIN — METOPROLOL TARTRATE 12.5 MG: 25 TABLET, FILM COATED ORAL at 05:42

## 2023-08-23 RX ADMIN — METFORMIN HYDROCHLORIDE 500 MG: 500 TABLET, EXTENDED RELEASE ORAL at 16:40

## 2023-08-23 RX ADMIN — CEFDINIR 300 MG: 300 CAPSULE ORAL at 08:17

## 2023-08-23 RX ADMIN — TAMSULOSIN HYDROCHLORIDE 0.4 MG: 0.4 CAPSULE ORAL at 09:20

## 2023-08-23 RX ADMIN — ATORVASTATIN CALCIUM 80 MG: 40 TABLET, FILM COATED ORAL at 20:08

## 2023-08-23 RX ADMIN — INSULIN LISPRO 2 UNITS: 100 INJECTION, SOLUTION INTRAVENOUS; SUBCUTANEOUS at 08:14

## 2023-08-23 RX ADMIN — INSULIN LISPRO 4 UNITS: 100 INJECTION, SOLUTION INTRAVENOUS; SUBCUTANEOUS at 20:02

## 2023-08-23 RX ADMIN — METFORMIN HYDROCHLORIDE 500 MG: 500 TABLET, EXTENDED RELEASE ORAL at 08:17

## 2023-08-23 RX ADMIN — POLYETHYLENE GLYCOL 3350 1 PACKET: 17 POWDER, FOR SOLUTION ORAL at 20:07

## 2023-08-23 RX ADMIN — CEFDINIR 300 MG: 300 CAPSULE ORAL at 20:08

## 2023-08-23 RX ADMIN — POLYETHYLENE GLYCOL 3350 1 PACKET: 17 POWDER, FOR SOLUTION ORAL at 08:18

## 2023-08-23 RX ADMIN — ASPIRIN 81 MG: 81 TABLET, COATED ORAL at 20:08

## 2023-08-23 RX ADMIN — METOPROLOL TARTRATE 12.5 MG: 25 TABLET, FILM COATED ORAL at 16:40

## 2023-08-23 RX ADMIN — ENOXAPARIN SODIUM 40 MG: 100 INJECTION SUBCUTANEOUS at 16:40

## 2023-08-23 RX ADMIN — ACETAMINOPHEN 650 MG: 325 TABLET ORAL at 18:50

## 2023-08-23 ASSESSMENT — ENCOUNTER SYMPTOMS
BLURRED VISION: 0
FEVER: 0
DIZZINESS: 0
HALLUCINATIONS: 0
PALPITATIONS: 0
SHORTNESS OF BREATH: 0
NAUSEA: 0
HEADACHES: 0
VOMITING: 0

## 2023-08-23 ASSESSMENT — ACTIVITIES OF DAILY LIVING (ADL)
BED_CHAIR_WHEELCHAIR_TRANSFER_DESCRIPTION: ADAPTIVE EQUIPMENT;INCREASED TIME;SET-UP OF EQUIPMENT;SUPERVISION FOR SAFETY
TOILET_TRANSFER_DESCRIPTION: ADAPTIVE EQUIPMENT;GRAB BAR;INCREASED TIME;INITIAL PREPARATION FOR TASK;SET-UP OF EQUIPMENT;SUPERVISION FOR SAFETY;VERBAL CUEING
BED_CHAIR_WHEELCHAIR_TRANSFER_DESCRIPTION: ADAPTIVE EQUIPMENT;INCREASED TIME;SET-UP OF EQUIPMENT;SUPERVISION FOR SAFETY;VERBAL CUEING

## 2023-08-23 ASSESSMENT — GAIT ASSESSMENTS
GAIT LEVEL OF ASSIST: MINIMAL ASSIST
DISTANCE (FEET): 50

## 2023-08-23 ASSESSMENT — PATIENT HEALTH QUESTIONNAIRE - PHQ9
SUM OF ALL RESPONSES TO PHQ9 QUESTIONS 1 AND 2: 0
2. FEELING DOWN, DEPRESSED, IRRITABLE, OR HOPELESS: NOT AT ALL
1. LITTLE INTEREST OR PLEASURE IN DOING THINGS: NOT AT ALL

## 2023-08-23 NOTE — CARE PLAN
Problem: Self Care  Goal: Patient will have the ability to perform ADLs independently or with assistance  Outcome: Progressing  Note: Patient able to perform regular activities this shift.  Pain controlled this shift.  Pain management includes PRN pain meds as well as non-pharmacological measures such as emotional support, rest, and repositioning.  Will continue to monitor.    The patient is Stable - Low risk of patient condition declining or worsening    Shift Goals  Clinical Goals: Safety  Patient Goals: Safety    Progress made toward(s) clinical / shift goals:  pt participates with therapy and is cooperative with nursing    Patient is not progressing towards the following goals:

## 2023-08-23 NOTE — CARE PLAN
Problem: Mobility  Goal: STG-Within one week, patient will ambulate household distance 50 ft with HW and CGA.  Outcome: Not Met  Goal: STG-Within one week, patient will ambulate up/down a curb with HW and min A.  Outcome: Not Met     Problem: Mobility Transfers  Goal: STG-Within one week, patient will perform bed mobility with SPV consistently.  Outcome: Progressing  Goal: STG-Within one week, patient will transfer bed to chair stand pivot with HW and CGA.  Outcome: Progressing

## 2023-08-23 NOTE — PROGRESS NOTES
..                                                         NURSING DAILY NOTE    Name: Jorden Bonilla   Date of Admission: 8/14/2023   Admitting Diagnosis: Ischemic stroke (HCC)  Attending Physician: Sarai Davila M.d.  Allergies: Patient has no known allergies.    Safety  Patient Assist  Mod assist  Patient Precautions  Fall Risk  Precaution Comments  L phyllis, Zio patch, HTN, DM  Bed Transfer Status  Minimal Assist (min/ CGA stand-pivot)  Toilet Transfer Status   Minimal Assist  Assistive Devices  Rails  Oxygen  None - Room Air  Diet/Therapeutic Dining  Current Diet Order   Procedures    Diet Order Diet: Consistent CHO (Diabetic) (cardiac and CHO); Second Modifier: (optional): Cardiac     Pill Administration  whole  Agitated Behavioral Scale  14  ABS Level of Severity  No Agitation    Fall Risk  Has the patient had a fall this admission?   No  Melani Galeano Fall Risk Scoring  11, MODERATE RISK  Fall Risk Safety Measures  bed alarm, chair alarm, poor balance, low vision/ hearing, and ok to leave pt in bathroom    Vitals  Temperature: 36.7 °C (98.1 °F)  Temp src: Oral  Pulse: 82  Respiration: 18  Blood Pressure: 103/64  Blood Pressure MAP (Calculated): 77 MM HG  BP Location: Right, Upper Arm  Patient BP Position: Supine     Oxygen  Pulse Oximetry: 95 %  O2 (LPM): 0  O2 Delivery Device: None - Room Air    Bowel and Bladder  Last Bowel Movement  08/20/23  Stool Type  Type 2: Sausage shaped, but lumpy  Bowel Device     Continent  Bladder: Continent void   Bowel: Continent movement  Bladder Function  Urine Void (mL): 800 ml  Number of Times Voided: 1  Urine Color: Yellow  Genitourinary Assessment   Bladder Assessment (WDL):  WDL Except  Damon Catheter: Not Applicable  Damon Care: Given with Soap and Water  Urinary Elimination: Catheter (Document on LDA)  Urine Color: Yellow  Bladder Device: Urinal  Bladder Scan: Post Void  $ Bladder Scan Results (mL): 214  Bladder Medications: Yes    Skin  Cooper Score    18  Sensory Interventions   Bed Types: Standard/Trauma Mattress  Skin Preventative Measures: Pillows in Use to Float Heels, Pillows in Use for Support / Positioning  Moisture Interventions  Moisturizers/Barriers: Barrier Paste      Pain  Pain Rating Scale  4 - Distracts me, can do usual activities  Pain Location  Back  Pain Location Orientation  Lower  Pain Interventions   Medication (see MAR)    ADLs    Bathing   Shower, Staff  Linen Change   Partial  Personal Hygiene  Moist Mireya Wipes, Perineal Care  Chlorhexidine Bath      Oral Care     Teeth/Dentures     Shave     Nutrition Percentage Eaten  *  * Meal *  *, Dinner, Between % Consumed  Environmental Precautions  Treaded Slipper Socks on Patient  Patient Turns/Positioning  Patient Turns Self from Side to Side, Supine  Patient Turns Assistance/Tolerance     Bed Positions  Bed Controls On, Bed Locked  Head of Bed Elevated  Self regulated      Psychosocial/Neurologic Assessment  Psychosocial Assessment  Psychosocial (WDL):  Within Defined Limits  Neurologic Assessment  Neuro (WDL): Exceptions to WDL  Level of Consciousness: Alert  Orientation Level: Oriented X4  Muscle Strength Left Arm: Fair Strength against Gravity but No Resistance  Muscle Strength Left Leg: Fair Strength against Gravity but No Resistance  EENT (WDL):  WDL Except    Cardio/Pulmonary Assessment  Edema      Respiratory Breath Sounds     Cardiac Assessment   Cardiac (WDL):  WDL Except (tachycardia, Hx-CAD, HTN, mult. coronary artery by pass)

## 2023-08-23 NOTE — THERAPY
Occupational Therapy  Daily Treatment     Patient Name: Jorden Bonilla  Age:  58 y.o., Sex:  male  Medical Record #: [id number]  Today's Date: 8/23/2023     Precautions  Precautions: (P) Fall Risk  Comments: (P) L phyllis, Zio patch, HTN, DM         Subjective    Pt found in dinning area, sitting upright in w/c. Requesting to go to the bathroom prior to therapy     Objective       08/23/23 0831   OT Charge Group   Charges Yes   OT Self Care / ADL (Units) 1   OT Neuromuscular Re-education / Balance (Units) 1   OT Total Time Spent   OT Individual Total Time Spent (Mins) 30   Precautions   Precautions Fall Risk   Comments L phyllis, Zio patch, HTN, DM   Sleep/Wake Cycle   Sleep & Rest Awake   Functional Level of Assist   Toileting Contact Guard Assist   Toileting Description Adaptive equipment;Assist for standing balance;Grab bar;Increased time;Set-up of equipment;Supervision for safety  (Assistance for balance in standing during clothes management around waist)   Bed, Chair, Wheelchair Transfer Minimal Assist   Bed Chair Wheelchair Transfer Description Adaptive equipment;Increased time;Set-up of equipment;Supervision for safety  (SPT bed >< w/c)   Toilet Transfers Contact Guard Assist   Toilet Transfer Description Adaptive equipment;Grab bar;Increased time;Initial preparation for task;Set-up of equipment;Supervision for safety;Verbal cueing   Neuro-Muscular Treatments   Neuro-Muscular Treatments Facilitation;Joint Approximation;Sequencing;Tactile Cuing;Verbal Cuing   Comments See note for expanded details on neuro re-ed training for RUE in sit/stand   Balance   Sitting Balance (Static) Good   Sitting Balance (Dynamic) Fair +   Standing Balance (Static) Fair -   Standing Balance (Dynamic) Poor +   Weight Shift Sitting Fair   Weight Shift Standing Poor   Skilled Intervention Compensatory Strategies   Interdisciplinary Plan of Care Collaboration   Patient Position at End of Therapy Seated;Edge of Bed;Bed Alarm On;Call Light  within Reach;Tray Table within Reach   Strengths & Barriers   Strengths Able to follow instructions;Alert and oriented;Effective communication skills;Good carryover of learning;Good insight into deficits/needs;Independent prior level of function;Manages pain appropriately;Motivated for self care and independence;Pleasant and cooperative;Supportive family;Willingly participates in therapeutic activities   Barriers Hemiplegia;Hypertension;Impaired balance;Limited mobility     Neuro Re-Ed:  -Lateral leans in seated EOB with WB through L elbow and Min A to return to sitting midline with assist of pt RUE on bed rail. 2x10  -seated isolated LUE shoulder shrugs with tactile cues to isolate upper traps and eliminate compensatory shoulder movement  -seated place and holds of LUE shoulder in various planes of movement including flexion, abduction, horizontal adduction  -standing in // bars with LUE shoulder flexion/return to grab bar. Assist to facilitate LLE knee extension and L hand return to // bar for balance.     Assessment    Pt with good activity tolerance of treatment focused on ADLs and neuro re-ed of LUE and balance. Pt continues to be limited by L hemiparesis. He demos increased safety during ADL tasks and bathroom transfers with utilization of grab bars with Min A progressing towards CGA when transferring towards his R side. Min A required for toileting task only for balance in standing during management of clothes around waist. Pt demos increased ability for shoulder abduction and horizontal adduction with notable compensatory pectoralis involvement. Neural fatigue with LUE therex noted towards end of session. Continues to benefit from skilled OT services to further progress patient towards goals.     Strengths: (P) Able to follow instructions, Alert and oriented, Effective communication skills, Good carryover of learning, Good insight into deficits/needs, Independent prior level of function, Manages pain  appropriately, Motivated for self care and independence, Pleasant and cooperative, Supportive family, Willingly participates in therapeutic activities  Barriers: (P) Hemiplegia, Hypertension, Impaired balance, Limited mobility    Plan    LUE neuro re-ed. ADL/IADL training. Increase overall strength and endurance.      Passport items to be completed:  Perform bathroom transfers, complete dressing, complete feeding, get ready for the day, prepare a simple meal, participate in household tasks, adapt home for safety needs, demonstrate home exercise program, complete caregiver training     Occupational Therapy Goals (Active)       Problem: Bathing       Dates: Start:  08/15/23         Goal: STG-Within one week, patient will bathe body with min A using AE/AD prn       Dates: Start:  08/15/23    Expected End:  09/05/23         Goal Note filed on 08/23/23 0810 by Mayra Pantoja, Student       Requires Mod A.                 Problem: Dressing       Dates: Start:  08/15/23         Goal: STG-Within one week, patient will dress LB with min A       Dates: Start:  08/15/23    Expected End:  09/05/23         Goal Note filed on 08/23/23 0810 by Mayra Pantoja, Student       Requires Mod A.                 Problem: Functional Transfers       Dates: Start:  08/15/23         Goal: STG-Within one week, patient will transfer to toilet with CGA using grab bar       Dates: Start:  08/15/23    Expected End:  09/05/23         Goal Note filed on 08/23/23 0810 by Mayra Pantoja, Student       Requires Min A.                 Problem: OT Long Term Goals       Dates: Start:  08/15/23         Goal: LTG-By discharge, patient will complete basic self care tasks with SBA/supervision       Dates: Start:  08/15/23    Expected End:  09/05/23            Goal: LTG-By discharge, patient will perform bathroom transfers with supervision/mod I       Dates: Start:  08/15/23    Expected End:  09/05/23               Problem: Toileting       Dates: Start:   08/15/23         Goal: STG-Within one week, patient will complete toileting tasks with max A       Dates: Start:  08/15/23    Expected End:  09/05/23         Goal Note filed on 08/23/23 0810 by Mayra Pantoja, Student       Requires Total A.

## 2023-08-23 NOTE — PROGRESS NOTES
Hospital Medicine Daily Progress Note        Chief Complaint  Hypertension  Diabetes    Interval Problem Update  Discussed about his BS trending up recently and will monitor another day before adjusting meds.    Review of Systems  Review of Systems   Constitutional:  Negative for fever.   Eyes:  Negative for blurred vision.   Respiratory:  Negative for shortness of breath.    Cardiovascular:  Negative for palpitations.   Gastrointestinal:  Negative for nausea and vomiting.   Neurological:  Negative for dizziness and headaches.   Psychiatric/Behavioral:  Negative for hallucinations.         Physical Exam  Temp:  [36.3 °C (97.4 °F)-36.7 °C (98.1 °F)] 36.3 °C (97.4 °F)  Pulse:  [74-91] 75  Resp:  [17-18] 18  BP: (103-126)/(64-82) 118/82  SpO2:  [95 %-98 %] 98 %    Physical Exam  Vitals and nursing note reviewed.   Constitutional:       General: He is not in acute distress.  HENT:      Mouth/Throat:      Mouth: Mucous membranes are moist.      Pharynx: Oropharynx is clear.   Eyes:      General: No scleral icterus.  Cardiovascular:      Rate and Rhythm: Normal rate and regular rhythm.      Heart sounds: No murmur heard.  Pulmonary:      Effort: Pulmonary effort is normal.      Breath sounds: Normal breath sounds. No stridor.   Abdominal:      General: There is no distension.      Palpations: Abdomen is soft.      Tenderness: There is no abdominal tenderness.   Musculoskeletal:      Cervical back: No rigidity.      Right lower leg: No edema.      Left lower leg: No edema.   Skin:     General: Skin is warm and dry.      Findings: No rash.   Neurological:      Mental Status: He is alert and oriented to person, place, and time.   Psychiatric:         Mood and Affect: Mood normal.         Behavior: Behavior normal.             Laboratory  Recent Labs     08/22/23  0540   WBC 9.4   RBC 5.53   HEMOGLOBIN 14.7   HEMATOCRIT 44.7   MCV 80.8*   MCH 26.6*   MCHC 32.9   RDW 36.1   PLATELETCT 306   MPV 10.5       Recent Labs      08/22/23  0540   SODIUM 139   POTASSIUM 4.1   CHLORIDE 103   CO2 27   GLUCOSE 135*   BUN 17   CREATININE 0.89   CALCIUM 9.0                     Assessment/Plan  * Ischemic stroke (HCC)- (present on admission)  Assessment & Plan  Presented with left HP  S/P ZioPatch  Cont ASA and Lipitor  Off Plavix    UTI (urinary tract infection)  Assessment & Plan  Urine cultures show coag neg staph  Cont Omnicef (thru 8/24)    Primary hypertension- (present on admission)  Assessment & Plan  BP ok  Off Benicar 2nd to lower BP  Cont Lopressor  Note: on Flomax  Cont to monitor    Type 2 diabetes mellitus with hyperglycemia, without long-term current use of insulin (HCC)- (present on admission)  Assessment & Plan  Hba1c: 11.9 (8/10)  BS: trending up recently  Cont Metformin XR bid  Will change SS coverage to Lispro  Note: home meds include Metformin  mg bid, Jardiance 25 mg qd, and Trulicity 1.5 mg qwk  Will monitor another day before adjusting meds    Coronary artery disease due to calcified coronary lesion: CABG ×3 in 2015- (present on admission)  Assessment & Plan  Hx of CABG  Continue ASA and Lipitor  Off Benicar 2nd to low BP  Cont Lopressor  Cont to monitor

## 2023-08-23 NOTE — CARE PLAN
Problem: Toileting  Goal: STG-Within one week, patient will complete toileting tasks with max A  Outcome: Met  Note: CGA - updated     Problem: Functional Transfers  Goal: STG-Within one week, patient will transfer to toilet with CGA using grab bar  Outcome: Met  Note: CGA - updated

## 2023-08-23 NOTE — CARE PLAN
"The patient is Stable - Low risk of patient condition declining or worsening    Shift Goals  Clinical Goals: Safety  Patient Goals: Safety    Patient is not progressing towards the following goals:    Problem: Fall Risk - Rehab  Goal: Patient will remain free from falls  Outcome: Not Met  Note: Melani Galeano Fall risk Assessment Score: 11    Moderate fall risk Interventions  - Bed and strip alarm   - Yellow sign by the door   - Yellow wrist band \"Fall risk\"  - Fall risk education provided     "

## 2023-08-23 NOTE — DISCHARGE PLANNING
Case Management/IDT follow up.   IDT continues to recommend IRF level of care as patient continue to make progress with all therapies.   Projected dc date set for 9/5      DC needs: Rehab w/o Walls if approved by insurance; referral made - assessment to be scheduled.  DME - tbd; confirm if pt has glucometer/   Follow up with pcp/ neurology    Plan:  Continue to follow

## 2023-08-23 NOTE — CARE PLAN
Problem: Bathing  Goal: STG-Within one week, patient will bathe body with min A using AE/AD prn  Outcome: Not Met  Note: Requires Mod A.     Problem: Dressing  Goal: STG-Within one week, patient will dress LB with min A  Outcome: Not Met  Note: Requires Mod A.     Problem: Toileting  Goal: STG-Within one week, patient will complete toileting tasks with max A  Outcome: Not Met  Note: Requires Total A.     Problem: Functional Transfers  Goal: STG-Within one week, patient will transfer to toilet with CGA using grab bar  Outcome: Not Met  Note: Requires Min A.

## 2023-08-23 NOTE — PROGRESS NOTES
"Rehab Progress Note     Date of Service: 8/23/2023  Chief Complaint: Follow-up stroke    Interval Events (Subjective)    Patient seen and examined today in his room.  He is resting in his bed.  He currently is denying any pain.  He has been using the Lidoderm patch and did get a Flexeril overnight for his left-sided back pain.  Discussed with the physical therapist.  Patient is constipated without a bowel movement since the 20th.  He denies any abdominal pain.  He has been urinating without significant retention requiring catheterization.  Review of his weekly conference this afternoon to discuss his therapy progress.    Patient has no other new questions, concerns, or complaints today.             Objective:  VITAL SIGNS: /82   Pulse 75   Temp 36.3 °C (97.4 °F) (Oral)   Resp 18   Ht 1.651 m (5' 5\")   Wt 63 kg (139 lb)   SpO2 98%   BMI 23.13 kg/m²   Gen: alert, no apparent distress  CV: Regular rate, regular rhythm  Resp: Clear to auscultation bilaterally  Neuro: Improving left hemiparesis, no increased tone noted in the upper lower extremity    Recent Results (from the past 72 hour(s))   URINALYSIS    Collection Time: 08/20/23  2:25 PM   Result Value Ref Range    Color Yellow     Character Clear     Specific Gravity 1.014 <1.035    Ph 6.5 5.0 - 8.0    Glucose 250 (A) Negative mg/dL    Ketones Negative Negative mg/dL    Protein Negative Negative mg/dL    Bilirubin Negative Negative    Urobilinogen, Urine 0.2 Negative    Nitrite Negative Negative    Leukocyte Esterase Trace (A) Negative    Occult Blood Negative Negative    Micro Urine Req Microscopic    URINE MICROSCOPIC (W/UA)    Collection Time: 08/20/23  2:25 PM   Result Value Ref Range    WBC 2-5 (A) /hpf    RBC 0-2 (A) /hpf    Bacteria Negative None /hpf    Epithelial Cells Negative /hpf    Amorphous Crystal Present /hpf    Hyaline Cast 0-2 /lpf   POCT glucose device results    Collection Time: 08/20/23  4:27 PM   Result Value Ref Range    POC " Glucose, Blood 128 (H) 65 - 99 mg/dL   POCT glucose device results    Collection Time: 08/20/23  9:13 PM   Result Value Ref Range    POC Glucose, Blood 167 (H) 65 - 99 mg/dL   POCT glucose device results    Collection Time: 08/21/23  7:16 AM   Result Value Ref Range    POC Glucose, Blood 147 (H) 65 - 99 mg/dL   POCT glucose device results    Collection Time: 08/21/23 11:13 AM   Result Value Ref Range    POC Glucose, Blood 216 (H) 65 - 99 mg/dL   POCT glucose device results    Collection Time: 08/21/23  5:19 PM   Result Value Ref Range    POC Glucose, Blood 126 (H) 65 - 99 mg/dL   POCT glucose device results    Collection Time: 08/21/23  8:01 PM   Result Value Ref Range    POC Glucose, Blood 151 (H) 65 - 99 mg/dL   CBC WITHOUT DIFFERENTIAL    Collection Time: 08/22/23  5:40 AM   Result Value Ref Range    WBC 9.4 4.8 - 10.8 K/uL    RBC 5.53 4.70 - 6.10 M/uL    Hemoglobin 14.7 14.0 - 18.0 g/dL    Hematocrit 44.7 42.0 - 52.0 %    MCV 80.8 (L) 81.4 - 97.8 fL    MCH 26.6 (L) 27.0 - 33.0 pg    MCHC 32.9 32.3 - 36.5 g/dL    RDW 36.1 35.9 - 50.0 fL    Platelet Count 306 164 - 446 K/uL    MPV 10.5 9.0 - 12.9 fL   Basic Metabolic Panel    Collection Time: 08/22/23  5:40 AM   Result Value Ref Range    Sodium 139 135 - 145 mmol/L    Potassium 4.1 3.6 - 5.5 mmol/L    Chloride 103 96 - 112 mmol/L    Co2 27 20 - 33 mmol/L    Glucose 135 (H) 65 - 99 mg/dL    Bun 17 8 - 22 mg/dL    Creatinine 0.89 0.50 - 1.40 mg/dL    Calcium 9.0 8.5 - 10.5 mg/dL    Anion Gap 9.0 7.0 - 16.0   ESTIMATED GFR    Collection Time: 08/22/23  5:40 AM   Result Value Ref Range    GFR (CKD-EPI) 98 >60 mL/min/1.73 m 2   POCT glucose device results    Collection Time: 08/22/23  7:40 AM   Result Value Ref Range    POC Glucose, Blood 164 (H) 65 - 99 mg/dL   POCT glucose device results    Collection Time: 08/22/23 11:11 AM   Result Value Ref Range    POC Glucose, Blood 157 (H) 65 - 99 mg/dL   POCT glucose device results    Collection Time: 08/22/23  5:19 PM    Result Value Ref Range    POC Glucose, Blood 169 (H) 65 - 99 mg/dL   POCT glucose device results    Collection Time: 08/22/23  8:10 PM   Result Value Ref Range    POC Glucose, Blood 167 (H) 65 - 99 mg/dL   POCT glucose device results    Collection Time: 08/23/23  8:03 AM   Result Value Ref Range    POC Glucose, Blood 179 (H) 65 - 99 mg/dL   POCT glucose device results    Collection Time: 08/23/23 11:30 AM   Result Value Ref Range    POC Glucose, Blood 140 (H) 65 - 99 mg/dL       Scheduled Medications   Medication Dose Frequency    senna-docusate  2 Tablet BID    And    polyethylene glycol/lytes  1 Packet BID    insulin lispro  2-12 Units 4X/DAY ACHS    lidocaine  1 Patch DAILY    cefdinir  300 mg Q12HRS    metFORMIN ER  500 mg BID WITH MEALS    metoprolol tartrate  12.5 mg TWICE DAILY    aspirin  81 mg Q EVENING    atorvastatin  80 mg Q EVENING    enoxaparin (LOVENOX) injection  40 mg DAILY AT 1800    tamsulosin  0.4 mg AFTER BREAKFAST       Current Diet Order   Procedures    Diet Order Diet: Consistent CHO (Diabetic) (cardiac and CHO); Second Modifier: (optional): Cardiac       Assessment:    This patient is a 58 y.o. male admitted for acute inpatient rehabilitation with Ischemic stroke (HCC).      I, Sarai Davila M.D./MD., was present and led the interdisciplinary team conference on 8/23/2023.  I led the IDT conference and agree with the IDT conference documentation and plan of care as noted below.     Nursing:  Diet Current Diet Order   Procedures    Diet Order Diet: Consistent CHO (Diabetic) (cardiac and CHO); Second Modifier: (optional): Cardiac       Eating ADL Supervision  Set-up of equipment or meal/tube feeding   % of Last Meal  Oral Nutrition: Lunch, Between 25-50% Consumed (30%)   Sleep No issues   Bowel Last BM: 08/20/23   Bladder Post Void  82   Barriers to Discharge Home: none      Physical Therapy:  Bed Mobility    Transfers Minimal Assist  Adaptive equipment, Increased time, Set-up of  equipment, Supervision for safety (SPT bed >< w/c)   Mobility Minimal Assist (min-modA with intermittent L hyperextension block)   Stairs Total Assist (Vector harness with 25lb BWS)   Barriers to Discharge Home: left hemiparesis, stairs, impaired endurance      Occupational Therapy:  Grooming Supervision, Seated   Bathing Moderate Assist   UB Dressing Stand by Assist   LB Dressing Moderate Assist   Toileting Contact Guard Assist   Shower & Tub Transfer Minimal Assist   Barriers to Discharge Home: left hemiparesis      Respiratory Therapy:  O2 (LPM): 0  O2 Delivery Device: None - Room Air    Case Management:  Continues to work on disposition and DME needs.      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  Left hemiparesis,distal > proximal   Cognitive impairment, mild  Dysphagia  Continue full rehab program  PT/OT/SLP, 1 hr each discipline, 5 days per week  8/15: SLP discontinued, PT/OT, 1.5 hr each discipline, 5 days per week    Completed aspirin and Plavix for secondary stroke prophylaxis until 8/21, then aspirin alone    Continue aspirin and atorvastatin    Patient with cardiac monitor in place, return on 8/28    Outpatient follow-up with stroke Bridge clinic and cardiology, referrals made    Left paraspinal muscle spasms, improved  Heat and/or ice as needed  Continue tylenol as needed   Continue Lidoderm patch  Continue as needed Flexeril     Diabetes with hyperglycemia  Uncontrolled, hemoglobin A1c 11.9  Continue metformin and sliding scale insulin  Appreciate hospitalist assistance     Hypertension  Continue metoprolol  Olmesartan discontinued  Appreciate hospitalist assistance     Hyperlipidemia  Continue atorvastatin     Sinus tachycardia, resolved  Continue metoprolol  Appreciate hospitalist assistance  Negative doppler US for  DVTs     Urinary retention, improved  Hematuria, resolved  Damon removed 8/15  Continue Flomax  Monitor PVRs - 106, 75, 85, 214, 82  Intermittent catheterization for volumes over 400, not requiring     Coronary artery disease  With history of CABG  Continue aspirin and atorvastatin    Leukocytosis, resolved  Negative CXR    Acute cystitis  Coag negative staph  Continue cefdinir    Constipation, continues  Increase bowel medications  Consider KUB tomorrow    Iron deficiency  Positive occult stool  Outpatient follow up with GI    DVT prophylaxis  Continue Lovenox    Sarai Davila M.D.  Physical Medicine and Rehabilitation

## 2023-08-23 NOTE — THERAPY
"Physical Therapy   Daily Treatment     Patient Name: Jorden Bonilla  Age:  58 y.o., Sex:  male  Medical Record #: [id number]  Today's Date: 8/23/2023     The following represents two non consecutive sessions totaling 90 min, 11:00-11:30 and 14:00-15:00.     Precautions  Precautions: Fall Risk  Comments: L phyllis, Zio patch, HTN, DM    Subjective    \"I'm fine\"     Objective       08/23/23 1101   PT Charge Group   PT Gait Training (Units) 1   PT Therapeutic Activities (Units) 1   PT Total Time Spent   PT Individual Total Time Spent (Mins) 30   Pain 0 - 10 Group   Pain Rating Scale (NPRS) 0   Gait Functional Level of Assist    Gait Level Of Assist Minimal Assist  (min-modA with intermittent L hyperextension block)   Assistive Device   (R LBQC with L AFO and givmohr sling)   Distance (Feet) 50  (followed by 60ft with orange TB at L foot to encourage proprioceptive input through L leg and active assist for foot clearance. Minimal improvement noted in motor control and foot placement)   Deviation Decreased Base Of Support;Step To;Antalgic;Decreased Heel Strike;Decreased Toe Off  (Pt with poor LLE motor control with poor knee control in stance, variable step length, poor L foot placement. Min-modA for R weight shift for L foot clearance, which is inconsistent.)   Sitting Lower Body Exercises   Ankle Pumps 1 set of 10;Bilateral   Hip Flexion 1 set of 10;Bilateral   Long Arc Quad 1 set of 10;Bilateral   Bed Mobility    Sit to Stand Contact Guard Assist  (to WBQC, cues for hand placement)   Interdisciplinary Plan of Care Collaboration   Patient Position at End of Therapy Seated;Chair Alarm On  (in lunchroom)            08/23/23 1401   PT Charge Group   PT Therapeutic Exercise (Units) 2   PT Neuromuscular Re-Education / Balance (Units) 3   PT Total Time Spent   PT Individual Total Time Spent (Mins) 60   Pain 0 - 10 Group   Pain Rating Scale (NPRS) 0   Transfer Functional Level of Assist   Bed, Chair, Wheelchair Transfer Minimal " "Assist  (for arm rest clearance)   Bed Chair Wheelchair Transfer Description Assist with one limb;Supervision for safety;Verbal cueing  (SPT without use of an AD)   Sitting Lower Body Exercises   Hip Flexion 1 set of 10;Bilateral   Long Arc Quad 1 set of 10;Bilateral   Hamstring Curl 1 set of 10;Bilateral  (pink TB resistance for RLE, min PT resistance for LLE with active assist into end range knee flexion)   Other Exercises motomed gear 4 for 15 min with BLE   Bed Mobility    Supine to Sit Standby Assist   Sit to Supine Standby Assist   Interdisciplinary Plan of Care Collaboration   Patient Position at End of Therapy Call Light within Reach;Family / Friend in Room;In Bed;Bed Alarm On         Neuro Re-ed:   - standing with RUE support on WBQC and min-modA at trunk. Pt demonstrates anterior, L lateral lean that he is unable to self recover despite cues. Pt with excessive locking of LLE into hyperextension and unable to coordinate correction with postural correction. Post seated rest, mirror provided for visual cues with improvement in postural control and maintenance of upright. CG-Gregory provided.   - standing with WBQC and Gregory at trunk with mirror for visual cues, pt performs L TKE with cues to avoid full hyperextension. Performed to fatigue with 3s holds in TKE. Pt also performs squats with intermittent cues for L knee control 5x, progressed to 5x without UE support, CG-Gregory at trunk   - Pt in staggered stance with RLE on 2\" block. MAX cues to find upright balance and maintain postural control, Gregory with occasional modA. Mirror for visual cues. PT assists with control for LLE. L TKE with cues to avoid full hyperextension, performed to fatigue.       Assessment    Pt tolerated session well with rest breaks, short distance ambulation limited d/t activity tolerance. Pt with poor LLE motor control and knee instability which was focus of session.     Pt limited by poor activity tolerance during second session; however, " motivated and participatory throughout. Pt receptive to education concerning, L knee instability, weak hamstrings, L knee hyperextension, neural recovery.     Strengths: Able to follow instructions, Alert and oriented, Effective communication skills, Good carryover of learning, Good insight into deficits/needs, Independent prior level of function, Making steady progress towards goals, Motivated for self care and independence, Pleasant and cooperative, Supportive family, Willingly participates in therapeutic activities  Barriers: Bladder retention, Decreased endurance, Hemiparesis, Impaired activity tolerance, Impaired balance, Language barrier, non-fluent English, Limited mobility    Plan    Bed mobility with phyllis techniques, transfer training, gait training with LBQC vs. SBQC and carbon fiber AFO with stockinette, standing balance, neuro re-ed for L hemibody strengthening, Bioness, BWSTT, family training/education.      [facility]  therapy tech program 2x/week    Passport items to be completed:  Get in/out of bed safely, in/out of a vehicle, safely use mobility device, walk or wheel around home/community, navigate up and down stairs, show how to get up/down from the ground, ensure home is accessible, demonstrate HEP, complete caregiver training    Physical Therapy Problems (Active)       Problem: Mobility       Dates: Start:  08/15/23         Goal: STG-Within one week, patient will ambulate household distance 50 ft with HW and CGA.       Dates: Start:  08/15/23    Expected End:  09/05/23            Goal: STG-Within one week, patient will ambulate up/down a curb with HW and min A.       Dates: Start:  08/15/23    Expected End:  09/05/23               Problem: Mobility Transfers       Dates: Start:  08/15/23         Goal: STG-Within one week, patient will perform bed mobility with SPV consistently.       Dates: Start:  08/15/23    Expected End:  09/05/23            Goal: STG-Within one week, patient will transfer bed  to chair stand pivot with HW and CGA.       Dates: Start:  08/15/23    Expected End:  09/05/23               Problem: PT-Long Term Goals       Dates: Start:  08/15/23         Goal: LTG-By discharge, patient will ambulate 150 ft with QC vs SPC and SBA.       Dates: Start:  08/15/23    Expected End:  09/05/23            Goal: LTG-By discharge, patient will transfer one surface to another with QC vs SPC and SPV.       Dates: Start:  08/15/23    Expected End:  09/05/23            Goal: LTG-By discharge, patient will ambulate up/down a curb with QC vs SPC and SBA.       Dates: Start:  08/15/23    Expected End:  09/05/23            Goal: LTG-By discharge, patient will transfer in/out of a car with QC vs SPC and SBA after setup.       Dates: Start:  08/15/23    Expected End:  09/05/23            Goal: LTG-By discharge, patient will ambulate up/down a ramp with QC vs SPC and CGA to simulate home entrance.       Dates: Start:  08/15/23    Expected End:  09/05/23

## 2023-08-23 NOTE — THERAPY
Physical Therapy   Daily Treatment     Patient Name: Joredn Bonilla  Age:  58 y.o., Sex:  male  Medical Record #: [id number]  Today's Date: 8/23/2023     Precautions  Precautions: Fall Risk  Comments: L phyllis, Zio patch, HTN, DM    Subjective    Patient up and ready for therapy, agrees to treadmill training. Reports mild discomfort at base of toes w/ use of AFO and shoes.      Objective       08/23/23 0701   PT Charge Group   PT Gait Training (Units) 3   PT Therapeutic Activities (Units) 1   PT Total Time Spent   PT Individual Total Time Spent (Mins) 60   Vitals   Pulse 75   Patient BP Position Sitting   Blood Pressure 118/82   Pulse Oximetry 98 %   O2 (LPM) 0   O2 Delivery Device None - Room Air   Vitals Comments at rest   Transfer Functional Level of Assist   Bed, Chair, Wheelchair Transfer Minimal Assist   Bed Chair Wheelchair Transfer Description Adaptive equipment;Increased time;Set-up of equipment;Supervision for safety;Verbal cueing  (SPT bed > w/c)   Bed Mobility    Supine to Sit Standby Assist   Sit to Stand Contact Guard Assist   Rolling Standby Assist  (no railing use, increased time)   Interdisciplinary Plan of Care Collaboration   Patient Position at End of Therapy Seated;Chair Alarm On;Self Releasing Lap Belt Applied;Other (Comments)  (dining room for breakfast)     Lite Gait BWSTT: ~25% BWS (monitoring system unavailable)    Distance Speed Time Heart Rate Blood Pressure   56ft .3mph 2:08     134ft   .5mph 3:12 84bpm (O296%) 133/85   242ft .7mph 4:00     85ft .7mph 1:25     PT facilitation throughout all bouts for left phyllis gait, weight shift, swing through and stance phase.   -utilized AFO throughout training as well  - litegait harness and RUE support to step up/down on device          Assessment    Patient tolerates initial treadmill training well, with only minor reports of discomfort coming from AFO use inside his shoe. His overall gait mechanics show slight improvement with repetition and speed  of treadmill. But overall very limited by L phyllis weakness and incoordination.     Strengths: Able to follow instructions, Alert and oriented, Effective communication skills, Good carryover of learning, Good insight into deficits/needs, Independent prior level of function, Making steady progress towards goals, Motivated for self care and independence, Pleasant and cooperative, Supportive family, Willingly participates in therapeutic activities  Barriers: Bladder retention, Decreased endurance, Hemiparesis, Impaired activity tolerance, Impaired balance, Language barrier, non-fluent English, Limited mobility    Plan    Bed mobility with phyllis techniques, transfer training, gait training with LBQC vs. SBQC and carbon fiber AFO with stockinette, standing balance, neuro re-ed for L hemibody strengthening, Bioness, BWSTT, family training/education.      [facility]  therapy tech program 2x/week     Passport items to be completed:  Get in/out of bed safely, in/out of a vehicle, safely use mobility device, walk or wheel around home/community, navigate up and down stairs, show how to get up/down from the ground, ensure home is accessible, demonstrate HEP, complete caregiver training       Physical Therapy Problems (Active)       Problem: Mobility       Dates: Start:  08/15/23         Goal: STG-Within one week, patient will ambulate household distance 50 ft with HW and CGA.       Dates: Start:  08/15/23    Expected End:  09/05/23            Goal: STG-Within one week, patient will ambulate up/down a curb with HW and min A.       Dates: Start:  08/15/23    Expected End:  09/05/23               Problem: Mobility Transfers       Dates: Start:  08/15/23         Goal: STG-Within one week, patient will perform bed mobility with SPV consistently.       Dates: Start:  08/15/23    Expected End:  09/05/23            Goal: STG-Within one week, patient will transfer bed to chair stand pivot with HW and CGA.       Dates: Start:  08/15/23     Expected End:  09/05/23               Problem: PT-Long Term Goals       Dates: Start:  08/15/23         Goal: LTG-By discharge, patient will ambulate 150 ft with QC vs SPC and SBA.       Dates: Start:  08/15/23    Expected End:  09/05/23            Goal: LTG-By discharge, patient will transfer one surface to another with QC vs SPC and SPV.       Dates: Start:  08/15/23    Expected End:  09/05/23            Goal: LTG-By discharge, patient will ambulate up/down a curb with QC vs SPC and SBA.       Dates: Start:  08/15/23    Expected End:  09/05/23            Goal: LTG-By discharge, patient will transfer in/out of a car with QC vs SPC and SBA after setup.       Dates: Start:  08/15/23    Expected End:  09/05/23            Goal: LTG-By discharge, patient will ambulate up/down a ramp with QC vs SPC and CGA to simulate home entrance.       Dates: Start:  08/15/23    Expected End:  09/05/23

## 2023-08-24 ENCOUNTER — APPOINTMENT (OUTPATIENT)
Dept: RADIOLOGY | Facility: REHABILITATION | Age: 58
DRG: 057 | End: 2023-08-24
Attending: PHYSICAL MEDICINE & REHABILITATION
Payer: COMMERCIAL

## 2023-08-24 ENCOUNTER — APPOINTMENT (OUTPATIENT)
Dept: PHYSICAL THERAPY | Facility: REHABILITATION | Age: 58
DRG: 057 | End: 2023-08-24
Attending: PHYSICAL MEDICINE & REHABILITATION
Payer: COMMERCIAL

## 2023-08-24 ENCOUNTER — APPOINTMENT (OUTPATIENT)
Dept: INPATIENT REHAB | Facility: REHABILITATION | Age: 58
DRG: 057 | End: 2023-08-24
Attending: PHYSICAL MEDICINE & REHABILITATION
Payer: COMMERCIAL

## 2023-08-24 ENCOUNTER — APPOINTMENT (OUTPATIENT)
Dept: OCCUPATIONAL THERAPY | Facility: REHABILITATION | Age: 58
DRG: 057 | End: 2023-08-24
Attending: PHYSICAL MEDICINE & REHABILITATION
Payer: COMMERCIAL

## 2023-08-24 LAB
GLUCOSE BLD STRIP.AUTO-MCNC: 146 MG/DL (ref 65–99)
GLUCOSE BLD STRIP.AUTO-MCNC: 160 MG/DL (ref 65–99)
GLUCOSE BLD STRIP.AUTO-MCNC: 172 MG/DL (ref 65–99)
GLUCOSE BLD STRIP.AUTO-MCNC: 201 MG/DL (ref 65–99)

## 2023-08-24 PROCEDURE — 97112 NEUROMUSCULAR REEDUCATION: CPT

## 2023-08-24 PROCEDURE — 700111 HCHG RX REV CODE 636 W/ 250 OVERRIDE (IP): Mod: JZ | Performed by: PHYSICAL MEDICINE & REHABILITATION

## 2023-08-24 PROCEDURE — 99231 SBSQ HOSP IP/OBS SF/LOW 25: CPT | Performed by: HOSPITALIST

## 2023-08-24 PROCEDURE — 700102 HCHG RX REV CODE 250 W/ 637 OVERRIDE(OP): Performed by: HOSPITALIST

## 2023-08-24 PROCEDURE — A9270 NON-COVERED ITEM OR SERVICE: HCPCS | Performed by: PHYSICAL MEDICINE & REHABILITATION

## 2023-08-24 PROCEDURE — 770010 HCHG ROOM/CARE - REHAB SEMI PRIVAT*

## 2023-08-24 PROCEDURE — 74018 RADEX ABDOMEN 1 VIEW: CPT

## 2023-08-24 PROCEDURE — 97116 GAIT TRAINING THERAPY: CPT

## 2023-08-24 PROCEDURE — 97110 THERAPEUTIC EXERCISES: CPT

## 2023-08-24 PROCEDURE — 97530 THERAPEUTIC ACTIVITIES: CPT

## 2023-08-24 PROCEDURE — A9270 NON-COVERED ITEM OR SERVICE: HCPCS | Performed by: HOSPITALIST

## 2023-08-24 PROCEDURE — 82962 GLUCOSE BLOOD TEST: CPT

## 2023-08-24 PROCEDURE — 99232 SBSQ HOSP IP/OBS MODERATE 35: CPT | Performed by: PHYSICAL MEDICINE & REHABILITATION

## 2023-08-24 PROCEDURE — 700102 HCHG RX REV CODE 250 W/ 637 OVERRIDE(OP): Performed by: PHYSICAL MEDICINE & REHABILITATION

## 2023-08-24 PROCEDURE — 700101 HCHG RX REV CODE 250: Performed by: PHYSICAL MEDICINE & REHABILITATION

## 2023-08-24 RX ORDER — POLYETHYLENE GLYCOL 3350 17 G/17G
1 POWDER, FOR SOLUTION ORAL
Status: COMPLETED | OUTPATIENT
Start: 2023-08-24 | End: 2023-08-24

## 2023-08-24 RX ORDER — BISACODYL 10 MG
10 SUPPOSITORY, RECTAL RECTAL
Status: DISCONTINUED | OUTPATIENT
Start: 2023-08-24 | End: 2023-09-05 | Stop reason: HOSPADM

## 2023-08-24 RX ADMIN — CEFDINIR 300 MG: 300 CAPSULE ORAL at 20:57

## 2023-08-24 RX ADMIN — METFORMIN HYDROCHLORIDE 500 MG: 500 TABLET, EXTENDED RELEASE ORAL at 07:12

## 2023-08-24 RX ADMIN — TAMSULOSIN HYDROCHLORIDE 0.4 MG: 0.4 CAPSULE ORAL at 07:12

## 2023-08-24 RX ADMIN — SENNOSIDES AND DOCUSATE SODIUM 2 TABLET: 50; 8.6 TABLET ORAL at 08:40

## 2023-08-24 RX ADMIN — POLYETHYLENE GLYCOL 3350 1 PACKET: 17 POWDER, FOR SOLUTION ORAL at 17:33

## 2023-08-24 RX ADMIN — ATORVASTATIN CALCIUM 80 MG: 40 TABLET, FILM COATED ORAL at 20:57

## 2023-08-24 RX ADMIN — ACETAMINOPHEN 650 MG: 325 TABLET ORAL at 07:12

## 2023-08-24 RX ADMIN — POLYETHYLENE GLYCOL 3350 1 PACKET: 17 POWDER, FOR SOLUTION ORAL at 15:36

## 2023-08-24 RX ADMIN — ASPIRIN 81 MG: 81 TABLET, COATED ORAL at 20:57

## 2023-08-24 RX ADMIN — INSULIN LISPRO 4 UNITS: 100 INJECTION, SOLUTION INTRAVENOUS; SUBCUTANEOUS at 20:59

## 2023-08-24 RX ADMIN — ENOXAPARIN SODIUM 40 MG: 100 INJECTION SUBCUTANEOUS at 17:33

## 2023-08-24 RX ADMIN — POLYETHYLENE GLYCOL 3350 1 PACKET: 17 POWDER, FOR SOLUTION ORAL at 08:40

## 2023-08-24 RX ADMIN — CEFDINIR 300 MG: 300 CAPSULE ORAL at 08:40

## 2023-08-24 RX ADMIN — METFORMIN HYDROCHLORIDE 500 MG: 500 TABLET, EXTENDED RELEASE ORAL at 17:00

## 2023-08-24 RX ADMIN — INSULIN LISPRO 2 UNITS: 100 INJECTION, SOLUTION INTRAVENOUS; SUBCUTANEOUS at 08:36

## 2023-08-24 RX ADMIN — METOPROLOL TARTRATE 12.5 MG: 25 TABLET, FILM COATED ORAL at 16:59

## 2023-08-24 RX ADMIN — POLYETHYLENE GLYCOL 3350 1 PACKET: 17 POWDER, FOR SOLUTION ORAL at 16:27

## 2023-08-24 RX ADMIN — POLYETHYLENE GLYCOL 3350 1 PACKET: 17 POWDER, FOR SOLUTION ORAL at 18:18

## 2023-08-24 RX ADMIN — INSULIN LISPRO 2 UNITS: 100 INJECTION, SOLUTION INTRAVENOUS; SUBCUTANEOUS at 17:02

## 2023-08-24 RX ADMIN — METOPROLOL TARTRATE 12.5 MG: 25 TABLET, FILM COATED ORAL at 05:16

## 2023-08-24 RX ADMIN — LIDOCAINE 1 PATCH: 700 PATCH TOPICAL at 08:39

## 2023-08-24 ASSESSMENT — GAIT ASSESSMENTS
DEVIATION: DECREASED BASE OF SUPPORT;STEP TO;DECREASED HEEL STRIKE;DECREASED TOE OFF
GAIT LEVEL OF ASSIST: MINIMAL ASSIST
DISTANCE (FEET): 50

## 2023-08-24 ASSESSMENT — ACTIVITIES OF DAILY LIVING (ADL)
BED_CHAIR_WHEELCHAIR_TRANSFER_DESCRIPTION: ADAPTIVE EQUIPMENT;INCREASED TIME;SUPERVISION FOR SAFETY;SET-UP OF EQUIPMENT
BED_CHAIR_WHEELCHAIR_TRANSFER_DESCRIPTION: ADAPTIVE EQUIPMENT;SET-UP OF EQUIPMENT;VERBAL CUEING
BED_CHAIR_WHEELCHAIR_TRANSFER_DESCRIPTION: INCREASED TIME;INITIAL PREPARATION FOR TASK;SUPERVISION FOR SAFETY;VERBAL CUEING

## 2023-08-24 ASSESSMENT — ENCOUNTER SYMPTOMS
FEVER: 0
DIZZINESS: 0
NERVOUS/ANXIOUS: 0
DIARRHEA: 0
BLURRED VISION: 0
COUGH: 0

## 2023-08-24 ASSESSMENT — PAIN DESCRIPTION - PAIN TYPE: TYPE: ACUTE PAIN

## 2023-08-24 NOTE — THERAPY
Physical Therapy   Daily Treatment     Patient Name: Jorden Bonilla  Age:  58 y.o., Sex:  male  Medical Record #: [id number]  Today's Date: 8/24/2023     Precautions  Precautions: (P) Fall Risk  Comments: (P) L phyllis, Zio patch, HTN, DM    Subjective    Patient seated in w/c and agreeable to therapy, reporting headache.     Objective       08/24/23 0701   PT Charge Group   PT Gait Training (Units) 1   PT Neuromuscular Re-Education / Balance (Units) 2   PT Therapeutic Activities (Units) 1   PT Total Time Spent   PT Individual Total Time Spent (Mins) 60   Precautions   Precautions Fall Risk   Comments L phyllis, Zio patch, HTN, DM   Pain 0 - 10 Group   Location Head   Location Orientation Posterior   Description Aching   Gait Functional Level of Assist    Gait Level Of Assist Minimal Assist  (to Mod A)   Assistive Device   (R LBQC with L AFO/stockinette and givmohr sling)   Distance (Feet) 50   # of Times Distance was Traveled 2   Deviation Decreased Base Of Support;Step To;Decreased Heel Strike;Decreased Toe Off  (L phyllis gait with difficulty progressing LLE through swing, intermittent hyperextension thrust and vaulting noted.)   Transfer Functional Level of Assist   Bed, Chair, Wheelchair Transfer Minimal Assist   Bed Chair Wheelchair Transfer Description Increased time;Initial preparation for task;Supervision for safety;Verbal cueing  (stand step transfer with quad cane vs. reach pivot)   Bed Mobility    Sit to Stand Contact Guard Assist   Scooting Standby Assist   Interdisciplinary Plan of Care Collaboration   IDT Collaboration with  Nursing   Patient Position at End of Therapy Seated;Chair Alarm On;Self Releasing Lap Belt Applied  (in dining room)   Collaboration Comments CLOF     Standing squat/hip hinge tapping 6# med ball to floor with Min A to prevent posterior LOB 3x5.    Floor transfer using BUE support on therapy mat with Min A and verbal cues for technique. Performed BUE tricep push ups with assist to  stabilize LUE 3x15; RUE thread the needle with assist to stabilize LUE 1x15; tall kneeling A/P weight shifts with no UE support 1x15; partial tall kneeling mini squats with no UE support 2x15.    Assessment    Patient tolerated session well, focus of session on gait training and floor recovery/tall kneeling exercises. Continues with intermittent hyperextension thrust and posterior LOBs.    Strengths: Able to follow instructions, Alert and oriented, Effective communication skills, Good carryover of learning, Good insight into deficits/needs, Independent prior level of function, Making steady progress towards goals, Motivated for self care and independence, Pleasant and cooperative, Supportive family, Willingly participates in therapeutic activities  Barriers: Bladder retention, Decreased endurance, Hemiparesis, Impaired activity tolerance, Impaired balance, Language barrier, non-fluent English, Limited mobility    Plan    Bed mobility with phyllis techniques, transfer training, gait training with LBQC vs. SBQC and carbon fiber AFO with stockinette, standing balance, neuro re-ed for L hemibody strengthening, Bioness, BWSTT, family training/education.      [facility]  therapy tech program 2x/week     Passport items to be completed:  Get in/out of bed safely, in/out of a vehicle, safely use mobility device, walk or wheel around home/community, navigate up and down stairs, show how to get up/down from the ground, ensure home is accessible, demonstrate HEP, complete caregiver training    Physical Therapy Problems (Active)       Problem: Mobility       Dates: Start:  08/15/23         Goal: STG-Within one week, patient will ambulate household distance 50 ft with HW and CGA.       Dates: Start:  08/15/23    Expected End:  09/05/23            Goal: STG-Within one week, patient will ambulate up/down a curb with HW and min A.       Dates: Start:  08/15/23    Expected End:  09/05/23               Problem: Mobility Transfers        Dates: Start:  08/15/23         Goal: STG-Within one week, patient will perform bed mobility with SPV consistently.       Dates: Start:  08/15/23    Expected End:  09/05/23            Goal: STG-Within one week, patient will transfer bed to chair stand pivot with HW and CGA.       Dates: Start:  08/15/23    Expected End:  09/05/23               Problem: PT-Long Term Goals       Dates: Start:  08/15/23         Goal: LTG-By discharge, patient will ambulate 150 ft with QC vs SPC and SBA.       Dates: Start:  08/15/23    Expected End:  09/05/23            Goal: LTG-By discharge, patient will transfer one surface to another with QC vs SPC and SPV.       Dates: Start:  08/15/23    Expected End:  09/05/23            Goal: LTG-By discharge, patient will ambulate up/down a curb with QC vs SPC and SBA.       Dates: Start:  08/15/23    Expected End:  09/05/23            Goal: LTG-By discharge, patient will transfer in/out of a car with QC vs SPC and SBA after setup.       Dates: Start:  08/15/23    Expected End:  09/05/23            Goal: LTG-By discharge, patient will ambulate up/down a ramp with QC vs SPC and CGA to simulate home entrance.       Dates: Start:  08/15/23    Expected End:  09/05/23

## 2023-08-24 NOTE — THERAPY
Occupational Therapy  Daily Treatment     Patient Name: Jorden Bonilla  Age:  58 y.o., Sex:  male  Medical Record #: [id number]  Today's Date: 8/24/2023     Precautions  Precautions: Fall Risk  Comments: L phyllsi, Zio patch, HTN, DM         Subjective    Pt sitting upright in w/c in room, pleasant and agreeable to OT treatment at this time.     Objective       08/24/23 0931   OT Charge Group   Charges Yes   OT Neuromuscular Re-education / Balance (Units) 3   OT Therapeutic Exercise (Units) 1   OT Total Time Spent   OT Individual Total Time Spent (Mins) 60   Precautions   Precautions Fall Risk   Comments L phyllis, Zio patch, HTN, DM   Functional Level of Assist   Bed, Chair, Wheelchair Transfer Contact Guard Assist   Bed Chair Wheelchair Transfer Description Adaptive equipment;Increased time;Supervision for safety;Set-up of equipment  (reach pivot transfer from w/c><mat table and w/c > bed)   Supine Upper Body Exercises   Other Exercise isometric shoulder flexion, abduction, adduction, and extension with therapist facilitated resistance in gravity eliminated positions. [id number] hold   Neuro-Muscular Treatments   Neuro-Muscular Treatments Facilitation;Joint Approximation;Sequencing;Tactile Cuing;Verbal Cuing   Comments see note for expanded details on neuro re-ed training for RUE in sit/stand   Interdisciplinary Plan of Care Collaboration   IDT Collaboration with  Physician   Patient Position at End of Therapy Seated;Edge of Bed;Bed Alarm On;Call Light within Reach;Tray Table within Reach;Family / Friend in Room   Collaboration Comments CLOF   Strengths & Barriers   Strengths Able to follow instructions;Alert and oriented;Effective communication skills;Good carryover of learning;Good insight into deficits/needs;Independent prior level of function;Manages pain appropriately;Motivated for self care and independence;Pleasant and cooperative;Supportive family;Willingly participates in therapeutic activities   Barriers  Hemiplegia;Hypertension;Impaired balance;Limited mobility     Neuro Re-Ed:  -seated in w/c at mat table with L elbow WB on mat table, completed functional reach tasks with weighted med ball (6lbs) moving ball R><L and forwards/backwards, facilitating closed chain WB through L elbow/shoulder. Additionally completed functional reaching task with peg board while in same position, placing/removing 30 pegs for 3 horizontal rows of 10, facilitating similar WB through LUE as previous task.   -Upgraded task to standing at mat table with LUE support (therapist blocked elbow and provided external support for proper alignment of UE throughout task). Completed functional reaching tasks with weighted bed ball (2 lbs) moving ball R/L/F/B. Standing task requires Min A for balance/safety and blocking of L knee for additional support  -supine med ball (2lbs) straight arm shoulder flexion (1x10), chest press (1x10), and combination shoulder flexion > chest press (1x10). Therapist assist to maintain L  on med ball and facilitate proper joint movement to eliminate compensatory motions and progress AAROM activities    Assessment  Patient with good activity tolerance to OT treatment on this day, focused on neuro re-ed and therex. Continues to be limited by L hemiplegia, however noted improvement in L UE involvement in functional reaching tasks. Pt endorses increased pain with WB through L wrist/hand but denies discomfort with WB through L elbow. Notable neural fatigue with LUE post neuro re-ed tasks.       Strengths: (P) Able to follow instructions, Alert and oriented, Effective communication skills, Good carryover of learning, Good insight into deficits/needs, Independent prior level of function, Manages pain appropriately, Motivated for self care and independence, Pleasant and cooperative, Supportive family, Willingly participates in therapeutic activities  Barriers: (P) Hemiplegia, Hypertension, Impaired balance, Limited  mobility    Plan    LUE neuro re-ed. ADL/IADL training. Increase overall strength and endurance.    Occupational Therapy Goals (Active)       Problem: Bathing       Dates: Start:  08/15/23         Goal: STG-Within one week, patient will bathe body with min A using AE/AD prn       Dates: Start:  08/15/23    Expected End:  09/05/23         Goal Note filed on 08/23/23 0810 by Mayra Pantoja, Student       Requires Mod A.                 Problem: Dressing       Dates: Start:  08/15/23         Goal: STG-Within one week, patient will dress LB with min A       Dates: Start:  08/15/23    Expected End:  09/05/23         Goal Note filed on 08/23/23 0810 by Mayra Pantoja, Student       Requires Mod A.                 Problem: Functional Transfers       Dates: Start:  08/23/23         Goal: STG-Within one week, patient will transfer to toilet with supervision using DME/AE as  needed       Dates: Start:  08/23/23               Problem: OT Long Term Goals       Dates: Start:  08/15/23         Goal: LTG-By discharge, patient will complete basic self care tasks with SBA/supervision       Dates: Start:  08/15/23    Expected End:  09/05/23            Goal: LTG-By discharge, patient will perform bathroom transfers with supervision/mod I       Dates: Start:  08/15/23    Expected End:  09/05/23               Problem: Toileting       Dates: Start:  08/23/23         Goal: STG-Within one week, patient will complete toileting tasks with supervision using DME/AE as needed       Dates: Start:  08/23/23

## 2023-08-24 NOTE — PROGRESS NOTES
NURSING DAILY NOTE    Name: Jorden Bonilla   Date of Admission: 8/14/2023   Admitting Diagnosis: Ischemic stroke (HCC)  Attending Physician: Sarai Davila M.d.  Allergies: Patient has no known allergies.    Safety  Patient Assist  mod  Patient Precautions  Fall Risk  Precaution Comments  L phyllis, Zio patch, HTN, DM  Bed Transfer Status  Minimal Assist (for arm rest clearance)  Toilet Transfer Status   Contact Guard Assist  Assistive Devices  Rails, Wheelchair  Oxygen  None - Room Air  Diet/Therapeutic Dining  Current Diet Order   Procedures    Diet Order Diet: Consistent CHO (Diabetic) (cardiac and CHO); Second Modifier: (optional): Cardiac     Pill Administration  whole  Agitated Behavioral Scale  14  ABS Level of Severity  No Agitation    Fall Risk  Has the patient had a fall this admission?   No  Melani Galeano Fall Risk Scoring  11, MODERATE RISK  Fall Risk Safety Measures  bed alarm, chair alarm, and poor balance    Vitals  Temperature: 36.3 °C (97.4 °F)  Temp src: Oral  Pulse: 92  Respiration: 18  Blood Pressure: 137/77  Blood Pressure MAP (Calculated): 97 MM HG  BP Location: Right, Upper Arm  Patient BP Position: Sitting     Oxygen  Pulse Oximetry: 98 %  O2 (LPM): 0  O2 Delivery Device: None - Room Air    Bowel and Bladder  Last Bowel Movement  08/20/23  Stool Type  Type 2: Sausage shaped, but lumpy  Bowel Device  Bathroom  Continent  Bladder: Continent void   Bowel: Continent movement  Bladder Function  Urine Void (mL): 800 ml  Number of Times Voided: 1  Urine Color: Yellow  Genitourinary Assessment   Bladder Assessment (WDL):  WDL Except  Damon Catheter: Not Applicable  Damon Care: Given with Soap and Water  Urinary Elimination: Catheter (Document on LDA)  Urine Color: Yellow  Bladder Device: Urinal, Bathroom  Bladder Scan: Post Void  $ Bladder Scan Results (mL): 82  Bladder Medications: Yes    Skin  Cooper Score   18  Sensory Interventions    Bed Types: Standard/Trauma Mattress  Skin Preventative Measures: Pillows in Use for Support / Positioning  Moisture Interventions  Moisturizers/Barriers: Barrier Wipes      Pain  Pain Rating Scale  0 - No Pain  Pain Location  Back  Pain Location Orientation  Lower  Pain Interventions   Declines    ADLs    Bathing   Shower, Staff  Linen Change   Partial  Personal Hygiene  Moist Mireya Wipes, Perineal Care  Chlorhexidine Bath      Oral Care     Teeth/Dentures     Shave     Nutrition Percentage Eaten  Lunch, Between 25-50% Consumed (30%)  Environmental Precautions  Treaded Slipper Socks on Patient, Personal Belongings, Wastebasket, Call Bell etc. in Easy Reach, Transferred to Stronger Side, Report Given to Other Health Care Providers Regarding Fall Risk, Bed in Low Position  Patient Turns/Positioning  Patient Turns Self from Side to Side  Patient Turns Assistance/Tolerance     Bed Positions  Bed Controls On  Head of Bed Elevated  Self regulated      Psychosocial/Neurologic Assessment  Psychosocial Assessment  Psychosocial (WDL):  Within Defined Limits  Neurologic Assessment  Neuro (WDL): Exceptions to WDL  Level of Consciousness: Alert  Orientation Level: Oriented X4  Cognition: Follows commands  Speech: Clear  Facial Symmetry: Left facial drooping  LUE Sensation: Full sensation  Muscle Strength Left Arm: Weak Movement but Not Against Gravity or Resistance  LLE Sensation: Full sensation  Muscle Strength Left Leg: Weak Movement but Not Against Gravity or Resistance  EENT (WDL):  WDL Except    Cardio/Pulmonary Assessment  Edema      Respiratory Breath Sounds  RUL Breath Sounds: Clear  RML Breath Sounds: Clear  RLL Breath Sounds: Clear  SHALOM Breath Sounds: Clear  LLL Breath Sounds: Clear  Cardiac Assessment   Cardiac (WDL):  WDL Except (hx htn, cad, cabg)

## 2023-08-24 NOTE — DISCHARGE PLANNING
[facility] completed evaluation and has accepted pt at this time, will run ins auth at this time. Requested their preference is for pt to have AFO placed prior to DC, this info. provided to MD Davial.

## 2023-08-24 NOTE — PROGRESS NOTES
NURSING DAILY NOTE    Name: Jorden Bonilla   Date of Admission: 8/14/2023   Admitting Diagnosis: Ischemic stroke (HCC)  Attending Physician: Sarai Davila M.d.  Allergies: Patient has no known allergies.    Safety  Patient Assist  mod  Patient Precautions  Fall Risk  Precaution Comments  L phyllis, Zio patch, HTN, DM  Bed Transfer Status  Minimal Assist (for arm rest clearance)  Toilet Transfer Status   Contact Guard Assist  Assistive Devices  Rails, Wheelchair  Oxygen  None - Room Air  Diet/Therapeutic Dining  Current Diet Order   Procedures    Diet Order Diet: Consistent CHO (Diabetic) (cardiac and CHO); Second Modifier: (optional): Cardiac     Pill Administration  whole  Agitated Behavioral Scale  14  ABS Level of Severity  No Agitation    Fall Risk  Has the patient had a fall this admission?   No  Melani Galeano Fall Risk Scoring  11, MODERATE RISK  Fall Risk Safety Measures  bed alarm and chair alarm    Vitals  Temperature: 36.6 °C (97.9 °F)  Temp src: Oral  Pulse: 84  Respiration: 18  Blood Pressure: 115/70  Blood Pressure MAP (Calculated): 85 MM HG  BP Location: Right, Upper Arm  Patient BP Position: Supine     Oxygen  Pulse Oximetry: 98 %  O2 (LPM): 0  O2 Delivery Device: None - Room Air    Bowel and Bladder  Last Bowel Movement  08/20/23  Stool Type  Type 2: Sausage shaped, but lumpy  Bowel Device  Bathroom  Continent  Bladder: Continent void   Bowel: Continent movement  Bladder Function  Urine Void (mL): 800 ml  Number of Times Voided: 1  Urine Color: Yellow  Genitourinary Assessment   Bladder Assessment (WDL):  WDL Except  Damon Catheter: Not Applicable  Damon Care: Given with Soap and Water  Urinary Elimination: Catheter (Document on LDA)  Urine Color: Yellow  Bladder Device: Urinal, Bathroom  Bladder Scan: Post Void  $ Bladder Scan Results (mL): 1 (0scan)  Bladder Medications: Yes    Skin  Cooper Score   18  Sensory Interventions   Bed Types:  Standard/Trauma Mattress  Skin Preventative Measures: Pillows in Use for Support / Positioning  Moisture Interventions  Moisturizers/Barriers: Barrier Wipes      Pain  Pain Rating Scale  0 - No Pain  Pain Location  Back  Pain Location Orientation  Lower  Pain Interventions   Declines    ADLs    Bathing   Shower, Staff  Linen Change   Partial  Personal Hygiene  Moist Mireya Wipes, Perineal Care  Chlorhexidine Bath      Oral Care     Teeth/Dentures     Shave     Nutrition Percentage Eaten  Lunch, Between 25-50% Consumed (30%)  Environmental Precautions  Treaded Slipper Socks on Patient, Personal Belongings, Wastebasket, Call Bell etc. in Easy Reach, Transferred to Stronger Side, Report Given to Other Health Care Providers Regarding Fall Risk, Bed in Low Position  Patient Turns/Positioning  Patient Turns Self from Side to Side  Patient Turns Assistance/Tolerance     Bed Positions  Bed Controls On  Head of Bed Elevated  Self regulated      Psychosocial/Neurologic Assessment  Psychosocial Assessment  Psychosocial (WDL):  Within Defined Limits  Neurologic Assessment  Neuro (WDL): Exceptions to WDL  Level of Consciousness: Alert  Orientation Level: Oriented X4  Cognition: Follows commands  Speech: Clear  Facial Symmetry: Left facial drooping  LUE Sensation: Full sensation  Muscle Strength Left Arm: Weak Movement but Not Against Gravity or Resistance  LLE Sensation: Full sensation  Muscle Strength Left Leg: Weak Movement but Not Against Gravity or Resistance  EENT (WDL):  WDL Except    Cardio/Pulmonary Assessment  Edema      Respiratory Breath Sounds  RUL Breath Sounds: Clear  RML Breath Sounds: Clear  RLL Breath Sounds: Clear  SHALOM Breath Sounds: Clear  LLL Breath Sounds: Clear  Cardiac Assessment   Cardiac (WDL):  WDL Except (hx htn, cad, cabg)

## 2023-08-24 NOTE — CARE PLAN
"  Problem: Fall Risk - Rehab  Goal: Patient will remain free from falls  Note: Melani Galeano Fall risk Assessment Score: 11    Moderate fall risk Interventions  - Bed and strip alarm   - Yellow sign by the door   - Yellow wrist band \"Fall risk\"  - Room near to the nurse station  - Do not leave patient unattended in the bathroom  - Fall risk education provided      Problem: Bladder / Voiding  Goal: Patient will establish and maintain regular urinary output  Note: Pt is continent of bladder.Voiding adequate amount of urine.PVR 1.Will continue to monitor.     Problem: Bowel Elimination  Goal: Patient will participate in bowel management program  Note: Scheduled bowel meds given at hs.LBM 8/20.Will continue to monitor.     Problem: Diabetes Management  Goal: Patient's ability to maintain appropriate glucose levels will be maintained or improve  Note: FSBS 244, coverage given with snack.Will continue to monitor.         "

## 2023-08-24 NOTE — PROGRESS NOTES
Hospital Medicine Daily Progress Note        Chief Complaint  Hypertension  Diabetes    Interval Problem Update  No complaints.  Doing ok.    Review of Systems  Review of Systems   Constitutional:  Negative for fever.   Eyes:  Negative for blurred vision.   Respiratory:  Negative for cough.    Cardiovascular:  Negative for chest pain.   Gastrointestinal:  Negative for diarrhea.   Musculoskeletal:  Negative for joint pain.   Neurological:  Negative for dizziness.   Psychiatric/Behavioral:  The patient is not nervous/anxious.         Physical Exam  Temp:  [36.6 °C (97.9 °F)] 36.6 °C (97.9 °F)  Pulse:  [83-96] 96  Resp:  [17-18] 18  BP: (103-137)/(67-82) 125/82  SpO2:  [96 %-98 %] 96 %    Physical Exam  Vitals and nursing note reviewed.   Constitutional:       Appearance: He is not diaphoretic.   HENT:      Mouth/Throat:      Pharynx: No oropharyngeal exudate or posterior oropharyngeal erythema.   Eyes:      Extraocular Movements: Extraocular movements intact.   Neck:      Vascular: No carotid bruit.   Cardiovascular:      Rate and Rhythm: Normal rate and regular rhythm.      Heart sounds: No murmur heard.  Pulmonary:      Effort: Pulmonary effort is normal.      Breath sounds: Normal breath sounds. No stridor.   Abdominal:      General: Bowel sounds are normal.      Palpations: Abdomen is soft.   Musculoskeletal:      Right lower leg: No edema.      Left lower leg: No edema.   Skin:     General: Skin is warm and dry.      Findings: No rash.   Neurological:      Mental Status: He is alert and oriented to person, place, and time.   Psychiatric:         Mood and Affect: Mood normal.         Behavior: Behavior normal.             Laboratory  Recent Labs     08/22/23  0540   WBC 9.4   RBC 5.53   HEMOGLOBIN 14.7   HEMATOCRIT 44.7   MCV 80.8*   MCH 26.6*   MCHC 32.9   RDW 36.1   PLATELETCT 306   MPV 10.5       Recent Labs     08/22/23  0540   SODIUM 139   POTASSIUM 4.1   CHLORIDE 103   CO2 27   GLUCOSE 135*   BUN 17    CREATININE 0.89   CALCIUM 9.0                     Assessment/Plan  * Ischemic stroke (HCC)- (present on admission)  Assessment & Plan  Presented with left HP  S/P ZioPatch  Cont ASA and Lipitor  Off Plavix    UTI (urinary tract infection)  Assessment & Plan  Urine cultures show coag neg staph  Sensitivities don't mention Omnicef -- micro lab not sure it covers it  Repeat U/A neg (8/23)  S/P Omnicef (8/24)    Primary hypertension- (present on admission)  Assessment & Plan  BP ok  Off Benicar 2nd to lower BP  Cont Lopressor  Note: on Flomax  Cont to monitor    Type 2 diabetes mellitus with hyperglycemia, without long-term current use of insulin (HCC)- (present on admission)  Assessment & Plan  Hba1c: 11.9 (8/10)  BS: 124-179 (hit 244 x 1)  Cont Metformin XR bid  Note: home meds include Metformin  mg bid, Jardiance 25 mg qd, and Trulicity 1.5 mg qwk  Will monitor another day before adjusting meds (8/24)    Coronary artery disease due to calcified coronary lesion: CABG ×3 in 2015- (present on admission)  Assessment & Plan  Hx of CABG  Continue ASA and Lipitor  Off Benicar 2nd to low BP  Cont Lopressor  Cont to monitor

## 2023-08-24 NOTE — PROGRESS NOTES
"Rehab Progress Note     Date of Service: 8/24/2023  Chief Complaint: Follow-up stroke    Interval Events (Subjective)    Patient seen and evaluated today in the therapy gym.  He is doing upper body exercises with occupational therapy.  He reports that his left-sided back pain has improved with the Lidoderm patches.  He continues to be constipated without a bowel movement in 4 days.  Per case management he has been accepted to rehab [facility] walls.    Patient has no other new questions, concerns, or complaints today.         Objective:  VITAL SIGNS: /82   Pulse 96   Temp 36.6 °C (97.9 °F) (Oral)   Resp 18   Ht 1.651 m (5' 5\")   Wt 63 kg (139 lb)   SpO2 96%   BMI 23.13 kg/m²   Gen: alert, no apparent distress  CV: Regular rate, regular rhythm  Resp: Clear to auscultation bilaterally  GI: Slightly firm, nontender, hypoactive bowel sounds  Neuro: Left hemiparesis    Recent Results (from the past 72 hour(s))   POCT glucose device results    Collection Time: 08/21/23 11:13 AM   Result Value Ref Range    POC Glucose, Blood 216 (H) 65 - 99 mg/dL   POCT glucose device results    Collection Time: 08/21/23  5:19 PM   Result Value Ref Range    POC Glucose, Blood 126 (H) 65 - 99 mg/dL   POCT glucose device results    Collection Time: 08/21/23  8:01 PM   Result Value Ref Range    POC Glucose, Blood 151 (H) 65 - 99 mg/dL   CBC WITHOUT DIFFERENTIAL    Collection Time: 08/22/23  5:40 AM   Result Value Ref Range    WBC 9.4 4.8 - 10.8 K/uL    RBC 5.53 4.70 - 6.10 M/uL    Hemoglobin 14.7 14.0 - 18.0 g/dL    Hematocrit 44.7 42.0 - 52.0 %    MCV 80.8 (L) 81.4 - 97.8 fL    MCH 26.6 (L) 27.0 - 33.0 pg    MCHC 32.9 32.3 - 36.5 g/dL    RDW 36.1 35.9 - 50.0 fL    Platelet Count 306 164 - 446 K/uL    MPV 10.5 9.0 - 12.9 fL   Basic Metabolic Panel    Collection Time: 08/22/23  5:40 AM   Result Value Ref Range    Sodium 139 135 - 145 mmol/L    Potassium 4.1 3.6 - 5.5 mmol/L    Chloride 103 96 - 112 mmol/L    Co2 27 20 - 33 mmol/L    " Glucose 135 (H) 65 - 99 mg/dL    Bun 17 8 - 22 mg/dL    Creatinine 0.89 0.50 - 1.40 mg/dL    Calcium 9.0 8.5 - 10.5 mg/dL    Anion Gap 9.0 7.0 - 16.0   ESTIMATED GFR    Collection Time: 08/22/23  5:40 AM   Result Value Ref Range    GFR (CKD-EPI) 98 >60 mL/min/1.73 m 2   POCT glucose device results    Collection Time: 08/22/23  7:40 AM   Result Value Ref Range    POC Glucose, Blood 164 (H) 65 - 99 mg/dL   POCT glucose device results    Collection Time: 08/22/23 11:11 AM   Result Value Ref Range    POC Glucose, Blood 157 (H) 65 - 99 mg/dL   POCT glucose device results    Collection Time: 08/22/23  5:19 PM   Result Value Ref Range    POC Glucose, Blood 169 (H) 65 - 99 mg/dL   POCT glucose device results    Collection Time: 08/22/23  8:10 PM   Result Value Ref Range    POC Glucose, Blood 167 (H) 65 - 99 mg/dL   POCT glucose device results    Collection Time: 08/23/23  8:03 AM   Result Value Ref Range    POC Glucose, Blood 179 (H) 65 - 99 mg/dL   POCT glucose device results    Collection Time: 08/23/23 11:30 AM   Result Value Ref Range    POC Glucose, Blood 140 (H) 65 - 99 mg/dL   URINALYSIS    Collection Time: 08/23/23  1:10 PM    Specimen: Urine, Clean Catch   Result Value Ref Range    Color Yellow     Character Clear     Specific Gravity 1.013 <1.035    Ph 6.5 5.0 - 8.0    Glucose 100 (A) Negative mg/dL    Ketones Negative Negative mg/dL    Protein Negative Negative mg/dL    Bilirubin Negative Negative    Urobilinogen, Urine 0.2 Negative    Nitrite Negative Negative    Leukocyte Esterase Negative Negative    Occult Blood Negative Negative    Micro Urine Req see below    POCT glucose device results    Collection Time: 08/23/23  4:39 PM   Result Value Ref Range    POC Glucose, Blood 124 (H) 65 - 99 mg/dL   POCT glucose device results    Collection Time: 08/23/23  8:01 PM   Result Value Ref Range    POC Glucose, Blood 244 (H) 65 - 99 mg/dL   POCT glucose device results    Collection Time: 08/24/23  7:14 AM   Result Value  Ref Range    POC Glucose, Blood 160 (H) 65 - 99 mg/dL       Scheduled Medications   Medication Dose Frequency    senna-docusate  2 Tablet BID    And    polyethylene glycol/lytes  1 Packet BID    insulin lispro  2-12 Units 4X/DAY ACHS    lidocaine  1 Patch DAILY    cefdinir  300 mg Q12HRS    metFORMIN ER  500 mg BID WITH MEALS    metoprolol tartrate  12.5 mg TWICE DAILY    aspirin  81 mg Q EVENING    atorvastatin  80 mg Q EVENING    enoxaparin (LOVENOX) injection  40 mg DAILY AT 1800    tamsulosin  0.4 mg AFTER BREAKFAST       Current Diet Order   Procedures    Diet Order Diet: Consistent CHO (Diabetic) (cardiac and CHO); Second Modifier: (optional): Cardiac       Assessment:    This patient is a 58 y.o. male admitted for acute inpatient rehabilitation with Ischemic stroke (HCC).      I, Sarai Davila M.D./MD., was present and led the interdisciplinary team conference on 8/23/2023.  I led the IDT conference and agree with the IDT conference documentation and plan of care as noted below.     Nursing:  Diet Current Diet Order   Procedures    Diet Order Diet: Consistent CHO (Diabetic) (cardiac and CHO); Second Modifier: (optional): Cardiac       Eating ADL Supervision  Set-up of equipment or meal/tube feeding   % of Last Meal  Oral Nutrition: Lunch, Between 25-50% Consumed (30%)   Sleep No issues   Bowel Last BM: 08/20/23   Bladder Post Void  82   Barriers to Discharge Home: none      Physical Therapy:  Bed Mobility    Transfers Minimal Assist  Adaptive equipment, Increased time, Set-up of equipment, Supervision for safety (SPT bed >< w/c)   Mobility Minimal Assist (min-modA with intermittent L hyperextension block)   Stairs Total Assist (Vector harness with 25lb BWS)   Barriers to Discharge Home: left hemiparesis, stairs, impaired endurance      Occupational Therapy:  Grooming Supervision, Seated   Bathing Moderate Assist   UB Dressing Stand by Assist   LB Dressing Moderate Assist   Toileting Contact Guard Assist    Shower & Tub Transfer Minimal Assist   Barriers to Discharge Home: left hemiparesis      Respiratory Therapy:  O2 (LPM): 0  O2 Delivery Device: None - Room Air    Case Management:  Continues to work on disposition and DME needs.      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  Left hemiparesis,distal > proximal   Cognitive impairment, mild  Dysphagia  Continue full rehab program  PT/OT/SLP, 1 hr each discipline, 5 days per week  8/15: SLP discontinued, PT/OT, 1.5 hr each discipline, 5 days per week    Completed aspirin and Plavix for secondary stroke prophylaxis until 8/21, then aspirin alone    Continue aspirin and atorvastatin    Patient with cardiac monitor in place, return on 8/28    Outpatient follow-up with stroke Bridge clinic and cardiology, referrals made    Left paraspinal muscle spasms, resolved  Heat and/or ice as needed  Continue Lidoderm patch  Continue Tylenol or Flexeril as needed     Diabetes with hyperglycemia  Uncontrolled, hemoglobin A1c 11.9  Continue metformin and sliding scale insulin  Appreciate hospitalist assistance     Hypertension  Continue metoprolol  Olmesartan discontinued  Appreciate hospitalist assistance     Hyperlipidemia  Continue atorvastatin     Sinus tachycardia, resolved  Continue metoprolol  Appreciate hospitalist assistance  Negative doppler US for DVTs     Urinary retention, improved  Hematuria, resolved  Damon removed 8/15  Continue Flomax  Monitor PVRs - 106, 75, 85, 214, 82  Intermittent catheterization for volumes over 400, not requiring     Coronary artery disease  With history of CABG  Continue aspirin and atorvastatin    Leukocytosis, resolved  Negative CXR    Acute cystitis  Coag negative staph  Continue cefdinir    Constipation, continues  KUB with marked constipation  Bowel  cleanout ordered with 4 doses of MiraLAX followed by suppository    Iron deficiency  Positive occult stool  Outpatient follow up with GI    DVT prophylaxis  Continue Lovefrankx    Sarai Davila M.D.  Physical Medicine and Rehabilitation

## 2023-08-24 NOTE — CARE PLAN
Problem: Knowledge Deficit - Standard  Goal: Patient and family/care givers will demonstrate understanding of plan of care, disease process/condition, diagnostic tests and medications  Outcome: Progressing     Problem: Skin Integrity  Goal: Skin integrity is maintained or improved  Outcome: Progressing   The patient is Stable - Low risk of patient condition declining or worsening    Shift Goals  Clinical Goals: Safety  Patient Goals: Safety

## 2023-08-24 NOTE — THERAPY
Physical Therapy   Daily Treatment     Patient Name: Jorden Bonilla  Age:  58 y.o., Sex:  male  Medical Record #: [id number]  Today's Date: 8/24/2023     Precautions  Precautions: Fall Risk  Comments: L phyllis, Zio patch, HTN, DM    Subjective    Pt resting In bed, willing to participate     Objective       08/24/23 1501   PT Charge Group   PT Gait Training (Units) 2   PT Neuromuscular Re-Education / Balance (Units) 2   PT Total Time Spent   PT Individual Total Time Spent (Mins) 60   Transfer Functional Level of Assist   Bed, Chair, Wheelchair Transfer Contact Guard Assist   Bed Chair Wheelchair Transfer Description Adaptive equipment;Set-up of equipment;Verbal cueing   Sitting Lower Body Exercises   Nustep Resistance Level 3  (L hand ace wrap  assist/ gait belt at thighs to maintain LLE neutral hip x 10 minutes)   Neuro-Muscular Treatments   Neuro-Muscular Treatments Anterior weight shift;Facilitation;Joint Approximation;Sequencing;Tactile Cuing;Verbal Cuing;Weight Shift Right;Weight Shift Left  (neuro re-ed gait/ posture and reciprocal patterning training at Replaced by [facility].)     Lite gait treadmill training at 0.6 mph to complete 961 feet of ambulation. Pt required 3 seated rests throughout 15 minutes of gait training, mod/max A for LLE swing and manual cues for knee control with stance, anterior shelf AFO in place.      Assessment    Pt tolerated gait/ endurance and increased repetitions for reciprocal patterning well. Continues to demonstrate improve motor return / strength LLE.    Strengths: Able to follow instructions, Alert and oriented, Effective communication skills, Good carryover of learning, Good insight into deficits/needs, Independent prior level of function, Making steady progress towards goals, Motivated for self care and independence, Pleasant and cooperative, Supportive family, Willingly participates in therapeutic activities  Barriers: Bladder retention, Decreased endurance, Hemiparesis, Impaired  activity tolerance, Impaired balance, Language barrier, non-fluent English, Limited mobility    Plan    Bed mobility with phyllis techniques, transfer training, gait training with LBQC vs. SBQC and carbon fiber AFO with carrol, standing balance, neuro re-ed for L hemibody strengthening, Bioness, lite-gait BWSTT, family training/education.      E  therapy tech program 2x/week     Passport items to be completed:  Get in/out of bed safely, in/out of a vehicle, safely use mobility device, walk or wheel around home/community, navigate up and down stairs, show how to get up/down from the ground, ensure home is accessible, demonstrate HEP, complete caregiver training        Physical Therapy Problems (Active)       Problem: Mobility       Dates: Start:  08/15/23         Goal: STG-Within one week, patient will ambulate household distance 50 ft with HW and CGA.       Dates: Start:  08/15/23    Expected End:  09/05/23            Goal: STG-Within one week, patient will ambulate up/down a curb with HW and min A.       Dates: Start:  08/15/23    Expected End:  09/05/23               Problem: Mobility Transfers       Dates: Start:  08/15/23         Goal: STG-Within one week, patient will perform bed mobility with SPV consistently.       Dates: Start:  08/15/23    Expected End:  09/05/23            Goal: STG-Within one week, patient will transfer bed to chair stand pivot with HW and CGA.       Dates: Start:  08/15/23    Expected End:  09/05/23               Problem: PT-Long Term Goals       Dates: Start:  08/15/23         Goal: LTG-By discharge, patient will ambulate 150 ft with QC vs SPC and SBA.       Dates: Start:  08/15/23    Expected End:  09/05/23            Goal: LTG-By discharge, patient will transfer one surface to another with QC vs SPC and SPV.       Dates: Start:  08/15/23    Expected End:  09/05/23            Goal: LTG-By discharge, patient will ambulate up/down a curb with QC vs SPC and SBA.       Dates:  Start:  08/15/23    Expected End:  09/05/23            Goal: LTG-By discharge, patient will transfer in/out of a car with QC vs SPC and SBA after setup.       Dates: Start:  08/15/23    Expected End:  09/05/23            Goal: LTG-By discharge, patient will ambulate up/down a ramp with QC vs SPC and CGA to simulate home entrance.       Dates: Start:  08/15/23    Expected End:  09/05/23

## 2023-08-25 ENCOUNTER — APPOINTMENT (OUTPATIENT)
Dept: OCCUPATIONAL THERAPY | Facility: REHABILITATION | Age: 58
DRG: 057 | End: 2023-08-25
Attending: PHYSICAL MEDICINE & REHABILITATION
Payer: COMMERCIAL

## 2023-08-25 ENCOUNTER — APPOINTMENT (OUTPATIENT)
Dept: PHYSICAL THERAPY | Facility: REHABILITATION | Age: 58
DRG: 057 | End: 2023-08-25
Attending: PHYSICAL MEDICINE & REHABILITATION
Payer: COMMERCIAL

## 2023-08-25 LAB
GLUCOSE BLD STRIP.AUTO-MCNC: 149 MG/DL (ref 65–99)
GLUCOSE BLD STRIP.AUTO-MCNC: 156 MG/DL (ref 65–99)
GLUCOSE BLD STRIP.AUTO-MCNC: 161 MG/DL (ref 65–99)
GLUCOSE BLD STRIP.AUTO-MCNC: 202 MG/DL (ref 65–99)

## 2023-08-25 PROCEDURE — 97116 GAIT TRAINING THERAPY: CPT

## 2023-08-25 PROCEDURE — 97112 NEUROMUSCULAR REEDUCATION: CPT

## 2023-08-25 PROCEDURE — 99232 SBSQ HOSP IP/OBS MODERATE 35: CPT | Performed by: PHYSICAL MEDICINE & REHABILITATION

## 2023-08-25 PROCEDURE — 97530 THERAPEUTIC ACTIVITIES: CPT

## 2023-08-25 PROCEDURE — 82962 GLUCOSE BLOOD TEST: CPT | Mod: 91

## 2023-08-25 PROCEDURE — A9270 NON-COVERED ITEM OR SERVICE: HCPCS | Performed by: HOSPITALIST

## 2023-08-25 PROCEDURE — 700101 HCHG RX REV CODE 250: Performed by: PHYSICAL MEDICINE & REHABILITATION

## 2023-08-25 PROCEDURE — 770010 HCHG ROOM/CARE - REHAB SEMI PRIVAT*

## 2023-08-25 PROCEDURE — A9270 NON-COVERED ITEM OR SERVICE: HCPCS | Performed by: PHYSICAL MEDICINE & REHABILITATION

## 2023-08-25 PROCEDURE — 97110 THERAPEUTIC EXERCISES: CPT

## 2023-08-25 PROCEDURE — 700102 HCHG RX REV CODE 250 W/ 637 OVERRIDE(OP): Performed by: HOSPITALIST

## 2023-08-25 PROCEDURE — 99232 SBSQ HOSP IP/OBS MODERATE 35: CPT | Performed by: HOSPITALIST

## 2023-08-25 PROCEDURE — 700111 HCHG RX REV CODE 636 W/ 250 OVERRIDE (IP): Mod: JZ | Performed by: PHYSICAL MEDICINE & REHABILITATION

## 2023-08-25 PROCEDURE — 700102 HCHG RX REV CODE 250 W/ 637 OVERRIDE(OP): Performed by: PHYSICAL MEDICINE & REHABILITATION

## 2023-08-25 RX ORDER — BACLOFEN 10 MG/1
5 TABLET ORAL
Status: DISCONTINUED | OUTPATIENT
Start: 2023-08-25 | End: 2023-08-27

## 2023-08-25 RX ADMIN — METOPROLOL TARTRATE 12.5 MG: 25 TABLET, FILM COATED ORAL at 17:10

## 2023-08-25 RX ADMIN — METFORMIN HYDROCHLORIDE 500 MG: 500 TABLET, EXTENDED RELEASE ORAL at 17:10

## 2023-08-25 RX ADMIN — METOPROLOL TARTRATE 12.5 MG: 25 TABLET, FILM COATED ORAL at 05:16

## 2023-08-25 RX ADMIN — TAMSULOSIN HYDROCHLORIDE 0.4 MG: 0.4 CAPSULE ORAL at 08:30

## 2023-08-25 RX ADMIN — ACETAMINOPHEN 650 MG: 325 TABLET ORAL at 20:08

## 2023-08-25 RX ADMIN — METFORMIN HYDROCHLORIDE 500 MG: 500 TABLET, EXTENDED RELEASE ORAL at 08:01

## 2023-08-25 RX ADMIN — ATORVASTATIN CALCIUM 80 MG: 40 TABLET, FILM COATED ORAL at 20:08

## 2023-08-25 RX ADMIN — POLYETHYLENE GLYCOL 3350 1 PACKET: 17 POWDER, FOR SOLUTION ORAL at 20:09

## 2023-08-25 RX ADMIN — LIDOCAINE 1 PATCH: 700 PATCH TOPICAL at 08:30

## 2023-08-25 RX ADMIN — INSULIN LISPRO 2 UNITS: 100 INJECTION, SOLUTION INTRAVENOUS; SUBCUTANEOUS at 17:05

## 2023-08-25 RX ADMIN — BACLOFEN 5 MG: 10 TABLET ORAL at 20:08

## 2023-08-25 RX ADMIN — SENNOSIDES AND DOCUSATE SODIUM 2 TABLET: 50; 8.6 TABLET ORAL at 20:09

## 2023-08-25 RX ADMIN — ASPIRIN 81 MG: 81 TABLET, COATED ORAL at 20:08

## 2023-08-25 RX ADMIN — ENOXAPARIN SODIUM 40 MG: 100 INJECTION SUBCUTANEOUS at 18:00

## 2023-08-25 RX ADMIN — INSULIN LISPRO 2 UNITS: 100 INJECTION, SOLUTION INTRAVENOUS; SUBCUTANEOUS at 20:04

## 2023-08-25 RX ADMIN — INSULIN LISPRO 4 UNITS: 100 INJECTION, SOLUTION INTRAVENOUS; SUBCUTANEOUS at 11:21

## 2023-08-25 ASSESSMENT — PATIENT HEALTH QUESTIONNAIRE - PHQ9
2. FEELING DOWN, DEPRESSED, IRRITABLE, OR HOPELESS: NOT AT ALL
1. LITTLE INTEREST OR PLEASURE IN DOING THINGS: NOT AT ALL
2. FEELING DOWN, DEPRESSED, IRRITABLE, OR HOPELESS: NOT AT ALL
1. LITTLE INTEREST OR PLEASURE IN DOING THINGS: NOT AT ALL
SUM OF ALL RESPONSES TO PHQ9 QUESTIONS 1 AND 2: 0
SUM OF ALL RESPONSES TO PHQ9 QUESTIONS 1 AND 2: 0

## 2023-08-25 ASSESSMENT — ENCOUNTER SYMPTOMS
CHILLS: 0
FEVER: 0
DIARRHEA: 0
SHORTNESS OF BREATH: 0
NERVOUS/ANXIOUS: 0
ABDOMINAL PAIN: 0
VOMITING: 0
NAUSEA: 0

## 2023-08-25 ASSESSMENT — ACTIVITIES OF DAILY LIVING (ADL)
BED_CHAIR_WHEELCHAIR_TRANSFER_DESCRIPTION: INCREASED TIME;INITIAL PREPARATION FOR TASK;SET-UP OF EQUIPMENT;SQUAT PIVOT TRANSFER TO WHEELCHAIR;SUPERVISION FOR SAFETY;VERBAL CUEING
BED_CHAIR_WHEELCHAIR_TRANSFER_DESCRIPTION: ADAPTIVE EQUIPMENT;INCREASED TIME;INITIAL PREPARATION FOR TASK;SQUAT PIVOT TRANSFER TO WHEELCHAIR;SUPERVISION FOR SAFETY;VERBAL CUEING
BED_CHAIR_WHEELCHAIR_TRANSFER_DESCRIPTION: INCREASED TIME;INITIAL PREPARATION FOR TASK;SET-UP OF EQUIPMENT;SUPERVISION FOR SAFETY

## 2023-08-25 ASSESSMENT — PAIN DESCRIPTION - PAIN TYPE: TYPE: ACUTE PAIN

## 2023-08-25 NOTE — PROGRESS NOTES
Hospital Medicine Daily Progress Note        Chief Complaint  Hypertension  Diabetes    Interval Problem Update  Discussed about his BS labile -- pt states it does this at home.    Review of Systems  Review of Systems   Constitutional:  Negative for chills and fever.   Respiratory:  Negative for shortness of breath.    Cardiovascular:  Negative for chest pain.   Gastrointestinal:  Negative for abdominal pain, diarrhea, nausea and vomiting.   Psychiatric/Behavioral:  The patient is not nervous/anxious.         Physical Exam  Temp:  [36.2 °C (97.1 °F)-36.6 °C (97.9 °F)] 36.2 °C (97.1 °F)  Pulse:  [68-90] 73  Resp:  [18] 18  BP: (111-125)/(65-82) 113/65  SpO2:  [91 %-100 %] 100 %    Physical Exam  Vitals and nursing note reviewed.   Constitutional:       Appearance: Normal appearance.   HENT:      Head: Atraumatic.   Eyes:      Conjunctiva/sclera: Conjunctivae normal.      Pupils: Pupils are equal, round, and reactive to light.   Cardiovascular:      Rate and Rhythm: Normal rate and regular rhythm.   Pulmonary:      Effort: Pulmonary effort is normal.      Breath sounds: Normal breath sounds.   Abdominal:      General: Bowel sounds are normal.      Palpations: Abdomen is soft.   Musculoskeletal:      Cervical back: Normal range of motion and neck supple.      Right lower leg: No edema.      Left lower leg: No edema.   Skin:     General: Skin is warm and dry.   Neurological:      Mental Status: He is alert and oriented to person, place, and time.   Psychiatric:         Mood and Affect: Mood normal.         Behavior: Behavior normal.             Laboratory                            Assessment/Plan  * Ischemic stroke (HCC)- (present on admission)  Assessment & Plan  Presented with left HP  S/P ZioPatch  Cont ASA and Lipitor  Off Plavix    UTI (urinary tract infection)  Assessment & Plan  Urine cultures show coag neg staph  Sensitivities don't mention Omnicef -- micro lab not sure it covers it  Repeat U/A neg (8/23)  S/P  Omnicef (8/24)    Primary hypertension- (present on admission)  Assessment & Plan  BP ok  Off Benicar 2nd to lower BP  Cont Lopressor  Note: on Flomax  Cont to monitor    Type 2 diabetes mellitus with hyperglycemia, without long-term current use of insulin (HCC)- (present on admission)  Assessment & Plan  Hba1c: 11.9 (8/10)  BS: 124-201 (hit 244 x 1)  Cont Metformin XR bid  Note: home meds include Metformin  mg bid, Jardiance 25 mg qd, and Trulicity 1.5 mg qwk  Consider adding another oral med  Will monitor another day before adjusting meds (8/25)    Coronary artery disease due to calcified coronary lesion: CABG ×3 in 2015- (present on admission)  Assessment & Plan  Hx of CABG  Continue ASA and Lipitor  Off Benicar 2nd to low BP  Cont Lopressor  Cont to monitor

## 2023-08-25 NOTE — THERAPY
"Occupational Therapy  Daily Treatment     Patient Name: Jorden Bonilla  Age:  58 y.o., Sex:  male  Medical Record #: [id number]  Today's Date: 8/25/2023     Precautions  Precautions: Fall Risk  Comments: L phyllis, Zio patch, HTN, DM         Subjective    \"It's too much.\" Pt reported about e-stim on forearm flexors- mA needed to be adjusted throughout for comfort.      Objective       08/25/23 1431   OT Charge Group   OT Neuromuscular Re-education / Balance (Units) 1   OT Therapy Activity (Units) 1   OT Total Time Spent   OT Individual Total Time Spent (Mins) 30   Functional Level of Assist   Bed, Chair, Wheelchair Transfer Contact Guard Assist   Bed Chair Wheelchair Transfer Description Increased time;Initial preparation for task;Set-up of equipment;Squat pivot transfer to wheelchair;Supervision for safety;Verbal cueing   Neuro-Muscular Treatments   Neuro-Muscular Treatments Electrical Stimulation   Comments e-stim placed on pt's L forearm extensors (30 mA) and flexors 923 mA)  x 15 min   Interdisciplinary Plan of Care Collaboration   Patient Position at End of Therapy Seated;Other (Comments)  (handoff to PT in gym)         Assessment    Pt tolerated session well. Pt completed e-stim for LUE forearm extensors/flexors- educated pt on making sure to concentrate on making the movements himself in conjunction with e-stim. Therapist changed pt's shoe laces to elastic shoe laces to increase independence with donning/doffing B shoes.     Strengths: Able to follow instructions, Alert and oriented, Effective communication skills, Good carryover of learning, Good insight into deficits/needs, Independent prior level of function, Manages pain appropriately, Motivated for self care and independence, Pleasant and cooperative, Supportive family, Willingly participates in therapeutic activities  Barriers: Hemiplegia, Hypertension, Impaired balance, Limited mobility    Plan    ADL/IADL training, LUE neuro re-ed, increase overall " strength and endurance.      Passport items to be completed:  Perform bathroom transfers, complete dressing, complete feeding, get ready for the day, prepare a simple meal, participate in household tasks, adapt home for safety needs, demonstrate home exercise program, complete caregiver training     Occupational Therapy Goals (Active)       Problem: Bathing       Dates: Start:  08/15/23         Goal: STG-Within one week, patient will bathe body with min A using AE/AD prn       Dates: Start:  08/15/23    Expected End:  09/05/23         Goal Note filed on 08/23/23 0810 by Mayra Pantoja, Student       Requires Mod A.                 Problem: Dressing       Dates: Start:  08/15/23         Goal: STG-Within one week, patient will dress LB with min A       Dates: Start:  08/15/23    Expected End:  09/05/23         Goal Note filed on 08/23/23 0810 by Mayra Pantoja, Student       Requires Mod A.                 Problem: Functional Transfers       Dates: Start:  08/23/23         Goal: STG-Within one week, patient will transfer to toilet with supervision using DME/AE as  needed       Dates: Start:  08/23/23               Problem: OT Long Term Goals       Dates: Start:  08/15/23         Goal: LTG-By discharge, patient will complete basic self care tasks with SBA/supervision       Dates: Start:  08/15/23    Expected End:  09/05/23            Goal: LTG-By discharge, patient will perform bathroom transfers with supervision/mod I       Dates: Start:  08/15/23    Expected End:  09/05/23               Problem: Toileting       Dates: Start:  08/23/23         Goal: STG-Within one week, patient will complete toileting tasks with supervision using DME/AE as needed       Dates: Start:  08/23/23

## 2023-08-25 NOTE — THERAPY
Physical Therapy   Daily Treatment     Patient Name: Jorden Bonilla  Age:  58 y.o., Sex:  male  Medical Record #: [id number]  Today's Date: 8/25/2023     Precautions  Precautions: Fall Risk  Comments: L phyllis, Zio patch, HTN, DM    Subjective    Pt present and ready for PT, fatigued but willing to participate.     Objective       08/25/23 1501   PT Charge Group   PT Gait Training (Units) 3   PT Neuromuscular Re-Education / Balance (Units) 1   PT Total Time Spent   PT Individual Total Time Spent (Mins) 60   Supine Lower Body Exercise   Chest Fly 1 set of 10;Left   Serratus Punch 1 set of 10;Left   Other Exercises shoulder flexion/ ext 1 x 10 LUE   Comments pt completed shouder strength/ scapular stabilization exercises with air sleeve to stabilize elbow into extension AAROM for all exercises   Neuro-Muscular Treatments   Neuro-Muscular Treatments Facilitation;Sequencing;Tactile Cuing;Verbal Cuing   Comments neuro re-ed gait training with lite gait treadmill/ harness and L AFO. Pt requires mod/max A for LLE advancement, completed 5 minutes x 3 at 0.6 mph to complete 989 feet.         Assessment    Pt continues to demonstrate slow but consistent gains with L phyllis weakness recovery both LLE and LUE. Fatigued this pm but completed lite gait training with 1 less rest break compared to yesterday.     Strengths: Able to follow instructions, Alert and oriented, Effective communication skills, Good carryover of learning, Good insight into deficits/needs, Independent prior level of function, Making steady progress towards goals, Motivated for self care and independence, Pleasant and cooperative, Supportive family, Willingly participates in therapeutic activities  Barriers: Bladder retention, Decreased endurance, Hemiparesis, Impaired activity tolerance, Impaired balance, Language barrier, non-fluent English, Limited mobility    Plan    Bed mobility with phyllis techniques, transfer training, gait training with LBQC vs. SBQC and  carbon fiber AFO with carrol, standing balance, neuro re-ed for L hemibody strengthening, Bioness, lite-gait BWSTT, family training/education.      E  therapy tech program 2x/week     Passport items to be completed:  Get in/out of bed safely, in/out of a vehicle, safely use mobility device, walk or wheel around home/community, navigate up and down stairs, show how to get up/down from the ground, ensure home is accessible, demonstrate HEP, complete caregiver training      Physical Therapy Problems (Active)       Problem: Mobility       Dates: Start:  08/15/23         Goal: STG-Within one week, patient will ambulate household distance 50 ft with HW and CGA.       Dates: Start:  08/15/23    Expected End:  09/05/23            Goal: STG-Within one week, patient will ambulate up/down a curb with HW and min A.       Dates: Start:  08/15/23    Expected End:  09/05/23               Problem: Mobility Transfers       Dates: Start:  08/15/23         Goal: STG-Within one week, patient will perform bed mobility with SPV consistently.       Dates: Start:  08/15/23    Expected End:  09/05/23            Goal: STG-Within one week, patient will transfer bed to chair stand pivot with HW and CGA.       Dates: Start:  08/15/23    Expected End:  09/05/23               Problem: PT-Long Term Goals       Dates: Start:  08/15/23         Goal: LTG-By discharge, patient will ambulate 150 ft with QC vs SPC and SBA.       Dates: Start:  08/15/23    Expected End:  09/05/23            Goal: LTG-By discharge, patient will transfer one surface to another with QC vs SPC and SPV.       Dates: Start:  08/15/23    Expected End:  09/05/23            Goal: LTG-By discharge, patient will ambulate up/down a curb with QC vs SPC and SBA.       Dates: Start:  08/15/23    Expected End:  09/05/23            Goal: LTG-By discharge, patient will transfer in/out of a car with QC vs SPC and SBA after setup.       Dates: Start:  08/15/23    Expected End:   09/05/23            Goal: LTG-By discharge, patient will ambulate up/down a ramp with QC vs SPC and CGA to simulate home entrance.       Dates: Start:  08/15/23    Expected End:  09/05/23

## 2023-08-25 NOTE — THERAPY
Occupational Therapy  Daily Treatment     Patient Name: Jorden Bonilla  Age:  58 y.o., Sex:  male  Medical Record #: [id number]  Today's Date: 8/25/2023     Precautions  Precautions: Fall Risk  Comments: L phyllis, Zio patch, HTN, DM         Subjective    Pt seated in w/c in dining room upon arrival. Pt pleasant and cooperative, agreeable to therapy.     Objective       08/25/23 0831   OT Total Time Spent   OT Individual Total Time Spent (Mins) 60   Precautions   Precautions Fall Risk   Comments L phyllis, Zio patch, HTN, DM   Vitals   O2 Delivery Device None - Room Air   Pain   Intervention Declines   Pain 0 - 10 Group   Pain Rating Scale (NPRS) 0   Cognition    Level of Consciousness Alert   Sleep/Wake Cycle   Sleep & Rest Awake;Out of bed   Functional Level of Assist   Bed, Chair, Wheelchair Transfer Contact Guard Assist   Bed Chair Wheelchair Transfer Description Increased time;Initial preparation for task;Set-up of equipment;Supervision for safety  (stand pivot from w/c to bed)   Neuro-Muscular Treatments   Neuro-Muscular Treatments Electrical Stimulation   Comments , see note for details   Interdisciplinary Plan of Care Collaboration   Patient Position at End of Therapy In Bed;Bed Alarm On;Call Light within Reach;Tray Table within Reach;Phone within Reach     Neuro-Muscular Treatments  LUE neuro re-ed using  FES cycling with BUE's. Electrodes placed at LUE shoulder, scapular stabilizers, biceps/triceps, forearm extensors/flexors. Distance travelled: 3.00 mile, energy expended: 0.2 kcal, energy per hour: 0.4 kcal/hour, avg power: 0.5 w, avg asymmetry 0 %, total therapy time: 32:13 min including warm-up/cool-down, time active (off motor): 4:43 min, time passive (on motor) 27:30 min.      Assessment  Pt completed  FES cycling for LUE neuro re-edu, ROM, sensory input. Pt tolerated session well, good muscle contraction observed, and no complaints of pain.       Strengths: Able to follow instructions,  Alert and oriented, Effective communication skills, Good carryover of learning, Good insight into deficits/needs, Independent prior level of function, Manages pain appropriately, Motivated for self care and independence, Pleasant and cooperative, Supportive family, Willingly participates in therapeutic activities  Barriers: Hemiplegia, Hypertension, Impaired balance, Limited mobility    Plan    ADL/IADL training, LUE neuro re-ed, increase overall strength and endurance.     Passport items to be completed:  Perform bathroom transfers, complete dressing, complete feeding, get ready for the day, prepare a simple meal, participate in household tasks, adapt home for safety needs, demonstrate home exercise program, complete caregiver training     Occupational Therapy Goals (Active)       Problem: Bathing       Dates: Start:  08/15/23         Goal: STG-Within one week, patient will bathe body with min A using AE/AD prn       Dates: Start:  08/15/23    Expected End:  09/05/23         Goal Note filed on 08/23/23 0810 by Mayra Pantoja, Student       Requires Mod A.                 Problem: Dressing       Dates: Start:  08/15/23         Goal: STG-Within one week, patient will dress LB with min A       Dates: Start:  08/15/23    Expected End:  09/05/23         Goal Note filed on 08/23/23 0810 by Mayra Pantoja, Student       Requires Mod A.                 Problem: Functional Transfers       Dates: Start:  08/23/23         Goal: STG-Within one week, patient will transfer to toilet with supervision using DME/AE as  needed       Dates: Start:  08/23/23               Problem: OT Long Term Goals       Dates: Start:  08/15/23         Goal: LTG-By discharge, patient will complete basic self care tasks with SBA/supervision       Dates: Start:  08/15/23    Expected End:  09/05/23            Goal: LTG-By discharge, patient will perform bathroom transfers with supervision/mod I       Dates: Start:  08/15/23    Expected End:   09/05/23               Problem: Toileting       Dates: Start:  08/23/23         Goal: STG-Within one week, patient will complete toileting tasks with supervision using DME/AE as needed       Dates: Start:  08/23/23

## 2023-08-25 NOTE — PROGRESS NOTES
NURSING DAILY NOTE    Name: Jorden Bonilla   Date of Admission: 8/14/2023   Admitting Diagnosis: Ischemic stroke (HCC)  Attending Physician: Sarai Davila M.d.  Allergies: Patient has no known allergies.    Safety  Patient Assist  mod  Patient Precautions  Fall Risk  Precaution Comments  L phyllis, Zio patch, HTN, DM  Bed Transfer Status  Contact Guard Assist  Toilet Transfer Status   Contact Guard Assist  Assistive Devices  Rails, Wheelchair  Oxygen  None - Room Air  Diet/Therapeutic Dining  Current Diet Order   Procedures    Diet Order Diet: Consistent CHO (Diabetic) (cardiac and CHO); Second Modifier: (optional): Cardiac     Pill Administration  whole  Agitated Behavioral Scale  14  ABS Level of Severity  No Agitation    Fall Risk  Has the patient had a fall this admission?   No  Melani Galeano Fall Risk Scoring  11, MODERATE RISK  Fall Risk Safety Measures  bed alarm and chair alarm    Vitals  Temperature: 36.6 °C (97.9 °F)  Temp src: Oral  Pulse: 90  Respiration: 18  Blood Pressure: 120/80  Blood Pressure MAP (Calculated): 93 MM HG  BP Location: Right, Upper Arm  Patient BP Position: Sitting     Oxygen  Pulse Oximetry: 91 %  O2 (LPM): 0  O2 Delivery Device: None - Room Air    Bowel and Bladder  Last Bowel Movement  08/20/23  Stool Type  Type 2: Sausage shaped, but lumpy  Bowel Device  Bathroom  Continent  Bladder: Continent void   Bowel: Continent movement  Bladder Function  Urine Void (mL): 800 ml  Number of Times Voided: 1  Urine Color: Yellow  Genitourinary Assessment   Bladder Assessment (WDL):  WDL Except  Damon Catheter: Not Applicable  Damon Care: Given with Soap and Water  Urinary Elimination: Catheter (Document on LDA)  Urine Color: Yellow  Bladder Device: Urinal, Bathroom  Bladder Scan: Post Void  $ Bladder Scan Results (mL): 103  Bladder Medications: Yes    Skin  Cooper Score   18  Sensory Interventions   Bed Types: Standard/Trauma  Mattress  Skin Preventative Measures: Pillows in Use for Support / Positioning  Moisture Interventions  Moisturizers/Barriers: Barrier Wipes      Pain  Pain Rating Scale  0 - No Pain  Pain Location  Head  Pain Location Orientation  Posterior  Pain Interventions   Repositioned, Rest    ADLs    Bathing   Shower, Staff  Linen Change   Partial  Personal Hygiene  Moist Mireya Wipes, Perineal Care  Chlorhexidine Bath      Oral Care     Teeth/Dentures     Shave     Nutrition Percentage Eaten  *  * Meal *  *, Breakfast, Between 50-75% Consumed  Environmental Precautions  Treaded Slipper Socks on Patient, Personal Belongings, Wastebasket, Call Bell etc. in Easy Reach, Bed in Low Position  Patient Turns/Positioning  Patient Turns Self from Side to Side, Sitting Up in Wheelchair  Patient Turns Assistance/Tolerance     Bed Positions  Bed Controls On  Head of Bed Elevated  Self regulated      Psychosocial/Neurologic Assessment  Psychosocial Assessment  Psychosocial (WDL):  Within Defined Limits  Neurologic Assessment  Neuro (WDL): Exceptions to WDL  Level of Consciousness: Alert  Orientation Level: Oriented X4  Cognition: Follows commands  Speech: Clear  Facial Symmetry: Left facial drooping  LUE Sensation: Full sensation  Muscle Strength Left Arm: Weak Movement but Not Against Gravity or Resistance  LLE Sensation: Full sensation  Muscle Strength Left Leg: Weak Movement but Not Against Gravity or Resistance  EENT (WDL):  WDL Except    Cardio/Pulmonary Assessment  Edema      Respiratory Breath Sounds  RUL Breath Sounds: Clear  RML Breath Sounds: Clear  RLL Breath Sounds: Clear  SHALOM Breath Sounds: Clear  LLL Breath Sounds: Clear  Cardiac Assessment   Cardiac (WDL):  WDL Except (hx htn, cad, cabg)

## 2023-08-25 NOTE — CARE PLAN
Problem: Bowel Elimination  Goal: Patient will participate in bowel management program  Note: Offered prn suppository, pt refused.Education given on the importance of bowel medication, pt verbalized understanding.LBM 8/20.Will continue to monitor.     Problem: Diabetes Management  Goal: Patient's ability to maintain appropriate glucose levels will be maintained or improve  Note: FSBS 201, coverage given with snack.Will continue to monitor.

## 2023-08-25 NOTE — THERAPY
"Physical Therapy   Daily Treatment     Patient Name: Jorden Bonilla  Age:  58 y.o., Sex:  male  Medical Record #: [id number]  Today's Date: 8/25/2023     Precautions  Precautions: (P) Fall Risk  Comments: (P) L phyllis, Zio patch, HTN, DM    Subjective    Patient in bed and agreeable to therapy.     Objective       08/25/23 0701   PT Charge Group   PT Therapeutic Exercise (Units) 2   PT Therapeutic Activities (Units) 2   PT Total Time Spent   PT Individual Total Time Spent (Mins) 60   Precautions   Precautions Fall Risk   Comments L phyllis, Zio patch, HTN, DM   Transfer Functional Level of Assist   Bed, Chair, Wheelchair Transfer Contact Guard Assist   Bed Chair Wheelchair Transfer Description Adaptive equipment;Increased time;Initial preparation for task;Squat pivot transfer to wheelchair;Supervision for safety;Verbal cueing  (stand step transfer with quad cane vs. reach pivot transfer)   Supine Lower Body Exercise   Bridges Two Legged;2 sets of 10   Bed Mobility    Supine to Sit Standby Assist   Sit to Supine Standby Assist   Sit to Stand Contact Guard Assist   Scooting Standby Assist   Rolling Standby Assist   Interdisciplinary Plan of Care Collaboration   IDT Collaboration with  Nursing   Patient Position at End of Therapy Seated;Chair Alarm On;Self Releasing Lap Belt Applied  (in dining room)   Collaboration Comments CLOF     Shuttle exercises with intermittent assist for LLE:   - BLE press with 4 bands 2x15   - Single LE press with 3 bands for RLE 2x15, with 2 bands for LLE 2x15   - BLE heel raises with 2 bands 2x15   - Single leg marching with 2 bands, emphasis on maintaining stance with \"soft knee\" 2x15 each leg    Bed mobility on therapy mat with SBA:   - BLE hamstring bridges 2x15   - Prone BLE hamstring curls, active assist for LLE 2x10   - BUE serratus press 2x15    Standing partial mini squats with no UE support with SBA-Min A during posterior LOBs requiring cues 2x10    Assessment    Patient tolerated " session well, focus on LLE strengthening and motor control. Improving stance control with LLE.    Strengths: Able to follow instructions, Alert and oriented, Effective communication skills, Good carryover of learning, Good insight into deficits/needs, Independent prior level of function, Making steady progress towards goals, Motivated for self care and independence, Pleasant and cooperative, Supportive family, Willingly participates in therapeutic activities  Barriers: Bladder retention, Decreased endurance, Hemiparesis, Impaired activity tolerance, Impaired balance, Language barrier, non-fluent English, Limited mobility    Plan    Bed mobility with phyllis techniques, transfer training, gait training with LBQC vs. SBQC and carbon fiber AFO with stockinette, standing balance, neuro re-ed for L hemibody strengthening, Bioness, lite-gait BWSTT, family training/education.      LLE  therapy tech program 2x/week     Passport items to be completed:  Get in/out of bed safely, in/out of a vehicle, safely use mobility device, walk or wheel around home/community, navigate up and down stairs, show how to get up/down from the ground, ensure home is accessible, demonstrate HEP, complete caregiver training    Physical Therapy Problems (Active)       Problem: Mobility       Dates: Start:  08/15/23         Goal: STG-Within one week, patient will ambulate household distance 50 ft with HW and CGA.       Dates: Start:  08/15/23    Expected End:  09/05/23            Goal: STG-Within one week, patient will ambulate up/down a curb with HW and min A.       Dates: Start:  08/15/23    Expected End:  09/05/23               Problem: Mobility Transfers       Dates: Start:  08/15/23         Goal: STG-Within one week, patient will perform bed mobility with SPV consistently.       Dates: Start:  08/15/23    Expected End:  09/05/23            Goal: STG-Within one week, patient will transfer bed to chair stand pivot with HW and CGA.       Dates:  Start:  08/15/23    Expected End:  09/05/23               Problem: PT-Long Term Goals       Dates: Start:  08/15/23         Goal: LTG-By discharge, patient will ambulate 150 ft with QC vs SPC and SBA.       Dates: Start:  08/15/23    Expected End:  09/05/23            Goal: LTG-By discharge, patient will transfer one surface to another with QC vs SPC and SPV.       Dates: Start:  08/15/23    Expected End:  09/05/23            Goal: LTG-By discharge, patient will ambulate up/down a curb with QC vs SPC and SBA.       Dates: Start:  08/15/23    Expected End:  09/05/23            Goal: LTG-By discharge, patient will transfer in/out of a car with QC vs SPC and SBA after setup.       Dates: Start:  08/15/23    Expected End:  09/05/23            Goal: LTG-By discharge, patient will ambulate up/down a ramp with QC vs SPC and CGA to simulate home entrance.       Dates: Start:  08/15/23    Expected End:  09/05/23

## 2023-08-25 NOTE — PROGRESS NOTES
NURSING DAILY NOTE    Name: Jorden Bonilla   Date of Admission: 8/14/2023   Admitting Diagnosis: Ischemic stroke (HCC)  Attending Physician: Sarai Davila M.d.  Allergies: Patient has no known allergies.    Safety  Patient Assist  mod  Patient Precautions  Fall Risk  Precaution Comments  L phyllis, Zio patch, HTN, DM  Bed Transfer Status  Contact Guard Assist  Toilet Transfer Status   Contact Guard Assist  Assistive Devices  Rails, Wheelchair  Oxygen  None - Room Air  Diet/Therapeutic Dining  Current Diet Order   Procedures    Diet Order Diet: Consistent CHO (Diabetic) (cardiac and CHO); Second Modifier: (optional): Cardiac     Pill Administration  whole  Agitated Behavioral Scale  14  ABS Level of Severity  No Agitation    Fall Risk  Has the patient had a fall this admission?   No  Melani Galeano Fall Risk Scoring  11, MODERATE RISK  Fall Risk Safety Measures  bed alarm, chair alarm, and seatbelt alarm    Vitals  Temperature: 36.5 °C (97.7 °F)  Temp src: Oral  Pulse: 77  Respiration: 16  Blood Pressure: 105/68  Blood Pressure MAP (Calculated): 80 MM HG  BP Location: Right, Upper Arm  Patient BP Position: Supine     Oxygen  Pulse Oximetry: 96 %  O2 (LPM): 0  O2 Delivery Device: None - Room Air    Bowel and Bladder  Last Bowel Movement  08/25/23  Stool Type  Type 6: Fluffy pieces with ragged edges, a mushy stool  Bowel Device  Bathroom  Continent  Bladder: Continent void   Bowel: Continent movement  Bladder Function  Urine Void (mL): 800 ml  Number of Times Voided: 1  Urine Color: Yellow  Genitourinary Assessment   Bladder Assessment (WDL):  WDL Except  Damon Catheter: Not Applicable  Damon Care: Given with Soap and Water  Urinary Elimination: Catheter (Document on LDA)  Urine Color: Yellow  Bladder Device: Urinal, Bathroom  Time Void: No  Bladder Scan: Post Void  $ Bladder Scan Results (mL): 111  Bladder Medications: Yes    Skin  Cooper Score   18  Sensory  Interventions   Bed Types: Standard/Trauma Mattress  Skin Preventative Measures: Pillows in Use for Support / Positioning  Moisture Interventions  Moisturizers/Barriers: Barrier Wipes      Pain  Pain Rating Scale  0 - No Pain  Pain Location  Head  Pain Location Orientation  Posterior  Pain Interventions   Declines    ADLs    Bathing   Shower, Staff  Linen Change   Partial  Personal Hygiene  Perineal Care, Moist Mireya Wipes  Chlorhexidine Bath      Oral Care     Teeth/Dentures     Shave     Nutrition Percentage Eaten  *  * Meal *  *, Lunch, Between % Consumed  Environmental Precautions  Treaded Slipper Socks on Patient  Patient Turns/Positioning  Patient Turns Self from Side to Side  Patient Turns Assistance/Tolerance     Bed Positions  Bed Controls On  Head of Bed Elevated  Self regulated      Psychosocial/Neurologic Assessment  Psychosocial Assessment  Psychosocial (WDL):  Within Defined Limits  Neurologic Assessment  Neuro (WDL): Exceptions to WDL  Level of Consciousness: Alert  Orientation Level: Oriented X4  Cognition: Follows commands  Speech: Clear  Facial Symmetry: Left facial drooping  LUE Sensation: Full sensation  Muscle Strength Left Arm: Weak Movement but Not Against Gravity or Resistance  LLE Sensation: Full sensation  Muscle Strength Left Leg: Weak Movement but Not Against Gravity or Resistance  EENT (WDL):  WDL Except    Cardio/Pulmonary Assessment  Edema      Respiratory Breath Sounds  RUL Breath Sounds: Clear  RML Breath Sounds: Clear  RLL Breath Sounds: Clear  SHALOM Breath Sounds: Clear  LLL Breath Sounds: Clear  Cardiac Assessment   Cardiac (WDL):  WDL Except (hx-htn, cad, cabg)

## 2023-08-25 NOTE — DISCHARGE PLANNING
MD signed [facility] forms. Forms faxed to [facility] as per her request to fax#[phone]. Per MD also states pt will have AFO placed prior to DC.     DCP: Home with [facility], AFO, and pend therapy DME recs to arrange via family transport.   DC DATE: 9/5.

## 2023-08-25 NOTE — CARE PLAN
Problem: Fall Risk - Rehab  Goal: Patient will remain free from falls  Outcome: Progressing     Problem: Diabetes Management  Goal: Patient will achieve and maintain glucose in satisfactory range  Outcome: Progressing   The patient is Stable - Low risk of patient condition declining or worsening    Shift Goals  Clinical Goals: Safety  Patient Goals: Safety

## 2023-08-25 NOTE — PROGRESS NOTES
NURSING DAILY NOTE    Name: Jorden Bonilla   Date of Admission: 8/14/2023   Admitting Diagnosis: Ischemic stroke (HCC)  Attending Physician: Sarai Davila M.d.  Allergies: Patient has no known allergies.    Safety  Patient Assist  mod  Patient Precautions  Fall Risk  Precaution Comments  L phyllis, Zio patch, HTN, DM  Bed Transfer Status  Contact Guard Assist  Toilet Transfer Status   Contact Guard Assist  Assistive Devices  Rails, Wheelchair  Oxygen  None - Room Air  Diet/Therapeutic Dining  Current Diet Order   Procedures    Diet Order Diet: Consistent CHO (Diabetic) (cardiac and CHO); Second Modifier: (optional): Cardiac     Pill Administration  whole  Agitated Behavioral Scale  14  ABS Level of Severity  No Agitation    Fall Risk  Has the patient had a fall this admission?   No  Melani Galeano Fall Risk Scoring  11, MODERATE RISK  Fall Risk Safety Measures  bed alarm, chair alarm, poor balance, and low vision/ hearing    Vitals  Temperature: 36.3 °C (97.3 °F)  Temp src: Oral  Pulse: 89  Respiration: 18  Blood Pressure: 118/72  Blood Pressure MAP (Calculated): 87 MM HG  BP Location: Right, Upper Arm  Patient BP Position: Sitting     Oxygen  Pulse Oximetry: 91 %  O2 (LPM): 0  O2 Delivery Device: None - Room Air    Bowel and Bladder  Last Bowel Movement  08/20/23  Stool Type  Type 2: Sausage shaped, but lumpy  Bowel Device  Bathroom  Continent  Bladder: Continent void   Bowel: Continent movement  Bladder Function  Urine Void (mL): 800 ml  Number of Times Voided: 1  Urine Color: Yellow  Genitourinary Assessment   Bladder Assessment (WDL):  WDL Except  Damon Catheter: Not Applicable  Damon Care: Given with Soap and Water  Urinary Elimination: Catheter (Document on LDA)  Urine Color: Yellow  Bladder Device: Urinal, Bathroom  Bladder Scan: Post Void  $ Bladder Scan Results (mL): 103  Bladder Medications: Yes    Skin  Cooper Score   18  Sensory Interventions    Bed Types: Standard/Trauma Mattress  Skin Preventative Measures: Pillows in Use to Float Heels  Moisture Interventions  Moisturizers/Barriers: Barrier Wipes      Pain  Pain Rating Scale  5 - Interrupts some activities  Pain Location  Head  Pain Location Orientation  Posterior  Pain Interventions   Medication (see MAR)    ADLs    Bathing   Shower, Staff  Linen Change   Partial  Personal Hygiene  Moist Mireya Wipes, Perineal Care  Chlorhexidine Bath      Oral Care     Teeth/Dentures     Shave     Nutrition Percentage Eaten  *  * Meal *  *, Breakfast, Between 50-75% Consumed  Environmental Precautions  Treaded Slipper Socks on Patient, Personal Belongings, Wastebasket, Call Bell etc. in Easy Reach, Bed in Low Position  Patient Turns/Positioning  Patient Turns Self from Side to Side, Sitting Up in Wheelchair  Patient Turns Assistance/Tolerance     Bed Positions  Bed Controls On  Head of Bed Elevated  Self regulated      Psychosocial/Neurologic Assessment  Psychosocial Assessment  Psychosocial (WDL):  Within Defined Limits  Neurologic Assessment  Neuro (WDL): Exceptions to WDL  Level of Consciousness: Alert  Orientation Level: Oriented X4  Cognition: Follows commands  Speech: Clear  Facial Symmetry: Left facial drooping  LUE Sensation: Full sensation  Muscle Strength Left Arm: Weak Movement but Not Against Gravity or Resistance  LLE Sensation: Full sensation  Muscle Strength Left Leg: Weak Movement but Not Against Gravity or Resistance  EENT (WDL):  WDL Except    Cardio/Pulmonary Assessment  Edema      Respiratory Breath Sounds  RUL Breath Sounds: Clear  RML Breath Sounds: Clear  RLL Breath Sounds: Clear  SHALOM Breath Sounds: Clear  LLL Breath Sounds: Clear  Cardiac Assessment   Cardiac (WDL):  WDL Except (hx htn, cad, cabg)

## 2023-08-25 NOTE — PROGRESS NOTES
"Rehab Progress Note     Date of Service: 8/25/2023  Chief Complaint: Follow-up stroke    Interval Events (Subjective)    Patient seen and examined today in his room.  His constipation resolved with a very large and soft bowel movement this morning.  Last postvoid residuals 43.  Patient reports he is having spasms or twitching of his left leg at night that is interfering with his ability to sleep.  We discussed starting a low-dose of baclofen.  Physical therapy reporting patient needs a left off-the-shelf AFO for safe ambulation.    Patient has no other new questions, concerns, or complaints today.         Objective:  VITAL SIGNS: /65   Pulse 73   Temp 36.2 °C (97.1 °F) (Oral)   Resp 18   Ht 1.651 m (5' 5\")   Wt 63 kg (139 lb)   SpO2 100%   BMI 23.13 kg/m²   Gen: alert, no apparent distress  CV: Regular rate, regular rhythm  Resp: Clear to auscultation bilaterally  GI: Soft, nontender, active bowel sounds  Neuro: Left hemiparesis, no increased tone noted in the left upper extremity or lower extremity    Recent Results (from the past 72 hour(s))   POCT glucose device results    Collection Time: 08/22/23 11:11 AM   Result Value Ref Range    POC Glucose, Blood 157 (H) 65 - 99 mg/dL   POCT glucose device results    Collection Time: 08/22/23  5:19 PM   Result Value Ref Range    POC Glucose, Blood 169 (H) 65 - 99 mg/dL   POCT glucose device results    Collection Time: 08/22/23  8:10 PM   Result Value Ref Range    POC Glucose, Blood 167 (H) 65 - 99 mg/dL   POCT glucose device results    Collection Time: 08/23/23  8:03 AM   Result Value Ref Range    POC Glucose, Blood 179 (H) 65 - 99 mg/dL   POCT glucose device results    Collection Time: 08/23/23 11:30 AM   Result Value Ref Range    POC Glucose, Blood 140 (H) 65 - 99 mg/dL   URINALYSIS    Collection Time: 08/23/23  1:10 PM    Specimen: Urine, Clean Catch   Result Value Ref Range    Color Yellow     Character Clear     Specific Gravity 1.013 <1.035    Ph 6.5 5.0 " - 8.0    Glucose 100 (A) Negative mg/dL    Ketones Negative Negative mg/dL    Protein Negative Negative mg/dL    Bilirubin Negative Negative    Urobilinogen, Urine 0.2 Negative    Nitrite Negative Negative    Leukocyte Esterase Negative Negative    Occult Blood Negative Negative    Micro Urine Req see below    POCT glucose device results    Collection Time: 08/23/23  4:39 PM   Result Value Ref Range    POC Glucose, Blood 124 (H) 65 - 99 mg/dL   POCT glucose device results    Collection Time: 08/23/23  8:01 PM   Result Value Ref Range    POC Glucose, Blood 244 (H) 65 - 99 mg/dL   POCT glucose device results    Collection Time: 08/24/23  7:14 AM   Result Value Ref Range    POC Glucose, Blood 160 (H) 65 - 99 mg/dL   POCT glucose device results    Collection Time: 08/24/23 11:14 AM   Result Value Ref Range    POC Glucose, Blood 146 (H) 65 - 99 mg/dL   POCT glucose device results    Collection Time: 08/24/23  4:29 PM   Result Value Ref Range    POC Glucose, Blood 172 (H) 65 - 99 mg/dL   POCT glucose device results    Collection Time: 08/24/23  8:56 PM   Result Value Ref Range    POC Glucose, Blood 201 (H) 65 - 99 mg/dL   POCT glucose device results    Collection Time: 08/25/23  8:00 AM   Result Value Ref Range    POC Glucose, Blood 149 (H) 65 - 99 mg/dL       Scheduled Medications   Medication Dose Frequency    [Held by provider] senna-docusate  2 Tablet BID    And    [Held by provider] polyethylene glycol/lytes  1 Packet BID    insulin lispro  2-12 Units 4X/DAY ACHS    lidocaine  1 Patch DAILY    metFORMIN ER  500 mg BID WITH MEALS    metoprolol tartrate  12.5 mg TWICE DAILY    aspirin  81 mg Q EVENING    atorvastatin  80 mg Q EVENING    enoxaparin (LOVENOX) injection  40 mg DAILY AT 1800    tamsulosin  0.4 mg AFTER BREAKFAST       Current Diet Order   Procedures    Diet Order Diet: Consistent CHO (Diabetic) (cardiac and CHO); Second Modifier: (optional): Cardiac       Assessment:    This patient is a 58 y.o. male  admitted for acute inpatient rehabilitation with Ischemic stroke (HCC).      I, Sarai Davila M.D./MD., was present and led the interdisciplinary team conference on 8/23/2023.  I led the IDT conference and agree with the IDT conference documentation and plan of care as noted below.     Nursing:  Diet Current Diet Order   Procedures    Diet Order Diet: Consistent CHO (Diabetic) (cardiac and CHO); Second Modifier: (optional): Cardiac       Eating ADL Supervision  Set-up of equipment or meal/tube feeding   % of Last Meal  Oral Nutrition: Lunch, Between 25-50% Consumed (30%)   Sleep No issues   Bowel Last BM: 08/20/23   Bladder Post Void  82   Barriers to Discharge Home: none      Physical Therapy:  Bed Mobility    Transfers Minimal Assist  Adaptive equipment, Increased time, Set-up of equipment, Supervision for safety (SPT bed >< w/c)   Mobility Minimal Assist (min-modA with intermittent L hyperextension block)   Stairs Total Assist (Vector harness with 25lb BWS)   Barriers to Discharge Home: left hemiparesis, stairs, impaired endurance      Occupational Therapy:  Grooming Supervision, Seated   Bathing Moderate Assist   UB Dressing Stand by Assist   LB Dressing Moderate Assist   Toileting Contact Guard Assist   Shower & Tub Transfer Minimal Assist   Barriers to Discharge Home: left hemiparesis      Respiratory Therapy:  O2 (LPM): 0  O2 Delivery Device: None - Room Air    Case Management:  Continues to work on disposition and DME needs.      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  Left hemiparesis,distal > proximal   Cognitive impairment, mild  Dysphagia  Continue full rehab program  PT/OT/SLP, 1 hr each discipline, 5 days per week  8/15: SLP discontinued, PT/OT, 1.5 hr each discipline, 5 days per week    Completed aspirin and  Plavix for secondary stroke prophylaxis until 8/21, then aspirin alone    Continue aspirin and atorvastatin    Patient with cardiac monitor in place, return on 8/28    Outpatient follow-up with stroke Bridge clinic and cardiology, referrals made    Nocturnal spasticity  Mostly in the left leg  Start low-dose baclofen 5 mg    Left paraspinal muscle spasms, resolved  Heat and/or ice as needed  Continue Lidoderm patch  Continue Tylenol or Flexeril as needed     Diabetes with hyperglycemia  Uncontrolled, hemoglobin A1c 11.9  Continue metformin and sliding scale insulin  Appreciate hospitalist assistance     Hypertension  Continue metoprolol  Olmesartan discontinued  Appreciate hospitalist assistance     Hyperlipidemia  Continue atorvastatin     Sinus tachycardia, resolved  Continue metoprolol  Appreciate hospitalist assistance  Negative doppler US for DVTs     Urinary retention, improved  Hematuria, resolved  Damon removed 8/15  Continue Flomax  Monitor PVRs -82, 66  Intermittent catheterization for volumes over 400, not requiring     Coronary artery disease  With history of CABG  Continue aspirin and atorvastatin    Acute cystitis, resolved  Coag negative staph  Completed cefdinir    Constipation, resolved  KUB with marked constipation  S/p bowel cleanout  Continue scheduled Senokot MiraLAX  Last bowel movement 8/25    Iron deficiency  Positive occult stool  Outpatient follow up with GI    DVT prophylaxis  Continue Lovenox    Sarai Davila M.D.  Physical Medicine and Rehabilitation

## 2023-08-26 ENCOUNTER — APPOINTMENT (OUTPATIENT)
Dept: PHYSICAL THERAPY | Facility: REHABILITATION | Age: 58
DRG: 057 | End: 2023-08-26
Attending: PHYSICAL MEDICINE & REHABILITATION
Payer: COMMERCIAL

## 2023-08-26 LAB
GLUCOSE BLD STRIP.AUTO-MCNC: 130 MG/DL (ref 65–99)
GLUCOSE BLD STRIP.AUTO-MCNC: 144 MG/DL (ref 65–99)
GLUCOSE BLD STRIP.AUTO-MCNC: 150 MG/DL (ref 65–99)
GLUCOSE BLD STRIP.AUTO-MCNC: 183 MG/DL (ref 65–99)

## 2023-08-26 PROCEDURE — 97116 GAIT TRAINING THERAPY: CPT

## 2023-08-26 PROCEDURE — 700102 HCHG RX REV CODE 250 W/ 637 OVERRIDE(OP): Performed by: HOSPITALIST

## 2023-08-26 PROCEDURE — 700102 HCHG RX REV CODE 250 W/ 637 OVERRIDE(OP): Performed by: PHYSICAL MEDICINE & REHABILITATION

## 2023-08-26 PROCEDURE — A9270 NON-COVERED ITEM OR SERVICE: HCPCS | Performed by: HOSPITALIST

## 2023-08-26 PROCEDURE — 770010 HCHG ROOM/CARE - REHAB SEMI PRIVAT*

## 2023-08-26 PROCEDURE — 99232 SBSQ HOSP IP/OBS MODERATE 35: CPT | Performed by: PHYSICAL MEDICINE & REHABILITATION

## 2023-08-26 PROCEDURE — 97110 THERAPEUTIC EXERCISES: CPT

## 2023-08-26 PROCEDURE — A9270 NON-COVERED ITEM OR SERVICE: HCPCS | Performed by: PHYSICAL MEDICINE & REHABILITATION

## 2023-08-26 PROCEDURE — 700101 HCHG RX REV CODE 250: Performed by: PHYSICAL MEDICINE & REHABILITATION

## 2023-08-26 PROCEDURE — 99232 SBSQ HOSP IP/OBS MODERATE 35: CPT | Performed by: HOSPITALIST

## 2023-08-26 PROCEDURE — 82962 GLUCOSE BLOOD TEST: CPT | Mod: 91

## 2023-08-26 PROCEDURE — 700111 HCHG RX REV CODE 636 W/ 250 OVERRIDE (IP): Mod: JZ | Performed by: PHYSICAL MEDICINE & REHABILITATION

## 2023-08-26 RX ADMIN — BACLOFEN 5 MG: 10 TABLET ORAL at 21:12

## 2023-08-26 RX ADMIN — ATORVASTATIN CALCIUM 80 MG: 40 TABLET, FILM COATED ORAL at 21:12

## 2023-08-26 RX ADMIN — METOPROLOL TARTRATE 12.5 MG: 25 TABLET, FILM COATED ORAL at 05:23

## 2023-08-26 RX ADMIN — LIDOCAINE 1 PATCH: 700 PATCH TOPICAL at 07:54

## 2023-08-26 RX ADMIN — TAMSULOSIN HYDROCHLORIDE 0.4 MG: 0.4 CAPSULE ORAL at 07:54

## 2023-08-26 RX ADMIN — INSULIN LISPRO 2 UNITS: 100 INJECTION, SOLUTION INTRAVENOUS; SUBCUTANEOUS at 17:54

## 2023-08-26 RX ADMIN — METFORMIN HYDROCHLORIDE 500 MG: 500 TABLET, EXTENDED RELEASE ORAL at 07:54

## 2023-08-26 RX ADMIN — ASPIRIN 81 MG: 81 TABLET, COATED ORAL at 21:12

## 2023-08-26 RX ADMIN — ENOXAPARIN SODIUM 40 MG: 100 INJECTION SUBCUTANEOUS at 17:44

## 2023-08-26 RX ADMIN — METFORMIN HYDROCHLORIDE 500 MG: 500 TABLET, EXTENDED RELEASE ORAL at 17:45

## 2023-08-26 ASSESSMENT — ENCOUNTER SYMPTOMS
NAUSEA: 0
SHORTNESS OF BREATH: 0
HEADACHES: 0
HALLUCINATIONS: 0
FEVER: 0
VOMITING: 0
BLURRED VISION: 0
DIZZINESS: 0
PALPITATIONS: 0

## 2023-08-26 ASSESSMENT — PAIN DESCRIPTION - PAIN TYPE: TYPE: ACUTE PAIN

## 2023-08-26 ASSESSMENT — GAIT ASSESSMENTS
GAIT LEVEL OF ASSIST: MINIMAL ASSIST
DISTANCE (FEET): 40

## 2023-08-26 NOTE — CARE PLAN
Problem: Skin Integrity  Goal: Skin integrity is maintained or improved  Outcome: Progressing - pt turns self on own     Problem: Fall Risk - Rehab  Goal: Patient will remain free from falls  Outcome: Progressing - pt verbalized understanding

## 2023-08-26 NOTE — PROGRESS NOTES
Hospital Medicine Daily Progress Note        Chief Complaint  Hypertension  Diabetes    Interval Problem Update  No complaints.  Doing ok.    Review of Systems  Review of Systems   Constitutional:  Negative for fever.   Eyes:  Negative for blurred vision.   Respiratory:  Negative for shortness of breath.    Cardiovascular:  Negative for palpitations.   Gastrointestinal:  Negative for nausea and vomiting.   Neurological:  Negative for dizziness and headaches.   Psychiatric/Behavioral:  Negative for hallucinations.         Physical Exam  Temp:  [36.5 °C (97.7 °F)-36.7 °C (98.1 °F)] 36.7 °C (98.1 °F)  Pulse:  [62-78] 62  Resp:  [16-18] 16  BP: (105-121)/(62-72) 114/62  SpO2:  [91 %-96 %] 91 %    Physical Exam  Vitals and nursing note reviewed.   Constitutional:       General: He is not in acute distress.  HENT:      Mouth/Throat:      Mouth: Mucous membranes are moist.      Pharynx: Oropharynx is clear.   Eyes:      General: No scleral icterus.  Cardiovascular:      Rate and Rhythm: Normal rate and regular rhythm.   Pulmonary:      Effort: Pulmonary effort is normal.      Breath sounds: No wheezing or rales.   Abdominal:      General: Bowel sounds are normal.      Palpations: Abdomen is soft.   Musculoskeletal:      Cervical back: No rigidity.      Right lower leg: No edema.      Left lower leg: No edema.   Skin:     General: Skin is warm and dry.   Neurological:      Mental Status: He is alert and oriented to person, place, and time.   Psychiatric:         Mood and Affect: Mood normal.         Behavior: Behavior normal.             Laboratory                            Assessment/Plan  * Ischemic stroke (HCC)- (present on admission)  Assessment & Plan  Presented with left HP  S/P ZioPatch  Cont ASA and Lipitor  Off Plavix    UTI (urinary tract infection)  Assessment & Plan  Urine cultures show coag neg staph  Sensitivities don't mention Omnicef -- micro lab not sure it covers it  Repeat U/A neg (8/23)  S/P Omnicef  (8/24)    Primary hypertension- (present on admission)  Assessment & Plan  BP ok  Off Benicar 2nd to lower BP  Cont Lopressor  Note: on Flomax  Cont to monitor    Type 2 diabetes mellitus with hyperglycemia, without long-term current use of insulin (HCC)- (present on admission)  Assessment & Plan  Hba1c: 11.9 (8/10)  BS: 130-156 (hit 202 x 1)  Cont Metformin XR bid  Note: home meds include Metformin  mg bid, Jardiance 25 mg qd, and Trulicity 1.5 mg qwk  Consider adding another oral med  Cont to monitor    Coronary artery disease due to calcified coronary lesion: CABG ×3 in 2015- (present on admission)  Assessment & Plan  Hx of CABG  Continue ASA and Lipitor  Off Benicar 2nd to low BP  Cont Lopressor  Cont to monitor

## 2023-08-26 NOTE — PROGRESS NOTES
NURSING DAILY NOTE    Name: Jorden Bonilla  Date of Admission: 8/14/2023  Admitting Diagnosis: Ischemic stroke (HCC)  Attending Physician: Sarai Davila M.d.  Allergies: Patient has no known allergies.    Safety  Patient Assist  omin-mod assist  Patient Precautions  oFall Risk  Precaution Comments  oL phyllis, Zio patch, HTN, DM  Bed Transfer Status  oContact Guard Assist  Toilet Transfer Status  oContact Guard Assist  Assistive Devices  oRails, Wheelchair  Oxygen  oNone - Room Air  Diet/Therapeutic Dining  o  Current Diet Order   Procedures    Diet Order Diet: Consistent CHO (Diabetic) (cardiac and CHO); Second Modifier: (optional): Cardiac     Pill Administration  owhole  Agitated Behavioral Scale  o14  ABS Level of Severity  Brigido Agitation    Fall Risk  Has the patient had a fall this admission?  Brigido  Melani Galeano Fall Risk Scoring  o11, MODERATE RISK  Fall Risk Safety Measures  lauren alarm and chair alarm    Vitals  Temperature: 36.7 °C (98.1 °F)  Temp src: Oral  Pulse: 76  Respiration: 18  Blood Pressure: 107/68  Blood Pressure MAP (Calculated): 81 MM HG  BP Location: Right, Upper Arm  Patient BP Position: Supine    Oxygen  Pulse Oximetry: 96 %  O2 (LPM): 0  O2 Delivery Device: None - Room Air    Bowel and Bladder  Last Bowel Movement  o08/25/23  Stool Type  oType 6: Fluffy pieces with ragged edges, a mushy stool  Bowel Device  oBathroom  Continent  oBladder: Continent void  oBowel: Continent movement  Bladder Function  oUrine Void (mL): 750 ml  Number of Times Voided: 1  Urine Color: Yellow  Genitourinary Assessment  oBladder Assessment (WDL):  WDL Except  Damon Catheter: Not Applicable  Damon Care: Given with Soap and Water  Urinary Elimination: Catheter (Document on LDA)  Urine Color: Yellow  Bladder Device: Urinal, Bathroom  Time Void: No  Bladder Scan: Post Void  $ Bladder Scan Results (mL): 111  Bladder Medications: Yes    Skin  Cooper Score  o 18  Sensory Interventions  o Bed Types: Standard/Trauma  Mattress  Skin Preventative Measures: Pillows in Use for Support / Positioning  Moisture Interventions  oMoisturizers/Barriers: Barrier Wipes      Pain  Pain Rating Scale  o2 - Notice Pain, does not interfere with activities  Pain Location  oHead  Pain Location Orientation  oPosterior  Pain Interventions  oDeclines    ADLs    Bathing  oShower, Staff  Linen Change  oPartial  Personal Hygiene  oPerineal Care, Moist Mireya Wipes  Chlorhexidine Bath  o   Oral Care  o   Teeth/Dentures  o   Shave  o   Nutrition Percentage Eaten  o*  * Meal *  *, Lunch, Between % Consumed  Environmental Precautions  oTreaded Slipper Socks on Patient, Transferred to Stronger Side, Bed in Low Position  Patient Turns/Positioning  oPatient Turns Self from Side to Side  Patient Turns Assistance/Tolerance  o   Bed Positions  Cortney Controls On  Head of Bed Elevated  oSelf regulated      Psychosocial/Neurologic Assessment  Psychosocial Assessment  oPsychosocial (WDL):  Within Defined Limits  Neurologic Assessment  oNeuro (WDL): Exceptions to WDL  Level of Consciousness: Alert  Orientation Level: Oriented X4  Cognition: Follows commands  Speech: Clear  Facial Symmetry: Left facial drooping  Motor Function/Sensation Assessment: Sensation, Motor strength  RUE Sensation: Full sensation  Muscle Strength Right Arm: Normal Strength Against Gravity and Full Resistance  LUE Sensation: Full sensation  Muscle Strength Left Arm: Weak Movement but Not Against Gravity or Resistance  RLE Sensation: Full sensation  Muscle Strength Right Leg: Normal Strength Against Gravity and Full Resistance  LLE Sensation: Full sensation  Muscle Strength Left Leg: Fair Strength against Gravity but No Resistance  oEENT (WDL):  WDL Except    Cardio/Pulmonary Assessment  Edema  o   Respiratory Breath Sounds  oRUL Breath Sounds: Clear  RML Breath Sounds: Clear  RLL Breath Sounds: Clear  SHALOM Breath Sounds: Clear  LLL Breath Sounds: Clear  Cardiac Assessment  oCardiac (WDL):  WDL  Except

## 2023-08-26 NOTE — PROGRESS NOTES
"Rehab Progress Note     Date of Service: 8/26/2023  Chief Complaint: Follow-up stroke    Interval Events (Subjective)    Patient seen and examined today in the therapy gym.  He is on .  He did not sleep well last night due to his neighbor who was loud.  He reports he has a headache today from this.  He did not notice any decrease in the spasms or twitching in his left leg last night.    Patient has no other new questions, concerns, or complaints today.         Objective:  VITAL SIGNS: /62   Pulse 62   Temp 36.7 °C (98.1 °F) (Oral)   Resp 16   Ht 1.651 m (5' 5\")   Wt 63 kg (139 lb)   SpO2 91%   BMI 23.13 kg/m²   Gen: alert, no apparent distress  CV: Regular rate, regular rhythm  Resp: Clear to auscultation bilaterally  Neuro: Left hemiparesis    Recent Results (from the past 72 hour(s))   POCT glucose device results    Collection Time: 08/23/23 11:30 AM   Result Value Ref Range    POC Glucose, Blood 140 (H) 65 - 99 mg/dL   URINALYSIS    Collection Time: 08/23/23  1:10 PM    Specimen: Urine, Clean Catch   Result Value Ref Range    Color Yellow     Character Clear     Specific Gravity 1.013 <1.035    Ph 6.5 5.0 - 8.0    Glucose 100 (A) Negative mg/dL    Ketones Negative Negative mg/dL    Protein Negative Negative mg/dL    Bilirubin Negative Negative    Urobilinogen, Urine 0.2 Negative    Nitrite Negative Negative    Leukocyte Esterase Negative Negative    Occult Blood Negative Negative    Micro Urine Req see below    POCT glucose device results    Collection Time: 08/23/23  4:39 PM   Result Value Ref Range    POC Glucose, Blood 124 (H) 65 - 99 mg/dL   POCT glucose device results    Collection Time: 08/23/23  8:01 PM   Result Value Ref Range    POC Glucose, Blood 244 (H) 65 - 99 mg/dL   POCT glucose device results    Collection Time: 08/24/23  7:14 AM   Result Value Ref Range    POC Glucose, Blood 160 (H) 65 - 99 mg/dL   POCT glucose device results    Collection Time: 08/24/23 11:14 AM   Result Value " Ref Range    POC Glucose, Blood 146 (H) 65 - 99 mg/dL   POCT glucose device results    Collection Time: 08/24/23  4:29 PM   Result Value Ref Range    POC Glucose, Blood 172 (H) 65 - 99 mg/dL   POCT glucose device results    Collection Time: 08/24/23  8:56 PM   Result Value Ref Range    POC Glucose, Blood 201 (H) 65 - 99 mg/dL   POCT glucose device results    Collection Time: 08/25/23  8:00 AM   Result Value Ref Range    POC Glucose, Blood 149 (H) 65 - 99 mg/dL   POCT glucose device results    Collection Time: 08/25/23 11:20 AM   Result Value Ref Range    POC Glucose, Blood 202 (H) 65 - 99 mg/dL   POCT glucose device results    Collection Time: 08/25/23  5:04 PM   Result Value Ref Range    POC Glucose, Blood 156 (H) 65 - 99 mg/dL   POCT glucose device results    Collection Time: 08/25/23  8:02 PM   Result Value Ref Range    POC Glucose, Blood 161 (H) 65 - 99 mg/dL   POCT glucose device results    Collection Time: 08/26/23  7:13 AM   Result Value Ref Range    POC Glucose, Blood 130 (H) 65 - 99 mg/dL       Scheduled Medications   Medication Dose Frequency    baclofen  5 mg QHS    senna-docusate  2 Tablet BID    And    polyethylene glycol/lytes  1 Packet BID    insulin lispro  2-12 Units 4X/DAY ACHS    lidocaine  1 Patch DAILY    metFORMIN ER  500 mg BID WITH MEALS    metoprolol tartrate  12.5 mg TWICE DAILY    aspirin  81 mg Q EVENING    atorvastatin  80 mg Q EVENING    enoxaparin (LOVENOX) injection  40 mg DAILY AT 1800    tamsulosin  0.4 mg AFTER BREAKFAST       Current Diet Order   Procedures    Diet Order Diet: Consistent CHO (Diabetic) (cardiac and CHO); Second Modifier: (optional): Cardiac       Assessment:    This patient is a 58 y.o. male admitted for acute inpatient rehabilitation with Ischemic stroke (HCC).      I, Sarai Davila M.D./MD., was present and led the interdisciplinary team conference on 8/23/2023.  I led the IDT conference and agree with the IDT conference documentation and plan of care as  noted below.     Nursing:  Diet Current Diet Order   Procedures    Diet Order Diet: Consistent CHO (Diabetic) (cardiac and CHO); Second Modifier: (optional): Cardiac       Eating ADL Supervision  Set-up of equipment or meal/tube feeding   % of Last Meal  Oral Nutrition: Lunch, Between 25-50% Consumed (30%)   Sleep No issues   Bowel Last BM: 08/20/23   Bladder Post Void  82   Barriers to Discharge Home: none      Physical Therapy:  Bed Mobility    Transfers Minimal Assist  Adaptive equipment, Increased time, Set-up of equipment, Supervision for safety (SPT bed >< w/c)   Mobility Minimal Assist (min-modA with intermittent L hyperextension block)   Stairs Total Assist (Vector harness with 25lb BWS)   Barriers to Discharge Home: left hemiparesis, stairs, impaired endurance      Occupational Therapy:  Grooming Supervision, Seated   Bathing Moderate Assist   UB Dressing Stand by Assist   LB Dressing Moderate Assist   Toileting Contact Guard Assist   Shower & Tub Transfer Minimal Assist   Barriers to Discharge Home: left hemiparesis      Respiratory Therapy:  O2 (LPM): 0  O2 Delivery Device: None - Room Air    Case Management:  Continues to work on disposition and DME needs.      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  Left hemiparesis,distal > proximal   Cognitive impairment, mild  Dysphagia  Continue full rehab program  PT/OT/SLP, 1 hr each discipline, 5 days per week  8/15: SLP discontinued, PT/OT, 1.5 hr each discipline, 5 days per week    Completed aspirin and Plavix for secondary stroke prophylaxis until 8/21, then aspirin alone    Continue aspirin and atorvastatin    Patient with cardiac monitor in place, return on 8/28    Outpatient follow-up with stroke Bridge clinic and cardiology, referrals made    Nocturnal spasticity,  continues  Mostly in the left leg  Continue baclofen  Monitor for 1 more day before increasing dose    Left paraspinal muscle spasms, resolved  Heat and/or ice as needed  Continue Lidoderm patch  Continue Tylenol Flexeril as needed     Diabetes with hyperglycemia  Uncontrolled, hemoglobin A1c 11.9  Continue metformin and sliding scale insulin  Appreciate hospitalist assistance     Hypertension  Continue metoprolol  Olmesartan discontinued  Appreciate hospitalist assistance     Hyperlipidemia  Continue atorvastatin     Sinus tachycardia, resolved  Continue metoprolol  Appreciate hospitalist assistance  Negative doppler US for DVTs     Urinary retention, improved  Hematuria, resolved  Damon removed 8/15  Continue Flomax  Monitor PVRs -82, 66, 7  Intermittent catheterization for volumes over 400, not requiring     Coronary artery disease  With history of CABG  Continue aspirin atorvastatin    Acute cystitis, resolved  Coag negative staph  Completed cefdinir    Constipation, resolved  Continue Senokot and MiraLAX  Last bowel movement 8/25    Iron deficiency  Positive occult stool  Outpatient follow up with GI    DVT prophylaxis  Continue Lovenox    Sarai Davila M.D.  Physical Medicine and Rehabilitation

## 2023-08-26 NOTE — THERAPY
Physical Therapy   Daily Treatment     Patient Name: Jorden Bonilla  Age:  58 y.o., Sex:  male  Medical Record #: [id number]  Today's Date: 8/26/2023     Precautions  Precautions: Fall Risk  Comments: L phyllis, Zio patch, HTN, DM    Subjective    Patient willing to practice Nu-step for coordination and ambulation work in 30min session     Objective       08/26/23 1101   PT Charge Group   PT Gait Training (Units) 1   PT Therapeutic Exercise (Units) 1   PT Total Time Spent   PT Individual Total Time Spent (Mins) 30   Precautions   Precautions Fall Risk   Gait Functional Level of Assist    Gait Level Of Assist Minimal Assist   Assistive Device   (R LBQC with L AFO and givmohr sling)   Distance (Feet) 40   Sitting Lower Body Exercises   Nustep Resistance Level 3  (10min, right ACE wrap, gait belt around LE for alignment)   Interdisciplinary Plan of Care Collaboration   IDT Collaboration with  Nursing   Collaboration Comments med pass       Assessment  Patient able to ambulate 40ft right LBQC  CGA but requires increase to min assist with turning steps using LBQC    Strengths: Able to follow instructions, Alert and oriented, Effective communication skills, Good carryover of learning, Good insight into deficits/needs, Independent prior level of function, Making steady progress towards goals, Motivated for self care and independence, Pleasant and cooperative, Supportive family, Willingly participates in therapeutic activities  Barriers: Bladder retention, Decreased endurance, Hemiparesis, Impaired activity tolerance, Impaired balance, Language barrier, non-fluent English, Limited mobility    Plan    mobility with phyllis techniques, transfer training, gait training with LBQC vs. SBQC and carbon fiber AFO with stockinette, standing balance, neuro re-ed for L hemibody strengthening, Bioness, lite-gait BWSTT, family training/education.      LLE  therapy tech program 2x/week     Passport items to be completed:  Get in/out of  bed safely, in/out of a vehicle, safely use mobility device, walk or wheel around home/community, navigate up and down stairs, show how to get up/down from the ground, ensure home is accessible, demonstrate HEP, complete caregiver training    Physical Therapy Problems (Active)       Problem: Mobility       Dates: Start:  08/15/23         Goal: STG-Within one week, patient will ambulate household distance 50 ft with HW and CGA.       Dates: Start:  08/15/23    Expected End:  09/05/23            Goal: STG-Within one week, patient will ambulate up/down a curb with HW and min A.       Dates: Start:  08/15/23    Expected End:  09/05/23               Problem: Mobility Transfers       Dates: Start:  08/15/23         Goal: STG-Within one week, patient will perform bed mobility with SPV consistently.       Dates: Start:  08/15/23    Expected End:  09/05/23            Goal: STG-Within one week, patient will transfer bed to chair stand pivot with HW and CGA.       Dates: Start:  08/15/23    Expected End:  09/05/23               Problem: PT-Long Term Goals       Dates: Start:  08/15/23         Goal: LTG-By discharge, patient will ambulate 150 ft with QC vs SPC and SBA.       Dates: Start:  08/15/23    Expected End:  09/05/23            Goal: LTG-By discharge, patient will transfer one surface to another with QC vs SPC and SPV.       Dates: Start:  08/15/23    Expected End:  09/05/23            Goal: LTG-By discharge, patient will ambulate up/down a curb with QC vs SPC and SBA.       Dates: Start:  08/15/23    Expected End:  09/05/23            Goal: LTG-By discharge, patient will transfer in/out of a car with QC vs SPC and SBA after setup.       Dates: Start:  08/15/23    Expected End:  09/05/23            Goal: LTG-By discharge, patient will ambulate up/down a ramp with QC vs SPC and CGA to simulate home entrance.       Dates: Start:  08/15/23    Expected End:  09/05/23

## 2023-08-26 NOTE — CARE PLAN
The patient is Stable - Low risk of patient condition declining or worsening    Shift Goals  Clinical Goals: Safety  Patient Goals: sleep, safety    Progress made toward(s) clinical / shift goals:    Problem: Fall Risk - Rehab  Goal: Patient will remain free from falls  Outcome: Progressing. Patient bed in lowest locked position with bed alarm on and call light within reach. Patient has not been impulsive over shift. Patient calls appropriately with call light for needs.      Problem: Bladder / Voiding  Goal: Patient will establish and maintain regular urinary output  Outcome: Progressing. Patient continent of bladder over shift, uses urinal when in bed, denies pain or burning with urination.

## 2023-08-27 ENCOUNTER — APPOINTMENT (OUTPATIENT)
Dept: PHYSICAL THERAPY | Facility: REHABILITATION | Age: 58
DRG: 057 | End: 2023-08-27
Attending: PHYSICAL MEDICINE & REHABILITATION
Payer: COMMERCIAL

## 2023-08-27 LAB
GLUCOSE BLD STRIP.AUTO-MCNC: 115 MG/DL (ref 65–99)
GLUCOSE BLD STRIP.AUTO-MCNC: 146 MG/DL (ref 65–99)
GLUCOSE BLD STRIP.AUTO-MCNC: 163 MG/DL (ref 65–99)
GLUCOSE BLD STRIP.AUTO-MCNC: 176 MG/DL (ref 65–99)

## 2023-08-27 PROCEDURE — 99232 SBSQ HOSP IP/OBS MODERATE 35: CPT | Performed by: PHYSICAL MEDICINE & REHABILITATION

## 2023-08-27 PROCEDURE — 700101 HCHG RX REV CODE 250: Performed by: PHYSICAL MEDICINE & REHABILITATION

## 2023-08-27 PROCEDURE — 99232 SBSQ HOSP IP/OBS MODERATE 35: CPT | Performed by: HOSPITALIST

## 2023-08-27 PROCEDURE — 700102 HCHG RX REV CODE 250 W/ 637 OVERRIDE(OP): Performed by: HOSPITALIST

## 2023-08-27 PROCEDURE — A9270 NON-COVERED ITEM OR SERVICE: HCPCS | Performed by: HOSPITALIST

## 2023-08-27 PROCEDURE — 82962 GLUCOSE BLOOD TEST: CPT | Mod: 91

## 2023-08-27 PROCEDURE — A9270 NON-COVERED ITEM OR SERVICE: HCPCS | Performed by: PHYSICAL MEDICINE & REHABILITATION

## 2023-08-27 PROCEDURE — 770010 HCHG ROOM/CARE - REHAB SEMI PRIVAT*

## 2023-08-27 PROCEDURE — 700111 HCHG RX REV CODE 636 W/ 250 OVERRIDE (IP): Mod: JZ | Performed by: PHYSICAL MEDICINE & REHABILITATION

## 2023-08-27 PROCEDURE — 700102 HCHG RX REV CODE 250 W/ 637 OVERRIDE(OP): Performed by: PHYSICAL MEDICINE & REHABILITATION

## 2023-08-27 RX ORDER — BACLOFEN 10 MG/1
10 TABLET ORAL
Status: DISCONTINUED | OUTPATIENT
Start: 2023-08-27 | End: 2023-08-31

## 2023-08-27 RX ADMIN — INSULIN LISPRO 2 UNITS: 100 INJECTION, SOLUTION INTRAVENOUS; SUBCUTANEOUS at 11:13

## 2023-08-27 RX ADMIN — ENOXAPARIN SODIUM 40 MG: 100 INJECTION SUBCUTANEOUS at 17:09

## 2023-08-27 RX ADMIN — BACLOFEN 10 MG: 10 TABLET ORAL at 21:24

## 2023-08-27 RX ADMIN — ATORVASTATIN CALCIUM 80 MG: 40 TABLET, FILM COATED ORAL at 21:25

## 2023-08-27 RX ADMIN — INSULIN LISPRO 2 UNITS: 100 INJECTION, SOLUTION INTRAVENOUS; SUBCUTANEOUS at 17:06

## 2023-08-27 RX ADMIN — METOPROLOL TARTRATE 12.5 MG: 25 TABLET, FILM COATED ORAL at 05:58

## 2023-08-27 RX ADMIN — LIDOCAINE 1 PATCH: 700 PATCH TOPICAL at 08:27

## 2023-08-27 RX ADMIN — ASPIRIN 81 MG: 81 TABLET, COATED ORAL at 21:25

## 2023-08-27 RX ADMIN — METOPROLOL TARTRATE 12.5 MG: 25 TABLET, FILM COATED ORAL at 17:09

## 2023-08-27 RX ADMIN — TAMSULOSIN HYDROCHLORIDE 0.4 MG: 0.4 CAPSULE ORAL at 08:27

## 2023-08-27 RX ADMIN — METFORMIN HYDROCHLORIDE 500 MG: 500 TABLET, EXTENDED RELEASE ORAL at 08:27

## 2023-08-27 RX ADMIN — METFORMIN HYDROCHLORIDE 500 MG: 500 TABLET, EXTENDED RELEASE ORAL at 17:09

## 2023-08-27 RX ADMIN — SITAGLIPTIN 25 MG: 50 TABLET, FILM COATED ORAL at 13:59

## 2023-08-27 ASSESSMENT — PATIENT HEALTH QUESTIONNAIRE - PHQ9
2. FEELING DOWN, DEPRESSED, IRRITABLE, OR HOPELESS: NOT AT ALL
SUM OF ALL RESPONSES TO PHQ9 QUESTIONS 1 AND 2: 0
1. LITTLE INTEREST OR PLEASURE IN DOING THINGS: NOT AT ALL
2. FEELING DOWN, DEPRESSED, IRRITABLE, OR HOPELESS: NOT AT ALL
1. LITTLE INTEREST OR PLEASURE IN DOING THINGS: NOT AT ALL
SUM OF ALL RESPONSES TO PHQ9 QUESTIONS 1 AND 2: 0

## 2023-08-27 ASSESSMENT — ENCOUNTER SYMPTOMS
DIARRHEA: 0
COUGH: 0
FEVER: 0
DIZZINESS: 0
BLURRED VISION: 0
NERVOUS/ANXIOUS: 0

## 2023-08-27 ASSESSMENT — PAIN DESCRIPTION - PAIN TYPE: TYPE: ACUTE PAIN

## 2023-08-27 NOTE — CARE PLAN
"The patient is Stable - Low risk of patient condition declining or worsening    Shift Goals  Clinical Goals: Safety  Patient Goals: sleep, safety    Problem: Skin Integrity  Goal: Skin integrity is maintained or improved  Outcome: Progressing  Note:   Cooper Score: 18    Patient's skin remains intact and free from new or accidental injury this shift; no s/s of infection. RN wound protocol checked. Encouraged hydration and educated about the importance of nutrition to keep skin integrity. Will continue to monitor.       Problem: Fall Risk - Rehab  Goal: Patient will remain free from falls  Outcome: Progressing  Note: Melani Galeano Fall risk Assessment Score: 11    Moderate fall risk Interventions  - Bed and strip alarm   - Yellow sign by the door   - Yellow wrist band \"Fall risk\"  - Room near to the nurse station  - Do not leave patient unattended in the bathroom  - Fall risk education provided       "

## 2023-08-27 NOTE — PROGRESS NOTES
Hospital Medicine Daily Progress Note        Chief Complaint  Hypertension  Diabetes    Interval Problem Update  No complaints.  Doing ok.    Review of Systems  Review of Systems   Constitutional:  Negative for fever.   Eyes:  Negative for blurred vision.   Respiratory:  Negative for cough.    Cardiovascular:  Negative for chest pain.   Gastrointestinal:  Negative for diarrhea.   Musculoskeletal:  Negative for joint pain.   Neurological:  Negative for dizziness.   Psychiatric/Behavioral:  The patient is not nervous/anxious.         Physical Exam  Temp:  [36.2 °C (97.1 °F)-36.8 °C (98.2 °F)] 36.3 °C (97.3 °F)  Pulse:  [75-90] 75  Resp:  [16-18] 16  BP: ()/(57-79) 115/73  SpO2:  [94 %-96 %] 96 %    Physical Exam  Vitals and nursing note reviewed.   Constitutional:       Appearance: He is not diaphoretic.   HENT:      Mouth/Throat:      Pharynx: No oropharyngeal exudate or posterior oropharyngeal erythema.   Eyes:      Extraocular Movements: Extraocular movements intact.   Neck:      Vascular: No carotid bruit.   Cardiovascular:      Rate and Rhythm: Normal rate and regular rhythm.   Pulmonary:      Effort: Pulmonary effort is normal.      Breath sounds: No wheezing or rales.   Abdominal:      General: There is no distension.      Palpations: Abdomen is soft.      Tenderness: There is no abdominal tenderness.   Musculoskeletal:      Right lower leg: No edema.      Left lower leg: No edema.   Skin:     General: Skin is warm and dry.   Neurological:      Mental Status: He is alert and oriented to person, place, and time.   Psychiatric:         Mood and Affect: Mood normal.         Behavior: Behavior normal.             Laboratory                            Assessment/Plan  * Ischemic stroke (HCC)- (present on admission)  Assessment & Plan  Presented with left HP  S/P ZioPatch  Cont ASA and Lipitor  Off Plavix    UTI (urinary tract infection)  Assessment & Plan  Urine cultures show coag neg staph  Sensitivities  don't mention Omnicef -- micro lab not sure it covers it  Repeat U/A neg (8/23)  S/P Omnicef (8/24)    Primary hypertension- (present on admission)  Assessment & Plan  BP ok  Off Benicar 2nd to lower BP  Cont Lopressor  Note: on Flomax  Cont to monitor    Type 2 diabetes mellitus with hyperglycemia, without long-term current use of insulin (HCC)- (present on admission)  Assessment & Plan  Hba1c: 11.9 (8/10)  BS: 130-183  Cont Metformin XR bid  Will start Januvia  Note: home meds include Metformin  mg bid, Jardiance 25 mg qd, and Trulicity 1.5 mg qwk  Cont to monitor    Coronary artery disease due to calcified coronary lesion: CABG ×3 in 2015- (present on admission)  Assessment & Plan  Hx of CABG  Continue ASA and Lipitor  Off Benicar 2nd to low BP  Cont Lopressor  Cont to monitor

## 2023-08-27 NOTE — PROGRESS NOTES
NURSING DAILY NOTE    Name: Jorden Bonilla   Date of Admission: 8/14/2023   Admitting Diagnosis: Ischemic stroke (HCC)  Attending Physician: Sarai Davila M.d.  Allergies: Patient has no known allergies.    Safety  Patient Assist  min-mod assist  Patient Precautions  Fall Risk  Precaution Comments  L phyllis, Zio patch, HTN, DM  Bed Transfer Status  Contact Guard Assist  Toilet Transfer Status   Contact Guard Assist  Assistive Devices  Rails, Wheelchair  Oxygen  None - Room Air  Diet/Therapeutic Dining  Current Diet Order   Procedures    Diet Order Diet: Consistent CHO (Diabetic) (cardiac and CHO); Second Modifier: (optional): Cardiac     Pill Administration  whole  Agitated Behavioral Scale  14  ABS Level of Severity  No Agitation    Fall Risk  Has the patient had a fall this admission?   No  Melani Galeano Fall Risk Scoring  11, MODERATE RISK  Fall Risk Safety Measures  bed alarm and chair alarm    Vitals  Temperature: 36.8 °C (98.2 °F)  Temp src: Oral  Pulse: 78  Respiration: 18  Blood Pressure: 113/77  Blood Pressure MAP (Calculated): 89 MM HG  BP Location: Right, Upper Arm  Patient BP Position: Supine     Oxygen  Pulse Oximetry: 95 %  O2 (LPM): 0  O2 Delivery Device: None - Room Air    Bowel and Bladder  Last Bowel Movement  08/26/23  Stool Type  Type 5: Soft blob with clear cut edges (passed easily)  Bowel Device  Bathroom  Continent  Bladder: Continent void   Bowel: Continent movement  Bladder Function  Urine Void (mL):  (LARGE)  Number of Times Voided: 1  Urine Color: Yellow  Genitourinary Assessment   Bladder Assessment (WDL):  WDL Except  Damno Catheter: Not Applicable  Damon Care: Given with Soap and Water  Urinary Elimination: Catheter (Document on LDA)  Urine Color: Yellow  Bladder Device: Urinal, Bathroom  Time Void: No  Bladder Scan: Post Void  $ Bladder Scan Results (mL): 28  Bladder Medications: Yes    Skin  Cooper Score   18  Sensory  Interventions   Bed Types: Standard/Trauma Mattress  Skin Preventative Measures: Pillows in Use for Support / Positioning  Moisture Interventions  Moisturizers/Barriers: Barrier Paste      Pain  Pain Rating Scale  0 - No Pain  Pain Location  Head  Pain Location Orientation  Posterior  Pain Interventions   Declines    ADLs    Bathing   Shower, Staff  Linen Change   Partial  Personal Hygiene  Perineal Care, Moist Mireya Wipes  Chlorhexidine Bath      Oral Care     Teeth/Dentures     Shave     Nutrition Percentage Eaten  *  * Meal *  *, Lunch, Less than 25% Consumed  Environmental Precautions  Treaded Slipper Socks on Patient  Patient Turns/Positioning  Patient Turns Self from Side to Side  Patient Turns Assistance/Tolerance     Bed Positions  Bed Controls On  Head of Bed Elevated  Self regulated      Psychosocial/Neurologic Assessment  Psychosocial Assessment  Psychosocial (WDL):  Within Defined Limits  Neurologic Assessment  Neuro (WDL): Exceptions to WDL  Level of Consciousness: Alert  Orientation Level: Oriented X4  Cognition: Follows commands  Speech: Clear  Facial Symmetry: Left facial drooping  Motor Function/Sensation Assessment: Sensation, Motor strength  RUE Sensation: Full sensation  Muscle Strength Right Arm: Normal Strength Against Gravity and Full Resistance  LUE Sensation: Full sensation  Muscle Strength Left Arm: Weak Movement but Not Against Gravity or Resistance  RLE Sensation: Full sensation  Muscle Strength Right Leg: Normal Strength Against Gravity and Full Resistance  LLE Sensation: Full sensation  Muscle Strength Left Leg: Fair Strength against Gravity but No Resistance  EENT (WDL):  WDL Except    Cardio/Pulmonary Assessment  Edema      Respiratory Breath Sounds  RUL Breath Sounds: Clear  RML Breath Sounds: Clear  RLL Breath Sounds: Clear  SHALOM Breath Sounds: Clear  LLL Breath Sounds: Clear  Cardiac Assessment   Cardiac (WDL):  WDL Except (True)

## 2023-08-27 NOTE — CARE PLAN
The patient is Stable - Low risk of patient condition declining or worsening    Shift Goals  Clinical Goals: safety  Patient Goals: sleep, safety    Progress made toward(s) clinical / shift goals:      Problem: Knowledge Deficit - Standard  Goal: Patient and family/care givers will demonstrate understanding of plan of care, disease process/condition, diagnostic tests and medications  Outcome: Progressing     Problem: Fall Risk - Rehab  Goal: Patient will remain free from falls  Outcome: Progressing     Problem: Pain - Standard  Goal: Alleviation of pain or a reduction in pain to the patient’s comfort goal  Outcome: Progressing       Patient is not progressing towards the following goals:

## 2023-08-27 NOTE — PROGRESS NOTES
NURSING DAILY NOTE    Name: Jorden Bonilla   Date of Admission: 8/14/2023   Admitting Diagnosis: Ischemic stroke (HCC)  Attending Physician: Sarai Davila M.d.  Allergies: Patient has no known allergies.    Safety  Patient Assist  min-mod assist  Patient Precautions  Fall Risk  Precaution Comments  L phyllis, Zio patch, HTN, DM  Bed Transfer Status  Contact Guard Assist  Toilet Transfer Status   Contact Guard Assist  Assistive Devices  Rails, Wheelchair  Oxygen  None - Room Air  Diet/Therapeutic Dining  Current Diet Order   Procedures    Diet Order Diet: Consistent CHO (Diabetic) (cardiac and CHO); Second Modifier: (optional): Cardiac     Pill Administration  whole  Agitated Behavioral Scale  14  ABS Level of Severity  No Agitation    Fall Risk  Has the patient had a fall this admission?   No  Melani Galeano Fall Risk Scoring  11, MODERATE RISK  Fall Risk Safety Measures  bed alarm and chair alarm    Vitals  Temperature: 36.2 °C (97.1 °F)  Temp src: Oral  Pulse: 90  Respiration: 18  Blood Pressure: 95/64  Blood Pressure MAP (Calculated): 74 MM HG  BP Location: Right, Upper Arm  Patient BP Position: Lying Left Side     Oxygen  Pulse Oximetry: 94 %  O2 (LPM): 0  O2 Delivery Device: None - Room Air    Bowel and Bladder  Last Bowel Movement  08/25/23 (per patient report)  Stool Type  Type 6: Fluffy pieces with ragged edges, a mushy stool  Bowel Device  Bathroom  Continent  Bladder: Continent void   Bowel: Continent movement  Bladder Function  Urine Void (mL):  (large)  Number of Times Voided: 1  Urine Color: Yellow  Genitourinary Assessment   Bladder Assessment (WDL):  WDL Except  Damon Catheter: Not Applicable  Damon Care: Given with Soap and Water  Urinary Elimination: Catheter (Document on LDA)  Urine Color: Yellow  Bladder Device: Urinal, Bathroom  Time Void: No  Bladder Scan: Post Void  $ Bladder Scan Results (mL): 7  Bladder Medications:  Yes    Skin  Cooper Score   18  Sensory Interventions   Bed Types: Standard/Trauma Mattress  Skin Preventative Measures: Pillows in Use for Support / Positioning  Moisture Interventions  Moisturizers/Barriers: Barrier Wipes      Pain  Pain Rating Scale  2 - Notice Pain, does not interfere with activities  Pain Location  Head  Pain Location Orientation  Posterior  Pain Interventions   Declines    ADLs    Bathing   Shower, Staff  Linen Change   Partial  Personal Hygiene  Perineal Care, Moist Mireya Wipes  Chlorhexidine Bath      Oral Care     Teeth/Dentures     Shave     Nutrition Percentage Eaten  *  * Meal *  *, Lunch, Less than 25% Consumed  Environmental Precautions  Treaded Slipper Socks on Patient, Transferred to Stronger Side, Bed in Low Position  Patient Turns/Positioning  Patient Turns Self from Side to Side  Patient Turns Assistance/Tolerance     Bed Positions  Bed Controls On  Head of Bed Elevated  Self regulated      Psychosocial/Neurologic Assessment  Psychosocial Assessment  Psychosocial (WDL):  Within Defined Limits  Neurologic Assessment  Neuro (WDL): Exceptions to WDL  Level of Consciousness: Alert  Orientation Level: Oriented X4  Cognition: Follows commands  Speech: Clear  Facial Symmetry: Left facial drooping  Motor Function/Sensation Assessment: Sensation, Motor strength  RUE Sensation: Full sensation  Muscle Strength Right Arm: Normal Strength Against Gravity and Full Resistance  LUE Sensation: Full sensation  Muscle Strength Left Arm: Weak Movement but Not Against Gravity or Resistance  RLE Sensation: Full sensation  Muscle Strength Right Leg: Normal Strength Against Gravity and Full Resistance  LLE Sensation: Full sensation  Muscle Strength Left Leg: Fair Strength against Gravity but No Resistance  EENT (WDL):  WDL Except    Cardio/Pulmonary Assessment  Edema      Respiratory Breath Sounds  RUL Breath Sounds: Clear  RML Breath Sounds: Clear  RLL Breath Sounds: Clear  SHALOM Breath Sounds: Clear  LLL  Breath Sounds: Clear  Cardiac Assessment   Cardiac (WDL):  WDL Except ([place])

## 2023-08-27 NOTE — PROGRESS NOTES
"Rehab Progress Note     Date of Service: 8/27/2023  Chief Complaint: Follow-up stroke    Interval Events (Subjective)    Patient seen and examined today in his room.  He was moved into a new room and so slept very well last night.  However he reports he woke up at 1 AM with spasms in his left leg.  He is agreeable to increasing the baclofen.  He denies any pain.  He still has not had any return of movement of the left hand.  Last postvoid residual was 28.  Patient moved his bowels this morning.    Patient has no other new questions, concerns, or complaints today.         Objective:  VITAL SIGNS: /73   Pulse 75   Temp 36.3 °C (97.3 °F) (Temporal)   Resp 16   Ht 1.651 m (5' 5\")   Wt 63 kg (139 lb)   SpO2 96%   BMI 23.13 kg/m²   Gen: alert, no apparent distress  CV: Regular rate, regular rhythm  Resp: Clear to auscultation bilaterally  Neuro: Left hemiparesis    Recent Results (from the past 72 hour(s))   POCT glucose device results    Collection Time: 08/24/23  4:29 PM   Result Value Ref Range    POC Glucose, Blood 172 (H) 65 - 99 mg/dL   POCT glucose device results    Collection Time: 08/24/23  8:56 PM   Result Value Ref Range    POC Glucose, Blood 201 (H) 65 - 99 mg/dL   POCT glucose device results    Collection Time: 08/25/23  8:00 AM   Result Value Ref Range    POC Glucose, Blood 149 (H) 65 - 99 mg/dL   POCT glucose device results    Collection Time: 08/25/23 11:20 AM   Result Value Ref Range    POC Glucose, Blood 202 (H) 65 - 99 mg/dL   POCT glucose device results    Collection Time: 08/25/23  5:04 PM   Result Value Ref Range    POC Glucose, Blood 156 (H) 65 - 99 mg/dL   POCT glucose device results    Collection Time: 08/25/23  8:02 PM   Result Value Ref Range    POC Glucose, Blood 161 (H) 65 - 99 mg/dL   POCT glucose device results    Collection Time: 08/26/23  7:13 AM   Result Value Ref Range    POC Glucose, Blood 130 (H) 65 - 99 mg/dL   POCT glucose device results    Collection Time: 08/26/23 " 11:22 AM   Result Value Ref Range    POC Glucose, Blood 150 (H) 65 - 99 mg/dL   POCT glucose device results    Collection Time: 08/26/23  5:09 PM   Result Value Ref Range    POC Glucose, Blood 183 (H) 65 - 99 mg/dL   POCT glucose device results    Collection Time: 08/26/23  9:11 PM   Result Value Ref Range    POC Glucose, Blood 144 (H) 65 - 99 mg/dL   POCT glucose device results    Collection Time: 08/27/23  7:26 AM   Result Value Ref Range    POC Glucose, Blood 146 (H) 65 - 99 mg/dL   POCT glucose device results    Collection Time: 08/27/23 11:12 AM   Result Value Ref Range    POC Glucose, Blood 176 (H) 65 - 99 mg/dL       Scheduled Medications   Medication Dose Frequency    baclofen  10 mg QHS    SITagliptin  25 mg DAILY    senna-docusate  2 Tablet BID    And    polyethylene glycol/lytes  1 Packet BID    insulin lispro  2-12 Units 4X/DAY ACHS    lidocaine  1 Patch DAILY    metFORMIN ER  500 mg BID WITH MEALS    metoprolol tartrate  12.5 mg TWICE DAILY    aspirin  81 mg Q EVENING    atorvastatin  80 mg Q EVENING    enoxaparin (LOVENOX) injection  40 mg DAILY AT 1800    tamsulosin  0.4 mg AFTER BREAKFAST       Current Diet Order   Procedures    Diet Order Diet: Consistent CHO (Diabetic) (cardiac and CHO); Second Modifier: (optional): Cardiac       Assessment:    This patient is a 58 y.o. male admitted for acute inpatient rehabilitation with Ischemic stroke (HCC).      I, Sarai Davila M.D./MD., was present and led the interdisciplinary team conference on 8/23/2023.  I led the IDT conference and agree with the IDT conference documentation and plan of care as noted below.     Nursing:  Diet Current Diet Order   Procedures    Diet Order Diet: Consistent CHO (Diabetic) (cardiac and CHO); Second Modifier: (optional): Cardiac       Eating ADL Supervision  Set-up of equipment or meal/tube feeding   % of Last Meal  Oral Nutrition: Lunch, Between 25-50% Consumed (30%)   Sleep No issues   Bowel Last BM: 08/20/23    Bladder Post Void  82   Barriers to Discharge Home: none      Physical Therapy:  Bed Mobility    Transfers Minimal Assist  Adaptive equipment, Increased time, Set-up of equipment, Supervision for safety (SPT bed >< w/c)   Mobility Minimal Assist (min-modA with intermittent L hyperextension block)   Stairs Total Assist (Vector harness with 25lb BWS)   Barriers to Discharge Home: left hemiparesis, stairs, impaired endurance      Occupational Therapy:  Grooming Supervision, Seated   Bathing Moderate Assist   UB Dressing Stand by Assist   LB Dressing Moderate Assist   Toileting Contact Guard Assist   Shower & Tub Transfer Minimal Assist   Barriers to Discharge Home: left hemiparesis      Respiratory Therapy:  O2 (LPM): 0  O2 Delivery Device: None - Room Air    Case Management:  Continues to work on disposition and DME needs.      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  Left hemiparesis,distal > proximal   Cognitive impairment, mild  Dysphagia  Continue full rehab program  PT/OT/SLP, 1 hr each discipline, 5 days per week  8/15: SLP discontinued, PT/OT, 1.5 hr each discipline, 5 days per week    Completed aspirin and Plavix for secondary stroke prophylaxis until 8/21, then aspirin alone    Continue aspirin atorvastatin    Patient with cardiac monitor in place, will return tomorrow, then patient can have aqua therapy    Outpatient follow-up with stroke Bridge clinic and cardiology, referrals made    Nocturnal spasticity, continues  Mostly in the left leg  Increase baclofen    Left paraspinal muscle spasms, resolved  Heat and/or ice as needed  Continue Lidoderm patch  Continue Tylenol and Flexeril as needed     Diabetes with hyperglycemia  Uncontrolled, hemoglobin A1c 11.9  Continue metformin  Started on Januvia continue sliding scale  insulin  Appreciate hospitalist assistance     Hypertension  Continue metoprolol  Olmesartan discontinued  Appreciate hospitalist assistance     Hyperlipidemia  Continue atorvastatin     Sinus tachycardia, resolved  Continue metoprolol  Appreciate hospitalist assistance  Negative doppler US for DVTs     Urinary retention, resolved  Hematuria, resolved  Damon removed 8/15  Continue Flomax  Monitor PVRs -82, 66, 7, 28  Intermittent catheterization for volumes over 400, not requiring     Coronary artery disease  With history of CABG  Continue aspirin and atorvastatin    Acute cystitis, resolved  Coag negative staph  Completed cefdinir    Constipation, resolved  Continue Senokot MiraLAX  Last bowel movement 8/27    Iron deficiency  Positive occult stool  Outpatient follow up with GI    DVT prophylaxis  Continue Lovenox    Sarai Davila M.D.  Physical Medicine and Rehabilitation

## 2023-08-28 ENCOUNTER — APPOINTMENT (OUTPATIENT)
Dept: PHYSICAL THERAPY | Facility: REHABILITATION | Age: 58
DRG: 057 | End: 2023-08-28
Attending: PHYSICAL MEDICINE & REHABILITATION
Payer: COMMERCIAL

## 2023-08-28 ENCOUNTER — APPOINTMENT (OUTPATIENT)
Dept: OCCUPATIONAL THERAPY | Facility: REHABILITATION | Age: 58
DRG: 057 | End: 2023-08-28
Attending: PHYSICAL MEDICINE & REHABILITATION
Payer: COMMERCIAL

## 2023-08-28 LAB
GLUCOSE BLD STRIP.AUTO-MCNC: 115 MG/DL (ref 65–99)
GLUCOSE BLD STRIP.AUTO-MCNC: 126 MG/DL (ref 65–99)
GLUCOSE BLD STRIP.AUTO-MCNC: 177 MG/DL (ref 65–99)
GLUCOSE BLD STRIP.AUTO-MCNC: 221 MG/DL (ref 65–99)

## 2023-08-28 PROCEDURE — 700111 HCHG RX REV CODE 636 W/ 250 OVERRIDE (IP): Mod: JZ | Performed by: PHYSICAL MEDICINE & REHABILITATION

## 2023-08-28 PROCEDURE — 770010 HCHG ROOM/CARE - REHAB SEMI PRIVAT*

## 2023-08-28 PROCEDURE — 700102 HCHG RX REV CODE 250 W/ 637 OVERRIDE(OP): Performed by: HOSPITALIST

## 2023-08-28 PROCEDURE — 97116 GAIT TRAINING THERAPY: CPT

## 2023-08-28 PROCEDURE — A9270 NON-COVERED ITEM OR SERVICE: HCPCS | Performed by: HOSPITALIST

## 2023-08-28 PROCEDURE — 82962 GLUCOSE BLOOD TEST: CPT | Mod: 91

## 2023-08-28 PROCEDURE — 97112 NEUROMUSCULAR REEDUCATION: CPT

## 2023-08-28 PROCEDURE — A9270 NON-COVERED ITEM OR SERVICE: HCPCS | Performed by: PHYSICAL MEDICINE & REHABILITATION

## 2023-08-28 PROCEDURE — 700101 HCHG RX REV CODE 250: Performed by: PHYSICAL MEDICINE & REHABILITATION

## 2023-08-28 PROCEDURE — 97110 THERAPEUTIC EXERCISES: CPT

## 2023-08-28 PROCEDURE — 97530 THERAPEUTIC ACTIVITIES: CPT

## 2023-08-28 PROCEDURE — 700102 HCHG RX REV CODE 250 W/ 637 OVERRIDE(OP): Performed by: PHYSICAL MEDICINE & REHABILITATION

## 2023-08-28 PROCEDURE — 99232 SBSQ HOSP IP/OBS MODERATE 35: CPT | Performed by: HOSPITALIST

## 2023-08-28 PROCEDURE — 99232 SBSQ HOSP IP/OBS MODERATE 35: CPT | Performed by: PHYSICAL MEDICINE & REHABILITATION

## 2023-08-28 RX ADMIN — INSULIN LISPRO 4 UNITS: 100 INJECTION, SOLUTION INTRAVENOUS; SUBCUTANEOUS at 11:10

## 2023-08-28 RX ADMIN — METOPROLOL TARTRATE 12.5 MG: 25 TABLET, FILM COATED ORAL at 17:23

## 2023-08-28 RX ADMIN — ENOXAPARIN SODIUM 40 MG: 100 INJECTION SUBCUTANEOUS at 17:23

## 2023-08-28 RX ADMIN — INSULIN LISPRO 2 UNITS: 100 INJECTION, SOLUTION INTRAVENOUS; SUBCUTANEOUS at 17:19

## 2023-08-28 RX ADMIN — ATORVASTATIN CALCIUM 80 MG: 40 TABLET, FILM COATED ORAL at 20:48

## 2023-08-28 RX ADMIN — METFORMIN HYDROCHLORIDE 500 MG: 500 TABLET, EXTENDED RELEASE ORAL at 17:23

## 2023-08-28 RX ADMIN — METFORMIN HYDROCHLORIDE 500 MG: 500 TABLET, EXTENDED RELEASE ORAL at 08:46

## 2023-08-28 RX ADMIN — BACLOFEN 10 MG: 10 TABLET ORAL at 20:48

## 2023-08-28 RX ADMIN — METOPROLOL TARTRATE 12.5 MG: 25 TABLET, FILM COATED ORAL at 05:45

## 2023-08-28 RX ADMIN — TAMSULOSIN HYDROCHLORIDE 0.4 MG: 0.4 CAPSULE ORAL at 08:46

## 2023-08-28 RX ADMIN — SITAGLIPTIN 25 MG: 50 TABLET, FILM COATED ORAL at 08:46

## 2023-08-28 RX ADMIN — ASPIRIN 81 MG: 81 TABLET, COATED ORAL at 20:48

## 2023-08-28 RX ADMIN — ACETAMINOPHEN 650 MG: 325 TABLET ORAL at 20:57

## 2023-08-28 RX ADMIN — LIDOCAINE 1 PATCH: 700 PATCH TOPICAL at 08:46

## 2023-08-28 ASSESSMENT — ENCOUNTER SYMPTOMS
CHILLS: 0
NERVOUS/ANXIOUS: 0
SHORTNESS OF BREATH: 0
FEVER: 0
ABDOMINAL PAIN: 0
NAUSEA: 0
DIARRHEA: 0
VOMITING: 0

## 2023-08-28 ASSESSMENT — GAIT ASSESSMENTS
ASSISTIVE DEVICE: QUAD CANE
DEVIATION: DECREASED BASE OF SUPPORT;DECREASED HEEL STRIKE;DECREASED TOE OFF
DISTANCE (FEET): 50
GAIT LEVEL OF ASSIST: MODERATE ASSIST

## 2023-08-28 ASSESSMENT — PAIN DESCRIPTION - PAIN TYPE: TYPE: ACUTE PAIN

## 2023-08-28 NOTE — PROGRESS NOTES
"Rehab Progress Note     Date of Service: 8/28/2023  Chief Complaint: Follow-up stroke    Interval Events (Subjective)    Patient seen and examined today in his room.  His wife is present.  She had an injury to her left shoulder and is nonweightbearing for about a month on that side.  She is right-hand dominant.  Patient's cardiac monitor has been removed today.  He will be able to do aqua therapy.  Patient seen again in the afternoon while doing some ambulation with PT and an off-the-shelf anterior light weight AFO.  Discussed with the therapist need for posterior leaf spring.  Patient last moved his bowels yesterday.  He reports he slept very well last night.  He denies any spasms overnight.    Patient has no other new questions, concerns, or complaints today.         Objective:  VITAL SIGNS: /73   Pulse 78   Temp 36.8 °C (98.2 °F) (Oral)   Resp 17   Ht 1.651 m (5' 5\")   Wt 63 kg (139 lb)   SpO2 95%   BMI 23.13 kg/m²   Gen: alert, no apparent distress  CV: Regular rate, regular rhythm  Resp: Clear to auscultation bilaterally  Neuro: Left hemiparesis, genu recurvatum of the left knee    Recent Results (from the past 72 hour(s))   POCT glucose device results    Collection Time: 08/25/23 11:20 AM   Result Value Ref Range    POC Glucose, Blood 202 (H) 65 - 99 mg/dL   POCT glucose device results    Collection Time: 08/25/23  5:04 PM   Result Value Ref Range    POC Glucose, Blood 156 (H) 65 - 99 mg/dL   POCT glucose device results    Collection Time: 08/25/23  8:02 PM   Result Value Ref Range    POC Glucose, Blood 161 (H) 65 - 99 mg/dL   POCT glucose device results    Collection Time: 08/26/23  7:13 AM   Result Value Ref Range    POC Glucose, Blood 130 (H) 65 - 99 mg/dL   POCT glucose device results    Collection Time: 08/26/23 11:22 AM   Result Value Ref Range    POC Glucose, Blood 150 (H) 65 - 99 mg/dL   POCT glucose device results    Collection Time: 08/26/23  5:09 PM   Result Value Ref Range    POC " Glucose, Blood 183 (H) 65 - 99 mg/dL   POCT glucose device results    Collection Time: 08/26/23  9:11 PM   Result Value Ref Range    POC Glucose, Blood 144 (H) 65 - 99 mg/dL   POCT glucose device results    Collection Time: 08/27/23  7:26 AM   Result Value Ref Range    POC Glucose, Blood 146 (H) 65 - 99 mg/dL   POCT glucose device results    Collection Time: 08/27/23 11:12 AM   Result Value Ref Range    POC Glucose, Blood 176 (H) 65 - 99 mg/dL   POCT glucose device results    Collection Time: 08/27/23  5:05 PM   Result Value Ref Range    POC Glucose, Blood 163 (H) 65 - 99 mg/dL   POCT glucose device results    Collection Time: 08/27/23  9:24 PM   Result Value Ref Range    POC Glucose, Blood 115 (H) 65 - 99 mg/dL   POCT glucose device results    Collection Time: 08/28/23  7:09 AM   Result Value Ref Range    POC Glucose, Blood 126 (H) 65 - 99 mg/dL       Scheduled Medications   Medication Dose Frequency    baclofen  10 mg QHS    SITagliptin  25 mg DAILY    senna-docusate  2 Tablet BID    And    polyethylene glycol/lytes  1 Packet BID    insulin lispro  2-12 Units 4X/DAY ACHS    lidocaine  1 Patch DAILY    metFORMIN ER  500 mg BID WITH MEALS    metoprolol tartrate  12.5 mg TWICE DAILY    aspirin  81 mg Q EVENING    atorvastatin  80 mg Q EVENING    enoxaparin (LOVENOX) injection  40 mg DAILY AT 1800    tamsulosin  0.4 mg AFTER BREAKFAST       Current Diet Order   Procedures    Diet Order Diet: Consistent CHO (Diabetic) (cardiac and CHO); Second Modifier: (optional): Cardiac       Assessment:    This patient is a 58 y.o. male admitted for acute inpatient rehabilitation with Ischemic stroke (HCC).      I, Sarai Davila M.D./MD., was present and led the interdisciplinary team conference on 8/23/2023.  I led the IDT conference and agree with the IDT conference documentation and plan of care as noted below.     Nursing:  Diet Current Diet Order   Procedures    Diet Order Diet: Consistent CHO (Diabetic) (cardiac and  CHO); Second Modifier: (optional): Cardiac       Eating ADL Supervision  Set-up of equipment or meal/tube feeding   % of Last Meal  Oral Nutrition: Lunch, Between 25-50% Consumed (30%)   Sleep No issues   Bowel Last BM: 08/20/23   Bladder Post Void  82   Barriers to Discharge Home: none      Physical Therapy:  Bed Mobility    Transfers Minimal Assist  Adaptive equipment, Increased time, Set-up of equipment, Supervision for safety (SPT bed >< w/c)   Mobility Minimal Assist (min-modA with intermittent L hyperextension block)   Stairs Total Assist (Vector harness with 25lb BWS)   Barriers to Discharge Home: left hemiparesis, stairs, impaired endurance      Occupational Therapy:  Grooming Supervision, Seated   Bathing Moderate Assist   UB Dressing Stand by Assist   LB Dressing Moderate Assist   Toileting Contact Guard Assist   Shower & Tub Transfer Minimal Assist   Barriers to Discharge Home: left hemiparesis      Respiratory Therapy:  O2 (LPM): 0  O2 Delivery Device: None - Room Air    Case Management:  Continues to work on disposition and DME needs.      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  Left hemiparesis,distal > proximal   Cognitive impairment, mild  Dysphagia  Continue full rehab program  PT/OT/SLP, 1 hr each discipline, 5 days per week  8/15: SLP discontinued, PT/OT, 1.5 hr each discipline, 5 days per week  Aqua therapy ordered today    Completed aspirin and Plavix for secondary stroke prophylaxis until 8/21, then aspirin alone    Continue aspirin atorvastatin    Cardiac monitor returned    Outpatient follow-up with stroke Bridge clinic and cardiology, referrals made    Nocturnal spasticity, improved  Mostly in the left leg  Continue baclofen    Left paraspinal muscle spasms, resolved  Heat and/or ice as needed  Continue  Lidoderm patch  Continue Tylenol and Flexeril as needed     Diabetes with hyperglycemia  Uncontrolled, hemoglobin A1c 11.9  Continue metformin, Januvia, sliding scale insulin  Appreciate hospitalist assistance     Hypertension  Continue metoprolol  Olmesartan discontinued  Appreciate hospitalist assistance     Hyperlipidemia  Continue atorvastatin     Sinus tachycardia, resolved  Continue metoprolol  Appreciate hospitalist assistance  Negative doppler US for DVTs     Urinary retention, resolved  Hematuria, resolved  Damon removed 8/15  Continue Flomax  Monitor PVRs -82, 66, 7, 28  Intermittent catheterization for volumes over 400, not requiring     Coronary artery disease  With history of CABG  Continue aspirin atorvastatin    Acute cystitis, resolved  Coag negative staph  Completed cefdinir    Constipation, resolved  Continue Senokot MiraLAX  Last bowel movement 8/27    Iron deficiency  Positive occult stool  Outpatient follow up with GI    DVT prophylaxis  Continue Lovenox    Sarai Davila M.D.  Physical Medicine and Rehabilitation

## 2023-08-28 NOTE — PROGRESS NOTES
NURSING DAILY NOTE    Name: Jorden Bonilla   Date of Admission: 8/14/2023   Admitting Diagnosis: Ischemic stroke (HCC)  Attending Physician: Sarai Davila M.d.  Allergies: Patient has no known allergies.    Safety  Patient Assist  min-mod assist  Patient Precautions  Fall Risk  Precaution Comments  L phyllis, Zio patch, HTN, DM  Bed Transfer Status  Contact Guard Assist  Toilet Transfer Status   Contact Guard Assist  Assistive Devices  Rails, Wheelchair  Oxygen  None - Room Air  Diet/Therapeutic Dining  Current Diet Order   Procedures    Diet Order Diet: Consistent CHO (Diabetic) (cardiac and CHO); Second Modifier: (optional): Cardiac     Pill Administration  whole  Agitated Behavioral Scale  14  ABS Level of Severity  No Agitation    Fall Risk  Has the patient had a fall this admission?   No  Melani Galeano Fall Risk Scoring  11, MODERATE RISK  Fall Risk Safety Measures  bed alarm and chair alarm    Vitals  Temperature: 36.3 °C (97.3 °F)  Temp src: Temporal  Pulse: 77  Respiration: 12  Blood Pressure: 109/67  Blood Pressure MAP (Calculated): 81 MM HG  BP Location: Right, Upper Arm  Patient BP Position: Supine     Oxygen  Pulse Oximetry: 97 %  O2 (LPM): 0  O2 Delivery Device: None - Room Air    Bowel and Bladder  Last Bowel Movement  08/27/23  Stool Type  Type 5: Soft blob with clear cut edges (passed easily)  Bowel Device  Bathroom  Continent  Bladder: Continent void   Bowel: Continent movement  Bladder Function  Urine Void (mL):  (LARGE)  Number of Times Voided: 1  Urine Color: Yellow  Genitourinary Assessment   Bladder Assessment (WDL):  WDL Except  Damon Catheter: Not Applicable  Damon Care: Given with Soap and Water  Urinary Elimination: Catheter (Document on LDA)  Urine Color: Yellow  Bladder Device: Bathroom  Time Void: No  Bladder Scan: Post Void  $ Bladder Scan Results (mL): 28  Bladder Medications: Yes    Skin  Cooper Score   18  Sensory  Interventions   Bed Types: Standard/Trauma Mattress  Skin Preventative Measures: Pillows in Use for Support / Positioning  Moisture Interventions  Moisturizers/Barriers: Barrier Paste      Pain  Pain Rating Scale  0 - No Pain  Pain Location  Head  Pain Location Orientation  Posterior  Pain Interventions   Declines    ADLs    Bathing   Shower, Staff  Linen Change   Partial  Personal Hygiene  Perineal Care, Moist Mireya Wipes  Chlorhexidine Bath      Oral Care     Teeth/Dentures     Shave     Nutrition Percentage Eaten  *  * Meal *  *, Lunch, Less than 25% Consumed  Environmental Precautions  Treaded Slipper Socks on Patient  Patient Turns/Positioning  Patient Turns Self from Side to Side  Patient Turns Assistance/Tolerance     Bed Positions  Bed Controls On  Head of Bed Elevated  Self regulated      Psychosocial/Neurologic Assessment  Psychosocial Assessment  Psychosocial (WDL):  Within Defined Limits  Neurologic Assessment  Neuro (WDL): Exceptions to WDL  Level of Consciousness: Alert  Orientation Level: Oriented X4  Cognition: Follows commands, Appropriate judgement  Speech: Clear  Facial Symmetry: Left facial drooping  Motor Function/Sensation Assessment: Sensation, Motor strength  RUE Sensation: Full sensation  Muscle Strength Right Arm: Normal Strength Against Gravity and Full Resistance  LUE Sensation: Full sensation  Muscle Strength Left Arm: Weak Movement but Not Against Gravity or Resistance  RLE Sensation: Full sensation  Muscle Strength Right Leg: Normal Strength Against Gravity and Full Resistance  LLE Sensation: Full sensation  Muscle Strength Left Leg: Fair Strength against Gravity but No Resistance  EENT (WDL):  WDL Except    Cardio/Pulmonary Assessment  Edema      Respiratory Breath Sounds  RUL Breath Sounds: Clear  RML Breath Sounds: Clear  RLL Breath Sounds: Clear  SHALOM Breath Sounds: Clear  LLL Breath Sounds: Clear  Cardiac Assessment   Cardiac (WDL):  WDL Except (aravind)

## 2023-08-28 NOTE — THERAPY
"Physical Therapy   Daily Treatment     Patient Name: Jorden Bonilla  Age:  58 y.o., Sex:  male  Medical Record #: [id number]  Today's Date: 8/28/2023     Precautions  Precautions: Fall Risk  Comments: L phyllis, Zio patch, HTN, DM    Subjective    Pt resting in bed, willing to participate.     Objective       08/28/23 1501   PT Charge Group   PT Gait Training (Units) 3   PT Therapeutic Exercise (Units) 1   PT Total Time Spent   PT Individual Total Time Spent (Mins) 60   Gait Functional Level of Assist    Gait Level Of Assist Moderate Assist  (min/mod A)   Assistive Device Quad Cane  (L AFO/ L giv-shanti sling)   Distance (Feet) 50   # of Times Distance was Traveled 2   Deviation Decreased Base Of Support;Decreased Heel Strike;Decreased Toe Off  (L phyllis gait with difficulty progressing LLE through swing, intermittent hyperextension thrust)   Sitting Lower Body Exercises   Nustep Resistance Level 3  (gait belt around BLE for alignment, L hand ace wrap for  assist. Pt completed 10 minutes with BLE's/ LUE only)   Neuro-Muscular Treatments   Neuro-Muscular Treatments Anterior weight shift;Facilitation;Joint Approximation;Postural Facilitation;Sequencing;Tactile Cuing;Verbal Cuing;Weight Shift Right;Weight Shift Left   Comments Neuro re-ed gait training with L ankle df A ace wrap/ mirror for visual feedback along galvez rail, forward/ backward walking 30 ft each x 5 trials. Standing posture re-ed/ lateral wt shifting x 10 after each rep of walking. Neuro re-ed strength/ pre-gait and anterior/ posterior wt shifting 2 x 10 at 4\" steps with step up, min A for standing stabilization LLE/ mod-max A for hip/ knee flexion LLE step ups.         Assessment    Pt remains motivated and hard working throughout therapy, remains with limited gait endurance due to on-going L phyllis-weakness and impaired core stabilization/ postural stability and balance.     Strengths: Able to follow instructions, Alert and oriented, Effective communication " skills, Good carryover of learning, Good insight into deficits/needs, Independent prior level of function, Making steady progress towards goals, Motivated for self care and independence, Pleasant and cooperative, Supportive family, Willingly participates in therapeutic activities  Barriers: Bladder retention, Decreased endurance, Hemiparesis, Impaired activity tolerance, Impaired balance, Language barrier, non-fluent English, Limited mobility    Plan    Bed mobility with phyllis techniques, transfer training, gait training with LBQC vs. SBQC and carbon fiber AFO with stockinette, standing balance, neuro re-ed for L hemibody strengthening, Bioness, lite-gait BWSTT, family training/education.      E  therapy tech program 2x/week     Passport items to be completed:  Get in/out of bed safely, in/out of a vehicle, safely use mobility device, walk or wheel around home/community, navigate up and down stairs, show how to get up/down from the ground, ensure home is accessible, demonstrate HEP, complete caregiver training      Physical Therapy Problems (Active)       Problem: Mobility       Dates: Start:  08/15/23         Goal: STG-Within one week, patient will ambulate household distance 50 ft with HW and CGA.       Dates: Start:  08/15/23    Expected End:  09/05/23            Goal: STG-Within one week, patient will ambulate up/down a curb with HW and min A.       Dates: Start:  08/15/23    Expected End:  09/05/23               Problem: Mobility Transfers       Dates: Start:  08/15/23         Goal: STG-Within one week, patient will perform bed mobility with SPV consistently.       Dates: Start:  08/15/23    Expected End:  09/05/23            Goal: STG-Within one week, patient will transfer bed to chair stand pivot with HW and CGA.       Dates: Start:  08/15/23    Expected End:  09/05/23               Problem: PT-Long Term Goals       Dates: Start:  08/15/23         Goal: LTG-By discharge, patient will ambulate 150 ft  with QC vs SPC and SBA.       Dates: Start:  08/15/23    Expected End:  09/05/23            Goal: LTG-By discharge, patient will transfer one surface to another with QC vs SPC and SPV.       Dates: Start:  08/15/23    Expected End:  09/05/23            Goal: LTG-By discharge, patient will ambulate up/down a curb with QC vs SPC and SBA.       Dates: Start:  08/15/23    Expected End:  09/05/23            Goal: LTG-By discharge, patient will transfer in/out of a car with QC vs SPC and SBA after setup.       Dates: Start:  08/15/23    Expected End:  09/05/23            Goal: LTG-By discharge, patient will ambulate up/down a ramp with QC vs SPC and CGA to simulate home entrance.       Dates: Start:  08/15/23    Expected End:  09/05/23

## 2023-08-28 NOTE — THERAPY
"Occupational Therapy  Daily Treatment     Patient Name: Jorden Bonilla  Age:  58 y.o., Sex:  male  Medical Record #: [id number]  Today's Date: 8/28/2023     Precautions  Precautions: Fall Risk  Comments: L phyllis, Zio patch, HTN, DM         Subjective    \"They woke me up at 5:30 am, I already did all that.\" Pt reported when asked if he needed to use the restroom or brush his teeth this morning      Objective       08/28/23 0701   OT Charge Group   OT Neuromuscular Re-education / Balance (Units) 3   OT Therapy Activity (Units) 1   OT Total Time Spent   OT Individual Total Time Spent (Mins) 60   Pain   Intervention Declines   Pain 0 - 10 Group   Pain Rating Scale (NPRS) 0   Cognition    Level of Consciousness Alert   Functional Level of Assist   Bed, Chair, Wheelchair Transfer Contact Guard Assist   Bed Chair Wheelchair Transfer Description Adaptive equipment;Increased time;Initial preparation for task;Supervision for safety;Verbal cueing  (quad cane going to L side)   Neuro-Muscular Treatments   Neuro-Muscular Treatments Electrical Stimulation;Verbal Cuing   Comments - see note for details   Bed Mobility    Supine to Sit Standby Assist   Scooting Standby Assist   Rolling Standby Assist   Interdisciplinary Plan of Care Collaboration   Patient Position at End of Therapy Seated;Chair Alarm On;Self Releasing Lap Belt Applied;Other (Comments)  (left in dining room for breakfast)         Assessment    Pt tolerated session well. Pt required mod a with LBD- donning B shoes- pt was able to get R shoe on with SBA, but required max a for L shoe- elastic shoe laces needed to be loosen- might still require adjustments for increased independence with dressing tasks. Pt set up on RT-300 FES for LUE neuro re-ed, ROM and sensory input.  Electrodes applied to L scap stabilizers, shoulder, biceps, triceps, and wrist flexors/extensors. LUE placed in arm trough with ace wrap applied at L wrist for increased stability while allowing " for finger flex/ext with stimulation. Pillow placed behind pt's back for comfort and increasing upright posture. Pt tolerated 25 min 10 sec of cycling, including warm up and cool down periods with results as follows: distance travelled: 2.67 miles, energy expended: 0.4 kCal, energy per hour: 1.3 kCal/hr, avg power: 1.5 W, avg stimulation: 4.33uC, avg asymmetry: 3%.  Pt tolerated well with no c/o pain and good muscle contraction noted. Therapist adjusted parameters as needed throughout for improved muscle contraction and pt's tolerance.      Strengths: Able to follow instructions, Alert and oriented, Effective communication skills, Good carryover of learning, Good insight into deficits/needs, Independent prior level of function, Manages pain appropriately, Motivated for self care and independence, Pleasant and cooperative, Supportive family, Willingly participates in therapeutic activities  Barriers: Hemiplegia, Hypertension, Impaired balance, Limited mobility    Plan    ADL/IADL training, LUE neuro re-ed, increase overall strength and endurance.      Passport items to be completed:  Perform bathroom transfers, complete dressing, complete feeding, get ready for the day, prepare a simple meal, participate in household tasks, adapt home for safety needs, demonstrate home exercise program, complete caregiver training     Occupational Therapy Goals (Active)       Problem: Bathing       Dates: Start:  08/15/23         Goal: STG-Within one week, patient will bathe body with min A using AE/AD prn       Dates: Start:  08/15/23    Expected End:  09/05/23         Goal Note filed on 08/23/23 0810 by Mayra Pantoja, Student       Requires Mod A.                 Problem: Dressing       Dates: Start:  08/15/23         Goal: STG-Within one week, patient will dress LB with min A       Dates: Start:  08/15/23    Expected End:  09/05/23         Goal Note filed on 08/23/23 0810 by Mayra Pantoja, Student       Requires Mod A.                  Problem: Functional Transfers       Dates: Start:  08/23/23         Goal: STG-Within one week, patient will transfer to toilet with supervision using DME/AE as  needed       Dates: Start:  08/23/23    Expected End:  09/05/23               Problem: OT Long Term Goals       Dates: Start:  08/15/23         Goal: LTG-By discharge, patient will complete basic self care tasks with SBA/supervision       Dates: Start:  08/15/23    Expected End:  09/05/23            Goal: LTG-By discharge, patient will perform bathroom transfers with supervision/mod I       Dates: Start:  08/15/23    Expected End:  09/05/23               Problem: Toileting       Dates: Start:  08/23/23         Goal: STG-Within one week, patient will complete toileting tasks with supervision using DME/AE as needed       Dates: Start:  08/23/23    Expected End:  09/05/23

## 2023-08-28 NOTE — THERAPY
Physical Therapy   Daily Treatment     Patient Name: Jorden Bonilla  Age:  58 y.o., Sex:  male  Medical Record #: [id number]  Today's Date: 8/28/2023     Precautions  Precautions: (P) Fall Risk  Comments: (P) L phyllis, Zio patch, HTN, DM    Subjective    Patient seated in w/c and agreeable to therapy.     Objective       08/28/23 0901   PT Charge Group   PT Therapeutic Exercise (Units) 1   PT Neuromuscular Re-Education / Balance (Units) 2   PT Therapeutic Activities (Units) 1   PT Total Time Spent   PT Individual Total Time Spent (Mins) 60   Precautions   Precautions Fall Risk   Comments L phyllis, Zio patch, HTN, DM   Transfer Functional Level of Assist   Bed, Chair, Wheelchair Transfer Contact Guard Assist   Bed Chair Wheelchair Transfer Description Increased time;Initial preparation for task;Adaptive equipment;Supervision for safety;Verbal cueing  (reach pivot at w/c level vs. stand step with LBQC)   Bed Mobility    Supine to Sit Standby Assist   Sit to Supine Standby Assist   Sit to Stand Contact Guard Assist   Scooting Standby Assist   Rolling Supervised   Interdisciplinary Plan of Care Collaboration   IDT Collaboration with  Family / Caregiver;Occupational Therapist   Patient Position at End of Therapy Seated;Chair Alarm On;Self Releasing Lap Belt Applied;Call Light within Reach;Tray Table within Reach;Phone within Reach;Family / Friend in Room   Collaboration Comments CLOF     Quadruped activities with LUE air splint used to facilitate WBing, Min A to stabilize LUE and hips at times:   - A/P weight shifts 2x10   - Lateral weight shifts 2x10   - RLE lateral stepping in kneeling 2x5   - Tall kneeling mini squats with no UE support and SBA    Supine BLE bridges 2x10, LLE heel slides 2x10; LUE shoulder flexion with air splint 2x10.    Modified dead lift with BUEs holding 6# med ball with CGA 1x10.    Assessment    Patient tolerated session well, focus on quadruped WBing activities. Occasional cues for set up with w/c  level transfers.    Strengths: Able to follow instructions, Alert and oriented, Effective communication skills, Good carryover of learning, Good insight into deficits/needs, Independent prior level of function, Making steady progress towards goals, Motivated for self care and independence, Pleasant and cooperative, Supportive family, Willingly participates in therapeutic activities  Barriers: Bladder retention, Decreased endurance, Hemiparesis, Impaired activity tolerance, Impaired balance, Language barrier, non-fluent English, Limited mobility    Plan    Bed mobility with phyllis techniques, transfer training, gait training with LBQC vs. SBQC and carbon fiber AFO with stockinette, standing balance, neuro re-ed for L hemibody strengthening, Bioness, lite-gait BWSTT, family training/education.      E  therapy tech program 2x/week     Passport items to be completed:  Get in/out of bed safely, in/out of a vehicle, safely use mobility device, walk or wheel around home/community, navigate up and down stairs, show how to get up/down from the ground, ensure home is accessible, demonstrate HEP, complete caregiver training    Physical Therapy Problems (Active)       Problem: Mobility       Dates: Start:  08/15/23         Goal: STG-Within one week, patient will ambulate household distance 50 ft with HW and CGA.       Dates: Start:  08/15/23    Expected End:  09/05/23            Goal: STG-Within one week, patient will ambulate up/down a curb with HW and min A.       Dates: Start:  08/15/23    Expected End:  09/05/23               Problem: Mobility Transfers       Dates: Start:  08/15/23         Goal: STG-Within one week, patient will perform bed mobility with SPV consistently.       Dates: Start:  08/15/23    Expected End:  09/05/23            Goal: STG-Within one week, patient will transfer bed to chair stand pivot with HW and CGA.       Dates: Start:  08/15/23    Expected End:  09/05/23               Problem: PT-Long  Term Goals       Dates: Start:  08/15/23         Goal: LTG-By discharge, patient will ambulate 150 ft with QC vs SPC and SBA.       Dates: Start:  08/15/23    Expected End:  09/05/23            Goal: LTG-By discharge, patient will transfer one surface to another with QC vs SPC and SPV.       Dates: Start:  08/15/23    Expected End:  09/05/23            Goal: LTG-By discharge, patient will ambulate up/down a curb with QC vs SPC and SBA.       Dates: Start:  08/15/23    Expected End:  09/05/23            Goal: LTG-By discharge, patient will transfer in/out of a car with QC vs SPC and SBA after setup.       Dates: Start:  08/15/23    Expected End:  09/05/23            Goal: LTG-By discharge, patient will ambulate up/down a ramp with QC vs SPC and CGA to simulate home entrance.       Dates: Start:  08/15/23    Expected End:  09/05/23

## 2023-08-28 NOTE — PROGRESS NOTES
Hospital Medicine Daily Progress Note        Chief Complaint  Hypertension  Diabetes    Interval Problem Update  Discussed about his BS doing ok but nora up at lunch -- ? Etiology since he apparently ate less for breakfast.    Review of Systems  Review of Systems   Constitutional:  Negative for chills and fever.   Respiratory:  Negative for shortness of breath.    Cardiovascular:  Negative for chest pain.   Gastrointestinal:  Negative for abdominal pain, diarrhea, nausea and vomiting.   Psychiatric/Behavioral:  The patient is not nervous/anxious.         Physical Exam  Temp:  [36.3 °C (97.3 °F)-37.1 °C (98.8 °F)] 36.8 °C (98.2 °F)  Pulse:  [77-84] 78  Resp:  [12-18] 17  BP: ()/(67-76) 112/73  SpO2:  [95 %-97 %] 95 %    Physical Exam  Vitals and nursing note reviewed.   Constitutional:       Appearance: Normal appearance.   HENT:      Head: Atraumatic.   Eyes:      Conjunctiva/sclera: Conjunctivae normal.      Pupils: Pupils are equal, round, and reactive to light.   Cardiovascular:      Rate and Rhythm: Normal rate and regular rhythm.      Heart sounds: No murmur heard.  Pulmonary:      Effort: Pulmonary effort is normal.      Breath sounds: No stridor. No wheezing or rales.   Abdominal:      General: There is no distension.      Palpations: Abdomen is soft.      Tenderness: There is no abdominal tenderness.   Musculoskeletal:      Cervical back: Normal range of motion and neck supple.      Right lower leg: No edema.      Left lower leg: No edema.   Skin:     General: Skin is warm and dry.      Findings: No rash.   Neurological:      Mental Status: He is alert and oriented to person, place, and time.   Psychiatric:         Mood and Affect: Mood normal.         Behavior: Behavior normal.             Laboratory                            Assessment/Plan  * Ischemic stroke (HCC)- (present on admission)  Assessment & Plan  Presented with left HP  S/P ZioPatch  Cont ASA and Lipitor  Off Plavix    Primary  hypertension- (present on admission)  Assessment & Plan  BP ok  Off Benicar 2nd to lower BP  Cont Lopressor  Note: on Flomax  Cont to monitor    Type 2 diabetes mellitus with hyperglycemia, without long-term current use of insulin (HCC)- (present on admission)  Assessment & Plan  Hba1c: 11.9 (8/10)  BS: 115-176  Cont Metformin XR bid  Cont Januvia  Note: home meds include Metformin  mg bid, Jardiance 25 mg qd, and Trulicity 1.5 mg qwk  Cont to monitor    Coronary artery disease due to calcified coronary lesion: CABG ×3 in 2015- (present on admission)  Assessment & Plan  Hx of CABG  Continue ASA and Lipitor  Off Benicar 2nd to low BP  Cont Lopressor  Cont to monitor

## 2023-08-28 NOTE — PROGRESS NOTES
NURSING DAILY NOTE    Name: Jorden Bonilla   Date of Admission: 8/14/2023   Admitting Diagnosis: Ischemic stroke (HCC)  Attending Physician: Sarai Davila M.d.  Allergies: Patient has no known allergies.    Safety  Patient Assist  min-mod assist  Patient Precautions  Fall Risk  Precaution Comments  L phyllis, Zio patch, HTN, DM  Bed Transfer Status  Contact Guard Assist  Toilet Transfer Status   Contact Guard Assist  Assistive Devices  Wheelchair  Oxygen  None - Room Air  Diet/Therapeutic Dining  Current Diet Order   Procedures    Diet Order Diet: Consistent CHO (Diabetic) (cardiac and CHO); Second Modifier: (optional): Cardiac     Pill Administration  whole  Agitated Behavioral Scale  14  ABS Level of Severity  No Agitation    Fall Risk  Has the patient had a fall this admission?   No  Melani Galeano Fall Risk Scoring  11, MODERATE RISK  Fall Risk Safety Measures  bed alarm and chair alarm    Vitals  Temperature: 37.1 °C (98.8 °F)  Temp src: Oral  Pulse: 84  Respiration: 18  Blood Pressure: 98/72  Blood Pressure MAP (Calculated): 81 MM HG  BP Location: Right, Upper Arm  Patient BP Position: Supine     Oxygen  Pulse Oximetry: 96 %  O2 (LPM): 0  O2 Delivery Device: None - Room Air    Bowel and Bladder  Last Bowel Movement  08/27/23  Stool Type  Type 5: Soft blob with clear cut edges (passed easily)  Bowel Device  Bathroom  Continent  Bladder: Continent void   Bowel: Continent movement  Bladder Function  Urine Void (mL):  (LARGE)  Number of Times Voided: 1  Urine Color: Yellow  Genitourinary Assessment   Bladder Assessment (WDL):  WDL Except  Damon Catheter: Not Applicable  Damon Care: Given with Soap and Water  Urinary Elimination: Catheter (Document on LDA)  Urine Color: Yellow  Bladder Device: Bathroom  Time Void: No  Bladder Scan: Post Void  $ Bladder Scan Results (mL): 28  Bladder Medications: Yes    Skin  Cooper Score   18  Sensory Interventions   Bed  Types: Standard/Trauma Mattress  Skin Preventative Measures: Pillows in Use for Support / Positioning  Moisture Interventions  Moisturizers/Barriers: Barrier Paste      Pain  Pain Rating Scale  0 - No Pain  Pain Location  Head  Pain Location Orientation  Posterior  Pain Interventions   Declines    ADLs    Bathing   Shower, Staff  Linen Change   Partial  Personal Hygiene  Perineal Care, Moist Mireya Wipes  Chlorhexidine Bath      Oral Care     Teeth/Dentures     Shave     Nutrition Percentage Eaten  *  * Meal *  *, Lunch, Less than 25% Consumed  Environmental Precautions  Treaded Slipper Socks on Patient  Patient Turns/Positioning  Patient Turns Self from Side to Side  Patient Turns Assistance/Tolerance     Bed Positions  Bed Controls On  Head of Bed Elevated  Self regulated      Psychosocial/Neurologic Assessment  Psychosocial Assessment  Psychosocial (WDL):  Within Defined Limits  Neurologic Assessment  Neuro (WDL): Exceptions to WDL  Level of Consciousness: Alert  Orientation Level: Oriented X4  Cognition: Follows commands, Appropriate judgement  Speech: Clear  Facial Symmetry: Left facial drooping  Motor Function/Sensation Assessment: Sensation, Motor strength  RUE Sensation: Full sensation  Muscle Strength Right Arm: Normal Strength Against Gravity and Full Resistance  LUE Sensation: Full sensation  Muscle Strength Left Arm: Weak Movement but Not Against Gravity or Resistance  RLE Sensation: Full sensation  Muscle Strength Right Leg: Normal Strength Against Gravity and Full Resistance  LLE Sensation: Full sensation  Muscle Strength Left Leg: Fair Strength against Gravity but No Resistance  EENT (WDL):  WDL Except    Cardio/Pulmonary Assessment  Edema      Respiratory Breath Sounds  RUL Breath Sounds: Clear  RML Breath Sounds: Clear  RLL Breath Sounds: Clear  SHALOM Breath Sounds: Clear  LLL Breath Sounds: Clear  Cardiac Assessment   Cardiac (WDL):  WDL Except (aravind)

## 2023-08-29 ENCOUNTER — APPOINTMENT (OUTPATIENT)
Dept: PHYSICAL THERAPY | Facility: REHABILITATION | Age: 58
DRG: 057 | End: 2023-08-29
Attending: PHYSICAL MEDICINE & REHABILITATION
Payer: COMMERCIAL

## 2023-08-29 ENCOUNTER — APPOINTMENT (OUTPATIENT)
Dept: OCCUPATIONAL THERAPY | Facility: REHABILITATION | Age: 58
DRG: 057 | End: 2023-08-29
Attending: PHYSICAL MEDICINE & REHABILITATION
Payer: COMMERCIAL

## 2023-08-29 LAB
GLUCOSE BLD STRIP.AUTO-MCNC: 128 MG/DL (ref 65–99)
GLUCOSE BLD STRIP.AUTO-MCNC: 143 MG/DL (ref 65–99)
GLUCOSE BLD STRIP.AUTO-MCNC: 158 MG/DL (ref 65–99)
GLUCOSE BLD STRIP.AUTO-MCNC: 169 MG/DL (ref 65–99)

## 2023-08-29 PROCEDURE — 99232 SBSQ HOSP IP/OBS MODERATE 35: CPT | Performed by: HOSPITALIST

## 2023-08-29 PROCEDURE — A9270 NON-COVERED ITEM OR SERVICE: HCPCS | Performed by: HOSPITALIST

## 2023-08-29 PROCEDURE — 97112 NEUROMUSCULAR REEDUCATION: CPT

## 2023-08-29 PROCEDURE — 700101 HCHG RX REV CODE 250: Performed by: PHYSICAL MEDICINE & REHABILITATION

## 2023-08-29 PROCEDURE — 97116 GAIT TRAINING THERAPY: CPT

## 2023-08-29 PROCEDURE — A9270 NON-COVERED ITEM OR SERVICE: HCPCS | Performed by: PHYSICAL MEDICINE & REHABILITATION

## 2023-08-29 PROCEDURE — 700111 HCHG RX REV CODE 636 W/ 250 OVERRIDE (IP): Mod: JZ | Performed by: PHYSICAL MEDICINE & REHABILITATION

## 2023-08-29 PROCEDURE — 99232 SBSQ HOSP IP/OBS MODERATE 35: CPT | Performed by: PHYSICAL MEDICINE & REHABILITATION

## 2023-08-29 PROCEDURE — 700102 HCHG RX REV CODE 250 W/ 637 OVERRIDE(OP): Performed by: PHYSICAL MEDICINE & REHABILITATION

## 2023-08-29 PROCEDURE — 700102 HCHG RX REV CODE 250 W/ 637 OVERRIDE(OP): Performed by: HOSPITALIST

## 2023-08-29 PROCEDURE — 770010 HCHG ROOM/CARE - REHAB SEMI PRIVAT*

## 2023-08-29 PROCEDURE — 82962 GLUCOSE BLOOD TEST: CPT

## 2023-08-29 PROCEDURE — 97530 THERAPEUTIC ACTIVITIES: CPT

## 2023-08-29 PROCEDURE — 97535 SELF CARE MNGMENT TRAINING: CPT

## 2023-08-29 RX ADMIN — METFORMIN HYDROCHLORIDE 500 MG: 500 TABLET, EXTENDED RELEASE ORAL at 17:20

## 2023-08-29 RX ADMIN — SENNOSIDES AND DOCUSATE SODIUM 2 TABLET: 50; 8.6 TABLET ORAL at 09:36

## 2023-08-29 RX ADMIN — METOPROLOL TARTRATE 12.5 MG: 25 TABLET, FILM COATED ORAL at 18:31

## 2023-08-29 RX ADMIN — BACLOFEN 10 MG: 10 TABLET ORAL at 20:46

## 2023-08-29 RX ADMIN — ATORVASTATIN CALCIUM 80 MG: 40 TABLET, FILM COATED ORAL at 20:46

## 2023-08-29 RX ADMIN — POLYETHYLENE GLYCOL 3350 1 PACKET: 17 POWDER, FOR SOLUTION ORAL at 09:37

## 2023-08-29 RX ADMIN — ASPIRIN 81 MG: 81 TABLET, COATED ORAL at 20:46

## 2023-08-29 RX ADMIN — SITAGLIPTIN 25 MG: 50 TABLET, FILM COATED ORAL at 09:36

## 2023-08-29 RX ADMIN — POLYETHYLENE GLYCOL 3350 1 PACKET: 17 POWDER, FOR SOLUTION ORAL at 20:46

## 2023-08-29 RX ADMIN — INSULIN LISPRO 2 UNITS: 100 INJECTION, SOLUTION INTRAVENOUS; SUBCUTANEOUS at 17:11

## 2023-08-29 RX ADMIN — INSULIN LISPRO 2 UNITS: 100 INJECTION, SOLUTION INTRAVENOUS; SUBCUTANEOUS at 12:07

## 2023-08-29 RX ADMIN — METOPROLOL TARTRATE 12.5 MG: 25 TABLET, FILM COATED ORAL at 05:24

## 2023-08-29 RX ADMIN — ENOXAPARIN SODIUM 40 MG: 100 INJECTION SUBCUTANEOUS at 18:31

## 2023-08-29 RX ADMIN — TAMSULOSIN HYDROCHLORIDE 0.4 MG: 0.4 CAPSULE ORAL at 09:36

## 2023-08-29 RX ADMIN — SENNOSIDES AND DOCUSATE SODIUM 2 TABLET: 50; 8.6 TABLET ORAL at 20:46

## 2023-08-29 RX ADMIN — METFORMIN HYDROCHLORIDE 500 MG: 500 TABLET, EXTENDED RELEASE ORAL at 09:36

## 2023-08-29 RX ADMIN — LIDOCAINE 1 PATCH: 700 PATCH TOPICAL at 09:37

## 2023-08-29 ASSESSMENT — ENCOUNTER SYMPTOMS
COUGH: 0
PALPITATIONS: 0
FEVER: 0
EYES NEGATIVE: 1
FOCAL WEAKNESS: 1
CHILLS: 0
VOMITING: 0
BRUISES/BLEEDS EASILY: 0
SHORTNESS OF BREATH: 0
NAUSEA: 0
MUSCULOSKELETAL NEGATIVE: 1
POLYDIPSIA: 0
ABDOMINAL PAIN: 0

## 2023-08-29 ASSESSMENT — PATIENT HEALTH QUESTIONNAIRE - PHQ9
SUM OF ALL RESPONSES TO PHQ9 QUESTIONS 1 AND 2: 0
1. LITTLE INTEREST OR PLEASURE IN DOING THINGS: NOT AT ALL

## 2023-08-29 ASSESSMENT — ACTIVITIES OF DAILY LIVING (ADL)
BED_CHAIR_WHEELCHAIR_TRANSFER_DESCRIPTION: ADAPTIVE EQUIPMENT;INCREASED TIME;INITIAL PREPARATION FOR TASK;SUPERVISION FOR SAFETY;VERBAL CUEING
TUB_SHOWER_TRANSFER_DESCRIPTION: GRAB BAR;SHOWER BENCH;INCREASED TIME;INITIAL PREPARATION FOR TASK;SET-UP OF EQUIPMENT;SUPERVISION FOR SAFETY

## 2023-08-29 ASSESSMENT — PAIN DESCRIPTION - PAIN TYPE
TYPE: ACUTE PAIN
TYPE: ACUTE PAIN

## 2023-08-29 ASSESSMENT — GAIT ASSESSMENTS
ASSISTIVE DEVICE: QUAD CANE
DEVIATION: DECREASED BASE OF SUPPORT;DECREASED HEEL STRIKE;DECREASED TOE OFF
GAIT LEVEL OF ASSIST: MINIMAL ASSIST

## 2023-08-29 NOTE — THERAPY
Physical Therapy   Daily Treatment     Patient Name: Jorden Bonilla  Age:  58 y.o., Sex:  male  Medical Record #: [id number]  Today's Date: 8/29/2023     Precautions  Precautions: Fall Risk  Comments: L phyllis, HTN, DM    Subjective    Pt resting in bed, willing to participate.     Objective       08/29/23 1501   PT Charge Group   PT Gait Training (Units) 3   PT Neuromuscular Re-Education / Balance (Units) 1   PT Total Time Spent   PT Individual Total Time Spent (Mins) 60   Supine Lower Body Exercise   Bridges Two Legged;3 sets of 15  (BUE's hands clasped with shoulders to 90* flexion for increased core stabilization/ activation.)   Neuro-Muscular Treatments   Neuro-Muscular Treatments Anterior weight shift;Joint Approximation;Sequencing;Tactile Cuing;Verbal Cuing   Comments Lite gait treadmill training at 0.6 mph, pt completed 7.5 minutes/ 5 minutes and 2.5 minutes consecutive walking for total of 15 minutes and ~ 800 ft.         Assessment    Pt continued to require 2 seated rests during treadmill gait training but improve initial walking time to 7.5 minutes prior to first seated rest. Pt also demonstrated improved L swing initiation and foot clearance with less effort needed from this PT since last treadmill treatment (Fri. 8/25).    Strengths: Able to follow instructions, Alert and oriented, Effective communication skills, Good carryover of learning, Good insight into deficits/needs, Independent prior level of function, Making steady progress towards goals, Motivated for self care and independence, Pleasant and cooperative, Supportive family, Willingly participates in therapeutic activities  Barriers: Bladder retention, Decreased endurance, Hemiparesis, Impaired activity tolerance, Impaired balance, Language barrier, non-fluent English, Limited mobility    Plan    Bed mobility with phyllis techniques, transfer training, gait training with LBQC vs. SBQC and carbon fiber AFO with carrol, standing balance, neuro  re-ed for L hemibody strengthening, Bioness, lite-gait BWSTT, family training/education.      LLE  therapy tech program 2x/week     Passport items to be completed:  Get in/out of bed safely, in/out of a vehicle, safely use mobility device, walk or wheel around home/community, navigate up and down stairs, show how to get up/down from the ground, ensure home is accessible, demonstrate HEP, complete caregiver training      Physical Therapy Problems (Active)       Problem: Mobility       Dates: Start:  08/15/23         Goal: STG-Within one week, patient will ambulate household distance 50 ft with HW and CGA.       Dates: Start:  08/15/23    Expected End:  09/05/23            Goal: STG-Within one week, patient will ambulate up/down a curb with HW and min A.       Dates: Start:  08/15/23    Expected End:  09/05/23               Problem: Mobility Transfers       Dates: Start:  08/15/23         Goal: STG-Within one week, patient will perform bed mobility with SPV consistently.       Dates: Start:  08/15/23    Expected End:  09/05/23            Goal: STG-Within one week, patient will transfer bed to chair stand pivot with HW and CGA.       Dates: Start:  08/15/23    Expected End:  09/05/23               Problem: PT-Long Term Goals       Dates: Start:  08/15/23         Goal: LTG-By discharge, patient will ambulate 150 ft with QC vs SPC and SBA.       Dates: Start:  08/15/23    Expected End:  09/05/23            Goal: LTG-By discharge, patient will transfer one surface to another with QC vs SPC and SPV.       Dates: Start:  08/15/23    Expected End:  09/05/23            Goal: LTG-By discharge, patient will ambulate up/down a curb with QC vs SPC and SBA.       Dates: Start:  08/15/23    Expected End:  09/05/23            Goal: LTG-By discharge, patient will transfer in/out of a car with QC vs SPC and SBA after setup.       Dates: Start:  08/15/23    Expected End:  09/05/23            Goal: LTG-By discharge, patient will  ambulate up/down a ramp with QC vs SPC and CGA to simulate home entrance.       Dates: Start:  08/15/23    Expected End:  09/05/23

## 2023-08-29 NOTE — CARE PLAN
The patient is Stable - Low risk of patient condition declining or worsening    Shift Goals  Clinical Goals: Safety  Patient Goals: Sleep    Progress made toward(s) clinical / shift goals:    Problem: Knowledge Deficit - Standard  Goal: Patient and family/care givers will demonstrate understanding of plan of care, disease process/condition, diagnostic tests and medications  Outcome: Progressing   Patient educated on the POC and medications administered. All questions and concerns addressed at this time   Problem: Pain - Standard  Goal: Alleviation of pain or a reduction in pain to the patient’s comfort goal  Outcome: Progressing   Use of 0-10 pain scale. Patient is able to verbalize pain and discomfort appropriately. PRN tylenol administered.    Patient is not progressing towards the following goals:

## 2023-08-29 NOTE — PROGRESS NOTES
Assumed care of patient. Bedside report received from Yolanda ALBA. Patient is in bed. Fall precautions in place. Call light and belongings within reach. Updated on POC, all questions and concerns answered at this time. No other needs at this time.

## 2023-08-29 NOTE — PROGRESS NOTES
"Rehab Progress Note     Date of Service: 8/29/2023  Chief Complaint: Follow-up stroke    Interval Events (Subjective)    Patient seen and examined today in the therapy gym.   He is working with PT and walking with quad cane.   He has better knee control today while using a heel wedge.   His AFO has not yet been delivered.  He does not want to stay past the 5th.  He reports his son will also be able to help at home, in addition to his wife, who is limited by her LUE injury.  Patient moved his bowels 2 days ago.  He is continent of bowel and bladder.   He is scheduled for pool therapy in 2 days.    Patient has no other new questions, concerns, or complaints today.           Objective:  VITAL SIGNS: /69   Pulse 67   Temp 36.9 °C (98.4 °F) (Oral)   Resp 17   Ht 1.651 m (5' 5\")   Wt 63 kg (139 lb)   SpO2 96%   BMI 23.13 kg/m²   Gen: alert, no apparent distress  CV: Regular rate, regular rhythm  Resp: Clear to auscultation bilaterally  Neuro: Left hemiparesis    Recent Results (from the past 72 hour(s))   POCT glucose device results    Collection Time: 08/26/23 11:22 AM   Result Value Ref Range    POC Glucose, Blood 150 (H) 65 - 99 mg/dL   POCT glucose device results    Collection Time: 08/26/23  5:09 PM   Result Value Ref Range    POC Glucose, Blood 183 (H) 65 - 99 mg/dL   POCT glucose device results    Collection Time: 08/26/23  9:11 PM   Result Value Ref Range    POC Glucose, Blood 144 (H) 65 - 99 mg/dL   POCT glucose device results    Collection Time: 08/27/23  7:26 AM   Result Value Ref Range    POC Glucose, Blood 146 (H) 65 - 99 mg/dL   POCT glucose device results    Collection Time: 08/27/23 11:12 AM   Result Value Ref Range    POC Glucose, Blood 176 (H) 65 - 99 mg/dL   POCT glucose device results    Collection Time: 08/27/23  5:05 PM   Result Value Ref Range    POC Glucose, Blood 163 (H) 65 - 99 mg/dL   POCT glucose device results    Collection Time: 08/27/23  9:24 PM   Result Value Ref Range    POC " Glucose, Blood 115 (H) 65 - 99 mg/dL   POCT glucose device results    Collection Time: 08/28/23  7:09 AM   Result Value Ref Range    POC Glucose, Blood 126 (H) 65 - 99 mg/dL   POCT glucose device results    Collection Time: 08/28/23 11:09 AM   Result Value Ref Range    POC Glucose, Blood 221 (H) 65 - 99 mg/dL   POCT glucose device results    Collection Time: 08/28/23  5:18 PM   Result Value Ref Range    POC Glucose, Blood 177 (H) 65 - 99 mg/dL   POCT glucose device results    Collection Time: 08/28/23  8:50 PM   Result Value Ref Range    POC Glucose, Blood 115 (H) 65 - 99 mg/dL   POCT glucose device results    Collection Time: 08/29/23  8:03 AM   Result Value Ref Range    POC Glucose, Blood 143 (H) 65 - 99 mg/dL       Scheduled Medications   Medication Dose Frequency    baclofen  10 mg QHS    SITagliptin  25 mg DAILY    senna-docusate  2 Tablet BID    And    polyethylene glycol/lytes  1 Packet BID    insulin lispro  2-12 Units 4X/DAY ACHS    lidocaine  1 Patch DAILY    metFORMIN ER  500 mg BID WITH MEALS    metoprolol tartrate  12.5 mg TWICE DAILY    aspirin  81 mg Q EVENING    atorvastatin  80 mg Q EVENING    enoxaparin (LOVENOX) injection  40 mg DAILY AT 1800    tamsulosin  0.4 mg AFTER BREAKFAST       Current Diet Order   Procedures    Diet Order Diet: Consistent CHO (Diabetic) (cardiac and CHO); Second Modifier: (optional): Cardiac       Assessment:    This patient is a 58 y.o. male admitted for acute inpatient rehabilitation with Ischemic stroke (HCC).      I, Sarai Davila M.D./MD., was present and led the interdisciplinary team conference on 8/23/2023.  I led the IDT conference and agree with the IDT conference documentation and plan of care as noted below.     Nursing:  Diet Current Diet Order   Procedures    Diet Order Diet: Consistent CHO (Diabetic) (cardiac and CHO); Second Modifier: (optional): Cardiac       Eating ADL Supervision  Set-up of equipment or meal/tube feeding   % of Last Meal  Oral  Nutrition: Lunch, Between 25-50% Consumed (30%)   Sleep No issues   Bowel Last BM: 08/20/23   Bladder Post Void  82   Barriers to Discharge Home: none      Physical Therapy:  Bed Mobility    Transfers Minimal Assist  Adaptive equipment, Increased time, Set-up of equipment, Supervision for safety (SPT bed >< w/c)   Mobility Minimal Assist (min-modA with intermittent L hyperextension block)   Stairs Total Assist (Vector harness with 25lb BWS)   Barriers to Discharge Home: left hemiparesis, stairs, impaired endurance      Occupational Therapy:  Grooming Supervision, Seated   Bathing Moderate Assist   UB Dressing Stand by Assist   LB Dressing Moderate Assist   Toileting Contact Guard Assist   Shower & Tub Transfer Minimal Assist   Barriers to Discharge Home: left hemiparesis      Respiratory Therapy:  O2 (LPM): 0  O2 Delivery Device: None - Room Air    Case Management:  Continues to work on disposition and DME needs.      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  Left hemiparesis,distal > proximal   Cognitive impairment, mild  Dysphagia  Continue full rehab program  PT/OT/SLP, 1 hr each discipline, 5 days per week  8/15: SLP discontinued, PT/OT, 1.5 hr each discipline, 5 days per week  Aqua therapy ordered  AFO ordered    Completed aspirin and Plavix for secondary stroke prophylaxis until 8/21, then aspirin alone    Continue aspirin and atorvastatin    Cardiac monitor returned    Outpatient follow-up with stroke Bridge clinic and cardiology, referrals made    Nocturnal spasticity, improved  Mostly in the left leg  Continue baclofen    Left paraspinal muscle spasms, resolved  Heat and/or ice as needed  Continue Lidoderm patch  Continue Tylenol and Flexeril as needed     Diabetes with hyperglycemia  Uncontrolled, hemoglobin A1c  11.9  Continue metformin, Januvia, and sliding scale insulin  Appreciate hospitalist assistance     Hypertension  Continue metoprolol  Olmesartan discontinued  Appreciate hospitalist assistance     Hyperlipidemia  Continue atorvastatin     Sinus tachycardia, resolved  Continue metoprolol  Appreciate hospitalist assistance  Negative doppler US for DVTs     Urinary retention, resolved  Hematuria, resolved  Damon removed 8/15  Continue Flomax  Monitor PVRs -82, 66, 7, 28  Intermittent catheterization for volumes over 400, not requiring     Coronary artery disease  With history of CABG  Continue aspirin and atorvastatin    Acute cystitis, resolved  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  Physical Medicine and Rehabilitation

## 2023-08-29 NOTE — CARE PLAN
The patient is Stable - Low risk of patient condition declining or worsening    Shift Goals  Clinical Goals: safety  Patient Goals: safety    Progress made toward(s) clinical / shift goals:      Problem: Fall Risk - Rehab  Goal: Patient will remain free from falls  Outcome: Progressing     Problem: Diabetes Management  Goal: Patient will achieve and maintain glucose in satisfactory range  Outcome: Progressing     Problem: Skin Integrity  Goal: Patient's skin integrity will be maintained or improve  Outcome: Progressing     Problem: Mobility  Goal: Patient's capacity to carry out activities will improve  Outcome: Progressing       Patient is not progressing towards the following goals: none

## 2023-08-29 NOTE — THERAPY
Physical Therapy   Daily Treatment     Patient Name: Jorden Bonilla  Age:  58 y.o., Sex:  male  Medical Record #: [id number]  Today's Date: 8/29/2023     Precautions  Precautions: (P) Fall Risk  Comments: (P) L phyllis, HTN, DM    Subjective    Patient in bed and agreeable to therapy.     Objective       08/29/23 0831   PT Charge Group   PT Gait Training (Units) 2   PT Neuromuscular Re-Education / Balance (Units) 1   PT Therapeutic Activities (Units) 1   PT Total Time Spent   PT Individual Total Time Spent (Mins) 60   Precautions   Precautions Fall Risk   Comments L phyllis, HTN, DM   Gait Functional Level of Assist    Gait Level Of Assist Minimal Assist   Assistive Device Quad Cane  (LBQC, L AFO/Givmohr, heel wedge)   Distance (Feet)   (30 ftx5, 60 ftx1)   Deviation Decreased Base Of Support;Decreased Heel Strike;Decreased Toe Off  (L phyllis gait with difficulty progressing LLE through swing, intermittent hyperextension thrust)   Wheelchair Functional Level of Assist   Wheelchair Assist Stand by Assist   Distance Wheelchair (Feet or Distance) 100  (outdoors)   Wheelchair Description Extra time;Limited by fatigue;Supervision for safety;Verbal cueing   Transfer Functional Level of Assist   Bed, Chair, Wheelchair Transfer Contact Guard Assist   Bed Chair Wheelchair Transfer Description Adaptive equipment;Increased time;Initial preparation for task;Supervision for safety;Verbal cueing  (reach pivot at w/c level vs. stand step with LBQC)   Bed Mobility    Sit to Stand Contact Guard Assist   Interdisciplinary Plan of Care Collaboration   IDT Collaboration with  Nursing;Physician   Patient Position at End of Therapy Seated;Chair Alarm On;Self Releasing Lap Belt Applied;Call Light within Reach;Tray Table within Reach;Phone within Reach  (nursing with patient)   Collaboration Comments CLOF     Education provided on heel wedge to supplement AFO to address hyperextension thrust in stance.    W/C mobility indoors and outdoors  including ramp negotiation. Patient able to propel uphill both forward and backward with SBA, however prefers backward technique.    Standing balance activities with SBA-CGA: lateral stepping 2x10 each leg with cues to increase LLE foot clearance with RUE support on quad cane; A/P weight shifts with no UE support 2x10.    Assessment    Patient tolerated session well, improved gait mechanics with addition of heel wedge. Continues with tendency for posterior LOBs with balance exercises.    Strengths: Able to follow instructions, Alert and oriented, Effective communication skills, Good carryover of learning, Good insight into deficits/needs, Independent prior level of function, Making steady progress towards goals, Motivated for self care and independence, Pleasant and cooperative, Supportive family, Willingly participates in therapeutic activities  Barriers: Bladder retention, Decreased endurance, Hemiparesis, Impaired activity tolerance, Impaired balance, Language barrier, non-fluent English, Limited mobility    Plan    Bed mobility with phyllis techniques, transfer training, gait training with LBQC vs. SBQC and carbon fiber AFO with stockinette, standing balance, neuro re-ed for L hemibody strengthening, Bioness, lite-gait BWSTT, family training/education.      LLE  therapy tech program 2x/week     Passport items to be completed:  Get in/out of bed safely, in/out of a vehicle, safely use mobility device, walk or wheel around home/community, navigate up and down stairs, show how to get up/down from the ground, ensure home is accessible, demonstrate HEP, complete caregiver training    Physical Therapy Problems (Active)       Problem: Mobility       Dates: Start:  08/15/23         Goal: STG-Within one week, patient will ambulate household distance 50 ft with HW and CGA.       Dates: Start:  08/15/23    Expected End:  09/05/23            Goal: STG-Within one week, patient will ambulate up/down a curb with HW and min  A.       Dates: Start:  08/15/23    Expected End:  09/05/23               Problem: Mobility Transfers       Dates: Start:  08/15/23         Goal: STG-Within one week, patient will perform bed mobility with SPV consistently.       Dates: Start:  08/15/23    Expected End:  09/05/23            Goal: STG-Within one week, patient will transfer bed to chair stand pivot with HW and CGA.       Dates: Start:  08/15/23    Expected End:  09/05/23               Problem: PT-Long Term Goals       Dates: Start:  08/15/23         Goal: LTG-By discharge, patient will ambulate 150 ft with QC vs SPC and SBA.       Dates: Start:  08/15/23    Expected End:  09/05/23            Goal: LTG-By discharge, patient will transfer one surface to another with QC vs SPC and SPV.       Dates: Start:  08/15/23    Expected End:  09/05/23            Goal: LTG-By discharge, patient will ambulate up/down a curb with QC vs SPC and SBA.       Dates: Start:  08/15/23    Expected End:  09/05/23            Goal: LTG-By discharge, patient will transfer in/out of a car with QC vs SPC and SBA after setup.       Dates: Start:  08/15/23    Expected End:  09/05/23            Goal: LTG-By discharge, patient will ambulate up/down a ramp with QC vs SPC and CGA to simulate home entrance.       Dates: Start:  08/15/23    Expected End:  09/05/23

## 2023-08-29 NOTE — THERAPY
"Occupational Therapy  Daily Treatment     Patient Name: Jorden Bonilla  Age:  58 y.o., Sex:  male  Medical Record #: [id number]  Today's Date: 8/29/2023     Precautions  Precautions: Fall Risk  Comments: L phyllis, HTN, DM         Subjective    \"I'd like a shower.\"      Objective       08/29/23 1031   OT Charge Group   OT Self Care / ADL (Units) 3   OT Neuromuscular Re-education / Balance (Units) 1   OT Total Time Spent   OT Individual Total Time Spent (Mins) 60   Functional Level of Assist   Grooming Supervision;Seated   Grooming Description Increased time;Initial preparation for task;Seated in wheelchair at sink;Set-up of equipment  (apply deordorant)   Bathing Contact Guard Assist   Bathing Description Grab bar;Hand held shower;Tub bench;Increased time;Initial preparation for task;Set-up of equipment;Supervision for safety;Verbal cueing  (seated on tub bench)   Upper Body Dressing Supervision   Upper Body Dressing Description Increased time;Initial preparation for task;Set-up of equipment  (doff/don pull over shirt seated in w/c)   Lower Body Dressing Minimal Assist   Lower Body Dressing Description Grab bar;Increased time;Initial preparation for task;Set-up of equipment;Supervision for safety;Verbal cueing  (able to doff shoes/socks/AFO seated in w/c w/o assist; CGA to doff pants/brief leaning side to side on tub bench; able to thread pants/boxers sitting in w/c; Min a during standing for balance using R hand to don over hips in standing)   Tub / Shower Transfers Contact Guard Assist  (stand pivot w/c <> TTB)   Tub Shower Transfer Description Grab bar;Shower bench;Increased time;Initial preparation for task;Set-up of equipment;Supervision for safety   Neuro-Muscular Treatments   Neuro-Muscular Treatments Electrical Stimulation;Facilitation;Verbal Cuing   Comments see note for details   Interdisciplinary Plan of Care Collaboration   Patient Position at End of Therapy Seated;Chair Alarm On;Self Releasing Lap Belt " Applied;Other (Comments)  (left in dining room for lunch)        Neuro re-ed:   E-stim placed on pt's forearm extensors 24 mA and flexors 20 mA for initiating grasp/release and finger/wrist flexion/extension. Educated pt on faciliating movement with R hand for full extension/flexion in conjunction with e-stim.     Assessment    Pt tolerated session well. Pt completed shower/dressing tasks with increased independence going from mod a for bathing to CGA and mod a for LBD to min a. Pt still requires assist with LLE mgmt during dressing- having trouble keeping leg in figure 4 positioning to don sock/shoe- therapist blocking leg to keep on other leg. Pt needing assist for donning L shoe and AFO- elastic laces adjusted to loosen up for improved ability to don R shoe with set-up.     Strengths: Able to follow instructions, Alert and oriented, Effective communication skills, Good carryover of learning, Good insight into deficits/needs, Independent prior level of function, Manages pain appropriately, Motivated for self care and independence, Pleasant and cooperative, Supportive family, Willingly participates in therapeutic activities  Barriers: Hemiplegia, Hypertension, Impaired balance, Limited mobility    Plan    ADL/IADL training, LUE neuro re-ed, increase overall strength and endurance.     Occupational Therapy Goals (Active)       Problem: Bathing       Dates: Start:  08/15/23         Goal: STG-Within one week, patient will bathe body with min A using AE/AD prn       Dates: Start:  08/15/23    Expected End:  09/05/23         Goal Note filed on 08/23/23 0810 by Mayra Pantoja, Student       Requires Mod A.                 Problem: Dressing       Dates: Start:  08/15/23         Goal: STG-Within one week, patient will dress LB with min A       Dates: Start:  08/15/23    Expected End:  09/05/23         Goal Note filed on 08/23/23 0810 by Mayra Pantoja, Student       Requires Mod A.                 Problem: Functional  Transfers       Dates: Start:  08/23/23         Goal: STG-Within one week, patient will transfer to toilet with supervision using DME/AE as  needed       Dates: Start:  08/23/23    Expected End:  09/05/23               Problem: OT Long Term Goals       Dates: Start:  08/15/23         Goal: LTG-By discharge, patient will complete basic self care tasks with SBA/supervision       Dates: Start:  08/15/23    Expected End:  09/05/23            Goal: LTG-By discharge, patient will perform bathroom transfers with supervision/mod I       Dates: Start:  08/15/23    Expected End:  09/05/23               Problem: Toileting       Dates: Start:  08/23/23         Goal: STG-Within one week, patient will complete toileting tasks with supervision using DME/AE as needed       Dates: Start:  08/23/23    Expected End:  09/05/23

## 2023-08-30 ENCOUNTER — APPOINTMENT (OUTPATIENT)
Dept: PHYSICAL THERAPY | Facility: REHABILITATION | Age: 58
DRG: 057 | End: 2023-08-30
Attending: PHYSICAL MEDICINE & REHABILITATION
Payer: COMMERCIAL

## 2023-08-30 ENCOUNTER — APPOINTMENT (OUTPATIENT)
Dept: OCCUPATIONAL THERAPY | Facility: REHABILITATION | Age: 58
DRG: 057 | End: 2023-08-30
Attending: PHYSICAL MEDICINE & REHABILITATION
Payer: COMMERCIAL

## 2023-08-30 LAB
ANION GAP SERPL CALC-SCNC: 9 MMOL/L (ref 7–16)
BUN SERPL-MCNC: 13 MG/DL (ref 8–22)
CALCIUM SERPL-MCNC: 8.7 MG/DL (ref 8.5–10.5)
CHLORIDE SERPL-SCNC: 106 MMOL/L (ref 96–112)
CO2 SERPL-SCNC: 27 MMOL/L (ref 20–33)
CREAT SERPL-MCNC: 0.92 MG/DL (ref 0.5–1.4)
ERYTHROCYTE [DISTWIDTH] IN BLOOD BY AUTOMATED COUNT: 37.5 FL (ref 35.9–50)
GFR SERPLBLD CREATININE-BSD FMLA CKD-EPI: 95 ML/MIN/1.73 M 2
GLUCOSE BLD STRIP.AUTO-MCNC: 139 MG/DL (ref 65–99)
GLUCOSE BLD STRIP.AUTO-MCNC: 139 MG/DL (ref 65–99)
GLUCOSE BLD STRIP.AUTO-MCNC: 158 MG/DL (ref 65–99)
GLUCOSE SERPL-MCNC: 144 MG/DL (ref 65–99)
HCT VFR BLD AUTO: 48.1 % (ref 42–52)
HGB BLD-MCNC: 15.2 G/DL (ref 14–18)
MCH RBC QN AUTO: 26 PG (ref 27–33)
MCHC RBC AUTO-ENTMCNC: 31.6 G/DL (ref 32.3–36.5)
MCV RBC AUTO: 82.2 FL (ref 81.4–97.8)
PLATELET # BLD AUTO: 262 K/UL (ref 164–446)
PMV BLD AUTO: 10.4 FL (ref 9–12.9)
POTASSIUM SERPL-SCNC: 3.9 MMOL/L (ref 3.6–5.5)
RBC # BLD AUTO: 5.85 M/UL (ref 4.7–6.1)
SODIUM SERPL-SCNC: 142 MMOL/L (ref 135–145)
WBC # BLD AUTO: 8.9 K/UL (ref 4.8–10.8)

## 2023-08-30 PROCEDURE — 97535 SELF CARE MNGMENT TRAINING: CPT

## 2023-08-30 PROCEDURE — 770010 HCHG ROOM/CARE - REHAB SEMI PRIVAT*

## 2023-08-30 PROCEDURE — A9270 NON-COVERED ITEM OR SERVICE: HCPCS | Performed by: HOSPITALIST

## 2023-08-30 PROCEDURE — 700102 HCHG RX REV CODE 250 W/ 637 OVERRIDE(OP): Performed by: HOSPITALIST

## 2023-08-30 PROCEDURE — 700111 HCHG RX REV CODE 636 W/ 250 OVERRIDE (IP): Mod: JZ | Performed by: PHYSICAL MEDICINE & REHABILITATION

## 2023-08-30 PROCEDURE — 700102 HCHG RX REV CODE 250 W/ 637 OVERRIDE(OP): Performed by: PHYSICAL MEDICINE & REHABILITATION

## 2023-08-30 PROCEDURE — 85027 COMPLETE CBC AUTOMATED: CPT

## 2023-08-30 PROCEDURE — 36415 COLL VENOUS BLD VENIPUNCTURE: CPT

## 2023-08-30 PROCEDURE — 82962 GLUCOSE BLOOD TEST: CPT | Mod: 91

## 2023-08-30 PROCEDURE — 99232 SBSQ HOSP IP/OBS MODERATE 35: CPT | Performed by: PHYSICAL MEDICINE & REHABILITATION

## 2023-08-30 PROCEDURE — 97112 NEUROMUSCULAR REEDUCATION: CPT

## 2023-08-30 PROCEDURE — 99232 SBSQ HOSP IP/OBS MODERATE 35: CPT | Performed by: HOSPITALIST

## 2023-08-30 PROCEDURE — 97116 GAIT TRAINING THERAPY: CPT

## 2023-08-30 PROCEDURE — 700101 HCHG RX REV CODE 250: Performed by: PHYSICAL MEDICINE & REHABILITATION

## 2023-08-30 PROCEDURE — 80048 BASIC METABOLIC PNL TOTAL CA: CPT

## 2023-08-30 PROCEDURE — A9270 NON-COVERED ITEM OR SERVICE: HCPCS | Performed by: PHYSICAL MEDICINE & REHABILITATION

## 2023-08-30 RX ADMIN — ACETAMINOPHEN 650 MG: 325 TABLET ORAL at 05:49

## 2023-08-30 RX ADMIN — METFORMIN HYDROCHLORIDE 500 MG: 500 TABLET, EXTENDED RELEASE ORAL at 17:30

## 2023-08-30 RX ADMIN — ASPIRIN 81 MG: 81 TABLET, COATED ORAL at 21:02

## 2023-08-30 RX ADMIN — SITAGLIPTIN 25 MG: 50 TABLET, FILM COATED ORAL at 09:10

## 2023-08-30 RX ADMIN — TAMSULOSIN HYDROCHLORIDE 0.4 MG: 0.4 CAPSULE ORAL at 09:09

## 2023-08-30 RX ADMIN — METOPROLOL TARTRATE 12.5 MG: 25 TABLET, FILM COATED ORAL at 05:49

## 2023-08-30 RX ADMIN — ENOXAPARIN SODIUM 40 MG: 100 INJECTION SUBCUTANEOUS at 17:30

## 2023-08-30 RX ADMIN — METOPROLOL TARTRATE 12.5 MG: 25 TABLET, FILM COATED ORAL at 17:30

## 2023-08-30 RX ADMIN — LIDOCAINE 1 PATCH: 700 PATCH TOPICAL at 09:10

## 2023-08-30 RX ADMIN — METFORMIN HYDROCHLORIDE 500 MG: 500 TABLET, EXTENDED RELEASE ORAL at 09:10

## 2023-08-30 RX ADMIN — BACLOFEN 10 MG: 10 TABLET ORAL at 21:02

## 2023-08-30 RX ADMIN — ATORVASTATIN CALCIUM 80 MG: 40 TABLET, FILM COATED ORAL at 21:01

## 2023-08-30 ASSESSMENT — ENCOUNTER SYMPTOMS
EYES NEGATIVE: 1
VOMITING: 0
COUGH: 0
POLYDIPSIA: 0
FEVER: 0
PALPITATIONS: 0
MUSCULOSKELETAL NEGATIVE: 1
CHILLS: 0
ABDOMINAL PAIN: 0
BRUISES/BLEEDS EASILY: 0
NAUSEA: 0
SHORTNESS OF BREATH: 0
FOCAL WEAKNESS: 1

## 2023-08-30 ASSESSMENT — GAIT ASSESSMENTS
DEVIATION: DECREASED BASE OF SUPPORT;DECREASED HEEL STRIKE;DECREASED TOE OFF
ASSISTIVE DEVICE: QUAD CANE
DISTANCE (FEET): 80
GAIT LEVEL OF ASSIST: MINIMAL ASSIST

## 2023-08-30 ASSESSMENT — PAIN DESCRIPTION - PAIN TYPE: TYPE: ACUTE PAIN

## 2023-08-30 ASSESSMENT — ACTIVITIES OF DAILY LIVING (ADL)
TOILET_TRANSFER_DESCRIPTION: GRAB BAR;INCREASED TIME;INITIAL PREPARATION FOR TASK;SET-UP OF EQUIPMENT;SUPERVISION FOR SAFETY;VERBAL CUEING
BED_CHAIR_WHEELCHAIR_TRANSFER_DESCRIPTION: INCREASED TIME;INITIAL PREPARATION FOR TASK;SET-UP OF EQUIPMENT;SUPERVISION FOR SAFETY
TUB_SHOWER_TRANSFER_DESCRIPTION: GRAB BAR;SHOWER BENCH;INCREASED TIME;SET-UP OF EQUIPMENT;SUPERVISION FOR SAFETY;VERBAL CUEING
BED_CHAIR_WHEELCHAIR_TRANSFER_DESCRIPTION: INCREASED TIME;SET-UP OF EQUIPMENT;SUPERVISION FOR SAFETY;VERBAL CUEING

## 2023-08-30 NOTE — CARE PLAN
Problem: Toileting  Goal: STG-Within one week, patient will complete toileting tasks with supervision using DME/AE as needed  Outcome: Not Met  Note: Requires Min A.     Problem: Functional Transfers  Goal: STG-Within one week, patient will transfer to toilet with supervision using DME/AE as  needed  Outcome: Not Met  Note: Requires CGA.     Problem: Bathing  Goal: STG-Within one week, patient will bathe body with min A using AE/AD prn  Outcome: Met     Problem: Dressing  Goal: STG-Within one week, patient will dress LB with min A  Outcome: Met

## 2023-08-30 NOTE — PROGRESS NOTES
Hospital Medicine Daily Progress Note        Chief Complaint  Hypertension  Diabetes    Interval Problem Update  Doing well, denies complaints.    Review of Systems  Review of Systems   Constitutional:  Negative for chills and fever.   HENT: Negative.     Eyes: Negative.    Respiratory:  Negative for cough and shortness of breath.    Cardiovascular:  Negative for chest pain and palpitations.   Gastrointestinal:  Negative for abdominal pain, nausea and vomiting.   Genitourinary:  Negative for dysuria, frequency and urgency.   Musculoskeletal: Negative.    Skin:  Negative for itching and rash.   Neurological:  Positive for focal weakness.   Endo/Heme/Allergies:  Negative for polydipsia. Does not bruise/bleed easily.        Physical Exam  Temp:  [36.8 °C (98.2 °F)-36.9 °C (98.4 °F)] 36.8 °C (98.3 °F)  Pulse:  [67-90] 90  Resp:  [17-18] 18  BP: (108-117)/(69-72) 108/72  SpO2:  [93 %-96 %] 93 %    Physical Exam  Vitals reviewed.   Constitutional:       General: He is not in acute distress.     Appearance: Normal appearance. He is not ill-appearing.   HENT:      Head: Normocephalic and atraumatic.      Right Ear: External ear normal.      Left Ear: External ear normal.      Nose: Nose normal.      Mouth/Throat:      Pharynx: Oropharynx is clear.   Eyes:      General:         Right eye: No discharge.         Left eye: No discharge.      Extraocular Movements: Extraocular movements intact.      Conjunctiva/sclera: Conjunctivae normal.   Cardiovascular:      Rate and Rhythm: Normal rate and regular rhythm.   Pulmonary:      Effort: Pulmonary effort is normal. No respiratory distress.      Breath sounds: Normal breath sounds. No wheezing.   Abdominal:      General: Bowel sounds are normal. There is no distension.      Palpations: Abdomen is soft.      Tenderness: There is no abdominal tenderness.   Musculoskeletal:      Cervical back: Normal range of motion and neck supple.      Right lower leg: No edema.      Left lower leg:  No edema.   Skin:     General: Skin is warm and dry.   Neurological:      Mental Status: He is alert and oriented to person, place, and time.             Laboratory                        Assessment/Plan  * Ischemic stroke (HCC)- (present on admission)  Assessment & Plan  Presented w/ L HP  S/P ZioPatch  On ASA and Lipitor, off Plavix  Management per Physiatry    Microcytosis  Assessment & Plan  Fe 34, may start supplement if no contraindications  FOB+, recommend outpt GI F/U  Follow H/H  Check F/U labs in AM    Primary hypertension- (present on admission)  Assessment & Plan  Observe blood pressure trends on Metoprolol  Olmesartan discontinued for low blood pressures  Monitor for orthostatic hypotension on Flomax    Type 2 diabetes mellitus with hyperglycemia, without long-term current use of insulin (HCC)- (present on admission)  Assessment & Plan  HbA1c 11.9  Now on Metformin XR, Januvia, and SSI  Lantus discontinued  Observe serum glucose trends  Outpt meds include Metformin  mg bid, Jardiance 25 mg qd, and Trulicity 1.5 mg SQ qwk    Coronary artery disease due to calcified coronary lesion: CABG ×3 in 2015- (present on admission)  Assessment & Plan  H/O CABG  Continue ASA, Lipitor, and Metoprolol  Off Olmesartan for hypotension  Cardiology F/U       Full Code

## 2023-08-30 NOTE — CARE PLAN
Problem: Mobility  Goal: STG-Within one week, patient will ambulate household distance 50 ft with HW and CGA.  Outcome: Met     Problem: Mobility Transfers  Goal: STG-Within one week, patient will perform bed mobility with SPV consistently.  Outcome: Met  Goal: STG-Within one week, patient will transfer bed to chair stand pivot with HW and CGA.  Outcome: Met     Problem: Mobility  Goal: STG-Within one week, patient will ambulate up/down a curb with HW and min A.  Outcome: Not Met

## 2023-08-30 NOTE — PROGRESS NOTES
Hospital Medicine Daily Progress Note        Chief Complaint  Hypertension  Diabetes    Interval Problem Update  No new issues.  Labs reviewed.    Review of Systems  Review of Systems   Constitutional:  Negative for chills and fever.   HENT: Negative.     Eyes: Negative.    Respiratory:  Negative for cough and shortness of breath.    Cardiovascular:  Negative for chest pain and palpitations.   Gastrointestinal:  Negative for abdominal pain, nausea and vomiting.   Genitourinary:  Negative for dysuria, frequency and urgency.   Musculoskeletal: Negative.    Skin:  Negative for itching and rash.   Neurological:  Positive for focal weakness.   Endo/Heme/Allergies:  Negative for polydipsia. Does not bruise/bleed easily.        Physical Exam  Temp:  [36.6 °C (97.9 °F)-36.8 °C (98.3 °F)] 36.6 °C (97.9 °F)  Pulse:  [79-90] 80  Resp:  [18] 18  BP: (108-122)/(68-72) 114/71  SpO2:  [93 %-95 %] 95 %    Physical Exam  Vitals reviewed.   Constitutional:       General: He is not in acute distress.     Appearance: Normal appearance. He is not ill-appearing.   HENT:      Head: Normocephalic and atraumatic.      Right Ear: External ear normal.      Left Ear: External ear normal.      Nose: Nose normal.      Mouth/Throat:      Pharynx: Oropharynx is clear.   Eyes:      General:         Right eye: No discharge.         Left eye: No discharge.      Extraocular Movements: Extraocular movements intact.      Conjunctiva/sclera: Conjunctivae normal.   Cardiovascular:      Rate and Rhythm: Normal rate and regular rhythm.   Pulmonary:      Effort: Pulmonary effort is normal. No respiratory distress.      Breath sounds: Normal breath sounds. No wheezing.   Abdominal:      General: Bowel sounds are normal. There is no distension.      Palpations: Abdomen is soft.      Tenderness: There is no abdominal tenderness.   Musculoskeletal:      Cervical back: Normal range of motion and neck supple.      Right lower leg: No edema.      Left lower leg: No  edema.   Skin:     General: Skin is warm and dry.   Neurological:      Mental Status: He is alert and oriented to person, place, and time.             Laboratory  Recent Labs     08/30/23  0547   WBC 8.9   RBC 5.85   HEMOGLOBIN 15.2   HEMATOCRIT 48.1   MCV 82.2   MCH 26.0*   MCHC 31.6*   RDW 37.5   PLATELETCT 262   MPV 10.4     Recent Labs     08/30/23  0547   SODIUM 142   POTASSIUM 3.9   CHLORIDE 106   CO2 27   GLUCOSE 144*   BUN 13   CREATININE 0.92   CALCIUM 8.7                   Assessment/Plan  * Ischemic stroke (HCC)- (present on admission)  Assessment & Plan  Presented w/ L HP  S/P ZioPatch  On ASA and Lipitor, off Plavix  Management per Physiatry    Microcytosis  Assessment & Plan  Fe 34, may start supplement if no contraindications  FOB+, recommend outpt GI F/U  Follow H/H    Primary hypertension- (present on admission)  Assessment & Plan  Blood pressure controlled on Metoprolol  Olmesartan discontinued for low blood pressures  Monitor for orthostatic hypotension on Flomax    Type 2 diabetes mellitus with hyperglycemia, without long-term current use of insulin (HCC)- (present on admission)  Assessment & Plan  HbA1c 11.9  Serum glucose controlled on Metformin XR and Januvia  Will discontinue FSBS and SSI  Outpt meds include Metformin  mg bid, Jardiance 25 mg qd, and Trulicity 1.5 mg SQ qwk  May resume outpt diabetic regimen upon discharge if taking PO well    Coronary artery disease due to calcified coronary lesion: CABG ×3 in 2015- (present on admission)  Assessment & Plan  H/O CABG  Continue ASA, Lipitor, and Metoprolol  Off Olmesartan for hypotension  Cardiology F/U       Full Code    Pt w/o acute medical issues requiring Hospitalist services at this time.  Will sign off.  Please re-consult as needed.  Discussed w/ Dr. Davila.

## 2023-08-30 NOTE — CARE PLAN
The patient is Stable - Low risk of patient condition declining or worsening    Shift Goals  Clinical Goals: Safety  Patient Goals: Sleep    Progress made toward(s) clinical / shift goals:    Problem: Knowledge Deficit - Standard  Goal: Patient and family/care givers will demonstrate understanding of plan of care, disease process/condition, diagnostic tests and medications  Outcome: Progressing   Patient educated on the POC and medications administered. All questions and concerns addressed at this time  Problem: Fall Risk - Rehab  Goal: Patient will remain free from falls  Outcome: Progressing   Bed is locked and in low position. Call light and belongings within reach  Problem: Pain - Standard  Goal: Alleviation of pain or a reduction in pain to the patient’s comfort goal  Outcome: Progressing   Use of 0-10 pain scale. Patient is able to verbalize pain and discomfort appropriately     Patient is not progressing towards the following goals:

## 2023-08-30 NOTE — THERAPY
Occupational Therapy  Daily Treatment     Patient Name: Jorden Bonilla  Age:  58 y.o., Sex:  male  Medical Record #: [id number]  Today's Date: 8/30/2023     Precautions  Precautions: (P) Fall Risk  Comments: (P) L phyllis, HTN, DM         Subjective    Pt seated in w/c upon arrival. Pt pleasant and cooperative, agreeable to therapy.     Objective       08/30/23 0831   OT Total Time Spent   OT Individual Total Time Spent (Mins) 30   Precautions   Precautions Fall Risk   Comments L phyllis, HTN, DM   Vitals   O2 Delivery Device None - Room Air   Pain   Intervention Declines   Pain 0 - 10 Group   Pain Rating Scale (NPRS) 0   Cognition    Level of Consciousness Alert   Sleep/Wake Cycle   Sleep & Rest Awake;Out of bed   Functional Level of Assist   Bed, Chair, Wheelchair Transfer Contact Guard Assist   Bed Chair Wheelchair Transfer Description Increased time;Set-up of equipment;Supervision for safety;Verbal cueing  (stand pivot from w/c to bed)   Toilet Transfers Contact Guard Assist   Toilet Transfer Description Grab bar;Increased time;Initial preparation for task;Set-up of equipment;Supervision for safety;Verbal cueing  (stand pivot from w/c <> toilet in ADL suite.)   Tub / Shower Transfers Contact Guard Assist   Tub Shower Transfer Description Grab bar;Shower bench;Increased time;Set-up of equipment;Supervision for safety;Verbal cueing  (lateral scoot from w/c <> tub transfer bench in ADL suite.)   Interdisciplinary Plan of Care Collaboration   Patient Position at End of Therapy In Bed;Call Light within Reach;Tray Table within Reach;Phone within Reach     Home set-up- toilet has on GB on L side and sink counter on R. No GB's in shower. Working on getting tub transfer bench and pictures of home.    Assessment    Pt tolerated session well with no LOB noted. Pt successful with using tub transfer bench in ADL suite and prefers using to avoid stepping over tub ledge. Pt had some difficulty with toilet transfer as LLE would get  caught when trying to pivot to toilet. Pt required verbal cues to bring awareness to what the LLE was doing but still able to complete without physical assist from therapist.     Strengths: Able to follow instructions, Alert and oriented, Effective communication skills, Good carryover of learning, Good insight into deficits/needs, Independent prior level of function, Manages pain appropriately, Motivated for self care and independence, Pleasant and cooperative, Supportive family, Willingly participates in therapeutic activities  Barriers: Hemiplegia, Hypertension, Impaired balance, Limited mobility    Plan    Review home set-up. ADL/IADL training at w/c level. Increase overall strength and endurance.     Passport items to be completed:  Perform bathroom transfers, complete dressing, complete feeding, get ready for the day, prepare a simple meal, participate in household tasks, adapt home for safety needs, demonstrate home exercise program, complete caregiver training     Occupational Therapy Goals (Active)       Problem: Dressing       Dates: Start:  08/30/23         Goal: STG-Within one week, patient will dress LB with CGA using AE/DME as needed.       Dates: Start:  08/30/23               Problem: Functional Transfers       Dates: Start:  08/23/23         Goal: STG-Within one week, patient will transfer to toilet with supervision using DME/AE as  needed       Dates: Start:  08/23/23    Expected End:  09/05/23         Goal Note filed on 08/30/23 0813 by Mayra Pantoja, Student       Requires CGA.                 Problem: IADL's       Dates: Start:  08/30/23         Goal: STG-Within one week, patient will access kitchen area with CGA using AE/DME as needed.       Dates: Start:  08/30/23               Problem: OT Long Term Goals       Dates: Start:  08/15/23         Goal: LTG-By discharge, patient will complete basic self care tasks with SBA/supervision       Dates: Start:  08/15/23    Expected End:  09/05/23             Goal: LTG-By discharge, patient will perform bathroom transfers with supervision/mod I       Dates: Start:  08/15/23    Expected End:  09/05/23               Problem: Toileting       Dates: Start:  08/23/23         Goal: STG-Within one week, patient will complete toileting tasks with supervision using DME/AE as needed       Dates: Start:  08/23/23    Expected End:  09/05/23         Goal Note filed on 08/30/23 0813 by Mayra Pantoja, Student       Requires Min A.
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
## 2023-08-30 NOTE — PROGRESS NOTES
Assumed care of patient. Bedside report received from Sherie ALBA. Patient is in bed. Fall precautions in place. Call light and belongings within reach. Updated on POC, all questions and concerns answered at this time. No other needs at this time.

## 2023-08-30 NOTE — THERAPY
Occupational Therapy  Daily Treatment     Patient Name: Jorden Bonilla  Age:  58 y.o., Sex:  male  Medical Record #: [id number]  Today's Date: 8/30/2023     Precautions  Precautions: (P) Fall Risk  Comments: (P) L phyllis, HTN, DM         Subjective    Pt supine in bed and agreeable to OT at this time.      Objective       08/30/23 0931   OT Charge Group   Charges Yes   OT Neuromuscular Re-education / Balance (Units) 4   OT Total Time Spent   OT Individual Total Time Spent (Mins) 60   Precautions   Precautions Fall Risk   Comments L phyllis, HTN, DM   Cognition    Cognition / Consciousness WDL   Orientation Level Oriented x 4   Level of Consciousness Alert   Functional Level of Assist   Bed, Chair, Wheelchair Transfer Standby Assist   Bed Chair Wheelchair Transfer Description Increased time;Initial preparation for task;Set-up of equipment;Supervision for safety   Neuro-Muscular Treatments   Neuro-Muscular Treatments Compensatory Strategies;Facilitation;Joint Approximation;Tapping;Weight Shift Right;Weight Shift Left;Anterior weight shift;Biofeedback   Comments see note for further detail   Bed Mobility    Sit to Supine Standby Assist   Interdisciplinary Plan of Care Collaboration   IDT Collaboration with  Occupational Therapist   Patient Position at End of Therapy In Bed;Bed Alarm On;Call Light within Reach;Phone within Reach;Tray Table within Reach   Collaboration Comments Discussed with lead therapist JENISE   Strengths & Barriers   Strengths Able to follow instructions;Alert and oriented;Effective communication skills;Good carryover of learning;Good insight into deficits/needs;Independent prior level of function;Manages pain appropriately;Motivated for self care and independence;Pleasant and cooperative;Supportive family;Willingly participates in therapeutic activities   Barriers Hemiplegia;Hypertension;Impaired balance;Limited mobility     Neuro Re-ed:  -WB on L elbow on mat table from W/c with anterior/posterior and  lateral weight shifts, facilitated by functional reaching task with RUE and weighted ball (4-6#) (2x20 B lateral and 2x20 anterior/posterior)  -AAROM facilitated with LUE hand ace wrapped on 2# weighted med ball for B shoulder flexion (2x10) and B chest press (2x10)    Assessment  Pt with good tolerance to OT treatment on this date focused on neuro re-ed of LUE. Pt endorses neural fatigue in L shoulder and hip with prolonged WB. Tactile cueing needed for focusing on specific muscle groups to decrease compensatory lateral deltoid and pectoralis from firing during shoulder pure flexion. Tactile cues needed for proper joint alignment during open chained exercises.     Strengths: (P) Able to follow instructions, Alert and oriented, Effective communication skills, Good carryover of learning, Good insight into deficits/needs, Independent prior level of function, Manages pain appropriately, Motivated for self care and independence, Pleasant and cooperative, Supportive family, Willingly participates in therapeutic activities  Barriers: (P) Hemiplegia, Hypertension, Impaired balance, Limited mobility    Plan    Review home set-up. ADL/IADL training at w/c level. Increase overall strength and endurance.      Passport items to be completed:  Perform bathroom transfers, complete dressing, complete feeding, get ready for the day, prepare a simple meal, participate in household tasks, adapt home for safety needs, demonstrate home exercise program, complete caregiver training        Occupational Therapy Goals (Active)       Problem: Dressing       Dates: Start:  08/30/23         Goal: STG-Within one week, patient will dress LB with CGA using AE/DME as needed.       Dates: Start:  08/30/23               Problem: Functional Transfers       Dates: Start:  08/23/23         Goal: STG-Within one week, patient will transfer to toilet with supervision using DME/AE as  needed       Dates: Start:  08/23/23    Expected End:  09/05/23          Goal Note filed on 08/30/23 0813 by Mayra Pantoja, Student       Requires CGA.                 Problem: IADL's       Dates: Start:  08/30/23         Goal: STG-Within one week, patient will access kitchen area with CGA using AE/DME as needed.       Dates: Start:  08/30/23               Problem: OT Long Term Goals       Dates: Start:  08/15/23         Goal: LTG-By discharge, patient will complete basic self care tasks with SBA/supervision       Dates: Start:  08/15/23    Expected End:  09/05/23            Goal: LTG-By discharge, patient will perform bathroom transfers with supervision/mod I       Dates: Start:  08/15/23    Expected End:  09/05/23               Problem: Toileting       Dates: Start:  08/23/23         Goal: STG-Within one week, patient will complete toileting tasks with supervision using DME/AE as needed       Dates: Start:  08/23/23    Expected End:  09/05/23         Goal Note filed on 08/30/23 0813 by Mayra Pantoja, Student       Requires Min A.

## 2023-08-30 NOTE — PROGRESS NOTES
NURSING DAILY NOTE    Name: Jorden Bonilla   Date of Admission: 8/14/2023   Admitting Diagnosis: Ischemic stroke (HCC)  Attending Physician: Sarai Davila M.d.  Allergies: Patient has no known allergies.    Safety  Patient Assist  min-mod assist  Patient Precautions  Fall Risk  Precaution Comments  L phyllis, HTN, DM  Bed Transfer Status  Contact Guard Assist  Toilet Transfer Status   Contact Guard Assist  Assistive Devices  Wheelchair  Oxygen  None - Room Air  Diet/Therapeutic Dining  Current Diet Order   Procedures    Diet Order Diet: Consistent CHO (Diabetic) (cardiac and CHO); Second Modifier: (optional): Cardiac     Pill Administration  whole  Agitated Behavioral Scale  14  ABS Level of Severity  No Agitation    Fall Risk  Has the patient had a fall this admission?   No  Melani Galeano Fall Risk Scoring  19, HIGH RISK  Fall Risk Safety Measures  bed alarm, chair alarm, seatbelt alarm, and poor balance    Vitals  Temperature: 36.8 °C (98.2 °F)  Temp src: Oral  Pulse: 79  Respiration: 18  Blood Pressure: 117/72  Blood Pressure MAP (Calculated): 87 MM HG  BP Location: Right, Upper Arm  Patient BP Position: Supine     Oxygen  Pulse Oximetry: 95 %  O2 (LPM): 0  O2 Delivery Device: None - Room Air    Bowel and Bladder  Last Bowel Movement  08/27/23  Stool Type  Type 5: Soft blob with clear cut edges (passed easily)  Bowel Device  Bathroom  Continent  Bladder: Continent void   Bowel: Continent movement  Bladder Function  Urine Void (mL):  (large)  Number of Times Voided: 1  Urine Color: Yellow  Genitourinary Assessment   Bladder Assessment (WDL):  WDL Except  Damon Catheter: Not Applicable  Damon Care: Given with Soap and Water  Urinary Elimination: Catheter (Document on LDA)  Urine Color: Yellow  Bladder Device: Bathroom  Time Void: No  Bladder Scan: Post Void  $ Bladder Scan Results (mL): 83  Bladder Medications: Yes    Skin  Cooper Score   18  Sensory  Interventions   Bed Types: Standard/Trauma Mattress  Skin Preventative Measures: Pillows in Use for Support / Positioning  Moisture Interventions  Moisturizers/Barriers: Barrier Wipes, Barrier Paste      Pain  Pain Rating Scale  3 - Sometimes distracts me  Pain Location  Head  Pain Location Orientation  Posterior  Pain Interventions   Medication (see MAR)    ADLs    Bathing   Shower, Staff (OT)  Linen Change   Partial  Personal Hygiene  Perineal Care, Moist Mireya Wipes  Chlorhexidine Bath      Oral Care     Teeth/Dentures     Shave     Nutrition Percentage Eaten  Lunch, Less than 25% Consumed  Environmental Precautions  Treaded Slipper Socks on Patient, Transferred to Stronger Side, Personal Belongings, Wastebasket, Call Bell etc. in Easy Reach, Report Given to Other Health Care Providers Regarding Fall Risk, Bed in Low Position, Communication Sign for Patients & Families, Mobility Assessed & Appropriate Sign Placed  Patient Turns/Positioning  Patient Turns Self from Side to Side  Patient Turns Assistance/Tolerance     Bed Positions  Bed Controls On, Bed Locked  Head of Bed Elevated  Self regulated      Psychosocial/Neurologic Assessment  Psychosocial Assessment  Psychosocial (WDL):  Within Defined Limits  Neurologic Assessment  Neuro (WDL): Exceptions to WDL  Level of Consciousness: Alert  Orientation Level: Oriented X4  Cognition: Follows commands, Appropriate judgement  Speech: Clear  Facial Symmetry: Left facial drooping  Motor Function/Sensation Assessment: Sensation, Motor strength  RUE Sensation: Full sensation  Muscle Strength Right Arm: Normal Strength Against Gravity and Full Resistance  LUE Sensation: Full sensation  Muscle Strength Left Arm: Weak Movement but Not Against Gravity or Resistance  RLE Sensation: Full sensation  Muscle Strength Right Leg: Normal Strength Against Gravity and Full Resistance  LLE Sensation: Full sensation  Muscle Strength Left Leg: Fair Strength against Gravity but No  Resistance  EENT (WDL):  WDL Except    Cardio/Pulmonary Assessment  Edema      Respiratory Breath Sounds  RUL Breath Sounds: Clear  RML Breath Sounds: Clear  RLL Breath Sounds: Clear  SHALOM Breath Sounds: Clear  LLL Breath Sounds: Clear  Cardiac Assessment   Cardiac (WDL):  WDL Except

## 2023-08-30 NOTE — PROGRESS NOTES
NURSING DAILY NOTE    Name: Jorden Bonilla   Date of Admission: 8/14/2023   Admitting Diagnosis: Ischemic stroke (HCC)  Attending Physician: Sarai Davila M.d.  Allergies: Patient has no known allergies.    Safety  Patient Assist  min-mod assist  Patient Precautions  Fall Risk  Precaution Comments  L phyllis, HTN, DM  Bed Transfer Status  Contact Guard Assist  Toilet Transfer Status   Contact Guard Assist  Assistive Devices  Wheelchair  Oxygen  None - Room Air  Diet/Therapeutic Dining  Current Diet Order   Procedures    Diet Order Diet: Consistent CHO (Diabetic) (cardiac and CHO); Second Modifier: (optional): Cardiac     Pill Administration  whole  Agitated Behavioral Scale  14  ABS Level of Severity  No Agitation    Fall Risk  Has the patient had a fall this admission?   No  Melani Galeano Fall Risk Scoring  15, HIGH RISK  Fall Risk Safety Measures  bed alarm, poor balance, and low vision/ hearing    Vitals  Temperature: 36.6 °C (97.9 °F)  Temp src: Oral  Pulse: 80  Respiration: 18  Blood Pressure: 114/71  Blood Pressure MAP (Calculated): 85 MM HG  BP Location: Right, Upper Arm  Patient BP Position: Supine     Oxygen  Pulse Oximetry: 95 %  O2 (LPM): 0  O2 Delivery Device: None - Room Air    Bowel and Bladder  Last Bowel Movement  08/29/23  Stool Type  Type 5: Soft blob with clear cut edges (passed easily)  Bowel Device  Bathroom  Continent  Bladder: Continent void   Bowel: Continent movement  Bladder Function  Urine Void (mL):  (moderate)  Number of Times Voided: 1  Urine Color: Unable To Evaluate  Genitourinary Assessment   Bladder Assessment (WDL):  WDL Except  Damon Catheter: Not Applicable  Damon Care: Given with Soap and Water  Urinary Elimination: Catheter (Document on LDA)  Urine Color: Unable To Evaluate  Bladder Device: Bathroom  Time Void: No  Bladder Scan: Post Void  $ Bladder Scan Results (mL): 83  Bladder Medications: Yes    Skin  Cooper  Score   18  Sensory Interventions   Bed Types: Standard/Trauma Mattress  Skin Preventative Measures: Pillows in Use for Support / Positioning  Moisture Interventions  Moisturizers/Barriers: Barrier Wipes      Pain  Pain Rating Scale  3 - Sometimes distracts me  Pain Location  Head  Pain Location Orientation  Posterior  Pain Interventions   Declines    ADLs    Bathing   Shower, Staff (OT)  Linen Change   Partial  Personal Hygiene  Perineal Care, Moist Mireya Wipes  Chlorhexidine Bath      Oral Care     Teeth/Dentures     Shave     Nutrition Percentage Eaten  Lunch, Less than 25% Consumed  Environmental Precautions  Treaded Slipper Socks on Patient, Personal Belongings, Wastebasket, Call Bell etc. in Easy Reach, Transferred to Stronger Side, Report Given to Other Health Care Providers Regarding Fall Risk, Bed in Low Position, Communication Sign for Patients & Families, Mobility Assessed & Appropriate Sign Placed  Patient Turns/Positioning  Patient Turns Self from Side to Side  Patient Turns Assistance/Tolerance     Bed Positions  Bed Controls On, Bed Locked  Head of Bed Elevated  Self regulated      Psychosocial/Neurologic Assessment  Psychosocial Assessment  Psychosocial (WDL):  Within Defined Limits  Neurologic Assessment  Neuro (WDL): Exceptions to WDL  Level of Consciousness: Alert  Orientation Level: Oriented X4  Cognition: Follows commands, Appropriate judgement  Speech: Clear  Facial Symmetry: Left facial drooping  Motor Function/Sensation Assessment: Sensation, Motor strength  RUE Sensation: Full sensation  Muscle Strength Right Arm: Normal Strength Against Gravity and Full Resistance  LUE Sensation: Full sensation  Muscle Strength Left Arm: Weak Movement but Not Against Gravity or Resistance  RLE Sensation: Full sensation  Muscle Strength Right Leg: Normal Strength Against Gravity and Full Resistance  LLE Sensation: Full sensation  Muscle Strength Left Leg: Fair Strength against Gravity but No Resistance  EENT  (WDL):  WDL Except    Cardio/Pulmonary Assessment  Edema      Respiratory Breath Sounds  RUL Breath Sounds: Clear  RML Breath Sounds: Clear  RLL Breath Sounds: Clear  SHALOM Breath Sounds: Clear  LLL Breath Sounds: Clear  Cardiac Assessment   Cardiac (WDL):  WDL Except

## 2023-08-30 NOTE — THERAPY
Physical Therapy   Daily Treatment     Patient Name: Jorden Bonilla  Age:  58 y.o., Sex:  male  Medical Record #: [id number]  Today's Date: 8/30/2023     Precautions  Precautions: Fall Risk  Comments: L phyllis, HTN, DM    Subjective    Pt resting in bed, ready for PT     Objective       08/30/23 1501   PT Charge Group   PT Gait Training (Units) 3   PT Neuromuscular Re-Education / Balance (Units) 1   PT Total Time Spent   PT Individual Total Time Spent (Mins) 60   Gait Functional Level of Assist    Gait Level Of Assist Minimal Assist   Assistive Device Quad Cane  (L AFO)   Distance (Feet) 80  (in addition to 40 ft x 4)   # of Times Distance was Traveled 2   Deviation Decreased Base Of Support;Decreased Heel Strike;Decreased Toe Off  (L phyllis-gait)   Neuro-Muscular Treatments   Neuro-Muscular Treatments Anterior weight shift;Biofeedback;Joint Approximation;Sequencing;Tactile Cuing;Verbal Cuing;Weight Shift Right;Weight Shift Left   Comments neuro re-ed standing posture/ pre-gait stepping and LLE forced use WB/ knee and hip control as pre am session 5 minutes x 2         Assessment    Pt tolerated increased gait reps this pm, trial posterior articulating AFO with slight improvement in LLE foot clearance and less knee hyperextension but poorly fitting/ small around calf. Fatigued post-tx.     Strengths: Able to follow instructions, Alert and oriented, Effective communication skills, Good carryover of learning, Good insight into deficits/needs, Independent prior level of function, Making steady progress towards goals, Motivated for self care and independence, Pleasant and cooperative, Supportive family, Willingly participates in therapeutic activities  Barriers: Bladder retention, Decreased endurance, Hemiparesis, Impaired activity tolerance, Impaired balance, Language barrier, non-fluent English, Limited mobility    Plan    Bed mobility with phyllis techniques, transfer training, gait training with LBQC vs. SBQC and carbon  fiber AFO with carrol, standing balance, neuro re-ed for L hemibody strengthening, Bioness, lite-gait BWSTT, family training/education.      LLE  therapy tech program 2x/week     Passport items to be completed:  Get in/out of bed safely, in/out of a vehicle, safely use mobility device, walk or wheel around home/community, navigate up and down stairs, show how to get up/down from the ground, ensure home is accessible, demonstrate HEP, complete caregiver training      Physical Therapy Problems (Active)       Problem: Mobility       Dates: Start:  08/15/23         Goal: STG-Within one week, patient will ambulate up/down a curb with HW and min A.       Dates: Start:  08/15/23    Expected End:  09/05/23               Problem: PT-Long Term Goals       Dates: Start:  08/15/23         Goal: LTG-By discharge, patient will ambulate 150 ft with QC vs SPC and SBA.       Dates: Start:  08/15/23    Expected End:  09/05/23            Goal: LTG-By discharge, patient will transfer one surface to another with QC vs SPC and SPV.       Dates: Start:  08/15/23    Expected End:  09/05/23            Goal: LTG-By discharge, patient will ambulate up/down a curb with QC vs SPC and SBA.       Dates: Start:  08/15/23    Expected End:  09/05/23            Goal: LTG-By discharge, patient will transfer in/out of a car with QC vs SPC and SBA after setup.       Dates: Start:  08/15/23    Expected End:  09/05/23            Goal: LTG-By discharge, patient will ambulate up/down a ramp with QC vs SPC and CGA to simulate home entrance.       Dates: Start:  08/15/23    Expected End:  09/05/23

## 2023-08-30 NOTE — THERAPY
Physical Therapy   Daily Treatment     Patient Name: Jorden Bonilla  Age:  58 y.o., Sex:  male  Medical Record #: [id number]  Today's Date: 8/30/2023     Precautions  Precautions: Fall Risk  Comments: L phyllis, HTN, DM    Subjective    Pt received asleep in bed, reports fatigue but willing to participate     Objective       08/30/23 1231   PT Charge Group   PT Neuromuscular Re-Education / Balance (Units) 2   PT Total Time Spent   PT Individual Total Time Spent (Mins) 30   Neuro-Muscular Treatments   Neuro-Muscular Treatments Anterior weight shift;Co-Contraction;Biofeedback;Facilitation;Postural Facilitation;Sequencing;Tactile Cuing;Verbal Cuing;Weight Shift Right;Weight Shift Left   Comments neuro re-ed standing posture/ pre-gait and forced use WB activity for LLE/ LUE.     Standing posture re-ed/ pre-gait activity with mirror for visual feedback and BUE support at FWW with hand strap to L hand:  Pre-gait stepping forward/ backward with RLE, manual cues for LLE stabilization and terminal hip/ knee ext.  Alternating knee flex/ ext with LLE and RLE with lateral wt shifting  Mini squats  Static standing with manual cues for symmetrical / midline posture, symmetrical hips/ shoulders      Assessment    Pt demonstrated improved midline and symmetrical posture/ hips and shoulders with BUE support and mirror for visual feedback, continues with impaired L knee/ hip strength and control / coordination.    Strengths: Able to follow instructions, Alert and oriented, Effective communication skills, Good carryover of learning, Good insight into deficits/needs, Independent prior level of function, Making steady progress towards goals, Motivated for self care and independence, Pleasant and cooperative, Supportive family, Willingly participates in therapeutic activities  Barriers: Bladder retention, Decreased endurance, Hemiparesis, Impaired activity tolerance, Impaired balance, Language barrier, non-fluent English, Limited  mobility    Plan    Bed mobility with phyllis techniques, transfer training, gait training with LBQC vs. SBQC and carbon fiber AFO with carrol, standing balance, neuro re-ed for L hemibody strengthening, Bioness, lite-gait BWSTT, family training/education.      LLE  therapy tech program 2x/week     Passport items to be completed:  Get in/out of bed safely, in/out of a vehicle, safely use mobility device, walk or wheel around home/community, navigate up and down stairs, show how to get up/down from the ground, ensure home is accessible, demonstrate HEP, complete caregiver training      Physical Therapy Problems (Active)       Problem: Mobility       Dates: Start:  08/15/23         Goal: STG-Within one week, patient will ambulate up/down a curb with HW and min A.       Dates: Start:  08/15/23    Expected End:  09/05/23               Problem: PT-Long Term Goals       Dates: Start:  08/15/23         Goal: LTG-By discharge, patient will ambulate 150 ft with QC vs SPC and SBA.       Dates: Start:  08/15/23    Expected End:  09/05/23            Goal: LTG-By discharge, patient will transfer one surface to another with QC vs SPC and SPV.       Dates: Start:  08/15/23    Expected End:  09/05/23            Goal: LTG-By discharge, patient will ambulate up/down a curb with QC vs SPC and SBA.       Dates: Start:  08/15/23    Expected End:  09/05/23            Goal: LTG-By discharge, patient will transfer in/out of a car with QC vs SPC and SBA after setup.       Dates: Start:  08/15/23    Expected End:  09/05/23            Goal: LTG-By discharge, patient will ambulate up/down a ramp with QC vs SPC and CGA to simulate home entrance.       Dates: Start:  08/15/23    Expected End:  09/05/23

## 2023-08-31 ENCOUNTER — TELEPHONE (OUTPATIENT)
Dept: CARDIOLOGY | Facility: MEDICAL CENTER | Age: 58
End: 2023-08-31
Payer: COMMERCIAL

## 2023-08-31 ENCOUNTER — APPOINTMENT (OUTPATIENT)
Dept: OCCUPATIONAL THERAPY | Facility: REHABILITATION | Age: 58
DRG: 057 | End: 2023-08-31
Attending: PHYSICAL MEDICINE & REHABILITATION
Payer: COMMERCIAL

## 2023-08-31 ENCOUNTER — APPOINTMENT (OUTPATIENT)
Dept: PHYSICAL THERAPY | Facility: REHABILITATION | Age: 58
DRG: 057 | End: 2023-08-31
Attending: PHYSICAL MEDICINE & REHABILITATION
Payer: COMMERCIAL

## 2023-08-31 PROCEDURE — 700102 HCHG RX REV CODE 250 W/ 637 OVERRIDE(OP): Performed by: HOSPITALIST

## 2023-08-31 PROCEDURE — 99232 SBSQ HOSP IP/OBS MODERATE 35: CPT | Performed by: PHYSICAL MEDICINE & REHABILITATION

## 2023-08-31 PROCEDURE — 97112 NEUROMUSCULAR REEDUCATION: CPT

## 2023-08-31 PROCEDURE — 97113 AQUATIC THERAPY/EXERCISES: CPT | Mod: CQ

## 2023-08-31 PROCEDURE — 700111 HCHG RX REV CODE 636 W/ 250 OVERRIDE (IP): Mod: JZ | Performed by: PHYSICAL MEDICINE & REHABILITATION

## 2023-08-31 PROCEDURE — A9270 NON-COVERED ITEM OR SERVICE: HCPCS | Performed by: PHYSICAL MEDICINE & REHABILITATION

## 2023-08-31 PROCEDURE — 700101 HCHG RX REV CODE 250: Performed by: PHYSICAL MEDICINE & REHABILITATION

## 2023-08-31 PROCEDURE — 97530 THERAPEUTIC ACTIVITIES: CPT

## 2023-08-31 PROCEDURE — A9270 NON-COVERED ITEM OR SERVICE: HCPCS | Performed by: HOSPITALIST

## 2023-08-31 PROCEDURE — 770010 HCHG ROOM/CARE - REHAB SEMI PRIVAT*

## 2023-08-31 PROCEDURE — 97116 GAIT TRAINING THERAPY: CPT

## 2023-08-31 PROCEDURE — 97535 SELF CARE MNGMENT TRAINING: CPT

## 2023-08-31 PROCEDURE — 700102 HCHG RX REV CODE 250 W/ 637 OVERRIDE(OP): Performed by: PHYSICAL MEDICINE & REHABILITATION

## 2023-08-31 RX ORDER — BACLOFEN 10 MG/1
15 TABLET ORAL
Status: DISCONTINUED | OUTPATIENT
Start: 2023-08-31 | End: 2023-09-05 | Stop reason: HOSPADM

## 2023-08-31 RX ADMIN — TAMSULOSIN HYDROCHLORIDE 0.4 MG: 0.4 CAPSULE ORAL at 09:38

## 2023-08-31 RX ADMIN — ATORVASTATIN CALCIUM 80 MG: 40 TABLET, FILM COATED ORAL at 21:20

## 2023-08-31 RX ADMIN — METFORMIN HYDROCHLORIDE 500 MG: 500 TABLET, EXTENDED RELEASE ORAL at 09:38

## 2023-08-31 RX ADMIN — ACETAMINOPHEN 650 MG: 325 TABLET ORAL at 06:17

## 2023-08-31 RX ADMIN — ACETAMINOPHEN 650 MG: 325 TABLET ORAL at 09:38

## 2023-08-31 RX ADMIN — BACLOFEN 15 MG: 10 TABLET ORAL at 21:20

## 2023-08-31 RX ADMIN — METOPROLOL TARTRATE 12.5 MG: 25 TABLET, FILM COATED ORAL at 06:12

## 2023-08-31 RX ADMIN — ASPIRIN 81 MG: 81 TABLET, COATED ORAL at 21:20

## 2023-08-31 RX ADMIN — ENOXAPARIN SODIUM 40 MG: 100 INJECTION SUBCUTANEOUS at 18:01

## 2023-08-31 RX ADMIN — METOPROLOL TARTRATE 12.5 MG: 25 TABLET, FILM COATED ORAL at 18:02

## 2023-08-31 RX ADMIN — SITAGLIPTIN 25 MG: 50 TABLET, FILM COATED ORAL at 09:38

## 2023-08-31 RX ADMIN — SENNOSIDES AND DOCUSATE SODIUM 2 TABLET: 50; 8.6 TABLET ORAL at 09:38

## 2023-08-31 RX ADMIN — POLYETHYLENE GLYCOL 3350 1 PACKET: 17 POWDER, FOR SOLUTION ORAL at 09:37

## 2023-08-31 RX ADMIN — METFORMIN HYDROCHLORIDE 500 MG: 500 TABLET, EXTENDED RELEASE ORAL at 18:03

## 2023-08-31 RX ADMIN — SENNOSIDES AND DOCUSATE SODIUM 2 TABLET: 50; 8.6 TABLET ORAL at 21:20

## 2023-08-31 RX ADMIN — POLYETHYLENE GLYCOL 3350 1 PACKET: 17 POWDER, FOR SOLUTION ORAL at 21:20

## 2023-08-31 RX ADMIN — LIDOCAINE 1 PATCH: 700 PATCH TOPICAL at 09:37

## 2023-08-31 ASSESSMENT — GAIT ASSESSMENTS
ASSISTIVE DEVICE: QUAD CANE
GAIT LEVEL OF ASSIST: MINIMAL ASSIST
DEVIATION: DECREASED BASE OF SUPPORT;DECREASED HEEL STRIKE;DECREASED TOE OFF

## 2023-08-31 ASSESSMENT — PAIN DESCRIPTION - PAIN TYPE
TYPE: ACUTE PAIN
TYPE: ACUTE PAIN

## 2023-08-31 ASSESSMENT — ACTIVITIES OF DAILY LIVING (ADL)
BED_CHAIR_WHEELCHAIR_TRANSFER_DESCRIPTION: ADAPTIVE EQUIPMENT;INCREASED TIME;INITIAL PREPARATION FOR TASK;SUPERVISION FOR SAFETY;VERBAL CUEING
TUB_SHOWER_TRANSFER_DESCRIPTION: GRAB BAR;INCREASED TIME;SUPERVISION FOR SAFETY;VERBAL CUEING;SET-UP OF EQUIPMENT

## 2023-08-31 NOTE — DISCHARGE PLANNING
Cm  Referral sent to [facility] for wc /cushion and large based quad cane for pt.   Rehab w/ [facility] has accepted.   Follow up with pcp/neurology  Family training scheduled; they will provide transportation home.     Dc planned for 9/5

## 2023-08-31 NOTE — THERAPY
"Physical Therapy   Daily Treatment     Patient Name: Jorden Bonilla  Age:  58 y.o., Sex:  male  Medical Record #: [id number]  Today's Date: 8/31/2023     Precautions  Precautions: (P) Fall Risk  Comments: (P) L phyllis, HTN, DM    Subjective    Patient in bed and agreeable to therapy.     Objective       08/31/23 0701   PT Charge Group   PT Gait Training (Units) 1   PT Neuromuscular Re-Education / Balance (Units) 2   PT Therapeutic Activities (Units) 1   PT Total Time Spent   PT Individual Total Time Spent (Mins) 60   Precautions   Precautions Fall Risk   Comments L phyllis, HTN, DM   Gait Functional Level of Assist    Gait Level Of Assist Minimal Assist  (to CGA)   Assistive Device Quad Cane  (L AFO)   Distance (Feet)   (75 ftx1, 40 ftx2)   Deviation Decreased Base Of Support;Decreased Heel Strike;Decreased Toe Off  (L phyllis gait with decreased stance control)   Transfer Functional Level of Assist   Bed, Chair, Wheelchair Transfer Standby Assist   Bed Chair Wheelchair Transfer Description Adaptive equipment;Increased time;Initial preparation for task;Supervision for safety;Verbal cueing  (stand step transfer with LBQC vs. reach pivot at w/c level)   Bed Mobility    Supine to Sit Supervised   Sit to Supine Supervised   Sit to Stand Standby Assist  (to CGA)   Scooting Supervised   Rolling Supervised   Interdisciplinary Plan of Care Collaboration   IDT Collaboration with  Certified Nursing Assistant;Occupational Therapist   Patient Position at End of Therapy Seated;Chair Alarm On;Self Releasing Lap Belt Applied  (in dining room)   Collaboration Comments CLOF, requested CNA take AFO off after breakfast     Bed mobility on standard bed simulating home environment (patient reports they are moving a bed into the living room). Performing bed mobility at supervised level with intermittent use of bed rail.    Standing balance activities at edge of mat, SBA-CGA with occasional Min A during LOBs:   - Anterior toe taps on 3\" step " with RLE, intermittent UE support on LBQC 1x15   - Sit<>stands with BUEs holding 3# dumbbell 2x10   - BUE anterior press with 3# dumbbell 2x10   - Resisted trunk rotation with BUEs holding blue theraband 2x10 each direction   - Modified BUE push up on elevated therapy mat with Min A to stabilize LUE and LLE 2x10    Assessment    Patient tolerated session well, focus of session on standing balance. Demonstrating improving balance reactions and safety. Discussion regarding home evaluation set up.    Strengths: Able to follow instructions, Alert and oriented, Effective communication skills, Good carryover of learning, Good insight into deficits/needs, Independent prior level of function, Making steady progress towards goals, Motivated for self care and independence, Pleasant and cooperative, Supportive family, Willingly participates in therapeutic activities  Barriers: Bladder retention, Decreased endurance, Hemiparesis, Impaired activity tolerance, Impaired balance, Language barrier, non-fluent English, Limited mobility    Plan    Bed mobility with phyllis techniques, transfer training, gait training with LBQC vs. SBQC and carbon fiber/plastic AFO with stockinette, standing balance, neuro re-ed for L hemibody strengthening, Bioness, lite-gait BWSTT, family training/education.      LLE  therapy tech program 2x/week     Passport items to be completed:  Get in/out of bed safely, in/out of a vehicle, safely use mobility device, walk or wheel around home/community, navigate up and down stairs, show how to get up/down from the ground, ensure home is accessible, demonstrate HEP, complete caregiver training    Physical Therapy Problems (Active)       Problem: Mobility       Dates: Start:  08/15/23         Goal: STG-Within one week, patient will ambulate up/down a curb with HW and min A.       Dates: Start:  08/15/23    Expected End:  09/05/23               Problem: PT-Long Term Goals       Dates: Start:  08/15/23          Goal: LTG-By discharge, patient will ambulate 150 ft with QC vs SPC and SBA.       Dates: Start:  08/15/23    Expected End:  09/05/23            Goal: LTG-By discharge, patient will transfer one surface to another with QC vs SPC and SPV.       Dates: Start:  08/15/23    Expected End:  09/05/23            Goal: LTG-By discharge, patient will ambulate up/down a curb with QC vs SPC and SBA.       Dates: Start:  08/15/23    Expected End:  09/05/23            Goal: LTG-By discharge, patient will transfer in/out of a car with QC vs SPC and SBA after setup.       Dates: Start:  08/15/23    Expected End:  09/05/23            Goal: LTG-By discharge, patient will ambulate up/down a ramp with QC vs SPC and CGA to simulate home entrance.       Dates: Start:  08/15/23    Expected End:  09/05/23

## 2023-08-31 NOTE — CARE PLAN
The patient is Stable - Low risk of patient condition declining or worsening    Shift Goals  Clinical Goals: safety  Patient Goals: safety    Progress made toward(s) clinical / shift goals:      Problem: Fall Risk - Rehab  Goal: Patient will remain free from falls  Outcome: Progressing     Problem: Mobility  Goal: Patient's capacity to carry out activities will improve  Outcome: Progressing     Problem: Diabetes Management  Goal: Patient's ability to maintain appropriate glucose levels will be maintained or improve  Outcome: Progressing       Patient is not progressing towards the following goals: none

## 2023-08-31 NOTE — PROGRESS NOTES
NURSING DAILY NOTE    Name: Jorden Bonilla   Date of Admission: 8/14/2023   Admitting Diagnosis: Ischemic stroke (HCC)  Attending Physician: Sarai Davila M.d.  Allergies: Patient has no known allergies.    Safety  Patient Assist  cga  Patient Precautions  Fall Risk  Precaution Comments  L phyllis, HTN, DM  Bed Transfer Status  Standby Assist  Toilet Transfer Status   Contact Guard Assist  Assistive Devices  Wheelchair, Rails  Oxygen  None - Room Air  Diet/Therapeutic Dining  Current Diet Order   Procedures    Diet Order Diet: Consistent CHO (Diabetic) (cardiac and CHO); Second Modifier: (optional): Cardiac     Pill Administration  whole  Agitated Behavioral Scale  14  ABS Level of Severity  No Agitation    Fall Risk  Has the patient had a fall this admission?   No  Melani Galeano Fall Risk Scoring  15, HIGH RISK  Fall Risk Safety Measures  bed alarm, chair alarm, seatbelt alarm, and poor balance    Vitals  Temperature: 36.6 °C (97.9 °F)  Temp src: Oral  Pulse: 80  Respiration: 18  Blood Pressure: 114/71  Blood Pressure MAP (Calculated): 85 MM HG  BP Location: Right, Upper Arm  Patient BP Position: Supine     Oxygen  Pulse Oximetry: 95 %  O2 (LPM): 0  O2 Delivery Device: None - Room Air    Bowel and Bladder  Last Bowel Movement  08/29/23  Stool Type  Type 5: Soft blob with clear cut edges (passed easily)  Bowel Device  Bathroom  Continent  Bladder: Continent void   Bowel: Continent movement  Bladder Function  Urine Void (mL):  (moderate)  Number of Times Voided: 1  Urine Color: Unable To Evaluate  Genitourinary Assessment   Bladder Assessment (WDL):  WDL Except  Damon Catheter: Not Applicable  Damon Care: Given with Soap and Water  Urinary Elimination: Catheter (Document on LDA)  Urine Color: Unable To Evaluate  Bladder Device: Bathroom  Time Void: No  Bladder Scan: Post Void  $ Bladder Scan Results (mL): 83  Bladder Medications: Yes    Skin  Cooper  Score   18  Sensory Interventions   Bed Types: Standard/Trauma Mattress  Skin Preventative Measures: Pillows in Use for Support / Positioning  Moisture Interventions  Moisturizers/Barriers: Barrier Wipes      Pain  Pain Rating Scale  0 - No Pain  Pain Location  Head  Pain Location Orientation  Posterior  Pain Interventions   Declines    ADLs    Bathing   Shower, Staff (OT)  Linen Change   Partial  Personal Hygiene  Perineal Care, Moist Mireya Wipes  Chlorhexidine Bath      Oral Care     Teeth/Dentures     Shave     Nutrition Percentage Eaten  *  * Meal *  *, Breakfast, Less than 25% Consumed  Environmental Precautions  Treaded Slipper Socks on Patient, Personal Belongings, Wastebasket, Call Bell etc. in Easy Reach, Transferred to Stronger Side, Report Given to Other Health Care Providers Regarding Fall Risk, Bed in Low Position, Communication Sign for Patients & Families, Mobility Assessed & Appropriate Sign Placed  Patient Turns/Positioning  Patient Turns Self from Side to Side  Patient Turns Assistance/Tolerance     Bed Positions  Bed Controls On, Bed Locked  Head of Bed Elevated  Self regulated      Psychosocial/Neurologic Assessment  Psychosocial Assessment  Psychosocial (WDL):  Within Defined Limits  Neurologic Assessment  Neuro (WDL): Exceptions to WDL  Level of Consciousness: Alert  Orientation Level: Oriented X4  Cognition: Follows commands, Appropriate judgement  Speech: Clear  Facial Symmetry: Left facial drooping  Motor Function/Sensation Assessment: Sensation, Motor strength  RUE Sensation: Full sensation  Muscle Strength Right Arm: Normal Strength Against Gravity and Full Resistance  LUE Sensation: Full sensation  Muscle Strength Left Arm: Weak Movement but Not Against Gravity or Resistance  RLE Sensation: Full sensation  Muscle Strength Right Leg: Normal Strength Against Gravity and Full Resistance  LLE Sensation: Full sensation  Muscle Strength Left Leg: Fair Strength against Gravity but No  Resistance  EENT (WDL):  WDL Except    Cardio/Pulmonary Assessment  Edema      Respiratory Breath Sounds  RUL Breath Sounds: Clear  RML Breath Sounds: Clear  RLL Breath Sounds: Clear  SHALOM Breath Sounds: Clear  LLL Breath Sounds: Clear  Cardiac Assessment   Cardiac (WDL):  WDL Except

## 2023-08-31 NOTE — THERAPY
"Physical Therapy   Daily Treatment     Patient Name: Jorden Bonilla  Age:  58 y.o., Sex:  male  Medical Record #: [id number]  Today's Date: 8/31/2023     Precautions  Precautions: Fall Risk  Comments: L phyllis, HTN, DM    Subjective    Pt seated in w/c upon arrival, agreeable to session.     Objective       08/31/23 1331   PT Charge Group   PT Aquatic Therapy  4   Supervising Physical Therapist Faith Calderon   PT Total Time Spent   PT Individual Total Time Spent (Mins) 60     The patient participated in 60 minutes of aquatic therapy with approx 25 % weight bearing, pt submerged to mid-sternum; patient entered and exited the pool via stairs w/ CGA.  Ambulation w/LLE #5lb ankle weights and w/o UE support. Patient amb forward, backward and lateral stepping focusing on LLE placement.  Standing exercises including heel/toe lifts, L hip flexion/extension and abduction w/ RUE support.  Step ups 6\" step RUE HR support/ HHA/ no AD, focusing on LLE placement and forced use.  LUE AAROM in seated position horz abd, elbow ext, supination and pronation. Chest press w/ BUE holding on floatie x10.  Floating w/ nekdoodle behind neck and waist for front kicking BLE hip flex/ext/abd/add, knee flex/ext; BUE shoulder abd/add.  Jumping w/ RUE support on HR.  Single leg stance RLE/LLE 1 min x2 each side, SBA w/ RLE stance, min/modA w/ LLE stance.    Assessment    Patient tolerated therapy session well focusing on buoyancy assisted gait training and improving gait quality. Pt continues to be limited d/t L phyllis and LLE hyperextension but was able to improve with tactile feedback. Pt continues to be motivated to progress and was very pleasant throughout. Pt will benefit from aquatic therapy to enhance overall functional outcome.      Strengths: Able to follow instructions, Alert and oriented, Effective communication skills, Good carryover of learning, Good insight into deficits/needs, Independent prior level of function, Making steady progress " towards goals, Motivated for self care and independence, Pleasant and cooperative, Supportive family, Willingly participates in therapeutic activities  Barriers: Bladder retention, Decreased endurance, Hemiparesis, Impaired activity tolerance, Impaired balance, Language barrier, non-fluent English, Limited mobility    Plan    Bed mobility with phyllis techniques, transfer training, gait training with LBQC vs. SBQC and carbon fiber/plastic AFO with stockinette, standing balance, neuro re-ed for L hemibody strengthening, Bioness, lite-gait BWSTT, family training/education.      LLE  therapy tech program 2x/week     Passport items to be completed:  Get in/out of bed safely, in/out of a vehicle, safely use mobility device, walk or wheel around home/community, navigate up and down stairs, show how to get up/down from the ground, ensure home is accessible, demonstrate HEP, complete caregiver training    Physical Therapy Problems (Active)       Problem: Mobility       Dates: Start:  08/15/23         Goal: STG-Within one week, patient will ambulate up/down a curb with HW and min A.       Dates: Start:  08/15/23    Expected End:  09/05/23               Problem: PT-Long Term Goals       Dates: Start:  08/15/23         Goal: LTG-By discharge, patient will ambulate 150 ft with QC vs SPC and SBA.       Dates: Start:  08/15/23    Expected End:  09/05/23            Goal: LTG-By discharge, patient will transfer one surface to another with QC vs SPC and SPV.       Dates: Start:  08/15/23    Expected End:  09/05/23            Goal: LTG-By discharge, patient will ambulate up/down a curb with QC vs SPC and SBA.       Dates: Start:  08/15/23    Expected End:  09/05/23            Goal: LTG-By discharge, patient will transfer in/out of a car with QC vs SPC and SBA after setup.       Dates: Start:  08/15/23    Expected End:  09/05/23            Goal: LTG-By discharge, patient will ambulate up/down a ramp with QC vs SPC and CGA to  simulate home entrance.       Dates: Start:  08/15/23    Expected End:  09/05/23

## 2023-08-31 NOTE — PROGRESS NOTES
NURSING DAILY NOTE    Name: Jorden Bonilla   Date of Admission: 8/14/2023   Admitting Diagnosis: Ischemic stroke (HCC)  Attending Physician: Sarai Davila M.d.  Allergies: Patient has no known allergies.    Safety  Patient Assist  cga  Patient Precautions  Fall Risk  Precaution Comments  L phyllis, HTN, DM  Bed Transfer Status  Standby Assist  Toilet Transfer Status   Contact Guard Assist  Assistive Devices  Rails, Wheelchair  Oxygen  None - Room Air  Diet/Therapeutic Dining  Current Diet Order   Procedures    Diet Order Diet: Consistent CHO (Diabetic) (cardiac and CHO); Second Modifier: (optional): Cardiac     Pill Administration  whole  Agitated Behavioral Scale  14  ABS Level of Severity  No Agitation    Fall Risk  Has the patient had a fall this admission?   No  Melani Galeano Fall Risk Scoring  15, HIGH RISK  Fall Risk Safety Measures  bed alarm, chair alarm, and poor balance    Vitals  Temperature: 36.8 °C (98.3 °F)  Temp src: Oral  Pulse: 69  Respiration: 18  Blood Pressure: 119/76  Blood Pressure MAP (Calculated): 90 MM HG  BP Location: Right, Upper Arm  Patient BP Position: Supine     Oxygen  Pulse Oximetry: 96 %  O2 (LPM): 0  O2 Delivery Device: None - Room Air    Bowel and Bladder  Last Bowel Movement  08/29/23  Stool Type  Type 5: Soft blob with clear cut edges (passed easily)  Bowel Device  Bathroom  Continent  Bladder: Continent void   Bowel: Continent movement  Bladder Function  Urine Void (mL):  (moderate)  Number of Times Voided: 1  Urine Color: Unable To Evaluate  Genitourinary Assessment   Bladder Assessment (WDL):  WDL Except  Damon Catheter: Not Applicable  Damon Care: Given with Soap and Water  Urinary Elimination: Catheter (Document on LDA)  Urine Color: Unable To Evaluate  Bladder Device: Bathroom  Time Void: No  Bladder Scan: Post Void  $ Bladder Scan Results (mL): 83  Bladder Medications: Yes    Skin  Cooper Score   18  Sensory  Interventions   Bed Types: Standard/Trauma Mattress  Skin Preventative Measures: Pillows in Use for Support / Positioning  Moisture Interventions  Moisturizers/Barriers: Barrier Wipes      Pain  Pain Rating Scale  0 - No Pain  Pain Location  Head  Pain Location Orientation  Posterior  Pain Interventions   Declines    ADLs    Bathing   Shower, Staff (OT)  Linen Change   Partial  Personal Hygiene  Perineal Care, Moist Mireya Wipes  Chlorhexidine Bath      Oral Care     Teeth/Dentures     Shave     Nutrition Percentage Eaten  *  * Meal *  *, Breakfast, Less than 25% Consumed  Environmental Precautions  Treaded Slipper Socks on Patient, Personal Belongings, Wastebasket, Call Bell etc. in Easy Reach, Transferred to Stronger Side, Report Given to Other Health Care Providers Regarding Fall Risk, Bed in Low Position, Communication Sign for Patients & Families, Mobility Assessed & Appropriate Sign Placed  Patient Turns/Positioning  Patient Turns Self from Side to Side  Patient Turns Assistance/Tolerance     Bed Positions  Bed Controls On, Bed Locked  Head of Bed Elevated  Self regulated      Psychosocial/Neurologic Assessment  Psychosocial Assessment  Psychosocial (WDL):  Within Defined Limits  Neurologic Assessment  Neuro (WDL): Exceptions to WDL  Level of Consciousness: Alert  Orientation Level: Oriented X4  Cognition: Follows commands, Appropriate judgement  Speech: Clear  Facial Symmetry: Left facial drooping  Motor Function/Sensation Assessment: Sensation, Motor strength  RUE Sensation: Full sensation  Muscle Strength Right Arm: Normal Strength Against Gravity and Full Resistance  LUE Sensation: Full sensation  Muscle Strength Left Arm: Weak Movement but Not Against Gravity or Resistance  RLE Sensation: Full sensation  Muscle Strength Right Leg: Normal Strength Against Gravity and Full Resistance  LLE Sensation: Full sensation  Muscle Strength Left Leg: Fair Strength against Gravity but No Resistance  EENT (WDL):  WDL  Except    Cardio/Pulmonary Assessment  Edema      Respiratory Breath Sounds  RUL Breath Sounds: Clear  RML Breath Sounds: Clear  RLL Breath Sounds: Clear  SHALOM Breath Sounds: Clear  LLL Breath Sounds: Clear  Cardiac Assessment   Cardiac (WDL):  WDL Except

## 2023-08-31 NOTE — DIETARY
"Nutrition services: Day 17 of admit.  Jorden Bonilla is a 58 y.o. male with admitting DX of Stroke: Left Body Involvement (Right Brain)    Poor PO intake noted on weekly screen      Assessment:  Height: 165.1 cm (5' 5\")  Weight: 63 kg (139 lb)  Body mass index is 23.13 kg/m²., BMI classification: WNL  Diet/Intake: consistent CHO(diabetic), cardiac diet/<25% of most meals    Evaluation:   GI: LBM on 8/29 (medium)  Labs: POC glucose: well controlled. 8/11/23: cholesterol=275, triglycerides=177, RMG=965  Meds: Lipitor, metformin, senna-docusate, Miralax, Januvia  Pt reports that he does not like the food at times. He agreed to Boost GC TID w/ meals. RD encouraged food first and then Boost if PO < 50%.    Malnutrition Risk: ASPEN criteria not met    Recommendations/Plan:  Add Boost GC to meals TID   Encourage intake of >50%  Document intake of all meals and supplements  as % taken in ADL's to provide interdisciplinary communication across all shifts.   Monitor weight.  Nutrition rep will continue to see patient for ongoing meal and snack preferences.       RD will follow  "

## 2023-08-31 NOTE — THERAPY
Occupational Therapy  Daily Treatment     Patient Name: Jorden Bonilla  Age:  58 y.o., Sex:  male  Medical Record #: [id number]  Today's Date: 8/31/2023     Precautions  Precautions: Fall Risk  Comments: L phyllis, HTN, DM         Subjective    Pt seated in w/c upon arrival. Pt pleasant and cooperative, agreeable to therapy. SO present last half of session.     Objective       08/31/23 1001   OT Total Time Spent   OT Individual Total Time Spent (Mins) 60   Precautions   Precautions Fall Risk   Comments L phyllis, HTN, DM   Vitals   O2 (LPM) 0   O2 Delivery Device None - Room Air   Pain   Intervention Declines   Pain 0 - 10 Group   Pain Rating Scale (NPRS) 0   Cognition    Level of Consciousness Alert   Sleep/Wake Cycle   Sleep & Rest Awake;Out of bed   Functional Level of Assist   Bathing Contact Guard Assist   Bathing Description Grab bar;Hand held shower;Tub bench;Increased time;Set-up of equipment;Supervision for safety   Upper Body Dressing Supervision   Upper Body Dressing Description Increased time;Set-up of equipment;Supervision for safety  (doff/don pull over shirt seated in w/c)   Lower Body Dressing Minimal Assist   Lower Body Dressing Description Grab bar;Increased time;Set-up of equipment;Verbal cueing;Supervision for safety  (assist to aura L shoe and AFO)   Tub / Shower Transfers Contact Guard Assist   Tub Shower Transfer Description Grab bar;Increased time;Supervision for safety;Verbal cueing;Set-up of equipment  (stand pivot from w/c <> fold down bench.)   Neuro-Muscular Treatments   Neuro-Muscular Treatments Anterior weight shift;Weight Shift Right;Weight Shift Left   Comments Pt completed weight bearing activity through the LUE and LLE standing in front of mat table with Min A. Pt used RUE to  19 1 inch blocks and stack into pyramid while weight bearing through LUE. Pt completed 1 round, took sitting rest break, and 1 more round. Pt completed lateral leans to reach and grab blocks while  supporting self with LUE with therapist assist to extend elbow and keep palm flat on table prior to stacking blocks.   Interdisciplinary Plan of Care Collaboration   IDT Collaboration with  Family / Caregiver   Patient Position at End of Therapy Seated;Chair Alarm On;Self Releasing Lap Belt Applied;Call Light within Reach;Tray Table within Reach;Phone within Reach;Family / Friend in Room   Collaboration Comments CLOF     UB strength- Pt complete 3x10 mat table pushups in standing with Min A to keep palm flat on table.    Assessment    Pt tolerated session well with no LOB noted. Pt required multiple verbal cues during weight bearing activity to widen base of support upon standing and to straighten L knee. Pt able to extend L elbow on own when completing mat table push-ups, only needing assist to keep L hand flat on table.    Strengths: Able to follow instructions, Alert and oriented, Effective communication skills, Good carryover of learning, Good insight into deficits/needs, Independent prior level of function, Manages pain appropriately, Motivated for self care and independence, Pleasant and cooperative, Supportive family, Willingly participates in therapeutic activities  Barriers: Hemiplegia, Hypertension, Impaired balance, Limited mobility    Plan    ADL/IADL training at w/c level. Increased overall strength and endurance. Home eval planned for Sunday.    Passport items to be completed:  Perform bathroom transfers, complete dressing, complete feeding, get ready for the day, prepare a simple meal, participate in household tasks, adapt home for safety needs, demonstrate home exercise program, complete caregiver training     Occupational Therapy Goals (Active)       Problem: Dressing       Dates: Start:  08/30/23         Goal: STG-Within one week, patient will dress LB with CGA using AE/DME as needed.       Dates: Start:  08/30/23               Problem: Functional Transfers       Dates: Start:  08/23/23          Goal: STG-Within one week, patient will transfer to toilet with supervision using DME/AE as  needed       Dates: Start:  08/23/23    Expected End:  09/05/23         Goal Note filed on 08/30/23 0813 by Mayra Pantoja, Student       Requires CGA.                 Problem: IADL's       Dates: Start:  08/30/23         Goal: STG-Within one week, patient will access kitchen area with CGA using AE/DME as needed.       Dates: Start:  08/30/23               Problem: OT Long Term Goals       Dates: Start:  08/15/23         Goal: LTG-By discharge, patient will complete basic self care tasks with SBA/supervision       Dates: Start:  08/15/23    Expected End:  09/05/23            Goal: LTG-By discharge, patient will perform bathroom transfers with supervision/mod I       Dates: Start:  08/15/23    Expected End:  09/05/23               Problem: Toileting       Dates: Start:  08/23/23         Goal: STG-Within one week, patient will complete toileting tasks with supervision using DME/AE as needed       Dates: Start:  08/23/23    Expected End:  09/05/23         Goal Note filed on 08/30/23 0813 by Mayra Pantoja, Student       Requires Min A.

## 2023-08-31 NOTE — PROGRESS NOTES
"Rehab Progress Note     Date of Service: 8/31/2023  Chief Complaint: Follow-up stroke    Interval Events (Subjective)    Patient seen and examined today in his room.  He reports he woke about 3:30 in the morning with spasms in his left hand as well as his left leg.  He also is currently reporting some neck discomfort and a headache.  He last moved his bowels 2 days ago.    Patient has no other new questions, concerns, or complaints today.         Objective:  VITAL SIGNS: /68   Pulse 68   Temp 36.4 °C (97.5 °F) (Oral)   Resp 18   Ht 1.651 m (5' 5\")   Wt 63 kg (139 lb)   SpO2 97%   BMI 23.13 kg/m²   Gen: alert, no apparent distress  CV: Regular rate, regular rhythm  Resp: Clear to auscultation bilaterally  Neuro: Left hemiparesis  MSK: Tenderness to palpation in the bilateral upper traps, left worse than right, trigger points in the left infraspinatus and upper traps    Recent Results (from the past 72 hour(s))   POCT glucose device results    Collection Time: 08/28/23 11:09 AM   Result Value Ref Range    POC Glucose, Blood 221 (H) 65 - 99 mg/dL   POCT glucose device results    Collection Time: 08/28/23  5:18 PM   Result Value Ref Range    POC Glucose, Blood 177 (H) 65 - 99 mg/dL   POCT glucose device results    Collection Time: 08/28/23  8:50 PM   Result Value Ref Range    POC Glucose, Blood 115 (H) 65 - 99 mg/dL   POCT glucose device results    Collection Time: 08/29/23  8:03 AM   Result Value Ref Range    POC Glucose, Blood 143 (H) 65 - 99 mg/dL   POCT glucose device results    Collection Time: 08/29/23 11:40 AM   Result Value Ref Range    POC Glucose, Blood 158 (H) 65 - 99 mg/dL   POCT glucose device results    Collection Time: 08/29/23  5:05 PM   Result Value Ref Range    POC Glucose, Blood 169 (H) 65 - 99 mg/dL   POCT glucose device results    Collection Time: 08/29/23  8:50 PM   Result Value Ref Range    POC Glucose, Blood 128 (H) 65 - 99 mg/dL   CBC WITHOUT DIFFERENTIAL    Collection Time: " 08/30/23  5:47 AM   Result Value Ref Range    WBC 8.9 4.8 - 10.8 K/uL    RBC 5.85 4.70 - 6.10 M/uL    Hemoglobin 15.2 14.0 - 18.0 g/dL    Hematocrit 48.1 42.0 - 52.0 %    MCV 82.2 81.4 - 97.8 fL    MCH 26.0 (L) 27.0 - 33.0 pg    MCHC 31.6 (L) 32.3 - 36.5 g/dL    RDW 37.5 35.9 - 50.0 fL    Platelet Count 262 164 - 446 K/uL    MPV 10.4 9.0 - 12.9 fL   Basic Metabolic Panel    Collection Time: 08/30/23  5:47 AM   Result Value Ref Range    Sodium 142 135 - 145 mmol/L    Potassium 3.9 3.6 - 5.5 mmol/L    Chloride 106 96 - 112 mmol/L    Co2 27 20 - 33 mmol/L    Glucose 144 (H) 65 - 99 mg/dL    Bun 13 8 - 22 mg/dL    Creatinine 0.92 0.50 - 1.40 mg/dL    Calcium 8.7 8.5 - 10.5 mg/dL    Anion Gap 9.0 7.0 - 16.0   ESTIMATED GFR    Collection Time: 08/30/23  5:47 AM   Result Value Ref Range    GFR (CKD-EPI) 95 >60 mL/min/1.73 m 2   POCT glucose device results    Collection Time: 08/30/23  7:41 AM   Result Value Ref Range    POC Glucose, Blood 139 (H) 65 - 99 mg/dL   POCT glucose device results    Collection Time: 08/30/23 12:12 PM   Result Value Ref Range    POC Glucose, Blood 139 (H) 65 - 99 mg/dL   POCT glucose device results    Collection Time: 08/30/23  5:28 PM   Result Value Ref Range    POC Glucose, Blood 158 (H) 65 - 99 mg/dL       Scheduled Medications   Medication Dose Frequency    baclofen  10 mg QHS    SITagliptin  25 mg DAILY    senna-docusate  2 Tablet BID    And    polyethylene glycol/lytes  1 Packet BID    lidocaine  1 Patch DAILY    metFORMIN ER  500 mg BID WITH MEALS    metoprolol tartrate  12.5 mg TWICE DAILY    aspirin  81 mg Q EVENING    atorvastatin  80 mg Q EVENING    enoxaparin (LOVENOX) injection  40 mg DAILY AT 1800    tamsulosin  0.4 mg AFTER BREAKFAST       Current Diet Order   Procedures    Diet Order Diet: Consistent CHO (Diabetic) (cardiac and CHO); Second Modifier: (optional): Cardiac       Assessment:    This patient is a 58 y.o. male admitted for acute inpatient rehabilitation with Ischemic  stroke ([facility]).      I, Sarai Davila M.D./MD., was present and led the interdisciplinary team conference on 8/30/2023.  I led the IDT conference and agree with the IDT conference documentation and plan of care as noted below.     Nursing:  Diet Current Diet Order   Procedures    Diet Order Diet: Consistent CHO (Diabetic) (cardiac and CHO); Second Modifier: (optional): Cardiac       Eating ADL Supervision  Set-up of equipment or meal/tube feeding   % of Last Meal  Oral Nutrition: *  * Meal *  *, Breakfast, Less than 25% Consumed   Sleep No issues    Bowel Last BM: 08/29/23   Bladder Post Void  83   Barriers to Discharge Home: none      Physical Therapy:  Bed Mobility    Transfers Contact Guard Assist  Increased time, Set-up of equipment, Supervision for safety, Verbal cueing (stand pivot from w/c to bed)   Mobility Minimal Assist   Stairs Total Assist (Vector harness with 25lb BWS)   Barriers to Discharge Home: left hemiparesis      Occupational Therapy:  Grooming Supervision, Seated   Bathing Contact Guard Assist   UB Dressing Supervision   LB Dressing Minimal Assist   Toileting Contact Guard Assist   Shower & Tub Transfer Contact Guard Assist   Barriers to Discharge Home: left hemiparesis    Respiratory Therapy:  O2 (LPM): 0  O2 Delivery Device: None - Room Air    Case Management:  Continues to work on disposition and DME needs.      Discharge Date/Disposition:    9/5    Needs home evaluation and family training    HH: PT/OT/RN, rehab without walls    Equip: large base quad cane. 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  Left hemiparesis,distal > proximal   Cognitive impairment, mild  Dysphagia  Continue full rehab program  PT/OT/SLP, 1 hr each discipline, 5 days per week  8/15: SLP discontinued, PT/OT, 1.5 hr each discipline, 5 days per week  Aqua therapy ordered  AFO ordered    Completed aspirin and Plavix for  secondary stroke prophylaxis until 8/21, then aspirin alone    Continue aspirin and atorvastatin    Cardiac monitor returned    Outpatient follow-up with stroke Bridge clinic and cardiology, referrals made    Nocturnal spasticity, continues  Mostly in the left leg  Increase baclofen to 15 mg    Left paraspinal muscle spasms, resolved  Heat and/or ice as needed  Continue Lidoderm patch  Continue Tylenol as needed, last use 8/31  Continue Flexeril as needed, last use 8/22    Left upper shoulder muscle spasms  Heat and/or ice as needed  Continue Tylenol as needed, last use 8/31  Continue Flexeril as needed     Diabetes with hyperglycemia  Uncontrolled, hemoglobin A1c 11.9  Continue metformin and Januvia  Sliding scale insulin discontinued     Hypertension  Continue metoprolol  Olmesartan discontinued     Hyperlipidemia  Continue atorvastatin     Sinus tachycardia, resolved  Continue metoprolol  Negative doppler US for DVTs     Urinary retention, resolved  Hematuria, resolved  Damon removed 8/15  Continue Flomax  Monitor PVRs -82, 66, 7, 28  Intermittent catheterization for volumes over 400, not requiring     Coronary artery disease  With history of CABG  Continue aspirin and atorvastatin    Acute cystitis, resolved  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  Physical Medicine and Rehabilitation

## 2023-09-01 ENCOUNTER — APPOINTMENT (OUTPATIENT)
Dept: PHYSICAL THERAPY | Facility: REHABILITATION | Age: 58
DRG: 057 | End: 2023-09-01
Attending: PHYSICAL MEDICINE & REHABILITATION
Payer: COMMERCIAL

## 2023-09-01 ENCOUNTER — APPOINTMENT (OUTPATIENT)
Dept: OCCUPATIONAL THERAPY | Facility: REHABILITATION | Age: 58
DRG: 057 | End: 2023-09-01
Attending: PHYSICAL MEDICINE & REHABILITATION
Payer: COMMERCIAL

## 2023-09-01 PROCEDURE — 700102 HCHG RX REV CODE 250 W/ 637 OVERRIDE(OP): Performed by: HOSPITALIST

## 2023-09-01 PROCEDURE — 700101 HCHG RX REV CODE 250: Performed by: PHYSICAL MEDICINE & REHABILITATION

## 2023-09-01 PROCEDURE — A9270 NON-COVERED ITEM OR SERVICE: HCPCS | Performed by: PHYSICAL MEDICINE & REHABILITATION

## 2023-09-01 PROCEDURE — A9270 NON-COVERED ITEM OR SERVICE: HCPCS | Performed by: HOSPITALIST

## 2023-09-01 PROCEDURE — 700111 HCHG RX REV CODE 636 W/ 250 OVERRIDE (IP): Mod: JZ | Performed by: PHYSICAL MEDICINE & REHABILITATION

## 2023-09-01 PROCEDURE — 97116 GAIT TRAINING THERAPY: CPT

## 2023-09-01 PROCEDURE — 700102 HCHG RX REV CODE 250 W/ 637 OVERRIDE(OP): Performed by: PHYSICAL MEDICINE & REHABILITATION

## 2023-09-01 PROCEDURE — 99232 SBSQ HOSP IP/OBS MODERATE 35: CPT | Performed by: PHYSICAL MEDICINE & REHABILITATION

## 2023-09-01 PROCEDURE — 97530 THERAPEUTIC ACTIVITIES: CPT

## 2023-09-01 PROCEDURE — 97110 THERAPEUTIC EXERCISES: CPT

## 2023-09-01 PROCEDURE — 97112 NEUROMUSCULAR REEDUCATION: CPT

## 2023-09-01 PROCEDURE — 770010 HCHG ROOM/CARE - REHAB SEMI PRIVAT*

## 2023-09-01 PROCEDURE — 97535 SELF CARE MNGMENT TRAINING: CPT

## 2023-09-01 RX ADMIN — ASPIRIN 81 MG: 81 TABLET, COATED ORAL at 20:20

## 2023-09-01 RX ADMIN — METFORMIN HYDROCHLORIDE 500 MG: 500 TABLET, EXTENDED RELEASE ORAL at 09:06

## 2023-09-01 RX ADMIN — METOPROLOL TARTRATE 12.5 MG: 25 TABLET, FILM COATED ORAL at 05:18

## 2023-09-01 RX ADMIN — SITAGLIPTIN 25 MG: 50 TABLET, FILM COATED ORAL at 09:19

## 2023-09-01 RX ADMIN — LIDOCAINE 1 PATCH: 700 PATCH TOPICAL at 09:06

## 2023-09-01 RX ADMIN — TAMSULOSIN HYDROCHLORIDE 0.4 MG: 0.4 CAPSULE ORAL at 09:06

## 2023-09-01 RX ADMIN — POLYETHYLENE GLYCOL 3350 1 PACKET: 17 POWDER, FOR SOLUTION ORAL at 09:05

## 2023-09-01 RX ADMIN — METOPROLOL TARTRATE 12.5 MG: 25 TABLET, FILM COATED ORAL at 17:57

## 2023-09-01 RX ADMIN — BACLOFEN 15 MG: 10 TABLET ORAL at 20:21

## 2023-09-01 RX ADMIN — ACETAMINOPHEN 650 MG: 325 TABLET ORAL at 09:05

## 2023-09-01 RX ADMIN — ENOXAPARIN SODIUM 40 MG: 100 INJECTION SUBCUTANEOUS at 17:58

## 2023-09-01 RX ADMIN — ATORVASTATIN CALCIUM 80 MG: 40 TABLET, FILM COATED ORAL at 20:24

## 2023-09-01 RX ADMIN — SENNOSIDES AND DOCUSATE SODIUM 2 TABLET: 50; 8.6 TABLET ORAL at 09:06

## 2023-09-01 RX ADMIN — METFORMIN HYDROCHLORIDE 500 MG: 500 TABLET, EXTENDED RELEASE ORAL at 17:58

## 2023-09-01 ASSESSMENT — ACTIVITIES OF DAILY LIVING (ADL)
TOILET_TRANSFER_DESCRIPTION: GRAB BAR;SET-UP OF EQUIPMENT;VERBAL CUEING
BED_CHAIR_WHEELCHAIR_TRANSFER_DESCRIPTION: ADAPTIVE EQUIPMENT;INCREASED TIME;SET-UP OF EQUIPMENT;SUPERVISION FOR SAFETY;VERBAL CUEING

## 2023-09-01 ASSESSMENT — PAIN DESCRIPTION - PAIN TYPE
TYPE: ACUTE PAIN
TYPE: ACUTE PAIN

## 2023-09-01 NOTE — CARE PLAN
Problem: Knowledge Deficit - Standard  Goal: Patient and family/care givers will demonstrate understanding of plan of care, disease process/condition, diagnostic tests and medications  Outcome: Progressing   Pt education given regarding plan of care with emphasis on adequate hydration, pt shows good understanding.    Problem: Fall Risk - Rehab  Goal: Patient will remain free from falls  Outcome: Progressing   Pt education given regarding fall precautions AND safety measures, pt shows good understanding, has not attempted to self transfer this shift.

## 2023-09-01 NOTE — PROGRESS NOTES
NURSING DAILY NOTE    Name: Jorden Bonilla   Date of Admission: 8/14/2023   Admitting Diagnosis: Ischemic stroke (HCC)  Attending Physician: Sarai Davila M.d.  Allergies: Patient has no known allergies.    Safety  Patient Assist  cga  Patient Precautions  Fall Risk  Precaution Comments  L phyllis, HTN, DM  Bed Transfer Status  Standby Assist  Toilet Transfer Status   Contact Guard Assist  Assistive Devices  Wheelchair  Oxygen  None - Room Air  Diet/Therapeutic Dining  Current Diet Order   Procedures    Diet Order Diet: Consistent CHO (Diabetic) (cardiac and CHO); Second Modifier: (optional): Cardiac     Pill Administration  whole  Agitated Behavioral Scale  14  ABS Level of Severity  No Agitation    Fall Risk  Has the patient had a fall this admission?   No  Melani Galeano Fall Risk Scoring  15, HIGH RISK  Fall Risk Safety Measures  bed alarm, chair alarm, and poor balance    Vitals  Temperature: 37.2 °C (98.9 °F)  Temp src: Oral  Pulse: 73  Respiration: 18  Blood Pressure: 118/71  Blood Pressure MAP (Calculated): 87 MM HG  BP Location: Right, Upper Arm  Patient BP Position: Supine     Oxygen  Pulse Oximetry: 96 %  O2 (LPM): 0  O2 Delivery Device: None - Room Air    Bowel and Bladder  Last Bowel Movement  08/31/23  Stool Type  Type 5: Soft blob with clear cut edges (passed easily)  Bowel Device  Bathroom  Continent  Bladder: Continent void   Bowel: Continent movement  Bladder Function  Urine Void (mL):  (large)  Number of Times Voided: 1  Urine Color: Unable To Evaluate  Genitourinary Assessment   Bladder Assessment (WDL):  WDL Except  Damon Catheter: Not Applicable  Damon Care: Given with Soap and Water  Urinary Elimination: Catheter (Document on LDA)  Urine Color: Unable To Evaluate  Bladder Device: Bathroom  Time Void: Yes  Bladder Scan: Post Void  $ Bladder Scan Results (mL): 33  Bladder Medications: Yes    Skin  Cooper Score   18  Sensory  Interventions   Bed Types: Standard/Trauma Mattress  Skin Preventative Measures: Pillows in Use for Support / Positioning  Moisture Interventions  Moisturizers/Barriers: Barrier Wipes      Pain  Pain Rating Scale  1 - Hardly Notice Pain  Pain Location  Head  Pain Location Orientation  Anterior  Pain Interventions   Declines    ADLs    Bathing   Shower, * * With Assistance from, Staff  Linen Change   Partial  Personal Hygiene  Perineal Care, Moist Mireya Wipes  Chlorhexidine Bath      Oral Care  Brushed Teeth  Teeth/Dentures     Shave     Nutrition Percentage Eaten  Between 25-50% Consumed  Environmental Precautions  Treaded Slipper Socks on Patient, Personal Belongings, Wastebasket, Call Bell etc. in Easy Reach, Transferred to Stronger Side, Report Given to Other Health Care Providers Regarding Fall Risk, Bed in Low Position, Communication Sign for Patients & Families, Mobility Assessed & Appropriate Sign Placed  Patient Turns/Positioning  Patient Turns Self from Side to Side  Patient Turns Assistance/Tolerance     Bed Positions  Bed Locked, Bed Controls On  Head of Bed Elevated  Self regulated      Psychosocial/Neurologic Assessment  Psychosocial Assessment  Psychosocial (WDL):  Within Defined Limits  Neurologic Assessment  Neuro (WDL): Exceptions to WDL  Level of Consciousness: Alert  Orientation Level: Oriented X4  Cognition: Follows commands, Appropriate judgement  Speech: Clear  Facial Symmetry: Left facial drooping  Motor Function/Sensation Assessment: Sensation, Motor strength  RUE Sensation: Full sensation  Muscle Strength Right Arm: Normal Strength Against Gravity and Full Resistance  LUE Sensation: Full sensation  Muscle Strength Left Arm: Weak Movement but Not Against Gravity or Resistance  RLE Sensation: Full sensation  Muscle Strength Right Leg: Normal Strength Against Gravity and Full Resistance  LLE Sensation: Full sensation  Muscle Strength Left Leg: Fair Strength against Gravity but No  Resistance  EENT (WDL):  WDL Except    Cardio/Pulmonary Assessment  Edema      Respiratory Breath Sounds  RUL Breath Sounds: Clear  RML Breath Sounds: Clear  RLL Breath Sounds: Clear  SHALOM Breath Sounds: Clear  LLL Breath Sounds: Clear  Cardiac Assessment   Cardiac (WDL):  WDL Except

## 2023-09-01 NOTE — THERAPY
Occupational Therapy  Daily Treatment     Patient Name: Jorden Bonilla  Age:  58 y.o., Sex:  male  Medical Record #: [id number]  Today's Date: 9/1/2023     Precautions  Precautions: Fall Risk  Comments: L phyllis, HTN, DM         Subjective    Pt seated in w/c in dining room upon arrival. Pt pleasant and cooperative, agreeable to therapy.     Objective       09/01/23 0831   OT Total Time Spent   OT Individual Total Time Spent (Mins) 60   Precautions   Precautions Fall Risk   Comments L phyllis, HTN, DM   Vitals   O2 (LPM) 0   O2 Delivery Device None - Room Air   Pain 0 - 10 Group   Location Generalized   Pain Rating Scale (NPRS) 3   Description Aching   Comfort Goal Comfort with Movement   Therapist Pain Assessment During Activity   Cognition    Level of Consciousness Alert   Sleep/Wake Cycle   Sleep & Rest Awake;Out of bed   Functional Level of Assist   Lower Body Dressing Standby Assist   Lower Body Dressing Description Increased time;Initial preparation for task;Set-up of equipment;Supervision for safety;Verbal cueing  (block practice donning/doffing socks and shoes supine in bed using figure 4 position on LLE. Completed 5x.)   Bed, Chair, Wheelchair Transfer Contact Guard Assist   Bed Chair Wheelchair Transfer Description Adaptive equipment;Increased time;Set-up of equipment;Supervision for safety;Verbal cueing  (stand pivot from w/c to bed)   IADL Treatments   IADL Treatments Kitchen mobility education   Kitchen Mobility Education Pt completed kitchen mobility activity at w/c level with CGA. Pt retrieved 15 cones placed in kitchen cupboards and appliances in high and low positions. Pt completed sit to stands to retrieve cones in high positions. Pt then transported all cones scattered across kitchen counter to far L counter at w/c level with SBA to practice transporting items in the kitchen. Pt slid cones across counter and placed cones in lap to transport.   Bed Mobility    Sit to Supine Supervised   Scooting  Supervised   Interdisciplinary Plan of Care Collaboration   IDT Collaboration with  Nursing   Patient Position at End of Therapy In Bed;Bed Alarm On;Call Light within Reach;Tray Table within Reach;Phone within Reach   Collaboration Comments re: CLOF       Assessment    Pt had 1 LOB to left side during kitchen mobility activity but able to self-recover using the kitchen counter. Pt required multiple verbal cues to widen base of support upon standing to maintain better standing balance. Pt required multiple verbal cues to apply w/c breaks on L side before standing during kitchen mobility activity and transferring to bed.     Strengths: Able to follow instructions, Alert and oriented, Effective communication skills, Good carryover of learning, Good insight into deficits/needs, Independent prior level of function, Manages pain appropriately, Motivated for self care and independence, Pleasant and cooperative, Supportive family, Willingly participates in therapeutic activities  Barriers: Hemiplegia, Hypertension, Impaired balance, Limited mobility    Plan    ADL/IADL training at w/c level. Increase overall strength and endurance.    Passport items to be completed:  Perform bathroom transfers, complete dressing, complete feeding, get ready for the day, prepare a simple meal, participate in household tasks, adapt home for safety needs, demonstrate home exercise program, complete caregiver training     Occupational Therapy Goals (Active)       Problem: Dressing       Dates: Start:  08/30/23         Goal: STG-Within one week, patient will dress LB with CGA using AE/DME as needed.       Dates: Start:  08/30/23               Problem: Functional Transfers       Dates: Start:  08/23/23         Goal: STG-Within one week, patient will transfer to toilet with supervision using DME/AE as  needed       Dates: Start:  08/23/23    Expected End:  09/05/23         Goal Note filed on 08/30/23 0813 by Mayra Pantoja, Student        Requires CGA.                 Problem: IADL's       Dates: Start:  08/30/23         Goal: STG-Within one week, patient will access kitchen area with CGA using AE/DME as needed.       Dates: Start:  08/30/23               Problem: OT Long Term Goals       Dates: Start:  08/15/23         Goal: LTG-By discharge, patient will complete basic self care tasks with SBA/supervision       Dates: Start:  08/15/23    Expected End:  09/05/23            Goal: LTG-By discharge, patient will perform bathroom transfers with supervision/mod I       Dates: Start:  08/15/23    Expected End:  09/05/23               Problem: Toileting       Dates: Start:  08/23/23         Goal: STG-Within one week, patient will complete toileting tasks with supervision using DME/AE as needed       Dates: Start:  08/23/23    Expected End:  09/05/23         Goal Note filed on 08/30/23 0813 by Mayra Pantoja, Student       Requires Min A.

## 2023-09-01 NOTE — THERAPY
Physical Therapy   Daily Treatment     Patient Name: Jorden Bonilla  Age:  58 y.o., Sex:  male  Medical Record #: [id number]  Today's Date: 9/1/2023     Precautions  Precautions: Fall Risk  Comments: L phyllis, HTN, DM    Subjective    Pt received in room, agreeable to PT session. Nothing specific he'd like to work on this session, is agreeable to walking when PT suggested this.     Objective       09/01/23 0701   PT Charge Group   PT Gait Training (Units) 3   PT Therapeutic Activities (Units) 1   PT Total Time Spent   PT Individual Total Time Spent (Mins) 60   Pain 0 - 10 Group   Therapist Pain Assessment During Activity  (intermittent L knee pain)   Cognition    Level of Consciousness Alert   Sleep/Wake Cycle   Sleep & Rest Awake   Gait Functional Level of Assist    Gait Level Of Assist   (Vector, see below)   Transfer Functional Level of Assist   Bed, Chair, Wheelchair Transfer Contact Guard Assist   Bed Mobility    Supine to Sit Supervised   Sit to Stand Standby Assist   Neuro-Muscular Treatments   Neuro-Muscular Treatments Facilitation   Interdisciplinary Plan of Care Collaboration   Patient Position at End of Therapy Seated;Chair Alarm On;Other (Comments), seat belt on   Collaboration Comments pt in dining room for breakfast, RN notified     Vector west ruy, large sling, L AFO donned, no AD:   -pt stood to don harness in // bars   -ABC Mod and Max fall prevention utilized   -total time 39:32 min   -training time: 35:39 min   -total distance 750 ft   -0 falls detected   -gait interventions, utilizing circuit-style training:    -fwd walking    -sidestepping both direction 3x45' each direction    -backward gait 3x45'    -turning both directions  -Unsupported STSs with equal WBing B LEs 2x10 reps total (not consecutively) from standard height chair (fall prevented disabled during STS training). B UE flexion during sit<>stands.    AFO doffed and skin inspected post treatment, no redness. Of note- current demo  hinged AFO does not fit well around pt's calf.    Assessment    Pt with impaired gross motor control and L LE hemiparesis, with resultant intermittent difficulty advancing L LE. Absent L LE stepping strategy during LOBs. No falls detected by vector.     Strengths: Able to follow instructions, Alert and oriented, Effective communication skills, Good carryover of learning, Good insight into deficits/needs, Independent prior level of function, Making steady progress towards goals, Motivated for self care and independence, Pleasant and cooperative, Supportive family, Willingly participates in therapeutic activities  Barriers: Bladder retention, Decreased endurance, Hemiparesis, Impaired activity tolerance, Impaired balance, Language barrier, non-fluent English, Limited mobility    Plan    Bed mobility with phyllis techniques, transfer training, gait training with LBQC vs. SBQC vs SPC, and carbon fiber/plastic AFO with stockinette, standing balance, neuro re-ed for L hemibody strengthening, Bioness, lite-gait BWSTT, family training/education. Gait training in Vector without AD.        Physical Therapy Problems (Active)       Problem: Mobility       Dates: Start:  08/15/23         Goal: STG-Within one week, patient will ambulate up/down a curb with HW and min A.       Dates: Start:  08/15/23    Expected End:  09/05/23               Problem: PT-Long Term Goals       Dates: Start:  08/15/23         Goal: LTG-By discharge, patient will ambulate 150 ft with QC vs SPC and SBA.       Dates: Start:  08/15/23    Expected End:  09/05/23            Goal: LTG-By discharge, patient will transfer one surface to another with QC vs SPC and SPV.       Dates: Start:  08/15/23    Expected End:  09/05/23            Goal: LTG-By discharge, patient will ambulate up/down a curb with QC vs SPC and SBA.       Dates: Start:  08/15/23    Expected End:  09/05/23            Goal: LTG-By discharge, patient will transfer in/out of a car with QC vs SPC  and SBA after setup.       Dates: Start:  08/15/23    Expected End:  09/05/23            Goal: LTG-By discharge, patient will ambulate up/down a ramp with QC vs SPC and CGA to simulate home entrance.       Dates: Start:  08/15/23    Expected End:  09/05/23

## 2023-09-01 NOTE — PROGRESS NOTES
Assumed care of patient. Bedside report received from Jeremy ALBA. Patient is in bed. Fall precautions in place. Call light and belongings within reach. Updated on POC, all questions and concerns answered at this time. No other needs at this time.

## 2023-09-01 NOTE — PROGRESS NOTES
NURSING DAILY NOTE    Name: Jorden Bonilla   Date of Admission: 8/14/2023   Admitting Diagnosis: Ischemic stroke (HCC)  Attending Physician: Sarai Davila M.d.  Allergies: Patient has no known allergies.    Safety  Patient Assist  cga  Patient Precautions  Fall Risk  Precaution Comments  L phyllis, HTN, DM  Bed Transfer Status  Standby Assist  Toilet Transfer Status   Contact Guard Assist  Assistive Devices  Wheelchair  Oxygen  None - Room Air  Diet/Therapeutic Dining  Current Diet Order   Procedures    Diet Order Diet: Consistent CHO (Diabetic) (cardiac and CHO); Second Modifier: (optional): Cardiac     Pill Administration  whole  Agitated Behavioral Scale  14  ABS Level of Severity  No Agitation    Fall Risk  Has the patient had a fall this admission?   No  Melani Galeano Fall Risk Scoring  15, HIGH RISK  Fall Risk Safety Measures  bed alarm, chair alarm, and poor balance    Vitals  Temperature: 36.6 °C (97.8 °F)  Temp src: Oral  Pulse: 83  Respiration: 18  Blood Pressure: 121/71  Blood Pressure MAP (Calculated): 88 MM HG  BP Location: Right, Upper Arm  Patient BP Position: Supine     Oxygen  Pulse Oximetry: 96 %  O2 (LPM): 0  O2 Delivery Device: None - Room Air    Bowel and Bladder  Last Bowel Movement  08/31/23  Stool Type  Type 5: Soft blob with clear cut edges (passed easily)  Bowel Device  Bathroom  Continent  Bladder: Continent void   Bowel: Continent movement  Bladder Function  Urine Void (mL):  (large)  Number of Times Voided: 1  Urine Color: Unable To Evaluate  Genitourinary Assessment   Bladder Assessment (WDL):  WDL Except  Damon Catheter: Not Applicable  Damon Care: Given with Soap and Water  Urinary Elimination: Catheter (Document on LDA)  Urine Color: Unable To Evaluate  Bladder Device: Bathroom  Time Void: Yes  Bladder Scan: Post Void  $ Bladder Scan Results (mL): 33  Bladder Medications: Yes    Skin  Cooper Score   18  Sensory  Interventions   Bed Types: Standard/Trauma Mattress  Skin Preventative Measures: Pillows in Use for Support / Positioning  Moisture Interventions  Moisturizers/Barriers: Barrier Wipes      Pain  Pain Rating Scale  1 - Hardly Notice Pain  Pain Location  Head  Pain Location Orientation  Anterior  Pain Interventions   Declines    ADLs    Bathing   Shower, * * With Assistance from, Staff  Linen Change   Partial  Personal Hygiene  Perineal Care, Moist Mireya Wipes  Chlorhexidine Bath      Oral Care  Brushed Teeth  Teeth/Dentures     Shave     Nutrition Percentage Eaten  Between 25-50% Consumed  Environmental Precautions  Treaded Slipper Socks on Patient, Personal Belongings, Wastebasket, Call Bell etc. in Easy Reach, Transferred to Stronger Side, Report Given to Other Health Care Providers Regarding Fall Risk, Bed in Low Position, Communication Sign for Patients & Families, Mobility Assessed & Appropriate Sign Placed  Patient Turns/Positioning  Patient Turns Self from Side to Side  Patient Turns Assistance/Tolerance     Bed Positions  Bed Locked, Bed Controls On  Head of Bed Elevated  Self regulated      Psychosocial/Neurologic Assessment  Psychosocial Assessment  Psychosocial (WDL):  Within Defined Limits  Neurologic Assessment  Neuro (WDL): Exceptions to WDL  Level of Consciousness: Alert  Orientation Level: Oriented X4  Cognition: Follows commands, Appropriate judgement  Speech: Clear  Facial Symmetry: Left facial drooping  Motor Function/Sensation Assessment: Sensation, Motor strength  RUE Sensation: Full sensation  Muscle Strength Right Arm: Normal Strength Against Gravity and Full Resistance  LUE Sensation: Full sensation  Muscle Strength Left Arm: Weak Movement but Not Against Gravity or Resistance  RLE Sensation: Full sensation  Muscle Strength Right Leg: Normal Strength Against Gravity and Full Resistance  LLE Sensation: Full sensation  Muscle Strength Left Leg: Fair Strength against Gravity but No  Resistance  EENT (WDL):  WDL Except    Cardio/Pulmonary Assessment  Edema      Respiratory Breath Sounds  RUL Breath Sounds: Clear  RML Breath Sounds: Clear  RLL Breath Sounds: Clear  SHALOM Breath Sounds: Clear  LLL Breath Sounds: Clear  Cardiac Assessment   Cardiac (WDL):  WDL Except

## 2023-09-01 NOTE — CARE PLAN
The patient is Stable - Low risk of patient condition declining or worsening    Shift Goals  Clinical Goals: Safety  Patient Goals: Safety    Progress made toward(s) clinical / shift goals:    Problem: Knowledge Deficit - Standard  Goal: Patient and family/care givers will demonstrate understanding of plan of care, disease process/condition, diagnostic tests and medications  Outcome: Progressing   Patient educated on the POC and medications administered. All questions and concerns addressed at this time  Problem: Fall Risk - Rehab  Goal: Patient will remain free from falls  Outcome: Progressing   Bed is locked and in low position. Patient calls appropriately   Problem: Pain - Standard  Goal: Alleviation of pain or a reduction in pain to the patient’s comfort goal  Outcome: Progressing   Use of 0-10 pain scale. Patient is able to verbalize pain and discomfort appropriately     Patient is not progressing towards the following goals:

## 2023-09-01 NOTE — PROGRESS NOTES
"Rehab Progress Note     Date of Service: 9/1/2023  Chief Complaint: Follow-up stroke    Interval Events (Subjective)  Vitals reviewed: WNL   Labs reviewed: mild hyperglycemia 8/30  Patient seen and examined during therapy , and again in his room. Reports the baclofen is helping. Patient reports he had one episode of hand spasms last night but it did not keep him awake. Denies feeling sleepy on increased dose of baclofen. Patient reports that he enjoyed therapy, has no complaints. Does not report HA, lightheadedness, SOB, CP, abdominal pain, or changes in numbness/tingling/weakness.         Objective:  Physical Exam:  Vitals: /71   Pulse 83   Temp 36.6 °C (97.8 °F) (Oral)   Resp 18   Ht 1.651 m (5' 5\")   Wt 63 kg (139 lb)   SpO2 96%   Gen: NAD, laying comfortably in bed   Head:  NC/AT  Eyes/ Nose/ Mouth: PERRLA, moist mucous membranes  Cardio: RRR, good distal perfusion, warm extremities  Pulm: normal respiratory effort, no cyanosis, on RA   Abd: Soft NTND,   Ext: No peripheral edema. No calf tenderness. No clubbing.  Neuro: on ankle clonus, no increase tone in bicep or finger flexors     Mental status:  A&Ox4 (person, place, date, situation) answers questions appropriately follows commands  Speech: fluent, no aphasia or dysarthria      Recent Results (from the past 72 hour(s))   POCT glucose device results    Collection Time: 08/29/23 11:40 AM   Result Value Ref Range    POC Glucose, Blood 158 (H) 65 - 99 mg/dL   POCT glucose device results    Collection Time: 08/29/23  5:05 PM   Result Value Ref Range    POC Glucose, Blood 169 (H) 65 - 99 mg/dL   POCT glucose device results    Collection Time: 08/29/23  8:50 PM   Result Value Ref Range    POC Glucose, Blood 128 (H) 65 - 99 mg/dL   CBC WITHOUT DIFFERENTIAL    Collection Time: 08/30/23  5:47 AM   Result Value Ref Range    WBC 8.9 4.8 - 10.8 K/uL    RBC 5.85 4.70 - 6.10 M/uL    Hemoglobin 15.2 14.0 - 18.0 g/dL    Hematocrit 48.1 42.0 - 52.0 %    MCV 82.2 " 81.4 - 97.8 fL    MCH 26.0 (L) 27.0 - 33.0 pg    MCHC 31.6 (L) 32.3 - 36.5 g/dL    RDW 37.5 35.9 - 50.0 fL    Platelet Count 262 164 - 446 K/uL    MPV 10.4 9.0 - 12.9 fL   Basic Metabolic Panel    Collection Time: 08/30/23  5:47 AM   Result Value Ref Range    Sodium 142 135 - 145 mmol/L    Potassium 3.9 3.6 - 5.5 mmol/L    Chloride 106 96 - 112 mmol/L    Co2 27 20 - 33 mmol/L    Glucose 144 (H) 65 - 99 mg/dL    Bun 13 8 - 22 mg/dL    Creatinine 0.92 0.50 - 1.40 mg/dL    Calcium 8.7 8.5 - 10.5 mg/dL    Anion Gap 9.0 7.0 - 16.0   ESTIMATED GFR    Collection Time: 08/30/23  5:47 AM   Result Value Ref Range    GFR (CKD-EPI) 95 >60 mL/min/1.73 m 2   POCT glucose device results    Collection Time: 08/30/23  7:41 AM   Result Value Ref Range    POC Glucose, Blood 139 (H) 65 - 99 mg/dL   POCT glucose device results    Collection Time: 08/30/23 12:12 PM   Result Value Ref Range    POC Glucose, Blood 139 (H) 65 - 99 mg/dL   POCT glucose device results    Collection Time: 08/30/23  5:28 PM   Result Value Ref Range    POC Glucose, Blood 158 (H) 65 - 99 mg/dL       Scheduled Medications   Medication Dose Frequency    baclofen  15 mg QHS    SITagliptin  25 mg DAILY    senna-docusate  2 Tablet BID    And    polyethylene glycol/lytes  1 Packet BID    lidocaine  1 Patch DAILY    metFORMIN ER  500 mg BID WITH MEALS    metoprolol tartrate  12.5 mg TWICE DAILY    aspirin  81 mg Q EVENING    atorvastatin  80 mg Q EVENING    enoxaparin (LOVENOX) injection  40 mg DAILY AT 1800    tamsulosin  0.4 mg AFTER BREAKFAST       Current Diet Order   Procedures    Diet Order Diet: Consistent CHO (Diabetic) (cardiac and CHO); Second Modifier: (optional): Cardiac       Assessment:    This patient is a 58 y.o. male admitted for acute inpatient rehabilitation with Ischemic stroke (HCC).      Dr Davila  was present and led the interdisciplinary team conference on 8/30/2023.  She led the IDT conference and agree with the IDT conference documentation and  plan of care as noted below.     Nursing:  Diet Current Diet Order   Procedures    Diet Order Diet: Consistent CHO (Diabetic) (cardiac and CHO); Second Modifier: (optional): Cardiac       Eating ADL Supervision  Set-up of equipment or meal/tube feeding   % of Last Meal  Oral Nutrition: *  * Meal *  *, Breakfast, Less than 25% Consumed   Sleep No issues    Bowel Last BM: 08/29/23   Bladder Post Void  83   Barriers to Discharge Home: none      Physical Therapy:  Bed Mobility    Transfers Contact Guard Assist  Increased time, Set-up of equipment, Supervision for safety, Verbal cueing (stand pivot from w/c to bed)   Mobility Minimal Assist   Stairs Total Assist (Vector harness with 25lb BWS)   Barriers to Discharge Home: left hemiparesis      Occupational Therapy:  Grooming Supervision, Seated   Bathing Contact Guard Assist   UB Dressing Supervision   LB Dressing Minimal Assist   Toileting Contact Guard Assist   Shower & Tub Transfer Contact Guard Assist   Barriers to Discharge Home: left hemiparesis    Respiratory Therapy:  O2 (LPM): 0  O2 Delivery Device: None - Room Air    Case Management:  Continues to work on disposition and DME needs.      Discharge Date/Disposition:    9/5    Needs home evaluation and family training    HH: PT/OT/RN, rehab without walls    Equip: large base quad cane. 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  Left hemiparesis,distal > proximal   Cognitive impairment, mild  Dysphagia  Continue full rehab program  PT/OT/SLP, 1 hr each discipline, 5 days per week  8/15: SLP discontinued, PT/OT, 1.5 hr each discipline, 5 days per week  Aqua therapy ordered  AFO ordered    Completed aspirin and Plavix for secondary stroke prophylaxis until 8/21, then aspirin alone    Continue aspirin and atorvastatin    Cardiac monitor returned    Outpatient follow-up with stroke Bridge clinic and cardiology, referrals  made    Nocturnal spasticity, continues  Mostly in the left leg  Increase baclofen to 15 mg  9/1 Spasticity improved, is not keeping patient awake at night , is not sleepy on increased dose of baclofen     Left paraspinal muscle spasms, resolved  Heat and/or ice as needed  Continue Lidoderm patch  Continue Tylenol as needed, last use 8/31  Continue Flexeril as needed, not use in last 72 hours     Left upper shoulder muscle spasms, improving   Heat and/or ice as needed  Continue Tylenol as needed, last use 8/31  Continue Flexeril as needed     Diabetes with hyperglycemia  Uncontrolled, hemoglobin A1c 11.9  Continue metformin and Januvia  Sliding scale insulin discontinued  Sugars controlled, BG  144      Hypertension  Continue metoprolol  Olmesartan discontinued  9/1 BP well controlled      Hyperlipidemia  Continue atorvastatin     Sinus tachycardia, resolved  Continue metoprolol  Negative doppler US for DVTs     Urinary retention, resolved  Hematuria, resolved  Damon removed 8/15  Continue Flomax  Monitor PVRs -82, 66, 7, 28  Intermittent catheterization for volumes over 400, not requiring     Coronary artery disease  With history of CABG  Continue aspirin and atorvastatin    Acute cystitis, resolved  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  Physical Medicine and Rehabilitation    Patient was seen for 36 minutes on unit/floor of which > 50% of time was spent on counseling and coordination of care regarding the above, including prognosis, risk reduction, benefits of treatment, and options for next stage of care.

## 2023-09-01 NOTE — THERAPY
Physical Therapy   Daily Treatment     Patient Name: Jorden Bonilla  Age:  58 y.o., Sex:  male  Medical Record #: [id number]  Today's Date: 9/1/2023     Precautions  Precautions: Fall Risk  Comments: L phyllis, HTN, DM    Subjective    Pt resting in bed, willing to participate     Objective       09/01/23 1301   PT Charge Group   PT Gait Training (Units) 1   PT Therapeutic Exercise (Units) 2   PT Neuromuscular Re-Education / Balance (Units) 1   PT Total Time Spent   PT Individual Total Time Spent (Mins) 60   Gait Functional Level of Assist    Gait Level Of Assist Minimal Assist  (occassional mod A for balance recovery with L toe catch)   Assistive Device Quad Cane  (L AFO)   # of Times Distance was Traveled 2   Deviation Decreased Base Of Support;Decreased Heel Strike;Decreased Toe Off  (L phyllis-gait)   Transfer Functional Level of Assist   Bed, Chair, Wheelchair Transfer Minimal Assist   Bed Chair Wheelchair Transfer Description Adaptive equipment;Initial preparation for task;Set-up of equipment;Verbal cueing   Toilet Transfers Contact Guard Assist   Toilet Transfer Description Grab bar;Set-up of equipment;Verbal cueing   Supine Lower Body Exercise   Bridges Two Legged;3 sets of 15  (BUE's hands clasped with shoulders to 90* flexion for increased core stabilization/ activation.)   Trunk Rotation 3 sets of 15;Medium Resistance Theraband   Hip Abduction Hook Lying;3 sets of 15;Medium Resistance Theraband   Knee to Chest 3 sets of 15;Left  (LLE propped on small therapy ball)   Other Exercises closed chain strength training at shuttle for sinlg elimb squats 3 x 15 with 4 bands LLE/ 5 bands RLE. manual cues throughout for LLE stabilization and prevention of knee hyperextension   Neuro-Muscular Treatments   Neuro-Muscular Treatments Facilitation;Joint Approximation;Sequencing;Tactile Cuing;Verbal Cuing;Weight Shift Right;Weight Shift Left   Comments neuro re-ed strength training/ shuttle and mat program as noted.          Assessment    Pt completed strength and mobility training well, new posterior carbon fiber AFO delivered today, less L knee hyperextension noted with short gait trials but remains with L phyllis-gait weakness.    Strengths: Able to follow instructions, Alert and oriented, Effective communication skills, Good carryover of learning, Good insight into deficits/needs, Independent prior level of function, Making steady progress towards goals, Motivated for self care and independence, Pleasant and cooperative, Supportive family, Willingly participates in therapeutic activities  Barriers: Bladder retention, Decreased endurance, Hemiparesis, Impaired activity tolerance, Impaired balance, Language barrier, non-fluent English, Limited mobility    Plan    Bed mobility with phyllis techniques, transfer training, gait training with LBQC vs. SBQC vs SPC, and carbon fiber/plastic AFO with stockinette, standing balance, neuro re-ed for L hemibody strengthening, Bioness, lite-gait BWSTT, family training/education. Gait training in Vector without AD.    Passport items to be completed:  Get in/out of bed safely, in/out of a vehicle, safely use mobility device, walk or wheel around home/community, navigate up and down stairs, show how to get up/down from the ground, ensure home is accessible, demonstrate HEP, complete caregiver training    Physical Therapy Problems (Active)       Problem: Mobility       Dates: Start:  08/15/23         Goal: STG-Within one week, patient will ambulate up/down a curb with HW and min A.       Dates: Start:  08/15/23    Expected End:  09/05/23               Problem: PT-Long Term Goals       Dates: Start:  08/15/23         Goal: LTG-By discharge, patient will ambulate 150 ft with QC vs SPC and SBA.       Dates: Start:  08/15/23    Expected End:  09/05/23            Goal: LTG-By discharge, patient will transfer one surface to another with QC vs SPC and SPV.       Dates: Start:  08/15/23    Expected End:   09/05/23            Goal: LTG-By discharge, patient will ambulate up/down a curb with QC vs SPC and SBA.       Dates: Start:  08/15/23    Expected End:  09/05/23            Goal: LTG-By discharge, patient will transfer in/out of a car with QC vs SPC and SBA after setup.       Dates: Start:  08/15/23    Expected End:  09/05/23            Goal: LTG-By discharge, patient will ambulate up/down a ramp with QC vs SPC and CGA to simulate home entrance.       Dates: Start:  08/15/23    Expected End:  09/05/23

## 2023-09-02 ENCOUNTER — APPOINTMENT (OUTPATIENT)
Dept: PHYSICAL THERAPY | Facility: REHABILITATION | Age: 58
DRG: 057 | End: 2023-09-02
Attending: PHYSICAL MEDICINE & REHABILITATION
Payer: COMMERCIAL

## 2023-09-02 PROCEDURE — 700111 HCHG RX REV CODE 636 W/ 250 OVERRIDE (IP): Mod: JZ | Performed by: PHYSICAL MEDICINE & REHABILITATION

## 2023-09-02 PROCEDURE — 700102 HCHG RX REV CODE 250 W/ 637 OVERRIDE(OP): Performed by: HOSPITALIST

## 2023-09-02 PROCEDURE — A9270 NON-COVERED ITEM OR SERVICE: HCPCS | Performed by: PHYSICAL MEDICINE & REHABILITATION

## 2023-09-02 PROCEDURE — 97112 NEUROMUSCULAR REEDUCATION: CPT

## 2023-09-02 PROCEDURE — 700102 HCHG RX REV CODE 250 W/ 637 OVERRIDE(OP): Performed by: PHYSICAL MEDICINE & REHABILITATION

## 2023-09-02 PROCEDURE — 770010 HCHG ROOM/CARE - REHAB SEMI PRIVAT*

## 2023-09-02 PROCEDURE — 700101 HCHG RX REV CODE 250: Performed by: PHYSICAL MEDICINE & REHABILITATION

## 2023-09-02 PROCEDURE — A9270 NON-COVERED ITEM OR SERVICE: HCPCS | Performed by: HOSPITALIST

## 2023-09-02 PROCEDURE — 97110 THERAPEUTIC EXERCISES: CPT

## 2023-09-02 PROCEDURE — 99231 SBSQ HOSP IP/OBS SF/LOW 25: CPT | Performed by: PHYSICAL MEDICINE & REHABILITATION

## 2023-09-02 PROCEDURE — 97116 GAIT TRAINING THERAPY: CPT

## 2023-09-02 RX ADMIN — METFORMIN HYDROCHLORIDE 500 MG: 500 TABLET, EXTENDED RELEASE ORAL at 18:12

## 2023-09-02 RX ADMIN — ASPIRIN 81 MG: 81 TABLET, COATED ORAL at 19:59

## 2023-09-02 RX ADMIN — METFORMIN HYDROCHLORIDE 500 MG: 500 TABLET, EXTENDED RELEASE ORAL at 08:24

## 2023-09-02 RX ADMIN — BACLOFEN 15 MG: 10 TABLET ORAL at 19:59

## 2023-09-02 RX ADMIN — ACETAMINOPHEN 650 MG: 325 TABLET ORAL at 18:11

## 2023-09-02 RX ADMIN — ACETAMINOPHEN 650 MG: 325 TABLET ORAL at 08:23

## 2023-09-02 RX ADMIN — ENOXAPARIN SODIUM 40 MG: 100 INJECTION SUBCUTANEOUS at 18:11

## 2023-09-02 RX ADMIN — ATORVASTATIN CALCIUM 80 MG: 40 TABLET, FILM COATED ORAL at 19:59

## 2023-09-02 RX ADMIN — TAMSULOSIN HYDROCHLORIDE 0.4 MG: 0.4 CAPSULE ORAL at 08:24

## 2023-09-02 RX ADMIN — SITAGLIPTIN 25 MG: 50 TABLET, FILM COATED ORAL at 08:28

## 2023-09-02 RX ADMIN — METOPROLOL TARTRATE 12.5 MG: 25 TABLET, FILM COATED ORAL at 18:14

## 2023-09-02 RX ADMIN — LIDOCAINE 1 PATCH: 700 PATCH TOPICAL at 09:14

## 2023-09-02 RX ADMIN — METOPROLOL TARTRATE 12.5 MG: 25 TABLET, FILM COATED ORAL at 05:14

## 2023-09-02 ASSESSMENT — ACTIVITIES OF DAILY LIVING (ADL): BED_CHAIR_WHEELCHAIR_TRANSFER_DESCRIPTION: ADAPTIVE EQUIPMENT;INCREASED TIME;SET-UP OF EQUIPMENT;VERBAL CUEING

## 2023-09-02 ASSESSMENT — PAIN DESCRIPTION - PAIN TYPE
TYPE: ACUTE PAIN
TYPE: ACUTE PAIN

## 2023-09-02 ASSESSMENT — PATIENT HEALTH QUESTIONNAIRE - PHQ9
2. FEELING DOWN, DEPRESSED, IRRITABLE, OR HOPELESS: NOT AT ALL
1. LITTLE INTEREST OR PLEASURE IN DOING THINGS: NOT AT ALL
SUM OF ALL RESPONSES TO PHQ9 QUESTIONS 1 AND 2: 0

## 2023-09-02 NOTE — CARE PLAN
"The patient is Stable - Low risk of patient condition declining or worsening    Shift Goals  Clinical Goals: safety  Patient Goals: safety, sleep/rest    Problem: Fall Risk - Rehab  Goal: Patient will remain free from falls  Outcome: Progressing  Note: Melani Galeano Fall risk Assessment Score: 20    High fall risk Interventions   - Alarming seatbelt  - Bed and strip alarm   - Yellow sign by the door   - Yellow wrist band \"Fall risk\"  - Do not leave patient unattended in the bathroom  - Fall risk education provided  Patient uses call light consistently and appropriately this shift.  Waits for assistance when needed and does not attempt self transfer.  Able to verbalize needs.  Will continue to monitor.          Problem: Bowel Elimination  Goal: Patient will participate in bowel management program  Outcome: Progressing  Note: Patient having regular bowel movements; last BM 9/1.  Refused stool softener tonight d/t 4x BM tonight with no abdominal pain or discomfort complaint.   Will continue to monitor.      "

## 2023-09-02 NOTE — PROGRESS NOTES
NURSING DAILY NOTE    Name: Jorden Bonilla   Date of Admission: 8/14/2023   Admitting Diagnosis: Ischemic stroke (HCC)  Attending Physician: Laura Kim D.o.  Allergies: Patient has no known allergies.    Safety  Patient Assist  cga  Patient Precautions  Fall Risk  Precaution Comments  L phyllis, HTN, DM  Bed Transfer Status  Minimal Assist  Toilet Transfer Status   Contact Guard Assist  Assistive Devices  Wheelchair  Oxygen  None - Room Air  Diet/Therapeutic Dining  Current Diet Order   Procedures    Diet Order Diet: Consistent CHO (Diabetic) (cardiac and CHO); Second Modifier: (optional): Cardiac     Pill Administration  whole  Agitated Behavioral Scale  14  ABS Level of Severity  No Agitation    Fall Risk  Has the patient had a fall this admission?   No  Melani Galeano Fall Risk Scoring  20, HIGH RISK  Fall Risk Safety Measures  bed alarm, chair alarm, poor balance, and ok to leave pt in bathroom    Vitals  Temperature: 36.7 °C (98.1 °F)  Temp src: Oral  Pulse: 81  Respiration: 18  Blood Pressure: 123/74  Blood Pressure MAP (Calculated): 90 MM HG  BP Location: Right, Upper Arm  Patient BP Position: Sitting     Oxygen  Pulse Oximetry: 96 %  O2 (LPM): 0  O2 Delivery Device: None - Room Air    Bowel and Bladder  Last Bowel Movement  09/01/23  Stool Type  Type 6: Fluffy pieces with ragged edges, a mushy stool  Bowel Device  Bathroom  Continent  Bladder: Continent void   Bowel: Continent movement  Bladder Function  Urine Void (mL):  (large)  Number of Times Voided: 1  Urine Color: Yellow  Genitourinary Assessment   Bladder Assessment (WDL):  WDL Except  Damon Catheter: Not Applicable  Damon Care: Given with Soap and Water  Urinary Elimination: Catheter (Document on LDA)  Urine Color: Yellow  Bladder Device: Bathroom  Time Void: Yes  Bladder Scan: Post Void  $ Bladder Scan Results (mL): 31  Bladder Medications: Yes    Skin  Cooper Score   18  Sensory  Interventions   Bed Types: Standard/Trauma Mattress  Skin Preventative Measures: Pillows in Use for Support / Positioning  Moisture Interventions  Moisturizers/Barriers: Barrier Wipes      Pain  Pain Rating Scale  0 - No Pain  Pain Location  Head  Pain Location Orientation  Anterior  Pain Interventions   Declines    ADLs    Bathing   Shower, * * With Assistance from, Staff  Linen Change   Partial  Personal Hygiene  Perineal Care, Moist Mireya Wipes  Chlorhexidine Bath      Oral Care  Brushed Teeth  Teeth/Dentures     Shave     Nutrition Percentage Eaten  Dinner, Between % Consumed  Environmental Precautions  Treaded Slipper Socks on Patient, Personal Belongings, Wastebasket, Call Bell etc. in Easy Reach, Transferred to Stronger Side, Report Given to Other Health Care Providers Regarding Fall Risk, Bed in Low Position, Communication Sign for Patients & Families, Mobility Assessed & Appropriate Sign Placed  Patient Turns/Positioning  Patient Turns Self from Side to Side  Patient Turns Assistance/Tolerance     Bed Positions  Bed Locked, Bed Controls On  Head of Bed Elevated  Self regulated      Psychosocial/Neurologic Assessment  Psychosocial Assessment  Psychosocial (WDL):  Within Defined Limits  Neurologic Assessment  Neuro (WDL): Exceptions to WDL  Level of Consciousness: Alert  Orientation Level: Oriented X4  Cognition: Follows commands, Appropriate judgement  Speech: Clear  Facial Symmetry: Left facial drooping  Motor Function/Sensation Assessment: Sensation, Motor strength  RUE Sensation: Full sensation  Muscle Strength Right Arm: Normal Strength Against Gravity and Full Resistance  LUE Sensation: Full sensation  Muscle Strength Left Arm: Weak Movement but Not Against Gravity or Resistance  RLE Sensation: Full sensation  Muscle Strength Right Leg: Normal Strength Against Gravity and Full Resistance  LLE Sensation: Full sensation  Muscle Strength Left Leg: Fair Strength against Gravity but No Resistance  EENT  (WDL):  WDL Except    Cardio/Pulmonary Assessment  Edema      Respiratory Breath Sounds  RUL Breath Sounds: Clear  RML Breath Sounds: Clear  RLL Breath Sounds: Clear  SHALOM Breath Sounds: Clear  LLL Breath Sounds: Clear  Cardiac Assessment   Cardiac (WDL):  WDL Except

## 2023-09-02 NOTE — PROGRESS NOTES
NURSING DAILY NOTE    Name: Jorden Bonilla   Date of Admission: 8/14/2023   Admitting Diagnosis: Ischemic stroke (HCC)  Attending Physician: Laura Kim D.o.  Allergies: Patient has no known allergies.    Safety  Patient Assist  cga  Patient Precautions  Fall Risk  Precaution Comments  L phyllis, HTN, DM  Bed Transfer Status  Minimal Assist  Toilet Transfer Status   Contact Guard Assist  Assistive Devices  Wheelchair  Oxygen  None - Room Air  Diet/Therapeutic Dining  Current Diet Order   Procedures    Diet Order Diet: Consistent CHO (Diabetic) (cardiac and CHO); Second Modifier: (optional): Cardiac     Pill Administration  whole  Agitated Behavioral Scale  14  ABS Level of Severity  No Agitation    Fall Risk  Has the patient had a fall this admission?   No  Melani Galeano Fall Risk Scoring  15, HIGH RISK  Fall Risk Safety Measures  bed alarm, chair alarm, poor balance, and ok to leave pt in bathroom    Vitals  Temperature: 36.6 °C (97.8 °F)  Temp src: Oral  Pulse: 83  Respiration: 18  Blood Pressure: 121/71  Blood Pressure MAP (Calculated): 88 MM HG  BP Location: Right, Upper Arm  Patient BP Position: Supine     Oxygen  Pulse Oximetry: 96 %  O2 (LPM): 0  O2 Delivery Device: None - Room Air    Bowel and Bladder  Last Bowel Movement  09/01/23  Stool Type  Type 5: Soft blob with clear cut edges (passed easily)  Bowel Device  Bathroom  Continent  Bladder: Continent void   Bowel: Continent movement  Bladder Function  Urine Void (mL):  (large)  Number of Times Voided: 1  Urine Color: Yellow  Genitourinary Assessment   Bladder Assessment (WDL):  WDL Except  Damon Catheter: Not Applicable  Damon Care: Given with Soap and Water  Urinary Elimination: Catheter (Document on LDA)  Urine Color: Yellow  Bladder Device: Bathroom  Time Void: Yes  Bladder Scan: Post Void  $ Bladder Scan Results (mL): 33  Bladder Medications: Yes    Skin  Cooper Score   18  Sensory  Interventions   Bed Types: Standard/Trauma Mattress  Skin Preventative Measures: Pillows in Use for Support / Positioning  Moisture Interventions  Moisturizers/Barriers: Barrier Wipes      Pain  Pain Rating Scale  3 - Sometimes distracts me  Pain Location  Head  Pain Location Orientation  Anterior  Pain Interventions   Declines    ADLs    Bathing   Shower, * * With Assistance from, Staff  Linen Change   Partial  Personal Hygiene  Perineal Care, Moist Mireya Wipes  Chlorhexidine Bath      Oral Care  Brushed Teeth  Teeth/Dentures     Shave     Nutrition Percentage Eaten  Lunch, Between % Consumed  Environmental Precautions  Treaded Slipper Socks on Patient, Personal Belongings, Wastebasket, Call Bell etc. in Easy Reach, Transferred to Stronger Side, Report Given to Other Health Care Providers Regarding Fall Risk, Bed in Low Position, Communication Sign for Patients & Families, Mobility Assessed & Appropriate Sign Placed  Patient Turns/Positioning  Patient Turns Self from Side to Side  Patient Turns Assistance/Tolerance     Bed Positions  Bed Locked, Bed Controls On  Head of Bed Elevated  Self regulated      Psychosocial/Neurologic Assessment  Psychosocial Assessment  Psychosocial (WDL):  Within Defined Limits  Neurologic Assessment  Neuro (WDL): Exceptions to WDL  Level of Consciousness: Alert  Orientation Level: Oriented X4  Cognition: Follows commands, Appropriate judgement  Speech: Clear  Facial Symmetry: Left facial drooping  Motor Function/Sensation Assessment: Sensation, Motor strength  RUE Sensation: Full sensation  Muscle Strength Right Arm: Normal Strength Against Gravity and Full Resistance  LUE Sensation: Full sensation  Muscle Strength Left Arm: Weak Movement but Not Against Gravity or Resistance  RLE Sensation: Full sensation  Muscle Strength Right Leg: Normal Strength Against Gravity and Full Resistance  LLE Sensation: Full sensation  Muscle Strength Left Leg: Fair Strength against Gravity but No  Resistance  EENT (WDL):  WDL Except    Cardio/Pulmonary Assessment  Edema      Respiratory Breath Sounds  RUL Breath Sounds: Clear  RML Breath Sounds: Clear  RLL Breath Sounds: Clear  SHALOM Breath Sounds: Clear  LLL Breath Sounds: Clear  Cardiac Assessment   Cardiac (WDL):  WDL Except

## 2023-09-02 NOTE — PROGRESS NOTES
"  Physical Medicine & Rehabilitation Progress Note    Encounter Date: 9/2/2023    Chief Complaint: Headache    Interval Events (Subjective):  LUISANA PHILLIPS 9/1  VV    Seen and examined in his room. He has a mild headache but otherwise is ok. Baclofen does not make him too sleepy.       ROS: 14 point ROS negative unless otherwise specified in the HPI    Objective:  VITAL SIGNS: BP 98/60   Pulse 65   Temp 36.3 °C (97.4 °F) (Oral)   Resp 18   Ht 1.651 m (5' 5\")   Wt 63 kg (139 lb)   SpO2 98%   BMI 23.13 kg/m²     GEN: No apparent distress  HEENT: Head normocephalic, atraumatic.  Sclera nonicteric bilaterally, no ocular discharge appreciated bilaterally.  CV: Extremities warm and well-perfused, no peripheral edema appreciated bilaterally.  PULMONARY: Breathing nonlabored on room air, no respiratory accessory muscle use.  Not requiring supplemental oxygen.  ABD: Soft, nontender.  SKIN: No appreciable skin breakdown on exposed areas of skin.  PSYCH: Mood and affect within normal limits.  NEURO: Awake alert.  Conversational.  Logical thought content. Left hemiparesis.       Laboratory Values:  Recent Results (from the past 72 hour(s))   POCT glucose device results    Collection Time: 08/30/23 12:12 PM   Result Value Ref Range    POC Glucose, Blood 139 (H) 65 - 99 mg/dL   POCT glucose device results    Collection Time: 08/30/23  5:28 PM   Result Value Ref Range    POC Glucose, Blood 158 (H) 65 - 99 mg/dL       Medications:  Scheduled Medications   Medication Dose Frequency    baclofen  15 mg QHS    SITagliptin  25 mg DAILY    senna-docusate  2 Tablet BID    And    polyethylene glycol/lytes  1 Packet BID    lidocaine  1 Patch DAILY    metFORMIN ER  500 mg BID WITH MEALS    metoprolol tartrate  12.5 mg TWICE DAILY    aspirin  81 mg Q EVENING    atorvastatin  80 mg Q EVENING    enoxaparin (LOVENOX) injection  40 mg DAILY AT 1800    tamsulosin  0.4 mg AFTER BREAKFAST     PRN medications: bisacodyl, senna-docusate **AND** " polyethylene glycol/lytes **AND** magnesium hydroxide **AND** bisacodyl, cyclobenzaprine, meclizine, hydrOXYzine HCl, melatonin, Respiratory Therapy Consult, acetaminophen, lactulose, docusate sodium, carboxymethylcellulose, benzocaine-menthol, mag hydrox-al hydrox-simeth, ondansetron **OR** ondansetron, traZODone, sodium chloride, midazolam    Diet:  Current Diet Order   Procedures    Diet Order Diet: Consistent CHO (Diabetic) (cardiac and CHO); Second Modifier: (optional): Cardiac       Medical Decision Making and Plan:    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  Left hemiparesis,distal > proximal   Cognitive impairment, mild  Dysphagia  Continue full rehab program  PT/OT/SLP, 1 hr each discipline, 5 days per week  8/15: SLP discontinued, PT/OT, 1.5 hr each discipline, 5 days per week  Aqua therapy ordered  AFO ordered     Completed aspirin and Plavix for secondary stroke prophylaxis until 8/21, then aspirin alone     Continue aspirin and atorvastatin     Cardiac monitor returned    Nocturnal spasticity, continues  Mostly in the left leg  Increase baclofen to 15 mg  9/1 Spasticity improved, is not keeping patient awake at night , is not sleepy on increased dose of baclofen   9/2 Stable on Baclofen     Headache, not new  PRN Tylenol      DVT PROPHYLAXIS: Lovenox 40mg SQ daily    HOSPITALIST FOLLOWING: NO    CODE STATUS: FULL CODE    ____________________________________    Dr. Jolanta Zuniga DO, MS  [facility] - Physical Medicine & Rehabilitation   ____________________________________

## 2023-09-02 NOTE — THERAPY
Physical Therapy   Daily Treatment     Patient Name: Jorden Bonilla  Age:  58 y.o., Sex:  male  Medical Record #: [id number]  Today's Date: 9/2/2023     Precautions  Precautions: Fall Risk  Comments: L phyllis, HTN, DM    Subjective    Pt resting in bed, willing to participate     Objective       09/02/23 1231   PT Charge Group   PT Gait Training (Units) 2   PT Therapeutic Exercise (Units) 1   PT Neuromuscular Re-Education / Balance (Units) 1   PT Total Time Spent   PT Individual Total Time Spent (Mins) 60   Transfer Functional Level of Assist   Bed, Chair, Wheelchair Transfer Minimal Assist   Bed Chair Wheelchair Transfer Description Adaptive equipment;Increased time;Set-up of equipment;Verbal cueing   Supine Lower Body Exercise   Bridges Two Legged;3 sets of 15  (BUE's hands clasped with shoulders to 90* flexion for increased core stabilization/ activation.)   Trunk Rotation 3 sets of 15;Medium Resistance Theraband   Hip Abduction Hook Lying;3 sets of 15;Medium Resistance Theraband   Neuro-Muscular Treatments   Neuro-Muscular Treatments Facilitation;Joint Approximation;Postural Facilitation;Sequencing;Tactile Cuing;Verbal Cuing;Weight Shift Right;Weight Shift Left   Comments neuro re-ed strength/ cor stabilization exercises for supine program as noted. Lite-gait treadmill training 7.5 minutes x 2 with seated rest, 0.6 mph to complete ~ 800 ft  continuous ambulation         Assessment    Pt continues to require assist for LLE advancement on treadmill despite posterior support AFO, pt required 1 seated rests during 15 minutes treadmill training but reports significant fatigue.  Strengths: Able to follow instructions, Alert and oriented, Effective communication skills, Good carryover of learning, Good insight into deficits/needs, Independent prior level of function, Making steady progress towards goals, Motivated for self care and independence, Pleasant and cooperative, Supportive family, Willingly participates in  therapeutic activities  Barriers: Bladder retention, Decreased endurance, Hemiparesis, Impaired activity tolerance, Impaired balance, Language barrier, non-fluent English, Limited mobility    Plan    Bed mobility with phyllis techniques, transfer training, gait training with LBQC vs. SBQC vs SPC, and carbon fiber/plastic AFO with stockinette, standing balance, neuro re-ed for L hemibody strengthening, Bioness, lite-gait BWSTT, family training/education. Gait training in [place] without AD.     Passport items to be completed:  Get in/out of bed safely, in/out of a vehicle, safely use mobility device, walk or wheel around home/community, navigate up and down stairs, show how to get up/down from the ground, ensure home is accessible, demonstrate HEP, complete caregiver training      Physical Therapy Problems (Active)       Problem: Mobility       Dates: Start:  08/15/23         Goal: STG-Within one week, patient will ambulate up/down a curb with HW and min A.       Dates: Start:  08/15/23    Expected End:  09/05/23               Problem: PT-Long Term Goals       Dates: Start:  08/15/23         Goal: LTG-By discharge, patient will ambulate 150 ft with QC vs SPC and SBA.       Dates: Start:  08/15/23    Expected End:  09/05/23            Goal: LTG-By discharge, patient will transfer one surface to another with QC vs SPC and SPV.       Dates: Start:  08/15/23    Expected End:  09/05/23            Goal: LTG-By discharge, patient will ambulate up/down a curb with QC vs SPC and SBA.       Dates: Start:  08/15/23    Expected End:  09/05/23            Goal: LTG-By discharge, patient will transfer in/out of a car with QC vs SPC and SBA after setup.       Dates: Start:  08/15/23    Expected End:  09/05/23            Goal: LTG-By discharge, patient will ambulate up/down a ramp with QC vs SPC and CGA to simulate home entrance.       Dates: Start:  08/15/23    Expected End:  09/05/23

## 2023-09-03 ENCOUNTER — APPOINTMENT (OUTPATIENT)
Dept: OCCUPATIONAL THERAPY | Facility: REHABILITATION | Age: 58
DRG: 057 | End: 2023-09-03
Attending: PHYSICAL MEDICINE & REHABILITATION
Payer: COMMERCIAL

## 2023-09-03 ENCOUNTER — APPOINTMENT (OUTPATIENT)
Dept: PHYSICAL THERAPY | Facility: REHABILITATION | Age: 58
DRG: 057 | End: 2023-09-03
Attending: PHYSICAL MEDICINE & REHABILITATION
Payer: COMMERCIAL

## 2023-09-03 PROCEDURE — 700101 HCHG RX REV CODE 250: Performed by: PHYSICAL MEDICINE & REHABILITATION

## 2023-09-03 PROCEDURE — 700102 HCHG RX REV CODE 250 W/ 637 OVERRIDE(OP): Performed by: PHYSICAL MEDICINE & REHABILITATION

## 2023-09-03 PROCEDURE — 700102 HCHG RX REV CODE 250 W/ 637 OVERRIDE(OP): Performed by: HOSPITALIST

## 2023-09-03 PROCEDURE — 700111 HCHG RX REV CODE 636 W/ 250 OVERRIDE (IP): Mod: JZ | Performed by: PHYSICAL MEDICINE & REHABILITATION

## 2023-09-03 PROCEDURE — A9270 NON-COVERED ITEM OR SERVICE: HCPCS | Performed by: HOSPITALIST

## 2023-09-03 PROCEDURE — A9270 NON-COVERED ITEM OR SERVICE: HCPCS | Performed by: PHYSICAL MEDICINE & REHABILITATION

## 2023-09-03 PROCEDURE — 97530 THERAPEUTIC ACTIVITIES: CPT

## 2023-09-03 PROCEDURE — 770010 HCHG ROOM/CARE - REHAB SEMI PRIVAT*

## 2023-09-03 PROCEDURE — 97110 THERAPEUTIC EXERCISES: CPT

## 2023-09-03 RX ADMIN — ASPIRIN 81 MG: 81 TABLET, COATED ORAL at 20:40

## 2023-09-03 RX ADMIN — METOPROLOL TARTRATE 12.5 MG: 25 TABLET, FILM COATED ORAL at 17:14

## 2023-09-03 RX ADMIN — METFORMIN HYDROCHLORIDE 500 MG: 500 TABLET, EXTENDED RELEASE ORAL at 17:14

## 2023-09-03 RX ADMIN — POLYETHYLENE GLYCOL 3350 1 PACKET: 17 POWDER, FOR SOLUTION ORAL at 08:48

## 2023-09-03 RX ADMIN — ATORVASTATIN CALCIUM 80 MG: 40 TABLET, FILM COATED ORAL at 20:40

## 2023-09-03 RX ADMIN — METOPROLOL TARTRATE 12.5 MG: 25 TABLET, FILM COATED ORAL at 05:16

## 2023-09-03 RX ADMIN — ENOXAPARIN SODIUM 40 MG: 100 INJECTION SUBCUTANEOUS at 17:15

## 2023-09-03 RX ADMIN — METFORMIN HYDROCHLORIDE 500 MG: 500 TABLET, EXTENDED RELEASE ORAL at 08:46

## 2023-09-03 RX ADMIN — LIDOCAINE 1 PATCH: 700 PATCH TOPICAL at 08:48

## 2023-09-03 RX ADMIN — SITAGLIPTIN 25 MG: 50 TABLET, FILM COATED ORAL at 08:46

## 2023-09-03 RX ADMIN — BACLOFEN 15 MG: 10 TABLET ORAL at 20:40

## 2023-09-03 RX ADMIN — TAMSULOSIN HYDROCHLORIDE 0.4 MG: 0.4 CAPSULE ORAL at 08:46

## 2023-09-03 RX ADMIN — SENNOSIDES AND DOCUSATE SODIUM 2 TABLET: 50; 8.6 TABLET ORAL at 08:46

## 2023-09-03 ASSESSMENT — PATIENT HEALTH QUESTIONNAIRE - PHQ9
SUM OF ALL RESPONSES TO PHQ9 QUESTIONS 1 AND 2: 0
2. FEELING DOWN, DEPRESSED, IRRITABLE, OR HOPELESS: NOT AT ALL
SUM OF ALL RESPONSES TO PHQ9 QUESTIONS 1 AND 2: 0
2. FEELING DOWN, DEPRESSED, IRRITABLE, OR HOPELESS: NOT AT ALL
1. LITTLE INTEREST OR PLEASURE IN DOING THINGS: NOT AT ALL
1. LITTLE INTEREST OR PLEASURE IN DOING THINGS: NOT AT ALL

## 2023-09-03 ASSESSMENT — PAIN DESCRIPTION - PAIN TYPE
TYPE: ACUTE PAIN
TYPE: ACUTE PAIN

## 2023-09-03 ASSESSMENT — ACTIVITIES OF DAILY LIVING (ADL): BED_CHAIR_WHEELCHAIR_TRANSFER_DESCRIPTION: VERBAL CUEING;SET-UP OF EQUIPMENT

## 2023-09-03 NOTE — CARE PLAN
"The patient is Stable - Low risk of patient condition declining or worsening    Shift Goals  Clinical Goals: safety  Patient Goals: safety, sleep/rest      Problem: Fall Risk - Rehab  Goal: Patient will remain free from falls  Outcome: Progressing  Note: Melani Galeano Fall risk Assessment Score: 17    High fall risk Interventions   - Alarming seatbelt  - Bed and strip alarm   - Yellow sign by the door   - Yellow wrist band \"Fall risk\"  - Room near to the nurse station  - Do not leave patient unattended in the bathroom  - Fall risk education provided  Patient uses call light consistently and appropriately this shift.  Waits for assistance when needed and does not attempt self transfer.  Able to verbalize needs.  Will continue to monitor.         Problem: Hemodynamics  Goal: Patient's hemodynamics, fluid balance and neurologic status will be stable or improve  Outcome: Progressing  Note: Vitals wnl, afebrile. No odd behavior noted. Denies any pain or any discomfort.      "

## 2023-09-03 NOTE — CARE PLAN
The patient is Stable - Low risk of patient condition declining or worsening    Shift Goals  Clinical Goals: Pain management  Patient Goals: safety, sleep/rest    Progress made toward(s) clinical / shift goals:    Problem: Pain - Standard  Goal: Alleviation of pain or a reduction in pain to the patient’s comfort goal  Outcome: Progressing     Patient able to verbalize pain level and verbalize an acceptable level of pain.

## 2023-09-03 NOTE — THERAPY
Occupational Therapy  Daily Treatment     Patient Name: Jorden Bonilla  Age:  58 y.o., Sex:  male  Medical Record #: [id number]  Today's Date: 9/3/2023     Precautions  Precautions: Fall Risk  Comments: L phyllis, HTN, DM         Subjective    Pt ready for home evaluation upon arrival, pleasant and cooperative, agreeable to therapy. Met family at home.       Objective    KEY abbreviations:    FIM levels = The Functional [place] Measure (FIM) is an instrument that was developed as a measure of disability. Graded on a scale of 1-7 based on level of assistance required.  No [place]  9. I = Complete [place]  (requires no walking assistive device, timely and safely)  8. Mod I = Modified [place] (requires a walking assistive device and increase time)  Requires [place] - Modified Dependence  7. SPV = Supervision (patient completes = 100%, requires cues for safety from across the room)  6. SBA = Standby Assistance (patient completes = 100%, requires cues for safety   next to the patient, may require set-up and clean-up assistance)   5. CGA = Contact Guard Assistance (occasional contact is made to help steady your body or help with balance)  4. Min A = Minimal Assistance (patient completes = 75% or more)  3. Mod A = Moderate Assistance (patient completes = 50% or more)  Requires [place] - Complete Dependence  2. Max A = Maximal Assistance (patient completes = 25% or more)  1. Total A = Total Assistance (patient completes = less than 25%)    AD = Assistive Devices:  FWW = front wheeled walker  W/C = wheelchair    Other:  ADL's = activities of daily living (basic self-care routine tasks)  IADL's = instrumental activities of daily living (higher complexity routine home tasks)     09/03/23 0931   OT Charge Group   OT Therapy Activity (Units) 4   OT Total Time Spent   OT Individual Total Time Spent (Mins) 60   Functional Level of Assist   Grooming   (simulated seated at home bathroom sink, however will most likely  "complete while in shower. SO will provide set-up assist)   Bathing   (simulated reaching body parts at home seated on tub bench, hand held shower head available. edu on standing safety in shower)   Upper Body Dressing Moderate Assist  (SO assisted with donning zip up sweater)   Lower Body Dressing   (edu provided on standing safety and donning shoes with elastic laces/AFO)   Toileting   (Edu provided on standing safety during clothing mgmt)   Bed, Chair, Wheelchair Transfer Standby Assist  (home bed<>w/c squat pivot, and home couch<>w/c squat pivot)   Toilet Transfers Contact Guard Assist  (at home - stand pivot w/c<>toilet with GB)   Tub / Shower Transfers Contact Guard Assist  (stand pivot w/c<>tub transfer bench)   IADL Treatments   Kitchen Mobility Education pt completed kitchen mobility in his home kitchen at w/c level - Min A to manuever d/t narrow spacing. Sit<>stand to retrieve cup from high cupboard CGA d/t LOB laterally to L side. Pt able to retrieve items from fridge at shoulder height at supervision. supervision to set-up w/c at dining room table   Home Management Min A to access laundry room d/t narrow spacing. SO will complete laundry   Bed Mobility    Supine to Sit Supervised   Sit to Supine Supervised   Scooting Supervised   Skilled Intervention   (home bed)   Interdisciplinary Plan of Care Collaboration   Patient Position at End of Therapy Tray Table within Reach;Call Light within Reach;In Bed;Bed Alarm On       The following information was compiled during the home evaluation assessment at [address], [place]:     Exterior:  Transportation:   Vehicle used: hospital van  Car transfer: Total A with use of w/c lift  Driveway:   Steep slope - Total A, 2nd person in front for safety  Entrance:   Through garage, ramp 28.5\" wide - Total A, 2nd person in front for safety   Sidewalk/curb cut-out:  Total A, edu on managing w/c backwards for safety        Interior: 2 story home, pt will remain on " "1st floor  [place]:   Majority is hardwood, small throw rugs were removed  Hallway: 35\" width  Bedroom set-up in living room:   Maneuverability in room: Max A at w/c level d/t narrow spacing, boxes/clutter  Bed height: 24\"  Couch height: 20\"  Bathroom:   Door width: 27\"  Toilet height: 16\".  DME set-up: vertical grab bar in between toilet and tub shower  Bathtub height: 18\"           Based on the assessment, the following recommendations were made to achieve the goals set by the patient and interdisciplinary team:   Removal of clutter for improve w/c clearance   Tub transfer bench for tub shower transfer  Raise toilet seat height with use of commode over toilet or toilet riser  Move commonly used items to shoulder height at w/c level   Tape down edges and corners of large area rugs. Remove small rugs and mats.  Provide contact guard assist for all standing and transfers with use of gait belt  Add border to both sides of the ramp edges     Assessment    A home evaluation was completed this date with the patient and family (sister, brother in law, spouse, oldest son) to assess barriers in the patient's home environment. Pt completed home evaluation successfully, with no significant LOB. Hands-on training was completed with the son who was able to demonstrate back appropriately all transfers and w/c mobility management. Overall patient required CGA - Min A. It is recommended that patient have 24 hr supervision when transitioning home, especially with higher complexity tasks in standing at discharge.    Patient would benefit from ongoing skilled occupational therapy (OT) services in order to improve overall activity tolerance, standing balance, strength, ongoing education and training on walker safety, reinforce fall prevention strategies, energy conservation, work simplification strategies, and home modifications for increase safety, independence, and reduce caregiver burden at discharge with home health and/or " outpatient therapy services.    Plan    See above recommendations     Occupational Therapy Goals (Active)       Problem: Dressing       Dates: Start:  08/30/23         Goal: STG-Within one week, patient will dress LB with CGA using AE/DME as needed.       Dates: Start:  08/30/23               Problem: Functional Transfers       Dates: Start:  08/23/23         Goal: STG-Within one week, patient will transfer to toilet with supervision using DME/AE as  needed       Dates: Start:  08/23/23    Expected End:  09/05/23         Goal Note filed on 08/30/23 0813 by Mayra Pantoja, Student       Requires CGA.                 Problem: IADL's       Dates: Start:  08/30/23         Goal: STG-Within one week, patient will access kitchen area with CGA using AE/DME as needed.       Dates: Start:  08/30/23               Problem: OT Long Term Goals       Dates: Start:  08/15/23         Goal: LTG-By discharge, patient will complete basic self care tasks with SBA/supervision       Dates: Start:  08/15/23    Expected End:  09/05/23            Goal: LTG-By discharge, patient will perform bathroom transfers with supervision/mod I       Dates: Start:  08/15/23    Expected End:  09/05/23               Problem: Toileting       Dates: Start:  08/23/23         Goal: STG-Within one week, patient will complete toileting tasks with supervision using DME/AE as needed       Dates: Start:  08/23/23    Expected End:  09/05/23         Goal Note filed on 08/30/23 0813 by Mayra Pantoja, Student       Requires Min A.

## 2023-09-03 NOTE — THERAPY
Physical Therapy   Daily Treatment     Patient Name: Jorden Bonilla  Age:  58 y.o., Sex:  male  Medical Record #: [id number]  Today's Date: 9/3/2023     Precautions  Precautions: Fall Risk  Comments: L phyllis, HTN, DM    Subjective    Patient reports he plans to stay in w/c during the day, plans to have HHPT, then OPPT to work on standing/walking and balance work     Objective       09/03/23 1331   PT Charge Group   PT Therapeutic Exercise (Units) 1   PT Therapeutic Activities (Units) 3   PT Total Time Spent   PT Individual Total Time Spent (Mins) 60   Precautions   Precautions Fall Risk   Comments L phyllis, HTN, DM   Wheelchair Functional Level of Assist   Wheelchair Assist Stand by Assist  (able to demonstrate steering and picking up objects from ground with reacher; son able to demonstrate bump up/down 2in threshold backward on 6 trials with max assist)   Distance Wheelchair (Feet or Distance) 200 right LE SBA   Transfer Functional Level of Assist   Bed, Chair, Wheelchair Transfer Contact Guard Assist  (son trained for CGA SPT 6 trials of different heights)   Bed Chair Wheelchair Transfer Description Verbal cueing;Set-up of equipment  (allow patient to direct transfer sequence with son)   Interdisciplinary Plan of Care Collaboration   IDT Collaboration with  Family / Caregiver;Occupational Therapist   Collaboration Comments trained for transfers and threshold step in w/c; home eval barriers, family training     Able to demonstrate CGA SPT with son 6 trials  Able to demonstrate bump up/down 2in threshold in w/c with son max assist 6 trials    Assessment    Able to propel 200ft SPV, steer through 6 cones as simulated doorways with SPV and no steering assist, able to  cones from wheelchair height on left, using reacher on right    Strengths: Able to follow instructions, Alert and oriented, Effective communication skills, Good carryover of learning, Good insight into deficits/needs, Independent prior level of  function, Making steady progress towards goals, Motivated for self care and independence, Pleasant and cooperative, Supportive family, Willingly participates in therapeutic activities  Barriers: Bladder retention, Decreased endurance, Hemiparesis, Impaired activity tolerance, Impaired balance, Language barrier, non-fluent English, Limited mobility    Plan    Bed mobility with phyllis techniques, transfer training, gait training with LBQC vs. SBQC vs SPC, and carbon fiber/plastic AFO with stockinette, standing balance, neuro re-ed for L hemibody strengthening, Bioness, lite-gait BWSTT, family training/education. Gait training in Vector without AD.     Passport items to be completed:  Get in/out of bed safely, in/out of a vehicle, safely use mobility device, walk or wheel around home/community, navigate up and down stairs, show how to get up/down from the ground, ensure home is accessible, demonstrate HEP, complete caregiver training    Physical Therapy Problems (Active)       Problem: Mobility       Dates: Start:  08/15/23         Goal: STG-Within one week, patient will ambulate up/down a curb with HW and min A.       Dates: Start:  08/15/23    Expected End:  09/05/23               Problem: PT-Long Term Goals       Dates: Start:  08/15/23         Goal: LTG-By discharge, patient will ambulate 150 ft with QC vs SPC and SBA.       Dates: Start:  08/15/23    Expected End:  09/05/23            Goal: LTG-By discharge, patient will transfer one surface to another with QC vs SPC and SPV.       Dates: Start:  08/15/23    Expected End:  09/05/23            Goal: LTG-By discharge, patient will ambulate up/down a curb with QC vs SPC and SBA.       Dates: Start:  08/15/23    Expected End:  09/05/23            Goal: LTG-By discharge, patient will transfer in/out of a car with QC vs SPC and SBA after setup.       Dates: Start:  08/15/23    Expected End:  09/05/23            Goal: LTG-By discharge, patient will ambulate up/down a ramp  with QC vs SPC and CGA to simulate home entrance.       Dates: Start:  08/15/23    Expected End:  09/05/23

## 2023-09-03 NOTE — PROGRESS NOTES
NURSING DAILY NOTE    Name: Jorden Bonilla   Date of Admission: 8/14/2023   Admitting Diagnosis: Ischemic stroke (HCC)  Attending Physician: Laura Kim D.o.  Allergies: Patient has no known allergies.    Safety  Patient Assist  cga  Patient Precautions  Fall Risk  Precaution Comments  L phyllis, HTN, DM  Bed Transfer Status  Minimal Assist  Toilet Transfer Status   Contact Guard Assist  Assistive Devices  Wheelchair  Oxygen  None - Room Air  Diet/Therapeutic Dining  Current Diet Order   Procedures    Diet Order Diet: Consistent CHO (Diabetic) (cardiac and CHO); Second Modifier: (optional): Cardiac     Pill Administration  whole  Agitated Behavioral Scale  14  ABS Level of Severity  No Agitation    Fall Risk  Has the patient had a fall this admission?   No  Melani Galeano Fall Risk Scoring  17, HIGH RISK  Fall Risk Safety Measures  bed alarm, chair alarm, and poor balance    Vitals  Temperature: 36.5 °C (97.7 °F)  Temp src: Oral  Pulse: 85  Respiration: 18  Blood Pressure: 128/77  Blood Pressure MAP (Calculated): 94 MM HG  BP Location: Right, Upper Arm  Patient BP Position: Supine     Oxygen  Pulse Oximetry: 93 %  O2 (LPM): 0  O2 Delivery Device: None - Room Air    Bowel and Bladder  Last Bowel Movement  09/01/23  Stool Type  Type 6: Fluffy pieces with ragged edges, a mushy stool  Bowel Device  Bathroom  Continent  Bladder: Continent void   Bowel: Continent movement  Bladder Function  Urine Void (mL):  (large)  Number of Times Voided: 1  Urine Color: Yellow  Genitourinary Assessment   Bladder Assessment (WDL):  WDL Except  Damon Catheter: Not Applicable  Damon Care: Given with Soap and Water  Urinary Elimination: Catheter (Document on LDA)  Urine Color: Yellow  Bladder Device: Bathroom  Time Void: Yes  Bladder Scan: Post Void  $ Bladder Scan Results (mL): 151  Bladder Medications: Yes    Skin  Cooper Score   18  Sensory Interventions   Bed Types:  Standard/Trauma Mattress  Skin Preventative Measures: Pillows in Use for Support / Positioning  Moisture Interventions  Moisturizers/Barriers: Barrier Wipes      Pain  Pain Rating Scale  5 - Interrupts some activities  Pain Location  Head  Pain Location Orientation  Anterior  Pain Interventions   Medication (see MAR), Rest, Repositioned    ADLs    Bathing   Shower, * * With Assistance from, Staff  Linen Change   Partial  Personal Hygiene  Perineal Care, Moist Mireya Wipes  Chlorhexidine Bath      Oral Care  Brushed Teeth  Teeth/Dentures     Shave     Nutrition Percentage Eaten  Lunch, Between 50-75% Consumed  Environmental Precautions  Treaded Slipper Socks on Patient, Personal Belongings, Wastebasket, Call Bell etc. in Easy Reach, Transferred to Stronger Side, Report Given to Other Health Care Providers Regarding Fall Risk, Bed in Low Position, Communication Sign for Patients & Families, Mobility Assessed & Appropriate Sign Placed  Patient Turns/Positioning  Patient Turns Self from Side to Side  Patient Turns Assistance/Tolerance     Bed Positions  Bed Locked, Bed Controls On  Head of Bed Elevated  Self regulated      Psychosocial/Neurologic Assessment  Psychosocial Assessment  Psychosocial (WDL):  Within Defined Limits  Neurologic Assessment  Neuro (WDL): Exceptions to WDL  Level of Consciousness: Alert  Orientation Level: Oriented X4  Cognition: Follows commands, Appropriate judgement  Speech: Clear  Facial Symmetry: Left facial drooping  Motor Function/Sensation Assessment: Sensation, Motor strength  RUE Sensation: Full sensation  Muscle Strength Right Arm: Normal Strength Against Gravity and Full Resistance  LUE Sensation: Full sensation  Muscle Strength Left Arm: Weak Movement but Not Against Gravity or Resistance  RLE Sensation: Full sensation  Muscle Strength Right Leg: Normal Strength Against Gravity and Full Resistance  LLE Sensation: Full sensation  Muscle Strength Left Leg: Fair Strength against Gravity  but No Resistance  EENT (WDL):  WDL Except    Cardio/Pulmonary Assessment  Edema      Respiratory Breath Sounds  RUL Breath Sounds: Clear  RML Breath Sounds: Clear  RLL Breath Sounds: Clear  SHALOM Breath Sounds: Clear  LLL Breath Sounds: Clear  Cardiac Assessment   Cardiac (WDL):  WDL Except

## 2023-09-03 NOTE — PROGRESS NOTES
NURSING DAILY NOTE    Name: Jorden Bonilla   Date of Admission: 8/14/2023   Admitting Diagnosis: Ischemic stroke (HCC)  Attending Physician: Laura Kim D.o.  Allergies: Patient has no known allergies.    Safety  Patient Assist  cga  Patient Precautions  Fall Risk  Precaution Comments  L phyllis, HTN, DM  Bed Transfer Status  Minimal Assist  Toilet Transfer Status   Contact Guard Assist  Assistive Devices  Rails, Wheelchair  Oxygen  None - Room Air  Diet/Therapeutic Dining  Current Diet Order   Procedures    Diet Order Diet: Consistent CHO (Diabetic) (cardiac and CHO); Second Modifier: (optional): Cardiac     Pill Administration  whole  Agitated Behavioral Scale  14  ABS Level of Severity  No Agitation    Fall Risk  Has the patient had a fall this admission?   No  Melani Galeano Fall Risk Scoring  17, HIGH RISK  Fall Risk Safety Measures  bed alarm, chair alarm, and poor balance    Vitals  Temperature: 36.7 °C (98.1 °F)  Temp src: Oral  Pulse: 70  Respiration: 18  Blood Pressure: 123/66  Blood Pressure MAP (Calculated): 85 MM HG  BP Location: Right, Upper Arm  Patient BP Position: Supine     Oxygen  Pulse Oximetry: 96 %  O2 (LPM): 0  O2 Delivery Device: None - Room Air    Bowel and Bladder  Last Bowel Movement  09/01/23  Stool Type  Type 6: Fluffy pieces with ragged edges, a mushy stool  Bowel Device  Bathroom  Continent  Bladder: Continent void   Bowel: Continent movement  Bladder Function  Urine Void (mL):  (large)  Number of Times Voided: 1  Urine Color: Yellow  Genitourinary Assessment   Bladder Assessment (WDL):  WDL Except  Damon Catheter: Not Applicable  Damon Care: Given with Soap and Water  Urinary Elimination: Catheter (Document on LDA)  Urine Color: Yellow  Bladder Device: Bathroom  Time Void: Yes  Bladder Scan: Post Void  $ Bladder Scan Results (mL): 56  Bladder Medications: Yes    Skin  Cooper Score   18  Sensory Interventions   Bed Types:  Standard/Trauma Mattress  Skin Preventative Measures: Pillows in Use for Support / Positioning  Moisture Interventions  Moisturizers/Barriers: Barrier Wipes      Pain  Pain Rating Scale  0 - No Pain  Pain Location  Head  Pain Location Orientation  Anterior  Pain Interventions   Declines    ADLs    Bathing   Shower, * * With Assistance from, Staff  Linen Change   Partial  Personal Hygiene  Perineal Care, Moist Mireya Wipes  Chlorhexidine Bath      Oral Care  Brushed Teeth  Teeth/Dentures     Shave     Nutrition Percentage Eaten  Lunch, Between 50-75% Consumed  Environmental Precautions  Bed in Low Position, Treaded Slipper Socks on Patient  Patient Turns/Positioning  Patient Turns Self from Side to Side  Patient Turns Assistance/Tolerance     Bed Positions  Bed Controls On  Head of Bed Elevated  Self regulated      Psychosocial/Neurologic Assessment  Psychosocial Assessment  Psychosocial (WDL):  Within Defined Limits  Neurologic Assessment  Neuro (WDL): Exceptions to WDL  Level of Consciousness: Alert  Orientation Level: Oriented X4  Cognition: Follows commands, Appropriate judgement  Speech: Clear  Facial Symmetry: Left facial drooping  Motor Function/Sensation Assessment: Sensation, Motor strength  RUE Sensation: Full sensation  Muscle Strength Right Arm: Normal Strength Against Gravity and Full Resistance  LUE Sensation: Full sensation  Muscle Strength Left Arm: Weak Movement but Not Against Gravity or Resistance  RLE Sensation: Full sensation  Muscle Strength Right Leg: Normal Strength Against Gravity and Full Resistance  LLE Sensation: Full sensation  Muscle Strength Left Leg: Fair Strength against Gravity but No Resistance  EENT (WDL):  WDL Except    Cardio/Pulmonary Assessment  Edema      Respiratory Breath Sounds  RUL Breath Sounds: Clear  RML Breath Sounds: Clear  RLL Breath Sounds: Clear  SHALOM Breath Sounds: Clear  LLL Breath Sounds: Clear  Cardiac Assessment   Cardiac (WDL):  WDL Except

## 2023-09-04 ENCOUNTER — APPOINTMENT (OUTPATIENT)
Dept: OCCUPATIONAL THERAPY | Facility: REHABILITATION | Age: 58
DRG: 057 | End: 2023-09-04
Attending: PHYSICAL MEDICINE & REHABILITATION
Payer: COMMERCIAL

## 2023-09-04 ENCOUNTER — APPOINTMENT (OUTPATIENT)
Dept: PHYSICAL THERAPY | Facility: REHABILITATION | Age: 58
DRG: 057 | End: 2023-09-04
Attending: PHYSICAL MEDICINE & REHABILITATION
Payer: COMMERCIAL

## 2023-09-04 PROCEDURE — 97110 THERAPEUTIC EXERCISES: CPT

## 2023-09-04 PROCEDURE — 700102 HCHG RX REV CODE 250 W/ 637 OVERRIDE(OP): Performed by: HOSPITALIST

## 2023-09-04 PROCEDURE — 700102 HCHG RX REV CODE 250 W/ 637 OVERRIDE(OP): Performed by: PHYSICAL MEDICINE & REHABILITATION

## 2023-09-04 PROCEDURE — 97116 GAIT TRAINING THERAPY: CPT

## 2023-09-04 PROCEDURE — 97535 SELF CARE MNGMENT TRAINING: CPT

## 2023-09-04 PROCEDURE — 700101 HCHG RX REV CODE 250: Performed by: PHYSICAL MEDICINE & REHABILITATION

## 2023-09-04 PROCEDURE — 770010 HCHG ROOM/CARE - REHAB SEMI PRIVAT*

## 2023-09-04 PROCEDURE — A9270 NON-COVERED ITEM OR SERVICE: HCPCS | Performed by: HOSPITALIST

## 2023-09-04 PROCEDURE — A9270 NON-COVERED ITEM OR SERVICE: HCPCS | Performed by: PHYSICAL MEDICINE & REHABILITATION

## 2023-09-04 PROCEDURE — 97032 APPL MODALITY 1+ESTIM EA 15: CPT

## 2023-09-04 PROCEDURE — 700111 HCHG RX REV CODE 636 W/ 250 OVERRIDE (IP): Mod: JZ | Performed by: PHYSICAL MEDICINE & REHABILITATION

## 2023-09-04 PROCEDURE — 97530 THERAPEUTIC ACTIVITIES: CPT

## 2023-09-04 RX ADMIN — POLYETHYLENE GLYCOL 3350 1 PACKET: 17 POWDER, FOR SOLUTION ORAL at 08:29

## 2023-09-04 RX ADMIN — TAMSULOSIN HYDROCHLORIDE 0.4 MG: 0.4 CAPSULE ORAL at 08:29

## 2023-09-04 RX ADMIN — ENOXAPARIN SODIUM 40 MG: 100 INJECTION SUBCUTANEOUS at 17:21

## 2023-09-04 RX ADMIN — SENNOSIDES AND DOCUSATE SODIUM 2 TABLET: 50; 8.6 TABLET ORAL at 08:29

## 2023-09-04 RX ADMIN — ATORVASTATIN CALCIUM 80 MG: 40 TABLET, FILM COATED ORAL at 20:10

## 2023-09-04 RX ADMIN — METFORMIN HYDROCHLORIDE 500 MG: 500 TABLET, EXTENDED RELEASE ORAL at 08:29

## 2023-09-04 RX ADMIN — METOPROLOL TARTRATE 12.5 MG: 25 TABLET, FILM COATED ORAL at 17:20

## 2023-09-04 RX ADMIN — ACETAMINOPHEN 650 MG: 325 TABLET ORAL at 21:11

## 2023-09-04 RX ADMIN — METOPROLOL TARTRATE 12.5 MG: 25 TABLET, FILM COATED ORAL at 05:19

## 2023-09-04 RX ADMIN — SITAGLIPTIN 25 MG: 50 TABLET, FILM COATED ORAL at 08:29

## 2023-09-04 RX ADMIN — BACLOFEN 15 MG: 10 TABLET ORAL at 20:10

## 2023-09-04 RX ADMIN — ASPIRIN 81 MG: 81 TABLET, COATED ORAL at 20:10

## 2023-09-04 RX ADMIN — ACETAMINOPHEN 650 MG: 325 TABLET ORAL at 17:19

## 2023-09-04 RX ADMIN — METFORMIN HYDROCHLORIDE 500 MG: 500 TABLET, EXTENDED RELEASE ORAL at 17:20

## 2023-09-04 RX ADMIN — LIDOCAINE 1 PATCH: 700 PATCH TOPICAL at 08:32

## 2023-09-04 ASSESSMENT — BRIEF INTERVIEW FOR MENTAL STATUS (BIMS)
WHAT DAY OF THE WEEK IS IT: CORRECT
ASKED TO RECALL BED: YES, NO CUE REQUIRED
INITIAL REPETITION OF BED BLUE SOCK - FIRST ATTEMPT: 3
WHAT MONTH IS IT: ACCURATE WITHIN 5 DAYS
WHAT YEAR IS IT: CORRECT
ASKED TO RECALL BLUE: YES, NO CUE REQUIRED
ASKED TO RECALL SOCK: YES, NO CUE REQUIRED
BIMS SUMMARY SCORE: 15

## 2023-09-04 ASSESSMENT — PATIENT HEALTH QUESTIONNAIRE - PHQ9
1. LITTLE INTEREST OR PLEASURE IN DOING THINGS: NOT AT ALL
2. FEELING DOWN, DEPRESSED, IRRITABLE, OR HOPELESS: NOT AT ALL
1. LITTLE INTEREST OR PLEASURE IN DOING THINGS: NOT AT ALL
SUM OF ALL RESPONSES TO PHQ9 QUESTIONS 1 AND 2: 0
SUM OF ALL RESPONSES TO PHQ9 QUESTIONS 1 AND 2: 0
2. FEELING DOWN, DEPRESSED, IRRITABLE, OR HOPELESS: NOT AT ALL

## 2023-09-04 ASSESSMENT — ACTIVITIES OF DAILY LIVING (ADL)
TOILETING_LEVEL_OF_ASSIST: REQUIRES SUPERVISION WITH TOILETING
TUB_SHOWER_TRANSFER_DESCRIPTION: GRAB BAR;SHOWER BENCH;SUPERVISION FOR SAFETY
SHOWER_TRANSFER_LEVEL_OF_ASSIST: REQUIRES SUPERVISION WITH SHOWER TRANSFER
TOILET_TRANSFER_LEVEL_OF_ASSIST: REQUIRES SUPERVISION WITH TOILET TRANSFER
BED_CHAIR_WHEELCHAIR_TRANSFER_DESCRIPTION: SET-UP OF EQUIPMENT
BED_CHAIR_WHEELCHAIR_TRANSFER_DESCRIPTION: INCREASED TIME;SUPERVISION FOR SAFETY

## 2023-09-04 ASSESSMENT — PAIN DESCRIPTION - PAIN TYPE
TYPE: ACUTE PAIN
TYPE: ACUTE PAIN

## 2023-09-04 NOTE — THERAPY
Physical Therapy   Daily Treatment     Patient Name: Jorden Bonilla  Age:  58 y.o., Sex:  male  Medical Record #: [id number]  Today's Date: 9/4/2023     Precautions  Precautions: Fall Risk  Comments: L phyllis, HTN, DM    Subjective    Patient feels prepared for discharge home tomorrow; prefers ramp entrance to threshold options     Objective       09/04/23 1501   PT Charge Group   PT Electrical Stimulation Attended (Units) 1   PT Gait Training (Units) 1   PT Therapeutic Exercise (Units) 1   PT Therapeutic Activities (Units) 1   PT Total Time Spent   PT Individual Total Time Spent (Mins) 60   Precautions   Precautions Fall Risk   Comments L phyllis, HTN, DM   Wheelchair Functional Level of Assist   Wheelchair Description   (outside ramps with min assist)   Stairs Functional Level of Assist   Level of Assist with Stairs Minimal Assist  (2in block in parallel bars with left AFO and min assist for verbal cues and assist for left hand placement)   Transfer Functional Level of Assist   Bed, Chair, Wheelchair Transfer Standby Assist   Bed Chair Wheelchair Transfer Description Set-up of equipment   Sitting Lower Body Exercises   Nustep Resistance Level 4  (15min with left hand ACE for endurance)   Neuro-Muscular Treatments   Comments NMES left TA seated with 55-60mA 10min unable to produce active DF with NMES; supine NMES to left TA 40mA 10min with gait belt to help assist with DF with NMES   Interdisciplinary Plan of Care Collaboration   IDT Collaboration with  Physical Therapist   Collaboration Comments prepared for discharge tomorrow, waiting on DME delivery- manual w/c and WBQC         Assessment    Patient able to demonstrate safe w/c mobility indoors/outdoors, verbalizes he will wait from family for standing and transfers, plans to work on walking with HHPT    Strengths: Able to follow instructions, Alert and oriented, Effective communication skills, Good carryover of learning, Good insight into deficits/needs,  Independent prior level of function, Making steady progress towards goals, Motivated for self care and independence, Pleasant and cooperative, Supportive family, Willingly participates in therapeutic activities  Barriers: Bladder retention, Decreased endurance, Hemiparesis, Impaired activity tolerance, Impaired balance, Language barrier, non-fluent English, Limited mobility    Plan    Check on DME- manual w/c and WBQC  Prepare for discharge    Passport items to be completed:  All complete    Physical Therapy Problems (Active)       Problem: PT-Long Term Goals       Dates: Start:  08/15/23         Goal: LTG-By discharge, patient will ambulate up/down a curb with QC vs SPC and SBA.       Dates: Start:  08/15/23    Expected End:  09/05/23         Goal Note filed on 09/04/23 1520 by HERMAN Loredo       Threshold with min assist; son trained to bump wheelchair over threshold for option              Goal: LTG-By discharge, patient will transfer in/out of a car with QC vs SPC and SBA after setup.       Dates: Start:  08/15/23    Expected End:  09/05/23         Goal Note filed on 09/04/23 1520 by HERMAN Loredo       Requires CGA for transfers due to balance, son trained to provide CGA              Goal: LTG-By discharge, patient will ambulate up/down a ramp with QC vs SPC and CGA to simulate home entrance.       Dates: Start:  08/15/23    Expected End:  09/05/23         Goal Note filed on 09/04/23 1520 by HERMAN Loredo       Patient plans to go into home in w/c on ramp

## 2023-09-04 NOTE — THERAPY
Occupational Therapy   Discharge Summary     Patient Name: Jorden Bonilla  Age:  58 y.o., Sex:  male  Medical Record #: [id number]  Today's Date: 9/4/2023     Precautions  Precautions: Fall Risk  Comments: L phylils, HTN, DM         Subjective    Pt seated in w/c upon arrival. Pt pleasant and cooperative, agreeable to therapy.     Objective       09/04/23 1231   OT Total Time Spent   OT Individual Total Time Spent (Mins) 60   Precautions   Precautions Fall Risk   Comments L phyllis, HTN, DM   Vitals   O2 (LPM) 0   O2 Delivery Device None - Room Air   Pain   Intervention Declines   Pain 0 - 10 Group   Pain Rating Scale (NPRS) 0   Comfort Goal Comfort with Movement   Cognition    Level of Consciousness Alert   Sleep/Wake Cycle   Sleep & Rest Awake;Out of bed   Functional Level of Assist   Bathing Modified Independent   Bathing Description Grab bar;Hand held shower;Tub bench;Supervision for safety   Upper Body Dressing Modified Independent   Upper Body Dressing Description Supervision for safety   Lower Body Dressing Modified Independent   Lower Body Dressing Description Increased time;Supervision for safety;Grab bar  (aura socks supine in bed)   Bed, Chair, Wheelchair Transfer Supervised   Bed Chair Wheelchair Transfer Description Increased time;Supervision for safety  (stand pivot from w/c <> bed.)   Tub / Shower Transfers Supervised   Tub Shower Transfer Description Grab bar;Shower bench;Supervision for safety  (stand pivot from w/c <> fold down bench.)   Bed Mobility    Supine to Sit Modified Independent   Sit to Supine Modified Independent   Scooting Modified Independent   Rolling Modified Independent   Interdisciplinary Plan of Care Collaboration   Patient Position at End of Therapy In Bed;Bed Alarm On;Call Light within Reach;Tray Table within Reach;Phone within Reach   Eating   Assistance Needed Independent   Physical Assistance Level No physical assistance   CARE Score - Eating 6   Oral Hygiene   Assistance Needed  "Independent   Physical Assistance Level No physical assistance   CARE Score - Oral Hygiene 6   Toileting Hygiene   Assistance Needed Independent   Physical Assistance Level No physical assistance   CARE Score - Toileting Hygiene 6   Shower/Bathe Self   Assistance Needed Independent   Physical Assistance Level No physical assistance   CARE Score - Shower/Bathe Self 6   Upper Body Dressing   Assistance Needed Independent   Physical Assistance Level No physical assistance   CARE Score - Upper Body Dressing 6   Lower Body Dressing   Assistance Needed Independent   Physical Assistance Level No physical assistance   CARE Score - Lower Body Dressing 6   Putting On/Taking Off Footwear   Assistance Needed Independent   Physical Assistance Level No physical assistance   CARE Score - Putting On/Taking Off Footwear 6   Toilet Transfer   Assistance Needed Supervision   Physical Assistance Level No physical assistance   CARE Score - Toilet Transfer 4   Cognitive Pattern Assessment   Cognitive Pattern Assessment Used BIMS   Brief Interview for Mental Status (BIMS)   Repetition of Three Words (First Attempt) 3   Temporal Orientation: Year Correct   Temporal Orientation: Month Accurate within 5 days   Temporal Orientation: Day Correct   Recall: \"Sock\" Yes, no cue required   Recall: \"Blue\" Yes, no cue required   Recall: \"Bed\" Yes, no cue required   BIMS Summary Score 15   Confusion Assessment Method (CAM)   Is there evidence of an acute change in mental status from the patient's baseline? No   Inattention Behavior not present   Disorganized thinking Behavior not present   Altered level of consciousness Behavior not present   Discharge Summary    Discharge Location  Home   Patient Discharging with Assist of Family ;Spouse / Significant Other   Level of Supervision Required 24 Hour Supervision   Recommended Equipment for Discharge Manual Wheelchair;Tub Transfer Bench;Grab Bars by Toilet;Grab Bars in Tub / Shower;Hand Held Shower Head "   Recommended Services Upon Discharge Home Health Occupational Therapy   Long Term Goals Met 2   Long Term Goals Not Met 0   Criteria for Termination of Services Maximum Function Achieved for Inpatient Rehabilitation   Discharge Instructions to Patient   Level of Assist Required for Eating Able to Complete Eating without Assist   Level of Assist Required for Grooming Able to Complete Grooming without Assist   Level of Assist Required for Dressing Able to Complete Dressing without Assist   Equipment for Dressing Elastic Shoe Laces   Level of Assist Required for Toileting Requires Supervision with Toileting   Level of Assist Required for Toilet Transfer Requires Supervision with Toilet Transfer   Equipment for Toilet Transfer Grab Bars by Toilet   Level of Assist Required for Bathing Requires Supervision with Bathing   Equipment for Bathing Tub Transfer Bench;Grab Bars in Tub / Shower;Hand Held Shower Head   Level of Assist Required for Shower Transfer Requires Supervision with Shower Transfer   Equipment for Shower Transfer Grab Bars in Tub / Shower;Tub Transfer Bench   Level of Assist Required for Home Mgmt Requires Supervision with Home Management   Level of Assist Required for Meal Prep Requires Supervision with Meal Preparation   Driving May not Drive, Please Contact Physician for Further Information   Home Exercise Program Refer to Home Exercise Program Handout for Details     Reviewed the following passport items: Perform bathroom transfers, complete dressing, complete feeding, get ready for the day, prepare a simple meal, participate in household tasks, adapt home for safety needs, demonstrate home exercise program, complete caregiver training     Assessment    Pt tolerated shower routine well with no LOB noted. Pt has no further questions or concerns regarding d/c tomorrow.     Strengths: Able to follow instructions, Alert and oriented, Effective communication skills, Good carryover of learning, Good insight  into deficits/needs, Independent prior level of function, Manages pain appropriately, Motivated for self care and independence, Pleasant and cooperative, Supportive family, Willingly participates in therapeutic activities  Barriers: Hemiplegia, Hypertension, Impaired balance, Limited mobility    Plan    D/c tomorrow.    Passport items to be completed:  Perform bathroom transfers, complete dressing, complete feeding, get ready for the day, prepare a simple meal, participate in household tasks, adapt home for safety needs, demonstrate home exercise program, complete caregiver training     Occupational Therapy Goals (Active)       There are no active problems.

## 2023-09-04 NOTE — PROGRESS NOTES
NURSING DAILY NOTE    Name: Jorden Bonilla   Date of Admission: 8/14/2023   Admitting Diagnosis: Ischemic stroke (HCC)  Attending Physician: Laura Kim D.o.  Allergies: Patient has no known allergies.    Safety  Patient Assist  CGA  Patient Precautions  Fall Risk  Precaution Comments  L phyllis, HTN, DM  Bed Transfer Status  Contact Guard Assist (son trained for CGA SPT 6 trials of different heights)  Toilet Transfer Status   Contact Guard Assist (at home - stand pivot w/c<>toilet with GB)  Assistive Devices  Wheelchair  Oxygen  None - Room Air  Diet/Therapeutic Dining  Current Diet Order   Procedures    Diet Order Diet: Consistent CHO (Diabetic) (cardiac and CHO); Second Modifier: (optional): Cardiac     Pill Administration  whole  Agitated Behavioral Scale  14  ABS Level of Severity  No Agitation    Fall Risk  Has the patient had a fall this admission?   No  Melani Galeano Fall Risk Scoring  17, HIGH RISK  Fall Risk Safety Measures  bed alarm and chair alarm    Vitals  Temperature: 37 °C (98.6 °F)  Temp src: Oral  Pulse: 79  Respiration: 18  Blood Pressure: 126/73  Blood Pressure MAP (Calculated): 91 MM HG  BP Location: Right, Upper Arm  Patient BP Position: Supine     Oxygen  Pulse Oximetry: 97 %  O2 (LPM): 0  O2 Delivery Device: None - Room Air    Bowel and Bladder  Last Bowel Movement  09/03/23  Stool Type  Type 6: Fluffy pieces with ragged edges, a mushy stool  Bowel Device  Bathroom  Continent  Bladder: Continent void   Bowel: Continent movement  Bladder Function  Urine Void (mL):  (large)  Number of Times Voided: 1  Urine Color: Unable To Evaluate  Genitourinary Assessment   Bladder Assessment (WDL):  WDL Except  Damon Catheter: Not Applicable  Damon Care: Given with Soap and Water  Urinary Elimination: Catheter (Document on LDA)  Urine Color: Unable To Evaluate  Bladder Device: Bathroom  Time Void: Yes  Bladder Scan: Post Void  $ Bladder Scan  Results (mL): 56  Bladder Medications: Yes    Skin  Cooper Score   18  Sensory Interventions   Bed Types: Standard/Trauma Mattress  Skin Preventative Measures: Pillows in Use for Support / Positioning  Moisture Interventions  Moisturizers/Barriers: Barrier Cream      Pain  Pain Rating Scale  0 - No Pain  Pain Location  Head  Pain Location Orientation  Anterior  Pain Interventions   Declines    ADLs    Bathing   Shower, * * With Assistance from, Staff  Linen Change   Partial  Personal Hygiene  Perineal Care, Moist Mireya Wipes  Chlorhexidine Bath      Oral Care  Brushed Teeth  Teeth/Dentures     Shave     Nutrition Percentage Eaten  Lunch, Between % Consumed  Environmental Precautions  Treaded Slipper Socks on Patient, Bed in Low Position  Patient Turns/Positioning  Patient Turns Self from Side to Side  Patient Turns Assistance/Tolerance     Bed Positions  Bed Controls On  Head of Bed Elevated  Self regulated      Psychosocial/Neurologic Assessment  Psychosocial Assessment  Psychosocial (WDL):  Within Defined Limits  Neurologic Assessment  Neuro (WDL): Exceptions to WDL  Level of Consciousness: Alert  Orientation Level: Oriented X4  Cognition: Follows commands, Appropriate judgement  Speech: Clear  Facial Symmetry: Left facial drooping  Pupil Assesment: No  Motor Function/Sensation Assessment: Sensation, Motor strength  RUE Sensation: Full sensation  Muscle Strength Right Arm: Normal Strength Against Gravity and Full Resistance  LUE Sensation: Full sensation  Muscle Strength Left Arm: Weak Movement but Not Against Gravity or Resistance  RLE Sensation: Full sensation  Muscle Strength Right Leg: Normal Strength Against Gravity and Full Resistance  LLE Sensation: Full sensation  Muscle Strength Left Leg: Fair Strength against Gravity but No Resistance  EENT (WDL):  WDL Except    Cardio/Pulmonary Assessment  Edema      Respiratory Breath Sounds  RUL Breath Sounds: Clear  RML Breath Sounds: Clear  RLL Breath Sounds:  Clear  SHALOM Breath Sounds: Clear  LLL Breath Sounds: Clear  Cardiac Assessment   Cardiac (WDL):  WDL Except

## 2023-09-04 NOTE — CARE PLAN
"The patient is Stable - Low risk of patient condition declining or worsening    Shift Goals  Clinical Goals: safety  Patient Goals: safety, sleep/rest      Problem: Fall Risk - Rehab  Goal: Patient will remain free from falls  Outcome: Progressing  Note: Melani Galeano Fall risk Assessment Score: 18    High fall risk Interventions   - Alarming seatbelt    - Bed and strip alarm   - Yellow sign by the door   - Yellow wrist band \"Fall risk\"  - Room near to the nurse station  - Do not leave patient unattended in the bathroom  - Fall risk education provided  Patient uses call light consistently and appropriately this shift.  Waits for assistance when needed and does not attempt self transfer.  Able to verbalize needs.  Will continue to monitor.        "

## 2023-09-04 NOTE — CARE PLAN
"The patient is Stable - Low risk of patient condition declining or worsening    Shift Goals  Clinical Goals: safety  Patient Goals: safety, sleep/rest    Progress made toward(s) clinical / shift goals:    Problem: Fall Risk - Rehab  Goal: Patient will remain free from falls  Outcome: Progressing     Melani Galeano Fall risk Assessment : 18    High fall risk Interventions     - Bed and strip alarm   - Yellow sign by the door   - Yellow wrist band \"Fall risk\"  - Room near to the nurse station  - Do not leave patient unattended in the bathroom  - Fall risk education provided    Pt uses call light consistently and appropriately. Waits for assistance does not attempt self transfer this shift. Able to verbalize needs.   "

## 2023-09-04 NOTE — DISCHARGE INSTRUCTIONS
[facility] NURSING DISCHARGE INSTRUCTIONS    Blood Pressure: 112/68  Weight: 63 kg (139 lb)  Nursing recommendations for Jorden Bonilla at time of discharge are as follows:  Client verbalized understanding of all discharge instructions and prescriptions.     Review all your home medications and newly ordered medications with your doctor and/or pharmacist. Follow medication instructions as directed by your doctor and/or pharmacist.    Pain Management:   Discharge Pain Medication Instructions:  Comfort Goal: Sleep Comfortably, Comfort with Movement  Notify your primary care provider if pain is unrelieved with these measures, if the pain is new, or increased in intensity.    Discharge Skin Characteristics:    Discharge Skin Exam:       Skin / Wound Care Instructions: Please contact your primary care physician for any change in skin integrity.       If You Have Surgical Incisions / Wounds:  Monitor surgical site(s) for signs of increased swelling, redness or symptoms of drainage from the site or fever as this could indicate signs and symptoms of infection. If these symptoms are noted, notifiy your primary care provider.      Discharge Safety Instructions:       Discharge Safety Concerns:    The interdisciplinary team has made recommendation that you do not require supervision in the house due to demonstration of safety with ADL's and IADLS and problem solving skills  Anti-embolic stockings are not required to increase circulation to the lower extremities.    Discharge Diet:       Discharge Liquids:    Discharge Bowel Function:    Please contact your primary care physician for any changes in bowel habits.  Discharge Bowel Program:    Discharge Bladder Function:    Discharge Urinary Devices:        Nursing Discharge Plan:        Case Management Discharge Instructions:   Discharge Location:    Agency Name/Address/Phone:    Home Health:    Outpatient Services:    DME Provider/Phone:    Medical  Equipment Ordered:    Prescription Faxed to:        Discharge Medication Instructions:  Below are the medications your physician expects you to take upon discharge:            Suicidal Feelings: How to Help Yourself  Suicide is when you end your own life. Suicidal ideation includes expressing thoughts about, or a preoccupation with, ending your own life. There are many things you can do to help yourself feel better when struggling with these feelings. Many services and people are available to support you and others who struggle with similar feelings.  If you ever feel like you may hurt yourself or others, or have thoughts about taking your own life, get help right away. To get help:  Go to your nearest emergency department.  Call your local emergency services (911 in the [place]  Call the [facility] and human services helpline (211 in the [place]  Call or text a suicide hotline to speak with a trained counselor. The following suicide hotlines are available in the [place]:  7-974-513-TALK (1-265.579.8059 or 670 in the [place]  4-506-HSZUPLT (1-759.765.7119).  Text 826972. This is the Crisis Text Line in the [place]  1-991.946.2928. This is a hotline for [place] speakers.  1-799.874.5838. This is a hotline for TTY users.  5-551-5-U-TERENCE (1-468.813.8215). This is a hotline for lesbian, vargas, bisexual, transgender, or questioning youth.  For a list of hotlines in [place], visit suicide.org/hotlines/international/lomliu-gmlvaco-pwvcoopn.html  Contact a crisis center or a local suicide prevention center. To find a crisis center or suicide prevention center:  Call your local hospital, clinic, community service organization, mental health center, social service provider, or health department. Ask for help with connecting to a crisis center.  For a list of crisis centers in the [place], visit: suicidepreventionlifeline.org  For a list of crisis centers in [place], visit: suicideprevention.ca  How to help  yourself feel better    Promise yourself that you will not do anything bad or extreme when you have suicidal feelings. Remember the times you have felt hopeful.  Many people have gotten through suicidal thoughts and feelings, and you can too.  If you have had these feelings before, remind yourself that you can get through them again.  Let family, friends, teachers, or counselors know how you are feeling. Do not separate yourself from those who care about you and want to help you.  Talk with someone every day, even if you do not feel like talking to anyone or being with other people.  Face-to-face conversation is best to help them understand your feelings.  Contact a mental health care provider and work with this person regularly.  Make a safety plan that you can follow during a crisis.  Include phone numbers of suicide prevention hotlines, mental health professionals, and trusted friends and family members you can call during an emergency.  Save these numbers on your phone.  If you are thinking of taking a lot of medicine, give your medicine to someone who can give it to you as prescribed.  If you are on antidepressants and are concerned you will overdose, tell your health care provider so that he or she can give you safer medicines.  Try to stick to your routines and follow a schedule every day. Make self-care a priority.  Make a list of realistic goals, and cross them off when you achieve them. Accomplishments can give you a sense of worth.  Wait until you are feeling better before doing things that you find difficult or unpleasant.  Do things that you have always enjoyed to take your mind off your feelings.  Try reading a book, or listening to or playing music.  Spending time outside, in nature, may help you feel better.  Follow these instructions at home:    Visit your primary health care provider every year for a physical and a mental health checkup.  Take over-the-counter and prescription medicines only as  told by your health care provider.  Ask your health care provider about the possible side effects of any medicines you are taking.  Ask your health care provider about whether suicidal ideation is a possible side effect of any of your medicines.  Learn about suicidal ideation and what increases the risk for the development of suicidal thoughts.  Eat a well-balanced diet, and eat regular meals.  Get plenty of rest.  Exercise if you are able. Just 30 minutes of exercise each day can help you feel better.  Keep your living space well lit.  Do not use alcohol or drugs. Remove these substances from your home.  General recommendations  Remove weapons, poisons, knives, and other deadly items from your home.  Work with a mental health care provider as needed.  When you are feeling well, write yourself a letter with tips and support that you can read when you are not feeling well.  Remember that life's difficulties can be sorted out with help. Conditions can be treated, and you can learn behaviors and ways of thinking that will help you.  Work with your health care provider or counselor to learn ways of coping with your thoughts and feelings.  Where to find more information  National Suicide Prevention Lifeline: www.suicidepreventionlifeline.org  Hopeline: www.hopeline.com  American Foundation for Suicide Prevention: www.afsp.org  The Edward Project (for lesbian, vargas, bisexual, transgender, or questioning youth): www.thetrevorproject.org  National [place] of Mental Health: www.nimh.nih.gov/health/topics/suicide-prevention  Suicide Prevention Resources: afsp.org/suicide-prevention-resources  Contact a health care provider if:  You feel as though you are a burden to others.  You feel agitated, angry, vengeful, or have extreme mood swings.  You have withdrawn from family and friends.  You are frequently using drugs or alcohol.  Get help right away if:  You are talking about suicide or wishing to die.  You start making plans  for how to commit suicide.  You feel that you have no reason to live.  You start making plans for putting your affairs in order, saying goodbye, or giving your possessions away.  You feel guilt, shame, or unbearable pain, and it seems like there is no way out.  You are engaging in risky behaviors that could lead to death.  If you have any of these thoughts or symptoms, get help right away:  Go to your nearest emergency department or crisis center.  Call emergency services (911 in the [place]  Call or text a suicide crisis helpline.  Summary  Suicide is when you take your own life. Suicidal feelings are thoughts about ending your own life.  Promise yourself that you will not do anything bad or extreme when you have suicidal feelings.  Let family, friends, teachers, or counselors know how you are feeling.  Get help right away if you start making plans for how to commit suicide.  This information is not intended to replace advice given to you by your health care provider. Make sure you discuss any questions you have with your health care provider.  Document Revised: 07/14/2022 Document Reviewed: 04/28/2022  [employer] Patient Education © 2023 [employer]            Understanding Your Risk for Falls  Each year, millions of people have serious injuries from falls. It is important to understand your risk for falling. Talk with your health care provider about your risk and what you can do to lower it. There are actions you can take at home to lower your risk and prevent falls.  If you do have a serious fall, make sure to tell your health care provider. Falling once raises your risk of falling again.  How can falls affect me?  Serious injuries from falls are common. These include:  Broken bones, such as hip fractures.  Head injuries, such as traumatic brain injuries (TBI) or concussion.  A fear of falling can cause you to avoid activities and stay at home. This can make your muscles weaker and actually raise your risk for a  fall.  What can increase my risk?  There are a number of risk factors that increase your risk for falling. The more risk factors you have, the higher your risk of falling. Serious injuries from a fall happen most often to people older than age 65. Children and young adults ages 15-29 are also at higher risk.  Common risk factors include:  Weakness in the lower body.  Lack (deficiency) of vitamin D.  Being generally weak or confused due to long-term (chronic) illness.  Dizziness or balance problems.  Poor vision.  Medicines that cause dizziness or drowsiness. These can include medicines for your blood pressure, heart, anxiety, insomnia, or edema, as well as pain medicines and muscle relaxants.  Other risk factors include:  Drinking alcohol.  Having had a fall in the past.  Having depression.  Having foot pain or wearing improper footwear.  Working at a dangerous job.  Having any of the following in your home:  Tripping hazards, such as floor clutter or loose rugs.  Poor lighting.  Pets.  Having dementia or memory loss.  What actions can I take to lower my risk of falling?         Physical activity  Maintain physical fitness. Do strength and balance exercises. Consider taking a regular class to build strength and balance. Yoga and lupe chi are good options.  Vision  Have your eyes checked every year and your vision prescription updated as needed.  Walking aids and footwear  Wear nonskid shoes. Do not wear high heels.  Do not walk around the house in socks or slippers.  Use a cane or walker as told by your health care provider.  Home safety  Attach secure railings on both sides of your stairs.  Install grab bars for your tub, shower, and toilet. Use a bath mat in your tub or shower.  Use good lighting in all rooms. Keep a flashlight near your bed.  Make sure there is a clear path from your bed to the bathroom. Use night-lights.  Do not use throw rugs. Make sure all carpeting is taped or tacked down securely.  Remove all  clutter from walkways and stairways, including extension cords.  Repair uneven or broken steps.  Avoid walking on icy or slippery surfaces. Walk on the grass instead of on icy or slick sidewalks. Use ice melt to get rid of ice on walkways.  Use a cordless phone.  Questions to ask your health care provider  Can you help me check my risk for a fall?  Do any of my medicines make me more likely to fall?  Should I take a vitamin D supplement?  What exercises can I do to improve my strength and balance?  Should I make an appointment to have my vision checked?  Do I need a bone density test to check for weak bones or osteoporosis?  Would it help to use a cane or a walker?  Where to find more information  Centers for Disease Control and PreventionYENNIFER: www.cdc.gov  Community-Based Fall Prevention Programs: www.cdc.gov  National [place] on Aging: www.keke.nih.gov  Contact a health care provider if:  You fall at home.  You are afraid of falling at home.  You feel weak, drowsy, or dizzy.  Summary  Serious injuries from a fall happen most often to people older than age 65. Children and young adults ages 15-29 are also at higher risk.  Talk with your health care provider about your risks for falling and how to lower those risks.  Taking certain precautions at home can lower your risk for falling.  If you fall, always tell your health care provider.  This information is not intended to replace advice given to you by your health care provider. Make sure you discuss any questions you have with your health care provider.  Document Revised: 07/21/2021 Document Reviewed: 07/21/2021  [employer]vier Patient Education © 2023 [employer]      Occupational Therapy Discharge Instructions for Jorden Bonilla    9/4/2023    Level of Assist Required for Eating: Able to Complete Eating without Assist  Level of Assist Required for Grooming: Able to Complete Grooming without Assist  Level of Assist Required for Dressing: Able to Complete  Dressing without Assist  Equipment for Dressing: Elastic Shoe Laces  Level of Assist Required for Toileting: Requires Supervision with Toileting  Level of Assist Required for Toilet Transfer: Requires Supervision with Toilet Transfer  Equipment for Toilet Transfer: Grab Bars by Toilet  Level of Assist Required for Bathing: Requires Supervision with Bathing  Equipment for Bathing: Tub Transfer Bench, Grab Bars in Tub / Shower, Hand Held Shower Head  Level of Assist Required for Shower Transfer: Requires Supervision with Shower Transfer  Equipment for Shower Transfer: Grab Bars in Tub / Shower, Tub Transfer Bench  Level of Assist Required for Home Mgmt: Requires Supervision with Home Management  Level of Assist Required for Meal Prep: Requires Supervision with Meal Preparation  Driving: May not Drive, Please Contact Physician for Further Information  Home Exercise Program: Refer to Home Exercise Program Handout for Details

## 2023-09-04 NOTE — PROGRESS NOTES
NURSING DAILY NOTE    Name: Jorden Bonilla   Date of Admission: 8/14/2023   Admitting Diagnosis: Ischemic stroke (HCC)  Attending Physician: Laura Kim D.o.  Allergies: Patient has no known allergies.    Safety  Patient Assist  CGA  Patient Precautions  Fall Risk  Precaution Comments  L phyllis, HTN, DM  Bed Transfer Status  Contact Guard Assist (son trained for CGA SPT 6 trials of different heights)  Toilet Transfer Status   Contact Guard Assist (at home - stand pivot w/c<>toilet with GB)  Assistive Devices  Rails, Wheelchair  Oxygen  None - Room Air  Diet/Therapeutic Dining  Current Diet Order   Procedures    Diet Order Diet: Consistent CHO (Diabetic) (cardiac and CHO); Second Modifier: (optional): Cardiac     Pill Administration  whole  Agitated Behavioral Scale  14  ABS Level of Severity  No Agitation    Fall Risk  Has the patient had a fall this admission?   No  Melani Galeano Fall Risk Scoring  18, HIGH RISK  Fall Risk Safety Measures  bed alarm, chair alarm, poor balance, and low vision/ hearing    Vitals  Temperature: 36.8 °C (98.3 °F)  Temp src: Oral  Pulse: 83  Respiration: 18  Blood Pressure: 104/73  Blood Pressure MAP (Calculated): 83 MM HG  BP Location: Right, Upper Arm  Patient BP Position: Supine     Oxygen  Pulse Oximetry: 97 %  O2 (LPM): 0  O2 Delivery Device: None - Room Air    Bowel and Bladder  Last Bowel Movement  09/03/23  Stool Type  Type 6: Fluffy pieces with ragged edges, a mushy stool  Bowel Device  Bathroom  Continent  Bladder: Continent void   Bowel: Continent movement  Bladder Function  Urine Void (mL):  (large)  Number of Times Voided: 1  Urine Color: Yellow  Genitourinary Assessment   Bladder Assessment (WDL):  WDL Except  Damon Catheter: Not Applicable  Damon Care: Given with Soap and Water  Urinary Elimination: Catheter (Document on LDA)  Urine Color: Yellow  Bladder Device: Bathroom  Time Void: Yes  Bladder Scan: Post  Void  $ Bladder Scan Results (mL): 27  Bladder Medications: Yes    Skin  Cooper Score   18  Sensory Interventions   Bed Types: Standard/Trauma Mattress  Skin Preventative Measures: Pillows in Use for Support / Positioning  Moisture Interventions  Moisturizers/Barriers: Barrier Cream      Pain  Pain Rating Scale  0 - No Pain  Pain Location  Head  Pain Location Orientation  Anterior  Pain Interventions   Declines    ADLs    Bathing   Shower, * * With Assistance from, Staff  Linen Change   Partial  Personal Hygiene  Perineal Care, Moist Mireya Wipes  Chlorhexidine Bath      Oral Care  Brushed Teeth  Teeth/Dentures     Shave     Nutrition Percentage Eaten  Lunch, Between % Consumed  Environmental Precautions  Treaded Slipper Socks on Patient, Bed in Low Position  Patient Turns/Positioning  Patient Turns Self from Side to Side  Patient Turns Assistance/Tolerance     Bed Positions  Bed Controls On  Head of Bed Elevated  Self regulated      Psychosocial/Neurologic Assessment  Psychosocial Assessment  Psychosocial (WDL):  Within Defined Limits  Neurologic Assessment  Neuro (WDL): Exceptions to WDL  Level of Consciousness: Alert  Orientation Level: Oriented X4  Cognition: Follows commands, Appropriate judgement  Speech: Clear  Facial Symmetry: Left facial drooping  Pupil Assesment: No  Motor Function/Sensation Assessment: Sensation, Motor strength  RUE Sensation: Full sensation  Muscle Strength Right Arm: Normal Strength Against Gravity and Full Resistance  LUE Sensation: Full sensation  Muscle Strength Left Arm: Weak Movement but Not Against Gravity or Resistance  RLE Sensation: Full sensation  Muscle Strength Right Leg: Normal Strength Against Gravity and Full Resistance  LLE Sensation: Full sensation  Muscle Strength Left Leg: Fair Strength against Gravity but No Resistance  EENT (WDL):  WDL Except    Cardio/Pulmonary Assessment  Edema      Respiratory Breath Sounds  RUL Breath Sounds: Clear  RML Breath Sounds:  Clear  RLL Breath Sounds: Clear  SHALOM Breath Sounds: Clear  LLL Breath Sounds: Clear  Cardiac Assessment   Cardiac (WDL):  WDL Except

## 2023-09-04 NOTE — CARE PLAN
Problem: OT Long Term Goals  Goal: LTG-By discharge, patient will complete basic self care tasks with SBA/supervision  Outcome: Met  Goal: LTG-By discharge, patient will perform bathroom transfers with supervision/mod I  Outcome: Met     Problem: Toileting  Goal: STG-Within one week, patient will complete toileting tasks with supervision using DME/AE as needed  Outcome: Met     Problem: Functional Transfers  Goal: STG-Within one week, patient will transfer to toilet with supervision using DME/AE as  needed  Outcome: Met     Problem: Dressing  Goal: STG-Within one week, patient will dress LB with CGA using AE/DME as needed.  Outcome: Met     Problem: IADL's  Goal: STG-Within one week, patient will access kitchen area with CGA using AE/DME as needed.  Outcome: Met

## 2023-09-04 NOTE — PROGRESS NOTES
NURSING DAILY NOTE    Name: Jorden Bonilla   Date of Admission: 8/14/2023   Admitting Diagnosis: Ischemic stroke (HCC)  Attending Physician: Laura Kim D.o.  Allergies: Patient has no known allergies.    Safety  Patient Assist  CGA  Patient Precautions  Fall Risk  Precaution Comments  L phyllis, HTN, DM  Bed Transfer Status  Supervised  Toilet Transfer Status   Contact Guard Assist (at home - stand pivot w/c<>toilet with GB)  Assistive Devices  Wheelchair  Oxygen  None - Room Air  Diet/Therapeutic Dining  Current Diet Order   Procedures    Diet Order Diet: Consistent CHO (Diabetic) (cardiac and CHO); Second Modifier: (optional): Cardiac     Pill Administration  whole  Agitated Behavioral Scale  14  ABS Level of Severity  No Agitation    Fall Risk  Has the patient had a fall this admission?   No  Melani Galeano Fall Risk Scoring  18, HIGH RISK  Fall Risk Safety Measures  bed alarm and chair alarm    Vitals  Temperature: 36.3 °C (97.4 °F)  Temp src: Oral  Pulse: 87  Respiration: 18  Blood Pressure: 116/74  Blood Pressure MAP (Calculated): 88 MM HG  BP Location: Right, Upper Arm  Patient BP Position: Supine     Oxygen  Pulse Oximetry: 96 %  O2 (LPM): 0  O2 Delivery Device: None - Room Air    Bowel and Bladder  Last Bowel Movement  09/04/23  Stool Type  Type 6: Fluffy pieces with ragged edges, a mushy stool  Bowel Device  Bathroom  Continent  Bladder: Continent void   Bowel: Continent movement  Bladder Function  Urine Void (mL):  (large)  Number of Times Voided: 1  Urine Color: Unable To Evaluate  Genitourinary Assessment   Bladder Assessment (WDL):  WDL Except  Damon Catheter: Not Applicable  Damon Care: Given with Soap and Water  Urinary Elimination: Catheter (Document on LDA)  Urine Color: Unable To Evaluate  Bladder Device: Bathroom  Time Void: Yes  Bladder Scan: Post Void  $ Bladder Scan Results (mL): 27  Bladder Medications: Yes    Skin  Cooper Score    18  Sensory Interventions   Bed Types: Standard/Trauma Mattress  Skin Preventative Measures: Pillows in Use for Support / Positioning  Moisture Interventions  Moisturizers/Barriers: Barrier Cream      Pain  Pain Rating Scale  0 - No Pain  Pain Location  Head  Pain Location Orientation  Anterior  Pain Interventions   Declines    ADLs    Bathing   Shower, * * With Assistance from, Staff  Linen Change   Partial  Personal Hygiene  Perineal Care, Moist Mireya Wipes  Chlorhexidine Bath      Oral Care  Brushed Teeth  Teeth/Dentures     Shave     Nutrition Percentage Eaten  *  * Meal *  *, Lunch, Between 25-50% Consumed  Environmental Precautions  Treaded Slipper Socks on Patient, Bed in Low Position  Patient Turns/Positioning  Patient Turns Self from Side to Side  Patient Turns Assistance/Tolerance     Bed Positions  Bed Controls On  Head of Bed Elevated  Self regulated      Psychosocial/Neurologic Assessment  Psychosocial Assessment  Psychosocial (WDL):  Within Defined Limits  Neurologic Assessment  Neuro (WDL): Exceptions to WDL  Level of Consciousness: Alert  Orientation Level: Oriented X4  Cognition: Follows commands, Appropriate judgement  Speech: Clear  Facial Symmetry: Left facial drooping  Pupil Assesment: No  Motor Function/Sensation Assessment: Sensation, Motor strength  RUE Sensation: Full sensation  Muscle Strength Right Arm: Normal Strength Against Gravity and Full Resistance  LUE Sensation: Full sensation  Muscle Strength Left Arm: Weak Movement but Not Against Gravity or Resistance  RLE Sensation: Full sensation  Muscle Strength Right Leg: Normal Strength Against Gravity and Full Resistance  LLE Sensation: Full sensation  Muscle Strength Left Leg: Fair Strength against Gravity but No Resistance  EENT (WDL):  WDL Except    Cardio/Pulmonary Assessment  Edema      Respiratory Breath Sounds  RUL Breath Sounds: Clear  RML Breath Sounds: Clear  RLL Breath Sounds: Clear  SHALOM Breath Sounds: Clear  LLL Breath Sounds:  Clear  Cardiac Assessment   Cardiac (WDL):  WDL Except

## 2023-09-05 VITALS
HEIGHT: 65 IN | WEIGHT: 139 LBS | DIASTOLIC BLOOD PRESSURE: 74 MMHG | BODY MASS INDEX: 23.16 KG/M2 | SYSTOLIC BLOOD PRESSURE: 112 MMHG | TEMPERATURE: 97.2 F | RESPIRATION RATE: 20 BRPM | HEART RATE: 65 BPM | OXYGEN SATURATION: 97 %

## 2023-09-05 PROBLEM — R00.0 TACHYCARDIA: Status: RESOLVED | Noted: 2022-07-01 | Resolved: 2023-09-05

## 2023-09-05 PROBLEM — N39.0 UTI (URINARY TRACT INFECTION): Status: RESOLVED | Noted: 2023-08-18 | Resolved: 2023-09-05

## 2023-09-05 PROBLEM — R25.2 SPASTICITY: Status: ACTIVE | Noted: 2023-09-05

## 2023-09-05 PROBLEM — R33.9 URINARY RETENTION: Status: RESOLVED | Noted: 2023-08-12 | Resolved: 2023-09-05

## 2023-09-05 PROBLEM — R71.8 MICROCYTOSIS: Status: RESOLVED | Noted: 2023-08-15 | Resolved: 2023-09-05

## 2023-09-05 PROCEDURE — 93228 REMOTE 30 DAY ECG REV/REPORT: CPT | Performed by: INTERNAL MEDICINE

## 2023-09-05 PROCEDURE — A9270 NON-COVERED ITEM OR SERVICE: HCPCS | Performed by: HOSPITALIST

## 2023-09-05 PROCEDURE — 700102 HCHG RX REV CODE 250 W/ 637 OVERRIDE(OP): Performed by: HOSPITALIST

## 2023-09-05 PROCEDURE — 700101 HCHG RX REV CODE 250: Performed by: PHYSICAL MEDICINE & REHABILITATION

## 2023-09-05 PROCEDURE — 99239 HOSP IP/OBS DSCHRG MGMT >30: CPT | Performed by: PHYSICAL MEDICINE & REHABILITATION

## 2023-09-05 PROCEDURE — A9270 NON-COVERED ITEM OR SERVICE: HCPCS | Performed by: PHYSICAL MEDICINE & REHABILITATION

## 2023-09-05 PROCEDURE — 700102 HCHG RX REV CODE 250 W/ 637 OVERRIDE(OP): Performed by: PHYSICAL MEDICINE & REHABILITATION

## 2023-09-05 RX ORDER — METFORMIN HYDROCHLORIDE 500 MG/1
500 TABLET, EXTENDED RELEASE ORAL 2 TIMES DAILY WITH MEALS
Qty: 60 TABLET | Refills: 0 | Status: SHIPPED | OUTPATIENT
Start: 2023-09-05

## 2023-09-05 RX ORDER — LIDOCAINE 4 G/G
1 PATCH TOPICAL DAILY
Qty: 60 PATCH | Refills: 0 | COMMUNITY
Start: 2023-09-05 | End: 2023-10-05

## 2023-09-05 RX ORDER — ACETAMINOPHEN 325 MG/1
650 TABLET ORAL EVERY 4 HOURS PRN
Qty: 30 TABLET | Refills: 0 | COMMUNITY
Start: 2023-09-05

## 2023-09-05 RX ORDER — ASPIRIN 81 MG/1
81 TABLET ORAL EVERY EVENING
Qty: 30 TABLET | Refills: 0 | COMMUNITY
Start: 2023-09-05

## 2023-09-05 RX ORDER — BACLOFEN 5 MG/1
15 TABLET ORAL
Qty: 90 TABLET | Refills: 0 | Status: SHIPPED | OUTPATIENT
Start: 2023-09-05

## 2023-09-05 RX ORDER — ATORVASTATIN CALCIUM 80 MG/1
80 TABLET, FILM COATED ORAL EVERY EVENING
Qty: 30 TABLET | Refills: 0 | Status: SHIPPED | OUTPATIENT
Start: 2023-09-05

## 2023-09-05 RX ORDER — TAMSULOSIN HYDROCHLORIDE 0.4 MG/1
0.4 CAPSULE ORAL
Qty: 30 CAPSULE | Refills: 0 | Status: SHIPPED | OUTPATIENT
Start: 2023-09-05

## 2023-09-05 RX ADMIN — TAMSULOSIN HYDROCHLORIDE 0.4 MG: 0.4 CAPSULE ORAL at 08:42

## 2023-09-05 RX ADMIN — LIDOCAINE 1 PATCH: 700 PATCH TOPICAL at 08:44

## 2023-09-05 RX ADMIN — ACETAMINOPHEN 650 MG: 325 TABLET ORAL at 05:30

## 2023-09-05 RX ADMIN — METFORMIN HYDROCHLORIDE 500 MG: 500 TABLET, EXTENDED RELEASE ORAL at 08:43

## 2023-09-05 RX ADMIN — METOPROLOL TARTRATE 12.5 MG: 25 TABLET, FILM COATED ORAL at 05:30

## 2023-09-05 RX ADMIN — SITAGLIPTIN 25 MG: 50 TABLET, FILM COATED ORAL at 08:43

## 2023-09-05 ASSESSMENT — PAIN DESCRIPTION - PAIN TYPE: TYPE: ACUTE PAIN

## 2023-09-05 NOTE — CARE PLAN
The patient is Stable - Low risk of patient condition declining or worsening    Shift Goals  Clinical Goals: Safety  Patient Goals: sleep, pain management    Progress made toward(s) clinical / shift goals:  Goals met. Patient discharged home with DME.

## 2023-09-05 NOTE — PROGRESS NOTES
NURSING DAILY NOTE    Name: Jorden Bonilla  Date of Admission: 8/14/2023  Admitting Diagnosis: Ischemic stroke (HCC)  Attending Physician: Laura Kim D.o.  Allergies: Patient has no known allergies.    Safety  Patient Assist  ocga  Patient Precautions  oFall Risk  Precaution Comments  oL phyllis, HTN, DM  Bed Transfer Status  oStandby Assist  Toilet Transfer Status  oContact Guard Assist (at home - stand pivot w/c<>toilet with GB)  Assistive Devices  oRails, Wheelchair  Oxygen  oNone - Room Air  Diet/Therapeutic Dining  o  Current Diet Order   Procedures    Diet Order Diet: Consistent CHO (Diabetic) (cardiac and CHO); Second Modifier: (optional): Cardiac     Pill Administration  owhole  Agitated Behavioral Scale  o14  ABS Level of Severity  Brigido Agitation    Fall Risk  Has the patient had a fall this admission?  Brigido  Melani Galeano Fall Risk Scoring  o18, HIGH RISK  Fall Risk Safety Measures  lauren alarm and chair alarm    Vitals  Temperature: 36.7 °C (98.1 °F)  Temp src: Oral  Pulse: 78  Respiration: 20  Blood Pressure: 116/75  Blood Pressure MAP (Calculated): 89 MM HG  BP Location: Right, Upper Arm  Patient BP Position: Supine    Oxygen  Pulse Oximetry: 96 %  O2 (LPM): 0  O2 Delivery Device: None - Room Air    Bowel and Bladder  Last Bowel Movement  o09/04/23  Stool Type  oType 6: Fluffy pieces with ragged edges, a mushy stool  Bowel Device  oBathroom  Continent  oBladder: Continent void  oBowel: Continent movement  Bladder Function  oUrine Void (mL): 800 ml  Number of Times Voided: 1  Urine Color: Yellow  Genitourinary Assessment  oBladder Assessment (WDL):  WDL Except  Damon Catheter: Not Applicable  Damon Care: Given with Soap and Water  Urinary Elimination: Catheter (Document on LDA)  Urine Color: Yellow  Bladder Device: Urinal  Time Void: Yes  Bladder Scan: Post Void  $ Bladder Scan Results (mL): 126  Bladder Medications: Yes    Skin  Cooper Score  o 18  Sensory Interventions  o Bed Types: Standard/Trauma  Mattress  Skin Preventative Measures: Pillows in Use for Support / Positioning  Moisture Interventions  oMoisturizers/Barriers: Barrier Cream, Barrier Wipes      Pain  Pain Rating Scale  o1 - Hardly Notice Pain  Pain Location  oHead  Pain Location Orientation  oAnterior  Pain Interventions  oDeclines    ADLs    Bathing  oShower, * * With Assistance from, Staff  Linen Change  oPartial  Personal Hygiene  oPerineal Care, Moist Mireya Wipes  Chlorhexidine Bath  o   Oral Care  oBrushed Teeth  Teeth/Dentures  o   Shave  o   Nutrition Percentage Eaten  o*  * Meal *  *, Lunch, Between 25-50% Consumed  Environmental Precautions  [place]ney in Low Position, Personal Belongings, Wastebasket, Call Bell etc. in Easy Reach, Transferred to Stronger Side  Patient Turns/Positioning  oPatient Turns Self from Side to Side  Patient Turns Assistance/Tolerance  o   Bed Positions  [place] Controls On  Head of Bed Elevated  oSelf regulated      Psychosocial/Neurologic Assessment  Psychosocial Assessment  oPsychosocial (WDL):  Within Defined Limits  Neurologic Assessment  oNeuro (WDL): Exceptions to WDL  Level of Consciousness: Alert  Orientation Level: Oriented X4  Cognition: Follows commands, Appropriate judgement  Speech: Clear  Facial Symmetry: Left facial drooping  Pupil Assesment: No  Motor Function/Sensation Assessment: Sensation, Motor strength  RUE Sensation: Full sensation  Muscle Strength Right Arm: Normal Strength Against Gravity and Full Resistance  LUE Sensation: Full sensation  Muscle Strength Left Arm: Weak Movement but Not Against Gravity or Resistance  RLE Sensation: Full sensation  Muscle Strength Right Leg: Normal Strength Against Gravity and Full Resistance  LLE Sensation: Full sensation  Muscle Strength Left Leg: Fair Strength against Gravity but No Resistance  oEENT (WDL):  WDL Except    Cardio/Pulmonary Assessment  Edema  o   Respiratory Breath Sounds  oRUL Breath Sounds: Clear  RML Breath Sounds: Clear  RLL Breath Sounds:  Clear  SHALOM Breath Sounds: Clear  LLL Breath Sounds: Clear  Cardiac Assessment  oCardiac (WDL):  WDL Except

## 2023-09-05 NOTE — PROGRESS NOTES
Hand off report given to charge RN at 0945, charge rn reports having no further questions at this time.

## 2023-09-05 NOTE — PROGRESS NOTES
Patient discharged to home per order.  Reviewed all discharge instructions, appointments, discharge medications, and wound care instructions with patient and family; they verbalize understanding.  Education provided in discharge instructions and Home Safety and Fall Prevention. Discharge paperwork completed; signed copies in chart.  Patient has education binder and all belongings; signed copy in chart.  Pt alert, calm, stable; no change in status from morning assessment.  Patient left facility at 1245 via WC accompanied by family; escorted to car by staff.

## 2023-09-05 NOTE — CARE PLAN
The patient is Stable - Low risk of patient condition declining or worsening    Shift Goals  Clinical Goals: Safety  Patient Goals: sleep, pain management    Progress made toward(s) clinical / shift goals:    Problem: Fall Risk - Rehab  Goal: Patient will remain free from falls  Outcome: Progressing. Patient bed in lowest locked position with bed alarm on and call light within reach. Patient calls appropriately with call light for needs. Patient has not been impulsive over shift.      Problem: Bladder / Voiding  Goal: Patient will establish and maintain regular urinary output  Outcome: Progressing. Patient continent of bladder over shift. Uses urinal when in bed overnight. PVR qshift low 126 mL at bedtime, patient on flomax.     Patient discharging home today.

## 2023-09-05 NOTE — DISCHARGE SUMMARY
Admission Date: 8/14/2023    Discharge Date: 9/5/2023    Attending Provider: Sarai Davila MD    Admission Diagnosis:   Active Hospital Problems    Diagnosis     *Ischemic stroke (HCC)     UTI (urinary tract infection)     Microcytosis     Urinary retention     Tachycardia     Type 2 diabetes mellitus with hyperglycemia, without long-term current use of insulin (HCC)     Primary hypertension     Mixed hyperlipidemia     Gastroesophageal reflux disease without esophagitis     Coronary artery disease due to calcified coronary lesion: CABG ×3 in 2015     Dyslipidemia        Discharge Diagnosis:  Active Hospital Problems    Diagnosis     *Ischemic stroke (HCC)     UTI (urinary tract infection)     Microcytosis     Urinary retention     Tachycardia     Type 2 diabetes mellitus with hyperglycemia, without long-term current use of insulin (HCC)     Primary hypertension     Mixed hyperlipidemia     Gastroesophageal reflux disease without esophagitis     Coronary artery disease due to calcified coronary lesion: CABG ×3 in 2015     Dyslipidemia        HPI per H&P:    The patient is a 58 y.o. male with a past medical history of hypertension, diabetes, hyperlipidemia, coronary artery disease status post CABG, remote tobacco use, noncompliant with medications; now admitted for acute inpatient rehabilitation with severe functional debility after an acute stroke.       On admission the patient and medical record report he presented to the hospital on 8/10 after waking up with left-sided weakness in his arm and leg.  Initial NIH stroke scale in the ED was 1.  Negative head CT, negative CT perfusion scan, CTA of the neck negative, CT of the head with 40% left intracranial vertebral artery stenosis.     Patient was not a candidate for enzymatic therapy or thrombectomy.  He was admitted to complete the stroke work-up.  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  His left-sided weakness worsened overnight, increasing his  NIH stroke scale to 7. HgbA1c 11.9, , ECHO EF 55% without any evidence of shunt.  Per neurology, patient started on aspirin 81 mg indefinitely, 10 days of Plavix with an end date of 821, goal for normotension 110-130/60-80.  Zio patch cardiac monitor was placed.     Patient currently reports left-sided weakness that waxes and wanes but has slightly improved.  He is right-hand dominant.  He denies any changes in his vision.  He reports some abnormal sensation in his left fingers.  He had urinary retention at the acute hospital for which Damon catheter was placed.  He reports he last moved his bowels this morning.     Patient was evaluated by Rehab Medicine physician and Physical Therapy, Occupational Therapy, and Speech Therapy and determined to be appropriate for acute inpatient rehab and was transferred to [facility] on 8/14/2023 12:08 PM.    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  Left hemiparesis,distal > proximal   Cognitive impairment, mild  Dysphagia  Completed aspirin and Plavix for secondary stroke prophylaxis until 8/21, then aspirin alone  Continue atorvastatin  Cardiac monitor returned     Nocturnal spasticity, resolved  Mostly in the left leg  Continue baclofen to 15 mg     Left paraspinal muscle spasms, resolved  Left upper shoulder muscle spasms  Heat and/or ice as needed  Continue Lidoderm patch     Diabetes with hyperglycemia  Uncontrolled, hemoglobin A1c 11.9  Continue metformin and Januvia     Hypertension  Continue metoprolol  Olmesartan discontinued     Hyperlipidemia  Continue atorvastatin     Sinus tachycardia, resolved  Continue metoprolol  Negative doppler US for DVTs     Urinary retention, resolved  Hematuria, resolved  Damon removed 8/15  Continue Flomax     Coronary artery disease  With history of CABG  Continue aspirin and atorvastatin     Acute cystitis, resolved  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 SO will provide set-up assist)  Grooming Description:  Increased time, Initial preparation for task, Seated in wheelchair at sink, Set-up of equipment (apply deordorant)  Bathing:  Modified Independent  Bathing Description:  Grab bar, Hand held shower, Tub bench, Supervision for safety  Upper Body Dressing:  Modified Independent  Upper Body Dressing Description:  Supervision for safety  Lower Body Dressing:  Modified Independent  Lower Body Dressing Description:  Increased time, Supervision for safety, Grab bar (aura socks supine in bed)  Discharge Location : Home  Patient Discharging with Assist of: Family ;Spouse / Significant Other  Level of Supervision Required: 24 Hour Supervision  Recommended Equipment for Discharge: Manual Wheelchair;Tub Transfer Bench;Grab Bars by Toilet;Grab Bars in Tub / Shower;Hand Held Shower Head  Recommended Services Upon Discharge: Home Health Occupational Therapy  Long Term Goals Met: 2  Long Term Goals Not Met: 0  Criteria for Termination of Services: Maximum Function Achieved for Inpatient Rehabilitation  Walk:  Minimal Assist (occassional mod A for balance recovery with L toe catch)  Distance Walked:   (75 ftx1, 40 ftx2)  Number of Times Distance Was Traveled:  2  Assistive Device:  Quad Cane (L AFO)  Gait Deviation:  Decreased Base Of Support, Decreased Heel Strike, Decreased Toe Off (L phyllis-gait)  Wheelchair:  Stand by Assist (able to demonstrate steering and picking up objects from ground with reacher; son able to demonstrate bump up/down 2in threshold backward on 6  trials with max assist)  Distance Propelled:  200 right LE SBA   Wheelchair Description:   (outside ramps with min assist)  Stairs Minimal Assist (2in block in parallel bars with left AFO and min assist for verbal cues and assist for left hand placement)  Stairs Description Verbal cueing, Safety concerns, Requires assist to advance foot, Limited by fatigue, Extra time, Assist device/equipment (LBQC, Vector harness, vc for sequencing, facilitation for LLE placement/ neutral rotation)  Discharge Location: Home  Patient Discharging with Assist of: Family  Level of Supervision Required Upon Discharge:  (CGA SPT, mod I from w/c level; plans for HHPT to advance ambulation)  Recommended Equipment for Discharge: Kavon-Walker;Manual Wheelchair  Recommeded Services Upon Discharge: Home Health Physical Therapy  Long Term Goals Met: 3  Long Term Goals Not Met: 3  Reason(s) for Goals Not Met: requires min assist with ambulation due to balance  Criteria for Termination of Services: Maximum Function Achieved for Inpatient Rehabilitation  Comprehension:  Independent  Comprehension Description:     Expression:  Independent  Expression Description:     Social Interaction:  Independent  Social Interaction Description:     Problem Solving:  Independent  Problem Solving Description:     Memory:  Supervision  Memory Description:  Verbal cueing, Therapy schedule, Increased time, Bed/chair alarm, Seat belt       I, aSrai Davila M.D., personally performed a complete drug regimen review and no potential clinically significant medication issues were identified.     Discharge Medication:     Medication List        ASK your doctor about these medications        Instructions   aspirin 81 MG EC tablet   Take 1 Tab by mouth every day.  Dose: 81 mg     atorvastatin 80 MG tablet  Commonly known as: Lipitor   Take 80 mg by mouth every evening.  Dose: 80 mg     CENTRUM ADULT PO   Take 1 Tablet by mouth every evening.  Dose: 1 Tablet      clopidogrel 75 MG Tabs  Commonly known as: Plavix  Ask about: Should I take this medication?   Take 1 Tablet by mouth every day for 6 days.  Dose: 75 mg     COQ-10 PO   Take 1 Tablet by mouth every evening.  Dose: 1 Tablet     insulin regular 100 Unit/mL Soln  Commonly known as: Humulin R   Inject 1-6 Units under the skin 4 Times a Day,Before Meals and at Bedtime.  Dose: 1-6 Units     Jardiance 25 MG Tabs  Generic drug: Empagliflozin   Take 25 mg by mouth every evening.  Dose: 25 mg     meclizine 25 MG Tabs  Commonly known as: Antivert   Take 1 Tablet by mouth in the morning, at noon, and at bedtime.  Dose: 25 mg     metFORMIN  MG Tb24  Commonly known as: Glucophage XR   Take 1,000 mg by mouth 2 times a day.  Dose: 1,000 mg     olmesartan 20 MG Tabs  Commonly known as: Benicar   Take 20 mg by mouth every evening.  Dose: 20 mg     tamsulosin 0.4 MG capsule  Commonly known as: Flomax   Take 1 Capsule by mouth 1/2 hour after breakfast.  Dose: 0.4 mg     Trulicity 1.5 MG/0.5ML Sopn  Generic drug: Dulaglutide   Inject 1.5 mg under the skin.  Dose: 1.5 mg              Discharge Diet:  Diabetic    Discharge Activity:  Do not return to work or driving until cleared by a physician.        Disposition:  Patient to discharge home with family support and community resources.     Equipment:  Follow-up & Discharge Instructions:  HH: PT/OT/RN, rehab without walls     Equip: large base quad cane. [facility] [place]     Follow-ups: PCP, stroke bridge, cardiology     Condition on Discharge:  Good    More than 35 minutes was spent on discharging this patient, including face-to-face time, prescription management, and the dictation of this note.    Sarai Davila M.D.    Date of Service: 9/5/2023

## 2023-09-06 ENCOUNTER — TELEPHONE (OUTPATIENT)
Dept: HEALTH INFORMATION MANAGEMENT | Facility: OTHER | Age: 58
End: 2023-09-06
Payer: COMMERCIAL

## 2023-09-06 NOTE — TELEPHONE ENCOUNTER
Referral for cardio & neuro. Unable to schedule for neuro stroke bridge. Escalation email sent for scheduling to neuro and patient has been added to the wait list.

## 2023-10-17 ENCOUNTER — TELEPHONE (OUTPATIENT)
Dept: HEALTH INFORMATION MANAGEMENT | Facility: OTHER | Age: 58
End: 2023-10-17
Payer: COMMERCIAL

## 2024-01-05 ENCOUNTER — OCCUPATIONAL THERAPY (OUTPATIENT)
Dept: OCCUPATIONAL THERAPY | Facility: REHABILITATION | Age: 59
End: 2024-01-05
Attending: OBSTETRICS & GYNECOLOGY
Payer: COMMERCIAL

## 2024-01-05 DIAGNOSIS — I63.9 CEREBRAL INFARCTION, UNSPECIFIED MECHANISM (HCC): ICD-10-CM

## 2024-01-05 PROCEDURE — 97166 OT EVAL MOD COMPLEX 45 MIN: CPT

## 2024-01-05 PROCEDURE — 97110 THERAPEUTIC EXERCISES: CPT

## 2024-01-05 SDOH — ECONOMIC STABILITY: GENERAL: QUALITY OF LIFE: GOOD

## 2024-01-05 ASSESSMENT — ENCOUNTER SYMPTOMS
PAIN TIMING: OCCASIONAL
PAIN LOCATION: LEFT SHOULDER AND WRIST
ALLEVIATING FACTORS: REST
EXACERBATED BY: ACTIVITY
PAIN SCALE AT LOWEST: 2
QUALITY: SHARP
QUALITY: TIGHT
PAIN SCALE AT HIGHEST: 6
PAIN SCALE: 2

## 2024-01-05 ASSESSMENT — BALANCE ASSESSMENTS
BALANCE - SITTING STATIC: NORMAL
BALANCE - STANDING STATIC: GOOD
BALANCE - STANDING DYNAMIC: FAIR +
BALANCE - SITTING DYNAMIC: GOOD

## 2024-01-05 ASSESSMENT — ACTIVITIES OF DAILY LIVING (ADL): POOR_BALANCE: 1

## 2024-01-05 NOTE — OP THERAPY EVALUATION
Outpatient Occupational Therapy  INITIAL NEUROLOGICAL EVALUATION    [facility] Occupational Therapy [place] [address] [address]  [address]  [facility] [address]  Phone:  [phone]  Fax:  [phone]    Date of Evaluation: 2024    Patient: Jorden Bonilla  YOB: 1965  MRN: [id number]     Referring Provider: Deepthi Colindres M.D.  [address] [place] [address]   Referring Diagnosis Cerebral infarction, unspecified [I63.9]     Time Calculation    Start time: 0100  Stop time: 0145 Time Calculation (min): 45 minutes             Chief Complaint: Extremity Weakness and Self Care Duties    Visit Diagnoses     ICD-10-CM   1. Cerebral infarction, unspecified mechanism (HCC)  I63.9       Subjective:   History of Present Illness:     Date of onset:  8/10/2023    Mechanism of injury:  As per referring provider's office visit on 11/10/23:  Jorden Bonilla a 58 year old gentlemen is here for a follow up visit post CVA on 8/10/23. ( Intracranial artery stenosis with right anterior medulla infarct. ) He has some residual left sided hemiplegia, left distal and some proximal calf area along with elbow and wrist. He was initially in a W/C and now uses a cane for support with a left ankle/ foot brace. He still drags his foot when walking occasionally and uses assistance with motor movements with his right arm.   He is continuing to work with PT/ OT and speech therapy in the home.     His dysphasia has improved and per pt and his family his cognitive concerns are improved with no decrease in memory or difficulty with thought processes.     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  Pain:     Current pain ratin    At best pain ratin    At worst pain ratin    Location:  Left shoulder and wrist    Quality:  Sharp and tight    Pain timing:  Occasional    Relieving factors:  Rest    Aggravating factors:   Activity (external rotation of shoulder and extension of wrist)    Progression:  Unchanged  Social Support:     Lives in:  One-story house    Lives with:  Spouse (supportive family live near by)  Hand dominance:  Right  Diagnostic Tests:     CT scan: abnormal    Treatments:     Previous treatment:  Physical therapy, occupational therapy and speech therapy    Current treatment:  Physical therapy and occupational therapy    Treatment Comments:  PT evaluation scheduled for 24  Activities of Daily Living:     Patient reported ADL status: Mod I with personal ADLs, difficulty with fine motor ADLs.  Mobilizing with quad cane  Would like to return to driving and eventually back to work.  Patient Goals:     Patient goals for therapy:  Decreased pain, increased motion, increased strength, improved balance, independence with ADLs/IADLs, return to sport/leisure activities and return to work      Past Medical History:   Diagnosis Date    Coronary artery disease     Diabetes (HCC)     Hyperlipidemia     Hypertension      Past Surgical History:   Procedure Laterality Date    MULTIPLE CORONARY ARTERY BYPASS ENDO VEIN HARVEST N/A 2015    Procedure: coronary artery bypass grtafting x 3, endoscopic vein harvest right leg, transesophageal echocardiogram;  Surgeon: Henry Baldwin M.D.;  Location: SURGERY [facility];  Service:      Social History     Tobacco Use    Smoking status: Former     Current packs/day: 0.00     Types: Cigarettes     Start date: 1974     Quit date: 1999     Years since quittin.3    Smokeless tobacco: Never   Substance Use Topics    Alcohol use: No     Family and Occupational History     Socioeconomic History    Marital status:      Spouse name: Not on file    Number of children: Not on file    Years of education: Not on file    Highest education level: Not on file   Occupational History    Not on file       Objective:   Active Range of Motion:   Upper extremity (left):     All  left upper extremity active range of motion: All below functional limits      Passive Range of Motion:     Passive Range of Motion Comments:  Increased pain with shoulder external rotation past 20 degrees and on wrist extension past 30 degrees.      Strength:   Upper extremity strength (left):     Shoulder flexion: 2    Shoulder extension:  3    Shoulder abduction: 2+    Shoulder adduction: 3    Shoulder external rotation:  2+    Shoulder internal rotation: 4    Elbow flexion: 3-    Elbow extension: 3-    Forearm pronation: 3-    Forearm supination: 3-    Wrist flexion: 3    Wrist extension: 2    Fingers flexion: 2    Fingers extension: 1    Fingers abduction: 2    Fingers adduction: 2    , Prehension, Pinch:  /Prehension (left):      strength: Impaired    Opposition: Impaired  Pinch (left):     Pinch (tip to tip): Impaired    Pinch (3 point): Impaired    Pinch (lateral): Impaired    Tone, Sensation and Coordination:   Tone:     Left upper extremity muscle tone: Gross assist, Contractures and Spastic    Modified Yunier:   Upper extremity (left):     Elbow extensors: 2    Wrist flexors: 2    Finger flexors: 2    Sensation   Upper extremity (left):     Light touch: Intact    Sharp/dull: Intact     Stereognosis: Impaired     Proprioception: Intact     Coordination   Upper extremity (left):     Fine motor: Absent    Gross motor: Absent    Slow alternating movements: Absent    Rapid alternating movements: Absent    Finger to finger: Absent    Finger touch to nose: Absent    Cognition:     Orientation: normal to time, normal to place, normal to person and normal to situation    Direction following: three step    Short term memory: intact    Long term memory: intact    Attention span: intact    Sequencing: intact    Organization: intact    Problem solving: intact    Judgement and safety awareness: intact    Hearing: intact    Vision/Perception:     Visual tracking: intact    Convergence: intact    Visual  attentions: intact    Visual scanning: intact    R/L discrimination: intact    R/L hemianopsia: not present    R/L neglect: not present    Spatial relations: intact    Vision assistive device(s): glasses    Postural Control (Balance)     Sitting (static): Normal    Sitting (dynamic): Good    Standing (static): Good    Standing (dynamic): Fair +    Activities of Daily Living:     Household Management:   Physical Assessment:   Coordination:     Upper extremity (left):  Fine Motor: Absent  Gross Motor: Absent  Slow alternating movements: Absent  Rapid alternating movements: Absent  Finger to finger: Absent  Finger touch to nose: Absent    Sensation:   Upper extremity (left):    Light touch: Intact    Proprioception: Intact     Sharp/dull: Intact     Stereognosis: Impaired     Balance:     Sitting (static): Normal    Sitting (dynamic): Good    Standing (static): Good    Standing (dynamic): Fair +      Vision/Perception:     Visual tracking: intact  Convergence: intact  Visual attentions: intact  Visual scanning: intact  R/L Discrimination: intact  R/L Hemianopsia: not present  R/L Neglect: not present  Spatial relations: intact  Vision assist device(s): glasses        Therapeutic Exercises (CPT 70628):     1. Self ROM of left UE    2. Care giver ROM/static progressive stretch of left UE, Focus on improving external rotation and wrist extension    3. weight bearing through left UE    Therapeutic Exercise Summary:  Initiated HEP and to complete 3 x a day  Patient also using KT taping to minimize wrist flexion and use of edema glove.        Time-based treatments/modalities:    Occupational Therapy Timed Treatment Charges  Therapeutic exercise minutes (CPT 63877): 15 minutes      Assessment, Response and Plan:   Impairments: abnormal coordination, abnormal muscle firing, abnormal muscle tone, abnormal or restricted ROM, activity intolerance, difficulty performing job, fine motor function, impaired functional mobility,  impaired balance, impaired physical strength, lacks appropriate home exercise program, limited ADL's and pain with function    Assessment details:  Patient is a 57 y/o male s/p CVA on 8/10/23 with residual left sided hemiparesis.  Patient limited with left sided ROM, some flexor tone in elbow, wrist and hand, impaired strength, muscle balance and functional use.  Patient progressing functionally; however only using right hand and difficulty with fine motor tasks.  Patient is interested in returning to driving and eventually going back to work.  Patient would benefit from OT intervention to minimize limitations and enhance level of function  Barriers to therapy:  Out-of-pocket cost of treatment  Prognosis: good    Goals:   Short Term Goals:   Patient will be independent with HEP  Patient will use left UE as GFA for at least 25% of self care tasks  Patient will initiate pre-driving screening with OTR  Patient will be able to tolerate standing for 10 minutes during a functional task  Short term goal timespan:  2-4 weeks    Long Term Goals:   Patient will use left UE as GFA for at least 50% of self care tasks  Patient will initiate pre-driving training  with OTR using the driving simulator.  Patient will be min A using zipper and buttoning shirt.   Patient will be able to tolerate standing for 15 minutes during a functional task  Patient will score at least 40/80 on the UEFI   Long term goal timespan:  6-8 weeks    Plan:   Therapy options:  Occupational therapy treatment to continue  Planned therapy interventions:  Neuromuscular Re-education (CPT 37379), E Stim Attended (CPT 54283), E Stim Unattended (CPT 35975), Self Care ADL Training (CPT 62718), Therapeutic Activities (CPT 80252) and Therapeutic Exercise (CPT 65678)  Frequency:  1x week  Duration in weeks:  8  Duration in visits:  8  Discussed with:  Patient, caregiver and family      Functional Assessment Used  Trail Making Part B = successfully completed in 3  minutes with no errors  Maze Task Test = 22 seconds  Monroe Test = 35/35  UEFI = 31/80        Referring provider co-signature:  I have reviewed this plan of care and my co-signature certifies the need for services.    Certification Period: 01/05/2024 to  03/01/24    Physician Signature: ________________________________ Date: ______________

## 2024-01-12 ENCOUNTER — APPOINTMENT (OUTPATIENT)
Dept: OCCUPATIONAL THERAPY | Facility: REHABILITATION | Age: 59
End: 2024-01-12
Attending: OBSTETRICS & GYNECOLOGY
Payer: COMMERCIAL

## 2024-01-12 NOTE — OP THERAPY DAILY TREATMENT
Outpatient Occupational Therapy  DAILY TREATMENT     [facility] Occupational Therapy [address]  [address]  [place] [address]  Phone:  [phone]  Fax:  [phone]    Date: 01/12/2024    Patient: Jorden Bonilla  YOB: 1965  MRN: [id number]     Time Calculation                 Chief Complaint: No chief complaint on file.    Visit #: 2    SUBJECTIVE:  ***    OBJECTIVE:  Current objective measures:   Active Range of Motion:   Upper extremity (left):     All left upper extremity active range of motion: All below functional limits        Passive Range of Motion:      Passive Range of Motion Comments:  Increased pain with shoulder external rotation past 20 degrees and on wrist extension past 30 degrees.       Strength:   Upper extremity strength (left):     Shoulder flexion: 2    Shoulder extension:  3    Shoulder abduction: 2+    Shoulder adduction: 3    Shoulder external rotation:  2+    Shoulder internal rotation: 4    Elbow flexion: 3-    Elbow extension: 3-    Forearm pronation: 3-    Forearm supination: 3-    Wrist flexion: 3    Wrist extension: 2    Fingers flexion: 2    Fingers extension: 1    Fingers abduction: 2    Fingers adduction: 2     , Prehension, Pinch:  /Prehension (left):      strength: Impaired    Opposition: Impaired  Pinch (left):     Pinch (tip to tip): Impaired    Pinch (3 point): Impaired    Pinch (lateral): Impaired     Tone, Sensation and Coordination:   Tone:     Left upper extremity muscle tone: Gross assist, Contractures and Spastic     Modified Yunier:   Upper extremity (left):     Elbow extensors: 2    Wrist flexors: 2    Finger flexors: 2     Sensation   Upper extremity (left):     Light touch: Intact    Sharp/dull: Intact     Stereognosis: Impaired     Proprioception: Intact      Coordination   Upper extremity (left):     Fine motor: Absent    Gross motor: Absent    Slow alternating movements: Absent    Rapid alternating movements:  Absent    Finger to finger: Absent    Finger touch to nose: Absent     Cognition:     Orientation: normal to time, normal to place, normal to person and normal to situation    Direction following: three step    Short term memory: intact    Long term memory: intact    Attention span: intact    Sequencing: intact    Organization: intact    Problem solving: intact    Judgement and safety awareness: intact    Hearing: intact     Vision/Perception:     Visual tracking: intact    Convergence: intact    Visual attentions: intact    Visual scanning: intact    R/L discrimination: intact    R/L hemianopsia: not present    R/L neglect: not present    Spatial relations: intact    Vision assistive device(s): glasses     Postural Control (Balance)     Sitting (static): Normal    Sitting (dynamic): Good    Standing (static): Good    Standing (dynamic): Fair +     Activities of Daily Living:      Household Management:   Physical Assessment:   Coordination:     Upper extremity (left):  Fine Motor: Absent  Gross Motor: Absent  Slow alternating movements: Absent  Rapid alternating movements: Absent  Finger to finger: Absent  Finger touch to nose: Absent     Sensation:   Upper extremity (left):    Light touch: Intact    Proprioception: Intact     Sharp/dull: Intact     Stereognosis: Impaired      Balance:     Sitting (static): Normal    Sitting (dynamic): Good    Standing (static): Good    Standing (dynamic): Fair +        Vision/Perception:      Visual tracking: intact  Convergence: intact  Visual attentions: intact  Visual scanning: intact  R/L Discrimination: intact  R/L Hemianopsia: not present  R/L Neglect: not present  Spatial relations: intact  Vision assist device(s): glasses        Therapeutic Exercises (CPT 09118):     1. Self ROM of left UE    2. Care giver ROM/static progressive stretch of left UE, Focus on improving external rotation and wrist extension    3. weight bearing through left UE    Therapeutic Exercise Summary:   "  Patient also using KT taping to minimize wrist flexion and use of edema glove.        Time-based treatments/modalities:           Pain rating before treatment: {PAIN NUMBERS_1-10:39151}  Pain rating after treatment: {PAIN NUMBERS_1-10:95027}    ASSESSMENT:   Response to treatment: ***    PLAN/RECOMMENDATIONS:   Plan for treatment: {Therapy Plan:[id number]::\"therapy treatment to continue next visit\"}.  Planned interventions for next visit: {planned OT Interventions:[id number]}         "

## 2024-01-15 ENCOUNTER — OFFICE VISIT (OUTPATIENT)
Dept: NEUROLOGY | Facility: MEDICAL CENTER | Age: 59
End: 2024-01-15
Attending: SPECIALIST
Payer: COMMERCIAL

## 2024-01-15 VITALS
TEMPERATURE: 97.4 F | OXYGEN SATURATION: 97 % | BODY MASS INDEX: 22.66 KG/M2 | HEIGHT: 65 IN | DIASTOLIC BLOOD PRESSURE: 78 MMHG | RESPIRATION RATE: 18 BRPM | WEIGHT: 136.02 LBS | HEART RATE: 96 BPM | SYSTOLIC BLOOD PRESSURE: 124 MMHG

## 2024-01-15 DIAGNOSIS — I63.9 CEREBROVASCULAR ACCIDENT (CVA), UNSPECIFIED MECHANISM (HCC): ICD-10-CM

## 2024-01-15 PROCEDURE — 3078F DIAST BP <80 MM HG: CPT | Performed by: SPECIALIST

## 2024-01-15 PROCEDURE — 99202 OFFICE O/P NEW SF 15 MIN: CPT | Performed by: SPECIALIST

## 2024-01-15 PROCEDURE — 3074F SYST BP LT 130 MM HG: CPT | Performed by: SPECIALIST

## 2024-01-15 PROCEDURE — 99212 OFFICE O/P EST SF 10 MIN: CPT | Performed by: SPECIALIST

## 2024-01-15 RX ORDER — EMPAGLIFLOZIN 25 MG/1
1 TABLET, FILM COATED ORAL DAILY
COMMUNITY
Start: 2023-11-29

## 2024-01-15 RX ORDER — LINAGLIPTIN 5 MG/1
5 TABLET, FILM COATED ORAL DAILY
COMMUNITY

## 2024-01-15 ASSESSMENT — PATIENT HEALTH QUESTIONNAIRE - PHQ9: CLINICAL INTERPRETATION OF PHQ2 SCORE: 0

## 2024-01-15 ASSESSMENT — FIBROSIS 4 INDEX: FIB4 SCORE: 1.12

## 2024-01-15 NOTE — PROGRESS NOTES
Subjective     Jorden Bonilla is a 58 y.o. male who presents with a history of a stroke.          HPI patient is a 58-year-old gentleman who presents with his wife and daughter who provides some of the history.  He was referred by Dr. Barraza.  He has a prior history of cardiac disease and underwent CABG in 2018.  He awakened on August 10 of this year with mild weakness in the left side.  He was seen in the emergency room and on neuro consult had an NIH stroke scale of 1 with only mild weakness.  CT angiogram of the head was negative, CT angiogram of the neck revealed a 40% left intracranial vertebral artery stenosis.  He was not administered tPA.  MRI brain scan revealed a small area of increased T2 signal in the right anterior medulla.  Over the next several days the patient was hospitalized but developed progressive weakness in the left side to where he had difficulty standing and was transferred to rehabilitation.  An echocardiogram revealed a normal LV ejection fraction of 55% and no shunt.  There was no source of emboli.  His cholesterol was elevated to 275 with an LDL of 187.  He was transferred to rehabilitation from 814 through 915 and improved in rehab.  He was discharged on aspirin and 80 mg of atorvastatin.  In addition he continues taking medications for his diabetes.  His blood pressure has been well-controlled since his hospitalization.  He did home physical therapy for some time and is now started in person physical therapy.    Past medical history:    History of coronary artery disease status post CABG    Type 2 diabetes    Stroke August 2023 as above    Medications Lipitor 80 mg daily aspirin 81 mg daily metformin and Trulicity and antihypertensive medications.    Allergies none noted family history noncontributory    Review of systems the patient developed worsening weakness when he was in the hospital and now has an AFO on the left.  He ambulates with a cane.  He is relatively independent in  "the home environment and handles his own self-care.  He has not returned to work.             Objective     /78 (BP Location: Left arm, Patient Position: Sitting, BP Cuff Size: Adult)   Pulse 96   Temp 36.3 °C (97.4 °F) (Temporal)   Resp 18   Ht 1.651 m (5' 5\")   Wt 61.7 kg (136 lb 0.4 oz)   SpO2 97%   BMI 22.64 kg/m²      Physical Exam  Patient is a cooperative gentleman who is alert.  His speech is fluent and mental status is grossly intact.  He complains of pain in his left shoulder.    HEENT exam normocephalic.  Pupils equal round reactive.  EOMs are full visual fields are full.  Optic disks are flat.    His neck is supple.          Neurological Exam  He has a left AFO in place.  He has a left hemiparetic gait.  He has pain on range of motion of the left shoulder and a restricted abduction of the left shoulder.  He has 1-2/5 left intrinsic hand motion, proximal left upper extremity strength is approximately 2-3.  His left hip flexors 2 and he has a foot drop on the left.  Strength on the right is intact.    Sensation is intact to pin and temperature.    Reflexes are increased on the left compared to the right with a left upgoing and right downgoing toe.             Assessment & Plan       Patient is a 58-year-old gentleman with a history of cardiac disease who is status post a CABG in 2018 who developed a left hemiparesis and August of last year which unfortunately progressed when he was hospitalized.  He initially had a small infarction in the right anterior medulla, his deficit worsened after that and he likely had a small vessel stroke that progressed.  He is now on appropriate post stroke management with a high-dose statin, good blood pressure control and good control of his diabetes.  He is appropriately receiving physical therapy.  He should continue as is.  It would be beneficial if he was referred for cardiology opinion.  I will see him back in a month to follow his progress.        "

## 2024-01-17 ENCOUNTER — OCCUPATIONAL THERAPY (OUTPATIENT)
Dept: OCCUPATIONAL THERAPY | Facility: REHABILITATION | Age: 59
End: 2024-01-17
Attending: OBSTETRICS & GYNECOLOGY
Payer: COMMERCIAL

## 2024-01-17 DIAGNOSIS — I63.9 CEREBRAL INFARCTION, UNSPECIFIED MECHANISM (HCC): ICD-10-CM

## 2024-01-17 PROCEDURE — 97110 THERAPEUTIC EXERCISES: CPT

## 2024-01-17 PROCEDURE — 97112 NEUROMUSCULAR REEDUCATION: CPT

## 2024-01-17 PROCEDURE — 97014 ELECTRIC STIMULATION THERAPY: CPT

## 2024-01-18 ENCOUNTER — TELEPHONE (OUTPATIENT)
Dept: CARDIOLOGY | Facility: MEDICAL CENTER | Age: 59
End: 2024-01-18
Payer: COMMERCIAL

## 2024-01-18 NOTE — OP THERAPY DAILY TREATMENT
Outpatient Occupational Therapy  DAILY TREATMENT     [facility] Occupational Therapy [address]  [address]  [place] [address]  Phone:  [phone]  Fax:  [phone]    Date: 01/17/2024    Patient: Jorden Bonilla  YOB: 1965  MRN: [id number]     Time Calculation  Start time: 0325  Stop time: 0410 Time Calculation (min): 45 minutes         Chief Complaint: Extremity Weakness and Self Care Duties    Visit #: 2    SUBJECTIVE:  I still have pain in my shoulder and wrist    OBJECTIVE:  Current objective measures:   Upper extremity (left):     All left upper extremity active range of motion: All below functional limits        Passive Range of Motion:      Passive Range of Motion Comments:  Increased pain with shoulder external rotation past 20 degrees and on wrist extension past 30 degrees.       Strength:   Upper extremity strength (left):     Shoulder flexion: 2+    Shoulder extension:  3    Shoulder abduction: 2+    Shoulder adduction: 3    Shoulder external rotation:  2+    Shoulder internal rotation: 4    Elbow flexion: 3-    Elbow extension: 3-    Forearm pronation: 3    Forearm supination: 3-    Wrist flexion: 3    Wrist extension: 2    Fingers flexion: 2+    Fingers extension: 2+    Fingers abduction: 2    Fingers adduction: 2     , Prehension, Pinch:  /Prehension (left):      strength: Impaired    Opposition: Impaired  Pinch (left):     Pinch (tip to tip): Impaired    Pinch (3 point): Impaired    Pinch (lateral): Impaired     Tone, Sensation and Coordination:   Tone:     Left upper extremity muscle tone: Gross assist, Contractures and Spastic     Modified Yunier:   Upper extremity (left):     Elbow extensors: 2    Wrist flexors: 2    Finger flexors: 2     Sensation   Upper extremity (left):     Light touch: Intact    Sharp/dull: Intact     Stereognosis: Impaired     Proprioception: Intact      Coordination   Upper extremity (left):     Fine motor: Absent     Gross motor: Absent    Slow alternating movements: Absent    Rapid alternating movements: Absent    Finger to finger: Absent    Finger touch to nose: Absent     Cognition:     Orientation: normal to time, normal to place, normal to person and normal to situation    Direction following: three step    Short term memory: intact    Long term memory: intact    Attention span: intact    Sequencing: intact    Organization: intact    Problem solving: intact    Judgement and safety awareness: intact    Hearing: intact     Vision/Perception:     Visual tracking: intact    Convergence: intact    Visual attentions: intact    Visual scanning: intact    R/L discrimination: intact    R/L hemianopsia: not present    R/L neglect: not present    Spatial relations: intact    Vision assistive device(s): glasses     Postural Control (Balance)     Sitting (static): Normal    Sitting (dynamic): Good    Standing (static): Good    Standing (dynamic): Fair +     Activities of Daily Living:      Household Management:   Physical Assessment:   Coordination:     Upper extremity (left):  Fine Motor: Absent  Gross Motor: Absent  Slow alternating movements: Absent  Rapid alternating movements: Absent  Finger to finger: Absent  Finger touch to nose: Absent     Sensation:   Upper extremity (left):    Light touch: Intact    Proprioception: Intact     Sharp/dull: Intact     Stereognosis: Impaired      Balance:     Sitting (static): Normal    Sitting (dynamic): Good    Standing (static): Good    Standing (dynamic): Fair +        Vision/Perception:      Visual tracking: intact  Convergence: intact  Visual attentions: intact  Visual scanning: intact  R/L Discrimination: intact  R/L Hemianopsia: not present  R/L Neglect: not present  Spatial relations: intact  Vision assist device(s): glasses        Therapeutic Exercises (CPT 74024):     1. Static progressive stretch of left UE, completed by OTR with patient in supine position, 9/10 pain noted on external  rotation past 20 degrees and wrist extension    Therapeutic Exercise Summary:        Therapeutic Treatments and Modalities:    1. Neuromuscular Re-education (CPT 43593)    2. E Stim Unattended (CPT 03407)    Therapeutic Treatments and Modalities Summary: NMRE for active shoulder , elbow, forearm, wrist and hand movements in supine   Shoulder shrugs, retraction and rolling 10 x while seated at edge of mat  Weight bearing through elbow x 5 and then through outstretched hand x 5.  FF and extension of shoulder x 5 with hand supported by OTR    E-stim unattended completed by Tech after treatment session to address pain of shoulder.      Time-based treatments/modalities:  Therapeutic exercise minutes (CPT 71172): 30 minutes  Neuromusc re-ed minutes (CPT 17244): 15 minutes        Pain rating before treatment: 7  Pain rating after treatment: 9    ASSESSMENT:   Response to treatment: increased isolated muscle strength noted in shoulder flexion, pronation and digits.  Primary limitation in limited soft tissue mobility and pain during external rotation and wrist extension     PLAN/RECOMMENDATIONS:   Plan for treatment: therapy treatment to continue next visit.  Planned interventions for next visit: continue with current treatment, E-stim attended (CPT 10627), E-stim unattended (CPT 04012), neuromuscular re-education (CPT 08590), self care ADL training (CPT 89658), therapeutic activities (CPT 40563), and therapeutic exercise (CPT 41564)

## 2024-01-19 ENCOUNTER — APPOINTMENT (OUTPATIENT)
Dept: OCCUPATIONAL THERAPY | Facility: REHABILITATION | Age: 59
End: 2024-01-19
Attending: OBSTETRICS & GYNECOLOGY
Payer: COMMERCIAL

## 2024-01-24 ENCOUNTER — OFFICE VISIT (OUTPATIENT)
Dept: CARDIOLOGY | Facility: MEDICAL CENTER | Age: 59
End: 2024-01-24
Attending: SPECIALIST
Payer: COMMERCIAL

## 2024-01-24 ENCOUNTER — OCCUPATIONAL THERAPY (OUTPATIENT)
Dept: OCCUPATIONAL THERAPY | Facility: REHABILITATION | Age: 59
End: 2024-01-24
Attending: OBSTETRICS & GYNECOLOGY
Payer: COMMERCIAL

## 2024-01-24 VITALS
WEIGHT: 137 LBS | DIASTOLIC BLOOD PRESSURE: 72 MMHG | BODY MASS INDEX: 22.82 KG/M2 | OXYGEN SATURATION: 97 % | SYSTOLIC BLOOD PRESSURE: 122 MMHG | HEIGHT: 65 IN | HEART RATE: 109 BPM

## 2024-01-24 DIAGNOSIS — I25.84 CORONARY ARTERY DISEASE DUE TO CALCIFIED CORONARY LESION: ICD-10-CM

## 2024-01-24 DIAGNOSIS — E78.5 DYSLIPIDEMIA: ICD-10-CM

## 2024-01-24 DIAGNOSIS — I25.10 CORONARY ARTERY DISEASE DUE TO CALCIFIED CORONARY LESION: ICD-10-CM

## 2024-01-24 DIAGNOSIS — E78.2 MIXED HYPERLIPIDEMIA: ICD-10-CM

## 2024-01-24 DIAGNOSIS — I63.9 CEREBRAL INFARCTION, UNSPECIFIED MECHANISM (HCC): ICD-10-CM

## 2024-01-24 DIAGNOSIS — I63.9 ISCHEMIC STROKE (HCC): ICD-10-CM

## 2024-01-24 PROCEDURE — 97112 NEUROMUSCULAR REEDUCATION: CPT

## 2024-01-24 PROCEDURE — 3078F DIAST BP <80 MM HG: CPT | Performed by: INTERNAL MEDICINE

## 2024-01-24 PROCEDURE — 3074F SYST BP LT 130 MM HG: CPT | Performed by: INTERNAL MEDICINE

## 2024-01-24 PROCEDURE — 99205 OFFICE O/P NEW HI 60 MIN: CPT | Mod: 25 | Performed by: INTERNAL MEDICINE

## 2024-01-24 PROCEDURE — 97530 THERAPEUTIC ACTIVITIES: CPT

## 2024-01-24 PROCEDURE — 99213 OFFICE O/P EST LOW 20 MIN: CPT | Performed by: INTERNAL MEDICINE

## 2024-01-24 PROCEDURE — 93005 ELECTROCARDIOGRAM TRACING: CPT | Performed by: INTERNAL MEDICINE

## 2024-01-24 ASSESSMENT — ENCOUNTER SYMPTOMS
DECREASED APPETITE: 0
SHORTNESS OF BREATH: 0
NAUSEA: 0
PND: 0
ALTERED MENTAL STATUS: 0
SYNCOPE: 0
DIZZINESS: 0
FEVER: 0
ORTHOPNEA: 0
WEIGHT LOSS: 0
ABDOMINAL PAIN: 0
BLURRED VISION: 0
DIARRHEA: 0
COUGH: 0
WEIGHT GAIN: 0
BACK PAIN: 0
HEARTBURN: 0
IRREGULAR HEARTBEAT: 0
PALPITATIONS: 0
CONSTIPATION: 0
VOMITING: 0
NEAR-SYNCOPE: 0
DEPRESSION: 0
CLAUDICATION: 0
FLANK PAIN: 0
DYSPNEA ON EXERTION: 0

## 2024-01-24 ASSESSMENT — FIBROSIS 4 INDEX: FIB4 SCORE: 1.12

## 2024-01-24 NOTE — OP THERAPY DAILY TREATMENT
Outpatient Occupational Therapy  DAILY TREATMENT     [facility] Occupational Therapy [address].  [address]  [place] [address]  Phone:  [phone]  Fax:  [phone]    Date: 01/24/2024    Patient: Jorden Bonilla  YOB: 1965  MRN: [id number]     Time Calculation  Start time: 0230  Stop time: 0315 Time Calculation (min): 45 minutes         Chief Complaint: Extremity Weakness and Self Care Duties    Visit #: 3    SUBJECTIVE:  I can move my shoulder a little bit more    OBJECTIVE:  Current objective measures:   Upper extremity (left):     All left upper extremity active range of motion: All below functional limits        Passive Range of Motion:      Passive Range of Motion Comments:  Increased pain with shoulder external rotation past 20 degrees and on wrist extension past 30 degrees.       Strength:   Upper extremity strength (left):     Shoulder flexion: 2+    Shoulder extension:  3    Shoulder abduction: 2+    Shoulder adduction: 3    Shoulder external rotation:  2+    Shoulder internal rotation: 4    Elbow flexion: 3-    Elbow extension: 3-    Forearm pronation: 3    Forearm supination: 3-    Wrist flexion: 3    Wrist extension: 2    Fingers flexion: 3-    Fingers extension: 3-    Fingers abduction: 2    Fingers adduction: 2     , Prehension, Pinch:  /Prehension (left):      strength: Impaired    Opposition: Impaired  Pinch (left):     Pinch (tip to tip): Impaired    Pinch (3 point): Impaired    Pinch (lateral): Impaired     Tone, Sensation and Coordination:   Tone:     Left upper extremity muscle tone: Gross assist, Contractures and Spastic     Modified Yunier:   Upper extremity (left):     Wrist flexors: 2    Finger flexors: 1     Sensation   Upper extremity (left):     Light touch: Intact    Sharp/dull: Intact     Stereognosis: Impaired     Proprioception: Intact      Coordination   Upper extremity (left):     Fine motor: Absent    Gross motor: Absent    Slow  alternating movements: Absent    Rapid alternating movements: Absent    Finger to finger: Absent    Finger touch to nose: Absent     Cognition:     Orientation: normal to time, normal to place, normal to person and normal to situation    Direction following: three step    Short term memory: intact    Long term memory: intact    Attention span: intact    Sequencing: intact    Organization: intact    Problem solving: intact    Judgement and safety awareness: intact    Hearing: intact     Vision/Perception:     Visual tracking: intact    Convergence: intact    Visual attentions: intact    Visual scanning: intact    R/L discrimination: intact    R/L hemianopsia: not present    R/L neglect: not present    Spatial relations: intact    Vision assistive device(s): glasses     Postural Control (Balance)     Sitting (static): Normal    Sitting (dynamic): Good    Standing (static): Good    Standing (dynamic): Fair +     Activities of Daily Living:      Household Management:   Physical Assessment:   Coordination:     Upper extremity (left):  Fine Motor: Absent  Gross Motor: Absent  Slow alternating movements: Absent  Rapid alternating movements: Absent  Finger to finger: Absent  Finger touch to nose: Absent     Sensation:   Upper extremity (left):    Light touch: Intact    Proprioception: Intact     Sharp/dull: Intact     Stereognosis: Impaired      Balance:     Sitting (static): Normal    Sitting (dynamic): Good    Standing (static): Good    Standing (dynamic): Fair +        Vision/Perception:      Visual tracking: intact  Convergence: intact  Visual attentions: intact  Visual scanning: intact  R/L Discrimination: intact  R/L Hemianopsia: not present  R/L Neglect: not present  Spatial relations: intact  Vision assist device(s): glasses        Therapeutic Exercises (CPT 68316): , 9/10 pain noted on external rotation past 20 degrees and wrist extension    Therapeutic Exercise Summary:        Therapeutic Treatments and  Modalities:    1. Neuromuscular Re-education (CPT 62785)    Therapeutic Treatments and Modalities Summary: NMRE for active shoulder , elbow, forearm, wrist and hand movements in supine   Shoulder shrugs, retraction and rolling 10 x while seated at edge of mat    FF and extension of shoulder x 5 with hand supported by OTR  Use of peg board for grasp/release of pegs with min support at wrist from OTR        Time-based treatments/modalities:  Therapeutic activity minutes (CPT 04812): 15 minutes  Neuromusc re-ed minutes (CPT 68708): 30 minutes        Pain rating before treatment: 6  Pain rating after treatment: 7    ASSESSMENT:   Response to treatment: Decreased pain noted today in left shoulder and wrist during mobilization.  Improving digit strength along with grasp and release.     PLAN/RECOMMENDATIONS:   Plan for treatment: therapy treatment to continue next visit.  Planned interventions for next visit: continue with current treatment, E-stim attended (CPT 70917), E-stim unattended (CPT 45954), neuromuscular re-education (CPT 50964), self care ADL training (CPT 59646), therapeutic activities (CPT 40918), and therapeutic exercise (CPT 32515)

## 2024-01-24 NOTE — PROGRESS NOTES
Cardiology Note    Chief Complaint   Patient presents with    Coronary Artery Disease     F/v dx: Coronary artery disease due to calcified coronary lesion: CABG ×3 in 2015    Hypertension    Hyperlipidemia       History of Present Illness: Jorden Bonilla is a 58 y.o. male PMH HLD, CAD, CABG x3 2015 (left internal mammary artery to the left anterior descending artery and reverse saphenous vein grafts to the first and second obtuse marginal arteries in a sequential fashion), CVA who presents for initial visit.     Admission 8/2023 [facility] for weakness and facial droop found acute CVA.    Stable since discharge. No cardiac complaints. Compliant with medications and denies adverse effects. Remains off tobacco; former smoker quit 1999. No toxic social habits. Follows for physical therapy. Following with neurology. He is working on improving A1c control with PCP.    Review of Systems   Constitutional: Negative for decreased appetite, fever, malaise/fatigue, weight gain and weight loss.   HENT:  Negative for congestion and nosebleeds.    Eyes:  Negative for blurred vision.   Cardiovascular:  Negative for chest pain, claudication, dyspnea on exertion, irregular heartbeat, leg swelling, near-syncope, orthopnea, palpitations, paroxysmal nocturnal dyspnea and syncope.   Respiratory:  Negative for cough and shortness of breath.    Endocrine: Negative for cold intolerance and heat intolerance.   Skin:  Negative for rash.   Musculoskeletal:  Negative for back pain.   Gastrointestinal:  Negative for abdominal pain, constipation, diarrhea, heartburn, melena, nausea and vomiting.   Genitourinary:  Negative for dysuria, flank pain and hematuria.   Neurological:  Negative for dizziness.   Psychiatric/Behavioral:  Negative for altered mental status and depression.          Past Medical History:   Diagnosis Date    Coronary artery disease     Diabetes (HCC)     Hyperlipidemia     Hypertension          Past Surgical History:   Procedure  Laterality Date    MULTIPLE CORONARY ARTERY BYPASS ENDO VEIN HARVEST N/A 8/22/2015    Procedure: coronary artery bypass grtafting x 3, endoscopic vein harvest right leg, transesophageal echocardiogram;  Surgeon: Henry Baldwin M.D.;  Location: SURGERY [facility];  Service:          Current Outpatient Medications   Medication Sig Dispense Refill    rivaroxaban (XARELTO) 2.5 MG tablet Take 1 Tablet by mouth 2 times a day. 60 Tablet 3    Empagliflozin (JARDIANCE) 25 MG Tab Take 1 Tablet by mouth.      linagliptin (TRADJENTA) 5 MG Tab tablet Oral for 30 Days      aspirin 81 MG EC tablet Take 1 Tablet by mouth every evening. 30 Tablet 0    atorvastatin (LIPITOR) 80 MG tablet Take 1 Tablet by mouth every evening. 30 Tablet 0    metFORMIN ER (GLUCOPHAGE XR) 500 MG TABLET SR 24 HR Take 1 Tablet by mouth 2 times a day with meals. 60 Tablet 0    tamsulosin (FLOMAX) 0.4 MG capsule Take 1 Capsule by mouth 1/2 hour after breakfast. 30 Capsule 0    acetaminophen (TYLENOL) 325 MG Tab Take 2 Tablets by mouth every four hours as needed for Mild Pain. 30 Tablet 0    baclofen (LIORESAL) 5 MG Tab Take 3 Tablets by mouth at bedtime. 90 Tablet 0    metoprolol tartrate (LOPRESSOR) 25 MG Tab Take 0.5 Tablets by mouth 2 times a day. 30 Tablet 0    SITagliptin (JANUVIA) 25 MG Tab Take 1 Tablet by mouth every day. 30 Tablet 0    Multiple Vitamins-Minerals (CENTRUM ADULT PO) Take 1 Tablet by mouth every evening.      Coenzyme Q10 (COQ-10 PO) Take 1 Tablet by mouth every evening.       No current facility-administered medications for this visit.         No Known Allergies      History reviewed. No pertinent family history.      Social History     Socioeconomic History    Marital status:      Spouse name: Not on file    Number of children: Not on file    Years of education: Not on file    Highest education level: Not on file   Occupational History    Not on file   Tobacco Use    Smoking status: Former     Current packs/day: 0.00      "Types: Cigarettes     Start date: 1974     Quit date: 1999     Years since quittin.4    Smokeless tobacco: Never   Vaping Use    Vaping Use: Never used   Substance and Sexual Activity    Alcohol use: No    Drug use: No    Sexual activity: Not on file   Other Topics Concern    Not on file   Social History Narrative    Not on file     Social Determinants of Health     Financial Resource Strain: Not on file   Food Insecurity: Not on file   Transportation Needs: No Transportation Needs (2023)    PRAPARE - Transportation     Lack of Transportation (Medical): No     Lack of Transportation (Non-Medical): No   Physical Activity: Not on file   Stress: Not on file   Social Connections: Not on file   Intimate Partner Violence: Not on file   Housing Stability: Not on file         Physical Exam:  Ambulatory Vitals  /72 (BP Location: Left arm, Patient Position: Sitting, BP Cuff Size: Adult)   Pulse (!) 109   Ht 1.651 m (5' 5\")   Wt 62.1 kg (137 lb)   SpO2 97%    BP Readings from Last 4 Encounters:   24 122/72   01/15/24 124/78   23 112/74   23 124/85     Weight/BMI:   Vitals:    24 1529   BP: 122/72   Weight: 62.1 kg (137 lb)   Height: 1.651 m (5' 5\")    Body mass index is 22.8 kg/m².  Wt Readings from Last 4 Encounters:   24 62.1 kg (137 lb)   01/15/24 61.7 kg (136 lb 0.4 oz)   23 63 kg (139 lb)   23 66.3 kg (146 lb 2.6 oz)       Physical Exam  Constitutional:       General: He is not in acute distress.  HENT:      Head: Normocephalic and atraumatic.   Eyes:      Conjunctiva/sclera: Conjunctivae normal.      Pupils: Pupils are equal, round, and reactive to light.   Neck:      Vascular: No JVD.   Cardiovascular:      Rate and Rhythm: Normal rate and regular rhythm.      Heart sounds: Normal heart sounds. No murmur heard.     No friction rub. No gallop.   Pulmonary:      Effort: Pulmonary effort is normal. No respiratory distress.      Breath sounds: Normal " "breath sounds. No wheezing or rales.   Chest:      Chest wall: No tenderness.   Abdominal:      General: Bowel sounds are normal. There is no distension.      Palpations: Abdomen is soft.   Musculoskeletal:      Cervical back: Normal range of motion and neck supple.   Skin:     General: Skin is warm and dry.   Neurological:      Mental Status: He is alert and oriented to person, place, and time.   Psychiatric:         Mood and Affect: Affect normal.         Judgment: Judgment normal.         Lab Data Review:  Lab Results   Component Value Date/Time    CHOLSTRLTOT 275 (H) 08/11/2023 06:58 AM     (H) 08/11/2023 06:58 AM    HDL 51 08/11/2023 06:58 AM    TRIGLYCERIDE 177 (H) 08/11/2023 06:58 AM       Lab Results   Component Value Date/Time    SODIUM 142 08/30/2023 05:47 AM    POTASSIUM 3.9 08/30/2023 05:47 AM    CHLORIDE 106 08/30/2023 05:47 AM    CO2 27 08/30/2023 05:47 AM    GLUCOSE 144 (H) 08/30/2023 05:47 AM    BUN 13 08/30/2023 05:47 AM    CREATININE 0.92 08/30/2023 05:47 AM     CrCl cannot be calculated (Patient's most recent lab result is older than the maximum 7 days allowed.).  Lab Results   Component Value Date/Time    ALKPHOSPHAT 69 08/15/2023 05:38 AM    ASTSGOT 19 08/15/2023 05:38 AM    ALTSGPT 15 08/15/2023 05:38 AM    TBILIRUBIN 0.6 08/15/2023 05:38 AM      Lab Results   Component Value Date/Time    WBC 8.9 08/30/2023 05:47 AM     Lab Results   Component Value Date/Time    HBA1C 8.6 (H) 11/10/2023 10:16 AM    HBA1C 11.9 (H) 08/10/2023 09:30 AM     No components found for: \"TROP\"      Cardiac Imaging and Procedures Review:      EKG 1/24/24 interpreted by me sinus, LAFB, LAE    ZIO PATCH REPORT (08/14/23-08/28/23)  Zio Patch study showing predominately sinus rhythm with maximum rate of 179 bpm, minimum rate of 62 bpm, average of 88 bpm.  Atrial fibrillation: None.  Supraventricular tachycardia: 1 episode of supraventricular tachycardia/atrial tachycardia lasting 20.0 seconds with rate of 179 bpm " noted.  Pauses: None.  Heart block: None.  Ventricular tachycardia: None.  Rare <1% burden of premature atrial contractions, rare couplets.  Rare <1% burden of premature ventricular contractions.      TTE 8/2023  CONCLUSIONS  Normal left ventricular systolic function.   The left ventricular ejection fraction is visually estimated to be 55%.  No significant valvular abnormalities.   No evidence of right to left shunt by agitated saline study.  A definite cardiac source for a systemic thromboembolic event is not   identified, failure to do so does not exclude its presence. If   clinically indicated, a transesophageal study would be useful.     CTA neck 8/2023  IMPRESSION:  No significant carotid or vertebral stenosis.    CTA head 8/2023  IMPRESSION:  1. 40% left intracranial vertebral artery stenosis.  2. Minimal bilateral intracranial carotid arterial narrowing secondary to plaque.  3. No large vessel occlusion or aneurysm identified.    Medical Decision Making:  Problem List Items Addressed This Visit       Dyslipidemia    Relevant Orders    Lipid Profile    Coronary artery disease due to calcified coronary lesion: CABG ×3 in 2015    Relevant Medications    rivaroxaban (XARELTO) 2.5 MG tablet    Other Relevant Orders    EKG (Completed)    Mixed hyperlipidemia    Relevant Medications    rivaroxaban (XARELTO) 2.5 MG tablet    Ischemic stroke (HCC)    Relevant Medications    rivaroxaban (XARELTO) 2.5 MG tablet    Other Relevant Orders    Lipid Profile    Cardiac Event Monitor    EC-TAMMIE W/O CONT     CVA, cryptogenic - repeat event monitor, check TAMMIE. Continue aspirin and statin. Check lipids. Add vascular dose xarelto for vasculopathy multiple vascular beds.    CAD / CABG - stable. Asymptomatic. Continue medications.    HLD - continue statin. Repeat lipids.    HTN - controlled. Goal <130/80. Continue regimen.     It was my pleasure to meet with  Chad.    A total of 66 minutes of time was spent on day of encounter  reviewing medical record, performing history and examination, counseling, ordering medication/test/consults and documentation.

## 2024-01-25 ENCOUNTER — TELEPHONE (OUTPATIENT)
Dept: CARDIOLOGY | Facility: MEDICAL CENTER | Age: 59
End: 2024-01-25
Payer: COMMERCIAL

## 2024-01-25 ENCOUNTER — NON-PROVIDER VISIT (OUTPATIENT)
Dept: CARDIOLOGY | Facility: MEDICAL CENTER | Age: 59
End: 2024-01-25
Attending: INTERNAL MEDICINE
Payer: COMMERCIAL

## 2024-01-25 DIAGNOSIS — I63.9 ISCHEMIC STROKE (HCC): ICD-10-CM

## 2024-01-25 LAB — EKG IMPRESSION: NORMAL

## 2024-01-25 PROCEDURE — 93246 EXT ECG>7D<15D RECORDING: CPT

## 2024-01-25 PROCEDURE — 93010 ELECTROCARDIOGRAM REPORT: CPT | Performed by: INTERNAL MEDICINE

## 2024-01-25 NOTE — PROGRESS NOTES
LVM 02/27/24    Patient enrolled in the 14 day ePatch Holter monitor per Michaelkassidy Moss MD.  In clinic hook up, monitor s/n [id number]. Pending EOS.

## 2024-01-26 ENCOUNTER — APPOINTMENT (OUTPATIENT)
Dept: OCCUPATIONAL THERAPY | Facility: REHABILITATION | Age: 59
End: 2024-01-26
Attending: OBSTETRICS & GYNECOLOGY
Payer: COMMERCIAL

## 2024-01-30 NOTE — TELEPHONE ENCOUNTER
Patient is scheduled for a TAMMIE with anesthesia on 02- with Dr. Moss. Patient has been instructed to check in at 11:00 am for 1:00 procedure. No meds to hold. Message sent to authorizations. FYI sent to Ajay.

## 2024-02-02 ENCOUNTER — PHYSICAL THERAPY (OUTPATIENT)
Dept: PHYSICAL THERAPY | Facility: REHABILITATION | Age: 59
End: 2024-02-02
Attending: OBSTETRICS & GYNECOLOGY
Payer: COMMERCIAL

## 2024-02-02 ENCOUNTER — OCCUPATIONAL THERAPY (OUTPATIENT)
Dept: OCCUPATIONAL THERAPY | Facility: REHABILITATION | Age: 59
End: 2024-02-02
Attending: OBSTETRICS & GYNECOLOGY
Payer: COMMERCIAL

## 2024-02-02 DIAGNOSIS — I63.9 CEREBRAL INFARCTION, UNSPECIFIED MECHANISM (HCC): ICD-10-CM

## 2024-02-02 PROCEDURE — 97112 NEUROMUSCULAR REEDUCATION: CPT

## 2024-02-02 PROCEDURE — 97014 ELECTRIC STIMULATION THERAPY: CPT

## 2024-02-02 PROCEDURE — 97162 PT EVAL MOD COMPLEX 30 MIN: CPT

## 2024-02-02 PROCEDURE — 97110 THERAPEUTIC EXERCISES: CPT

## 2024-02-02 SDOH — ECONOMIC STABILITY: GENERAL: QUALITY OF LIFE: GOOD

## 2024-02-02 ASSESSMENT — ENCOUNTER SYMPTOMS
QUALITY: NUMBNESS
PAIN SCALE AT LOWEST: 9
PAIN SCALE: 7
PAIN SCALE AT HIGHEST: 7

## 2024-02-02 NOTE — OP THERAPY DAILY TREATMENT
Outpatient Occupational Therapy  DAILY TREATMENT     [facility] Occupational Therapy [address]  [address]  [place] [address]  Phone:  [phone]  Fax:  [phone]    Date: 02/02/2024    Patient: Jorden Bonilla  YOB: 1965  MRN: [id number]     Time Calculation  Start time: 1015  Stop time: 1100 Time Calculation (min): 45 minutes         Chief Complaint: Extremity Weakness and Self Care Duties    Visit #: 4    SUBJECTIVE:  I try and do my stretches on my own.      OBJECTIVE:  Current objective measures:   Upper extremity (left):     All left upper extremity active range of motion: All below functional limits        Passive Range of Motion:      Passive Range of Motion Comments:  Increased pain with shoulder external rotation past 45 degrees and on wrist extension past 45 degrees.       Strength:   Upper extremity strength (left):     Shoulder flexion: 2+    Shoulder extension:  3    Shoulder abduction: 2+    Shoulder adduction: 3    Shoulder external rotation:  2+    Shoulder internal rotation: 4    Elbow flexion: 3-    Elbow extension: 3-    Forearm pronation: 3    Forearm supination: 3-    Wrist flexion: 3    Wrist extension: 2    Fingers flexion: 3-    Fingers extension: 3-    Fingers abduction: 2    Fingers adduction: 2     , Prehension, Pinch:  /Prehension (left):      strength: Impaired    Opposition: Impaired  Pinch (left):     Pinch (tip to tip): Impaired    Pinch (3 point): Impaired    Pinch (lateral): Impaired     Tone, Sensation and Coordination:   Tone:     Left upper extremity muscle tone: Gross assist, Contractures and Spastic     Modified Yunier:   Upper extremity (left):     Wrist flexors: 2    Finger flexors: 1     Sensation   Upper extremity (left):     Light touch: Intact    Sharp/dull: Intact     Stereognosis: Impaired     Proprioception: Intact      Coordination   Upper extremity (left):     Fine motor: Absent    Gross motor: Absent    Slow  alternating movements: Absent    Rapid alternating movements: Absent    Finger to finger: Absent    Finger touch to nose: Absent     Cognition:     Orientation: normal to time, normal to place, normal to person and normal to situation    Direction following: three step    Short term memory: intact    Long term memory: intact    Attention span: intact    Sequencing: intact    Organization: intact    Problem solving: intact    Judgement and safety awareness: intact    Hearing: intact     Vision/Perception:     Visual tracking: intact    Convergence: intact    Visual attentions: intact    Visual scanning: intact    R/L discrimination: intact    R/L hemianopsia: not present    R/L neglect: not present    Spatial relations: intact    Vision assistive device(s): glasses     Postural Control (Balance)     Sitting (static): Normal    Sitting (dynamic): Good    Standing (static): Good    Standing (dynamic): Fair +     Activities of Daily Living:      Household Management:   Physical Assessment:   Coordination:     Upper extremity (left):  Fine Motor: Absent  Gross Motor: Absent  Slow alternating movements: Absent  Rapid alternating movements: Absent  Finger to finger: Absent  Finger touch to nose: Absent     Sensation:   Upper extremity (left):    Light touch: Intact    Proprioception: Intact     Sharp/dull: Intact     Stereognosis: Impaired      Balance:     Sitting (static): Normal    Sitting (dynamic): Good    Standing (static): Good    Standing (dynamic): Fair +        Vision/Perception:      Visual tracking: intact  Convergence: intact  Visual attentions: intact  Visual scanning: intact  R/L Discrimination: intact  R/L Hemianopsia: not present  R/L Neglect: not present  Spatial relations: intact  Vision assist device(s): glasses        Therapeutic Exercises (CPT 31600):     1. static progressive stretch of shoulder external rotation and abduction, Completed in supine position, completed caregiver with wife to complete  with patient at least 1 x a day    Therapeutic Exercise Summary:        Therapeutic Treatments and Modalities:    1. Neuromuscular Re-education (CPT 89582)    2. E Stim Unattended (CPT 28822)    Therapeutic Treatments and Modalities Summary: NMRE for active shoulder , elbow, forearm, wrist and hand movements in supine   Shoulder shrugs, retraction and rolling 10 x while seated at edge of mat    FF and extension of shoulder x 5 with hand supported by OTR  Use of peg board for grasp/release of pegs with min support at wrist from OTR  Isolated pronation and supination   E-stim completed by tech after therapy session to address pain in shoulder       Time-based treatments/modalities:  Therapeutic exercise minutes (CPT 25912): 15 minutes  Neuromusc re-ed minutes (CPT 90244): 30 minutes        Pain rating before treatment: 5  Pain rating after treatment: 9  Pain declined after e-stim tx by technician   ASSESSMENT:   Response to treatment: Able to tolerate increased stretch at shoulder external rotation ( 45 degrees) and abduction ( 70 degrees) .  Encouraged assistance from wife to carry out stretches as patient unable to do so on his own    PLAN/RECOMMENDATIONS:   Plan for treatment: therapy treatment to continue next visit.  Planned interventions for next visit: continue with current treatment, E-stim attended (CPT 64381), E-stim unattended (CPT 28765), neuromuscular re-education (CPT 34902), self care ADL training (CPT 22558), therapeutic activities (CPT 85340), and therapeutic exercise (CPT 05167)

## 2024-02-02 NOTE — OP THERAPY EVALUATION
Outpatient Physical Therapy  INITIAL NEUROLOGICAL EVALUATION    [facility] Physical Therapy [address].  [address]  [place] [address]  Phone:  [phone]  Fax:  [phone]    Date of Evaluation: 2024    Patient: Jorden Bonilla  YOB: 1965  MRN: [id number]     Referring Provider: GOLDIE [place]   Referring Diagnosis Cerebral infarction, unspecified [I63.9]     Time Calculation    Start time: 845  Stop time: 930 Time Calculation (min): 45 minutes             Chief Complaint: Difficulty Walking and Loss Of Balance    Visit Diagnoses     ICD-10-CM   1. Cerebral infarction, unspecified mechanism (HCC)  I63.9       Subjective:   History of Present Illness:     Date of onset:  2023    Mechanism of injury:  Pt is 57 yo male c/c imbalance and difficulty walking, s/p R anterior medulla infart 2023.  Woke at 3:30am, couldn't move L LE. Within a few hours, progressive weakness to L UE.  Drove self with spouse to ER.    8/3/2023-2023 at acute care, 23-23 to IR[facility]. Home health 23 until end of .  Showering- needs SPV for safety, dressing SPV/Gregory. Spouse is working graveyard/full time.  2 steps in to get into front door-slow and inc time, does have a ramp in garage.  Work schedule/occupation: [employer]- - Pt reports would like to return to work, spouse and pt unsure if able to modify job/job title to return to [employer]   Leisure/work out routine:Played basketball and volleyball> 20 years ago while in the [facility]  Medications: cardiology-Xarelto-haven't started yet  Pertinent PMHx: CABG x3 2006  Quality of life:  Good  Pain:     Current pain ratin    At best pain ratin    At worst pain ratin    Quality:  Numbness      Past Medical History:   Diagnosis Date    Coronary artery disease     Diabetes (HCC)     Hyperlipidemia     Hypertension      Past Surgical History:   Procedure Laterality Date    MULTIPLE  "CORONARY ARTERY BYPASS ENDO VEIN HARVEST N/A 2015    Procedure: coronary artery bypass grtafting x 3, endoscopic vein harvest right leg, transesophageal echocardiogram;  Surgeon: Henry Baldwin M.D.;  Location: SURGERY [facility];  Service:      Social History     Tobacco Use    Smoking status: Former     Current packs/day: 0.00     Types: Cigarettes     Start date: 1974     Quit date: 1999     Years since quittin.4    Smokeless tobacco: Never   Substance Use Topics    Alcohol use: No     Family and Occupational History     Socioeconomic History    Marital status:      Spouse name: Not on file    Number of children: Not on file    Years of education: Not on file    Highest education level: Not on file   Occupational History    Not on file       Objective:     Strength:   Lower extremity (left):     Hip flexion: 3-    Knee extension: 4-    Ankle dorsiflexion: 2 (assessed in sitting with PRAFO donned)    Ankle plantar flexion: 2  Lower extremity (right):     Hip flexion: 5    Knee extension: 5    Ankle dorsiflexion: 5    Strength Comments:  L ankle: DF 2/5  PF: 3/5    Tone, Sensation and Coordination:   Tone:     Left lower extremity muscle tone: Hypotonic    Observational Gait   Gait: asymmetric     Walking speed below functional limits.    Decreased left stance time.   Stride width: narrow.    Left foot contact pattern: foot flat    Balance/Gait Comments   5xSTS: 17.2sec  no UE's        Therapeutic Exercises (CPT 75984):     1. STS, 2 x 10, no UE's, vc's for ant weight shift    2. modified heel toe s, static x30\" 1 side      Time-based treatments/modalities:    Physical Therapy Timed Treatment Charges  Therapeutic exercise minutes (CPT 24505): 10 minutes      Assessment, Response and Plan:   Impairments: abnormal coordination, abnormal gait, impaired functional mobility, impaired physical strength and lacks appropriate home exercise program    Assessment details:  Pt is a 57 yo male  " c/c of imbalance and impaired functional mobility d/t L side hemiparesis.  Initial injury is described s/p R anterior medulla infart 8/9/2023  Pertinent clinical findings include: impaired L hemiparesis, impaired sensation, impaired standing balance  Pt was working full time, sedentary lifestyle besides work, goals are to return to activity walking indep to store and with family to return to indep daily life.  Pt is limited by the above mentioned impairments and would benefit from skilled PT to address these impairments.   Barriers to therapy:  Psychosocial  Goals:   Short Term Goals:   1. Pt will be compliant with HEP  2. Pt will be able to participated: miniBESt, 6MWT, 10MWT       Short term goal time span:  2-4 weeks      Long Term Goals:    1. Pt will be able to demo LE strength: >3+/5 to allow indep amb with LRAD in community  2. Pt will be able to demo improvement in 5xSTS by 4 sec to decr risk for falls  3. Pt will be able to demo improve CV endurance to improve 6MWT >200ft  4. Pt will improve miniBEST >4 to decr risk for falls.    Long term goal time span:  6-8 weeks    Plan:   Planned therapy interventions:  Gait Training (CPT 86194), Neuromuscular Re-education (CPT 62307), Therapeutic Exercise (CPT 04016) and Therapeutic Activities (CPT 79684)  Frequency:  1x week  Duration in weeks:  12  Discussed with:  Patient and family      Functional Assessment Used      ABC -to be completed at home    Referring provider co-signature:  I have reviewed this plan of care and my co-signature certifies the need for services.    Certification Period: 02/02/2024 to  05/02/24    Physician Signature: ________________________________ Date: ______________

## 2024-02-07 ENCOUNTER — APPOINTMENT (OUTPATIENT)
Dept: OCCUPATIONAL THERAPY | Facility: REHABILITATION | Age: 59
End: 2024-02-07
Attending: OBSTETRICS & GYNECOLOGY
Payer: COMMERCIAL

## 2024-02-08 ENCOUNTER — APPOINTMENT (OUTPATIENT)
Dept: ADMISSIONS | Facility: MEDICAL CENTER | Age: 59
End: 2024-02-08
Attending: SURGERY
Payer: COMMERCIAL

## 2024-02-09 ENCOUNTER — PHYSICAL THERAPY (OUTPATIENT)
Dept: PHYSICAL THERAPY | Facility: REHABILITATION | Age: 59
End: 2024-02-09
Attending: OBSTETRICS & GYNECOLOGY
Payer: COMMERCIAL

## 2024-02-09 DIAGNOSIS — I63.9 CEREBRAL INFARCTION, UNSPECIFIED MECHANISM (HCC): ICD-10-CM

## 2024-02-09 PROCEDURE — 97112 NEUROMUSCULAR REEDUCATION: CPT

## 2024-02-09 NOTE — OP THERAPY DAILY TREATMENT
"  Outpatient Physical Therapy  DAILY TREATMENT     [facility] Physical [address]  [address]  [place] [address]  Phone:  [phone]  Fax:  [phone]    Date: 2024    Patient: Jorden Bonilla  YOB: 1965  MRN: [id number]     Time Calculation    Start time: 0845  Stop time: 0930 Time Calculation (min): 45 minutes         Chief Complaint: Difficulty Walking and Loss Of Balance    Visit #: 2    SUBJECTIVE:  Pt and spouse present with therapy.  Pt reporting increasing walking freq in home consistently with and w/o cane.    OBJECTIVE:  Current objective measures:   MiniBest Test      Anticipatory  STS:2  Rise to Toes:1/0 minimal range of motion  SLS: Left 1.-3 sec  Right. 2-30sec  Subscore 4 /6    Reactive Postural Control  Compensatory Stepping Correction-Forward:1, min weight shift outside MARIO, 1 step to catch  Compensatory Stepping Correction-Backwards:1, minimal weight shift, 2-4 steps to catch  Compensatory Stepping Correction-Lateral: Left: 1 Right:1, minimal weight shift outside MARIO  Subscore 3 /6    Sensory Orientation:  Stance (feet together) Eyes open, Firm surface:2  Stance (feet together) Eyes closed, Foam Surface:1  Incline -eyes closed:2  Subscore  5/6    Dynamic Gait *all performed w/o quad cane exception of stepping over obstacle  Changes in gait speed:1, slowed speed, not sig diff with slow vs normal pace  Walk with head turns-horizontal:2  Walk with pivot turns:2  Step over obstacles:0- able to clear 9\"H surface, hit boxes 23\" H  TU.9 Tug CO.04, counting forward by 3's from 0.-0    Subscore 6 /10    17/28    Less than 21 indicates increased risk of falling   The Activities-specific Balance Confidence Scale    Walk around the house.40  Walk up or down stairs.40  Bend over and  a slipper from the front of a closet floor.40  Reach for a small can off a shelf at eye level.40  Stand on your tip toes and reach for something over head.30  Stand " on a chair and reach for something.30  Sweep the floor.30  Walk outside the house to a car parked I the driveway.40  Get into or out of a car.40  Walk across a parking lot to the mall.40  Walk up or down a ramp.40  Walk in a crowded mall where people rapidly walk past you.40  Are bumped into by people as you walk through the mall.40  Step onto or off an escalator while you are holding on to a railing.30  Step on to ot off an escalator while holding onto parcels such that you can't use the railing.20  Walk outside on icy sidewalks.10    Total:    Scoring 550/16=  34.3%    Less than 67% indicates increased risk for falling          Exercises/Treatment  Time-based treatments/modalities:    Physical Therapy Timed Treatment Charges  Neuromusc re-ed, balance, coor, post minutes (CPT 74519): 45 minutes    ASSESSMENT:   Response to treatment: Scored 17/28 on miniBEST.  Demo impaired vestibular integration, delayed balance reactions and impaired hip strength with SLS/core strength.    PLAN/RECOMMENDATIONS:   Plan for treatment: therapy treatment to continue next visit.  Planned interventions for next visit:10MWT, 6MWT, balance HEP: progress to tandem, EC, HEP for strengthening: squats, bridging, running man continue with current treatment.

## 2024-02-13 NOTE — TELEPHONE ENCOUNTER
Left several message for patient. He has OT and PT scheduled on same time and date as this TAMMIE. Trying to find out if he will reschedule the OT and PT or if he needs to reschedule the TAMMIE.

## 2024-02-14 ENCOUNTER — PHYSICAL THERAPY (OUTPATIENT)
Dept: PHYSICAL THERAPY | Facility: REHABILITATION | Age: 59
End: 2024-02-14
Attending: OBSTETRICS & GYNECOLOGY
Payer: COMMERCIAL

## 2024-02-14 ENCOUNTER — OCCUPATIONAL THERAPY (OUTPATIENT)
Dept: OCCUPATIONAL THERAPY | Facility: REHABILITATION | Age: 59
End: 2024-02-14
Attending: OBSTETRICS & GYNECOLOGY
Payer: COMMERCIAL

## 2024-02-14 DIAGNOSIS — I63.9 CEREBRAL INFARCTION, UNSPECIFIED MECHANISM (HCC): ICD-10-CM

## 2024-02-14 PROCEDURE — 97110 THERAPEUTIC EXERCISES: CPT

## 2024-02-14 PROCEDURE — 97112 NEUROMUSCULAR REEDUCATION: CPT

## 2024-02-14 PROCEDURE — 97014 ELECTRIC STIMULATION THERAPY: CPT

## 2024-02-14 NOTE — OP THERAPY DAILY TREATMENT
"  Outpatient Physical Therapy  DAILY TREATMENT     [facility] Physical [address]  [address]  [place] [address]  Phone:  [phone]  Fax:  [phone]    Date: 2024    Patient: Jorden Bonilla  YOB: 1965  MRN: [id number]     Time Calculation    Start time: 1500  Stop time: 1545 Time Calculation (min): 45 minutes         Chief Complaint: Difficulty Walking and Loss Of Balance    Visit #: 3    SUBJECTIVE:  Pt and spouse present with therapy.  Pt reporting hear monitor is removed tmw.  No issues or concerns.    OBJECTIVE:  Current objective measures:     10MWT: 16.4: .36m/s  Fast 10MWT: 13.9: .43m/s    MiniBest Test      Anticipatory  STS:2  Rise to Toes:1/0 minimal range of motion  SLS: Left 1.-3 sec  Right. 2-30sec  Subscore 4 /6    Reactive Postural Control  Compensatory Stepping Correction-Forward:1, min weight shift outside MARIO, 1 step to catch  Compensatory Stepping Correction-Backwards:1, minimal weight shift, 2-4 steps to catch  Compensatory Stepping Correction-Lateral: Left: 1 Right:1, minimal weight shift outside MARIO  Subscore 3 /6    Sensory Orientation:  Stance (feet together) Eyes open, Firm surface:2  Stance (feet together) Eyes closed, Foam Surface:1  Incline -eyes closed:2  Subscore  5/6    Dynamic Gait *all performed w/o quad cane exception of stepping over obstacle  Changes in gait speed:1, slowed speed, not sig diff with slow vs normal pace  Walk with head turns-horizontal:2  Walk with pivot turns:2  Step over obstacles:0- able to clear 9\"H surface, hit boxes 23\" H  TU.9 Tug CO.04, counting forward by 3's from 0.-0    Subscore 6 /10    17/28    Less than 21 indicates increased risk of falling   The Activities-specific Balance Confidence Scale    Walk around the house.40  Walk up or down stairs.40  Bend over and  a slipper from the front of a closet floor.40  Reach for a small can off a shelf at eye level.40  Stand on your tip toes and " reach for something over head.30  Stand on a chair and reach for something.30  Sweep the floor.30  Walk outside the house to a car parked I the driveway.40  Get into or out of a car.40  Walk across a parking lot to the mall.40  Walk up or down a ramp.40  Walk in a crowded mall where people rapidly walk past you.40  Are bumped into by people as you walk through the mall.40  Step onto or off an escalator while you are holding on to a railing.30  Step on to ot off an escalator while holding onto parcels such that you can't use the railing.20  Walk outside on icy sidewalks.10    Total:    Scoring 550/16=  34.3%    Less than 67% indicates increased risk for falling            Therapeutic Exercises (CPT 64782):     1. SL bridging, x2, diff    2. 2 foot, x5, easy    3. SL with R LE lifted, x5, easy    4. single knee to chest, 30sec x3    5. adductor stretch, 30sec x 3    6. modified tandem, hz head tursn, 10 x2 R LE post, 10 x 1, L LE post    Therapeutic Treatments and Modalities:     1. Neuromuscular Re-education (CPT 47848), see below    Therapeutic Treatment and Modalities Summary: NMR: sit<>floor SBA to half kneeling to supine SPV  Quadruped on forearms : with SL hip ext 10 x 2  Prone lying x 2' stretching, edu on daily prone benefits      Time-based treatments/modalities:    Physical Therapy Timed Treatment Charges  Neuromusc re-ed, balance, coor, post minutes (CPT 43196): 25 minutes  Therapeutic exercise minutes (CPT 28835): 20 minutes    ASSESSMENT:   Response to treatment:   Demo SBA onto floor, cues to sequence floor>sitting on mat, SBA/CGA.  Fatigued with muscle tremors at end of session.    PLAN/RECOMMENDATIONS:   Plan for treatment: therapy treatment to continue next visit.  Planned interventions for next visit: progress to tandem, EC, HEP for strengthening: squats, bridging, running man continue with current treatment.

## 2024-02-14 NOTE — OP THERAPY PROGRESS SUMMARY
Outpatient Occupational Therapy  PROGRESS SUMMARY NOTE    [facility] Occupational Therapy [address].  [address]  [facility] [address]  Phone:  [phone]  Fax:  [phone]    Date of Visit: 02/14/2024    Patient: Jorden Bonilla  YOB: 1965  MRN: [id number]     Referring Provider: Deepthi Colindres M.D.  [address] [place] [address]   Referring Diagnosis Cerebral infarction, unspecified [I63.9]     Visit Diagnoses     ICD-10-CM   1. Cerebral infarction, unspecified mechanism (HCC)  I63.9       Rehab Potential: good    Progress Report Period: 1/5/24-2/14/24    Functional Assessment Used  Trail Making Part B = successfully completed in 3 minutes with no errors  Maze Task Test = 22 seconds  [employer] Test = 35/35  UEFI = 31/80          Objective Findings and Assessment:   Patient progression towards goals: Patient has been seen 4 x since initial evaluation and is progressing in therapy with improved standing endurance, some left UE muscle return and functional use .  Patient would benefit from continued OT intervention to further enhance functional use of left non-dominant arm.      Objective findings and assessment details: Upper extremity (left):     All left upper extremity active range of motion: All below functional limits        Passive Range of Motion:      Passive Range of Motion Comments:  Increased pain with shoulder external rotation past 45 degrees and on wrist extension past 45 degrees.       Strength:   Upper extremity strength (left):     Shoulder flexion: 2+    Shoulder extension:  3    Shoulder abduction: 2+    Shoulder adduction: 3    Shoulder external rotation:  2+    Shoulder internal rotation: 4    Elbow flexion: 3-    Elbow extension: 3-    Forearm pronation: 3    Forearm supination: 3-    Wrist flexion: 3    Wrist extension: 2    Fingers flexion: 3-    Fingers extension: 3-    Fingers abduction: 3    Fingers adduction: 3     , Prehension,  Pinch:  /Prehension (left):      strength: Impaired    Opposition: Impaired  Pinch (left):     Pinch (tip to tip): Impaired    Pinch (3 point): Impaired    Pinch (lateral): Impaired     Tone, Sensation and Coordination:   Tone:     Left upper extremity muscle tone: Gross assist, Contractures and Spastic     Modified Yunier:   Upper extremity (left):     Wrist flexors: 2    Finger flexors: 1     Sensation   Upper extremity (left):     Light touch: Intact    Sharp/dull: Intact     Stereognosis: Impaired     Proprioception: Intact      Coordination   Upper extremity (left):     Fine motor: Absent    Gross motor: Absent    Slow alternating movements: Absent    Rapid alternating movements: Absent    Finger to finger: Absent    Finger touch to nose: Absent     Cognition:     Orientation: normal to time, normal to place, normal to person and normal to situation    Direction following: three step    Short term memory: intact    Long term memory: intact    Attention span: intact    Sequencing: intact    Organization: intact    Problem solving: intact    Judgement and safety awareness: intact    Hearing: intact     Vision/Perception:     Visual tracking: intact    Convergence: intact    Visual attentions: intact    Visual scanning: intact    R/L discrimination: intact    R/L hemianopsia: not present    R/L neglect: not present    Spatial relations: intact    Vision assistive device(s): glasses     Postural Control (Balance)     Sitting (static): Normal    Sitting (dynamic): Good    Standing (static): Good    Standing (dynamic): Fair +     Activities of Daily Living:      Household Management:   Physical Assessment:   Coordination:     Upper extremity (left):  Fine Motor: Absent  Gross Motor: Absent  Slow alternating movements: Absent  Rapid alternating movements: Absent  Finger to finger: Absent  Finger touch to nose: Absent     Sensation:   Upper extremity (left):    Light touch: Intact    Proprioception: Intact      Sharp/dull: Intact     Stereognosis: Impaired      Balance:     Sitting (static): Normal    Sitting (dynamic): Good    Standing (static): Good    Standing (dynamic): Fair +        Vision/Perception:      Visual tracking: intact  Convergence: intact  Visual attentions: intact  Visual scanning: intact  R/L Discrimination: intact  R/L Hemianopsia: not present  R/L Neglect: not present  Spatial relations: intact  Vision assist device(s): glasses      Goals:   Short Term Goals:   Patient will use left UE as GFA for at least 25% of self care tasks  Patient will initiate pre-driving screening with OTR  Patient will be able to tolerate standing for 15 minutes during a functional task    Short term goal timespan:  2-4 weeks    Long Term Goals:   Patient will use left UE as GFA for at least 50% of self care tasks  Patient will initiate pre-driving training  with OTR using the driving simulator.  Patient will be min A using zipper and buttoning shirt.   Patient will be able to tolerate standing for 20 minutes during a functional task  Patient will score at least 40/80 on the UEFI     Long term goal timespan:  6-8 weeks    Plan:   Planned therapy interventions:  E Stim Attended (CPT 68349), E Stim Unattended (CPT 47211), Neuromuscular Re-education (CPT 95220), Self Care ADL Training (CPT 35168), Therapeutic Activities (CPT 85765) and Therapeutic Exercise (CPT 42179)  Frequency:  1x week  Duration in weeks:  8  Duration in visits:  8      Referring provider co-signature:  I have reviewed this plan of care and my co-signature certifies the need for services.    Certification Period: 02/14/2024 to 04/10/24    Physician Signature: ________________________________ Date: ______________

## 2024-02-14 NOTE — OP THERAPY DAILY TREATMENT
Outpatient Occupational Therapy  DAILY TREATMENT     [facility] Occupational Therapy [address]  [address]  [place] [address]  Phone:  [phone]  Fax:  [phone]    Date: 02/14/2024    Patient: Jorden Bonilla  YOB: 1965  MRN: [id number]     Time Calculation  Start time: 0145  Stop time: 0230 Time Calculation (min): 45 minutes         Chief Complaint: Extremity Weakness and Spinal Injury    Visit #: 5    SUBJECTIVE:  I am scheduled for an operation, but I need to postpone it for a while     OBJECTIVE:  Current objective measures:   Upper extremity (left):     All left upper extremity active range of motion: All below functional limits        Passive Range of Motion:      Passive Range of Motion Comments:  Increased pain with shoulder external rotation past 45 degrees and on wrist extension past 45 degrees.       Strength:   Upper extremity strength (left):     Shoulder flexion: 2+    Shoulder extension:  3    Shoulder abduction: 2+    Shoulder adduction: 3    Shoulder external rotation:  2+    Shoulder internal rotation: 4    Elbow flexion: 3-    Elbow extension: 3-    Forearm pronation: 3    Forearm supination: 3-    Wrist flexion: 3    Wrist extension: 2    Fingers flexion: 3-    Fingers extension: 3-    Fingers abduction: 3    Fingers adduction: 3     , Prehension, Pinch:  /Prehension (left):      strength: Impaired    Opposition: Impaired  Pinch (left):     Pinch (tip to tip): Impaired    Pinch (3 point): Impaired    Pinch (lateral): Impaired     Tone, Sensation and Coordination:   Tone:     Left upper extremity muscle tone: Gross assist, Contractures and Spastic     Modified Yunier:   Upper extremity (left):     Wrist flexors: 2    Finger flexors: 1     Sensation   Upper extremity (left):     Light touch: Intact    Sharp/dull: Intact     Stereognosis: Impaired     Proprioception: Intact      Coordination   Upper extremity (left):     Fine motor: Absent     Gross motor: Absent    Slow alternating movements: Absent    Rapid alternating movements: Absent    Finger to finger: Absent    Finger touch to nose: Absent     Cognition:     Orientation: normal to time, normal to place, normal to person and normal to situation    Direction following: three step    Short term memory: intact    Long term memory: intact    Attention span: intact    Sequencing: intact    Organization: intact    Problem solving: intact    Judgement and safety awareness: intact    Hearing: intact     Vision/Perception:     Visual tracking: intact    Convergence: intact    Visual attentions: intact    Visual scanning: intact    R/L discrimination: intact    R/L hemianopsia: not present    R/L neglect: not present    Spatial relations: intact    Vision assistive device(s): glasses     Postural Control (Balance)     Sitting (static): Normal    Sitting (dynamic): Good    Standing (static): Good    Standing (dynamic): Fair +     Activities of Daily Living:      Household Management:   Physical Assessment:   Coordination:     Upper extremity (left):  Fine Motor: Absent  Gross Motor: Absent  Slow alternating movements: Absent  Rapid alternating movements: Absent  Finger to finger: Absent  Finger touch to nose: Absent     Sensation:   Upper extremity (left):    Light touch: Intact    Proprioception: Intact     Sharp/dull: Intact     Stereognosis: Impaired      Balance:     Sitting (static): Normal    Sitting (dynamic): Good    Standing (static): Good    Standing (dynamic): Fair +        Vision/Perception:      Visual tracking: intact  Convergence: intact  Visual attentions: intact  Visual scanning: intact  R/L Discrimination: intact  R/L Hemianopsia: not present  R/L Neglect: not present  Spatial relations: intact  Vision assist device(s): glasses        Therapeutic Exercises (CPT 25693):     1. static progressive stretch of shoulder external rotation and abduction  of shoulder and supination of forearm,  Completed in supine position    2. isolted arm reach to promote shoulder retraction/scaption and strengthen serratus    Therapeutic Exercise Summary:        Therapeutic Treatments and Modalities:    1. Neuromuscular Re-education (CPT 54599)    2. E Stim Unattended (CPT 60530)    Therapeutic Treatments and Modalities Summary: NMRE for active shoulder , elbow, forearm, wrist and hand movements in supine   Shoulder shrugs, retraction and rolling 10 x while seated at edge of mat    FF and extension of shoulder x 5 with hand supported by OTR  Use of peg board for grasp/release of pegs with min support at wrist from OTR  Isolated pronation and supination   E-stim completed by tech after therapy session to address pain in shoulder       Time-based treatments/modalities:  Therapeutic exercise minutes (CPT 24690): 15 minutes  Neuromusc re-ed minutes (CPT 29839): 30 minutes        Pain rating before treatment: 6  Pain rating after treatment: 10    ASSESSMENT:   Response to treatment: continues to have right anterior shoulder pain upon external rotation and shoulder flexion and abduction., PN completed    PLAN/RECOMMENDATIONS:   Plan for treatment: therapy treatment to continue next visit.  Planned interventions for next visit: continue with current treatment, E-stim attended (CPT 68866), E-stim unattended (CPT 73660), neuromuscular re-education (CPT 49051), self care ADL training (CPT 44266), therapeutic activities (CPT 55876), and therapeutic exercise (CPT 57081)

## 2024-02-16 ENCOUNTER — PHYSICAL THERAPY (OUTPATIENT)
Dept: PHYSICAL THERAPY | Facility: REHABILITATION | Age: 59
End: 2024-02-16
Attending: OBSTETRICS & GYNECOLOGY
Payer: COMMERCIAL

## 2024-02-16 ENCOUNTER — APPOINTMENT (OUTPATIENT)
Dept: ADMISSIONS | Facility: MEDICAL CENTER | Age: 59
End: 2024-02-16
Attending: SURGERY
Payer: COMMERCIAL

## 2024-02-16 DIAGNOSIS — I63.9 CEREBRAL INFARCTION, UNSPECIFIED MECHANISM (HCC): ICD-10-CM

## 2024-02-16 PROCEDURE — 97112 NEUROMUSCULAR REEDUCATION: CPT

## 2024-02-16 PROCEDURE — 97110 THERAPEUTIC EXERCISES: CPT

## 2024-02-16 NOTE — OP THERAPY DAILY TREATMENT
"  Outpatient Physical Therapy  DAILY TREATMENT     [facility] Physical [address]  [address]  [place] [address]  Phone:  [phone]  Fax:  [phone]    Date: 2024    Patient: Jorden Bonilla  YOB: 1965  MRN: [id number]     Time Calculation    Start time: 1145  Stop time: 1230 Time Calculation (min): 45 minutes         Chief Complaint: Difficulty Walking and Loss Of Balance    Visit #: 4    SUBJECTIVE:  Pt and spouse present with therapy.  Pt reports being tired after last session until the end of the day.  Denies being over tired.  Ready for therapy    OBJECTIVE:  Current objective measures:     10MWT: 16.4: .36m/s  Fast 10MWT: 13.9: .43m/s    MiniBest Test      Anticipatory  STS:2  Rise to Toes:1/0 minimal range of motion  SLS: Left 1.-3 sec  Right. 2-30sec  Subscore 4 /6    Reactive Postural Control  Compensatory Stepping Correction-Forward:1, min weight shift outside MARIO, 1 step to catch  Compensatory Stepping Correction-Backwards:1, minimal weight shift, 2-4 steps to catch  Compensatory Stepping Correction-Lateral: Left: 1 Right:1, minimal weight shift outside MARIO  Subscore 3 /6    Sensory Orientation:  Stance (feet together) Eyes open, Firm surface:2  Stance (feet together) Eyes closed, Foam Surface:1  Incline -eyes closed:2  Subscore  5/6    Dynamic Gait *all performed w/o quad cane exception of stepping over obstacle  Changes in gait speed:1, slowed speed, not sig diff with slow vs normal pace  Walk with head turns-horizontal:2  Walk with pivot turns:2  Step over obstacles:0- able to clear 9\"H surface, hit boxes 23\" H  TU.9 Tug CO.04, counting forward by 3's from 0.-0    Subscore 6 /10    17/28    Less than 21 indicates increased risk of falling   The Activities-specific Balance Confidence Scale    Walk around the house.40  Walk up or down stairs.40  Bend over and  a slipper from the front of a closet floor.40  Reach for a small can off a " shelf at eye level.40  Stand on your tip toes and reach for something over head.30  Stand on a chair and reach for something.30  Sweep the floor.30  Walk outside the house to a car parked I the driveway.40  Get into or out of a car.40  Walk across a parking lot to the mall.40  Walk up or down a ramp.40  Walk in a crowded mall where people rapidly walk past you.40  Are bumped into by people as you walk through the mall.40  Step onto or off an escalator while you are holding on to a railing.30  Step on to ot off an escalator while holding onto parcels such that you can't use the railing.20  Walk outside on icy sidewalks.10    Total:    Scoring 550/16=  34.3%    Less than 67% indicates increased risk for falling            Therapeutic Exercises (CPT 88637):     1. SL bridging, NT    2. 2 foot, NT, easy    3. SL with R LE lifted, NT, easy    4. single knee to chest, NT    5. adductor stretch, NT    6. modified tandem, hz head tursn, NT    Therapeutic Treatments and Modalities:     1. Neuromuscular Re-education (CPT 64194), see below    Therapeutic Treatment and Modalities Summary: NMR:BWSTT on lite gait approx 10% support with harness, L UE strapped for support/decr shoulder pain  0.4MPH x 3min warm up  0.8 x3min  1.2MPH x 2min, total 543ft  -min/modA large step lengths, cues to inc hip /knee flex avoid circumduction  Standing rest break x2mi  1.2MPH 2min x 2   With 1 walking rest break at 0.8MPH x1min  2min cool down at 0.8MPH, total 1273ft, 0.24miles    Time-based treatments/modalities:    Physical Therapy Timed Treatment Charges  Neuromusc re-ed, balance, coor, post minutes (CPT 81941): 30 minutes  Therapeutic exercise minutes (CPT 26518): 15 minutes    ASSESSMENT:   Response to treatment:   Demo good tolerance to intensity training, fatigued quickly requiring inc assist with L LE advancement. Edu on intensity training at home I.e. using ankle weight, community busy environment, uneven  surfaces    PLAN/RECOMMENDATIONS:   Plan for treatment: therapy treatment to continue next visit.  Planned interventions for next visit: balance: SLS/tandem, stepping over ladders continue with current treatment.

## 2024-02-20 ENCOUNTER — PRE-ADMISSION TESTING (OUTPATIENT)
Dept: ADMISSIONS | Facility: MEDICAL CENTER | Age: 59
End: 2024-02-20
Payer: COMMERCIAL

## 2024-02-21 ENCOUNTER — APPOINTMENT (OUTPATIENT)
Dept: CARDIOLOGY | Facility: MEDICAL CENTER | Age: 59
End: 2024-02-21
Attending: INTERNAL MEDICINE
Payer: COMMERCIAL

## 2024-02-21 ENCOUNTER — ANESTHESIA EVENT (OUTPATIENT)
Dept: CARDIOLOGY | Facility: MEDICAL CENTER | Age: 59
End: 2024-02-21
Payer: COMMERCIAL

## 2024-02-21 ENCOUNTER — HOSPITAL ENCOUNTER (OUTPATIENT)
Facility: MEDICAL CENTER | Age: 59
End: 2024-02-21
Attending: INTERNAL MEDICINE | Admitting: INTERNAL MEDICINE
Payer: COMMERCIAL

## 2024-02-21 ENCOUNTER — APPOINTMENT (OUTPATIENT)
Dept: OCCUPATIONAL THERAPY | Facility: REHABILITATION | Age: 59
End: 2024-02-21
Attending: OBSTETRICS & GYNECOLOGY
Payer: COMMERCIAL

## 2024-02-21 ENCOUNTER — APPOINTMENT (OUTPATIENT)
Dept: PHYSICAL THERAPY | Facility: REHABILITATION | Age: 59
End: 2024-02-21
Attending: OBSTETRICS & GYNECOLOGY
Payer: COMMERCIAL

## 2024-02-21 ENCOUNTER — ANESTHESIA (OUTPATIENT)
Dept: CARDIOLOGY | Facility: MEDICAL CENTER | Age: 59
End: 2024-02-21
Payer: COMMERCIAL

## 2024-02-21 VITALS
RESPIRATION RATE: 18 BRPM | BODY MASS INDEX: 21.97 KG/M2 | HEART RATE: 73 BPM | WEIGHT: 131.84 LBS | SYSTOLIC BLOOD PRESSURE: 123 MMHG | HEIGHT: 65 IN | TEMPERATURE: 97.3 F | DIASTOLIC BLOOD PRESSURE: 63 MMHG | OXYGEN SATURATION: 96 %

## 2024-02-21 DIAGNOSIS — I63.9 ISCHEMIC STROKE (HCC): ICD-10-CM

## 2024-02-21 LAB
ANION GAP SERPL CALC-SCNC: 11 MMOL/L (ref 7–16)
BUN SERPL-MCNC: 12 MG/DL (ref 8–22)
CALCIUM SERPL-MCNC: 9.3 MG/DL (ref 8.5–10.5)
CHLORIDE SERPL-SCNC: 104 MMOL/L (ref 96–112)
CO2 SERPL-SCNC: 23 MMOL/L (ref 20–33)
CREAT SERPL-MCNC: 0.59 MG/DL (ref 0.5–1.4)
ERYTHROCYTE [DISTWIDTH] IN BLOOD BY AUTOMATED COUNT: 36.5 FL (ref 35.9–50)
GFR SERPLBLD CREATININE-BSD FMLA CKD-EPI: 111 ML/MIN/1.73 M 2
GLUCOSE BLD STRIP.AUTO-MCNC: 127 MG/DL (ref 65–99)
GLUCOSE SERPL-MCNC: 133 MG/DL (ref 65–99)
HCT VFR BLD AUTO: 48.6 % (ref 42–52)
HGB BLD-MCNC: 16.6 G/DL (ref 14–18)
MCH RBC QN AUTO: 27.4 PG (ref 27–33)
MCHC RBC AUTO-ENTMCNC: 34.2 G/DL (ref 32.3–36.5)
MCV RBC AUTO: 80.3 FL (ref 81.4–97.8)
PLATELET # BLD AUTO: 239 K/UL (ref 164–446)
PMV BLD AUTO: 10.5 FL (ref 9–12.9)
POTASSIUM SERPL-SCNC: 3.9 MMOL/L (ref 3.6–5.5)
RBC # BLD AUTO: 6.05 M/UL (ref 4.7–6.1)
SODIUM SERPL-SCNC: 138 MMOL/L (ref 135–145)
WBC # BLD AUTO: 8.1 K/UL (ref 4.8–10.8)

## 2024-02-21 PROCEDURE — 160002 HCHG RECOVERY MINUTES (STAT)

## 2024-02-21 PROCEDURE — 700105 HCHG RX REV CODE 258: Performed by: INTERNAL MEDICINE

## 2024-02-21 PROCEDURE — 700111 HCHG RX REV CODE 636 W/ 250 OVERRIDE (IP): Performed by: ANESTHESIOLOGY

## 2024-02-21 PROCEDURE — 85027 COMPLETE CBC AUTOMATED: CPT

## 2024-02-21 PROCEDURE — 82962 GLUCOSE BLOOD TEST: CPT

## 2024-02-21 PROCEDURE — 160046 HCHG PACU - 1ST 60 MINS PHASE II

## 2024-02-21 PROCEDURE — 80048 BASIC METABOLIC PNL TOTAL CA: CPT

## 2024-02-21 PROCEDURE — 93312 ECHO TRANSESOPHAGEAL: CPT | Mod: 26 | Performed by: INTERNAL MEDICINE

## 2024-02-21 PROCEDURE — 160035 HCHG PACU - 1ST 60 MINS PHASE I

## 2024-02-21 PROCEDURE — 93325 DOPPLER ECHO COLOR FLOW MAPG: CPT

## 2024-02-21 RX ORDER — ONDANSETRON 2 MG/ML
4 INJECTION INTRAMUSCULAR; INTRAVENOUS
Status: DISCONTINUED | OUTPATIENT
Start: 2024-02-21 | End: 2024-02-21 | Stop reason: HOSPADM

## 2024-02-21 RX ORDER — DIPHENHYDRAMINE HYDROCHLORIDE 50 MG/ML
12.5 INJECTION INTRAMUSCULAR; INTRAVENOUS
Status: DISCONTINUED | OUTPATIENT
Start: 2024-02-21 | End: 2024-02-21 | Stop reason: HOSPADM

## 2024-02-21 RX ORDER — HALOPERIDOL 5 MG/ML
1 INJECTION INTRAMUSCULAR
Status: DISCONTINUED | OUTPATIENT
Start: 2024-02-21 | End: 2024-02-21 | Stop reason: HOSPADM

## 2024-02-21 RX ORDER — SODIUM CHLORIDE, SODIUM LACTATE, POTASSIUM CHLORIDE, CALCIUM CHLORIDE 600; 310; 30; 20 MG/100ML; MG/100ML; MG/100ML; MG/100ML
INJECTION, SOLUTION INTRAVENOUS CONTINUOUS
Status: DISCONTINUED | OUTPATIENT
Start: 2024-02-21 | End: 2024-02-21 | Stop reason: HOSPADM

## 2024-02-21 RX ADMIN — PROPOFOL 150 MG: 10 INJECTION, EMULSION INTRAVENOUS at 13:28

## 2024-02-21 RX ADMIN — SODIUM CHLORIDE, POTASSIUM CHLORIDE, SODIUM LACTATE AND CALCIUM CHLORIDE: 600; 310; 30; 20 INJECTION, SOLUTION INTRAVENOUS at 12:34

## 2024-02-21 ASSESSMENT — FIBROSIS 4 INDEX: FIB4 SCORE: 1.12

## 2024-02-21 NOTE — ANESTHESIA TIME REPORT
Anesthesia Start and Stop Event Times       Date Time Event    2/21/2024 1311 Ready for Procedure     1321 Anesthesia Start     1329 Anesthesia Stop          Responsible Staff  02/21/24      Name Role Begin End    Jeremy Lunsford M.D. Anesth 1321 1329          Overtime Reason:  no overtime (within assigned shift)    Comments:

## 2024-02-21 NOTE — OR NURSING
1333 Arrived from procedure room ID verified. Report received. Patient sleep. 6L 02 mask respirations even and unlabored. Vss.     1351 Patient on RA oxygen sat 94%     1427 Tolerating PO fluids.     1431 Discharge instructions given to patient and family verbalize understanding of the orders. Copy of instructions given to family.     1443 Escorted via w/c to responsible adult with all personal belongings.

## 2024-02-21 NOTE — ANESTHESIA PREPROCEDURE EVALUATION
Date/Time: 02/21/24 1300    Scheduled providers: Michael Moss M.D.; Jeremy Lunsford M.D.    Procedure: EC-TAMMIE W/O CONT    Diagnosis: Ischemic stroke (HCC) [I63.9]    Indications: anesthesia please    Location: [facility] - Echocardiology [facility]            Relevant Problems   NEURO   (positive) Ischemic stroke (HCC)      CARDIAC   (positive) Coronary artery disease due to calcified coronary lesion: CABG ×3 in 2015   (positive) Primary hypertension      GI   (positive) Gastroesophageal reflux disease without esophagitis         (positive) Microalbuminuria due to type 2 diabetes mellitus (HCC)      ENDO   (positive) Type 2 diabetes mellitus with hyperglycemia, without long-term current use of insulin (HCC)       Physical Exam    Airway   Mallampati: II  TM distance: >3 FB  Neck ROM: full       Cardiovascular - normal exam  Rhythm: regular  Rate: normal  (-) murmur     Dental - normal exam           Pulmonary - normal exam  Breath sounds clear to auscultation     Abdominal    Neurological - normal exam                   Anesthesia Plan    ASA 3   ASA physical status 3 criteria: CVA or TIA - history (> 3 months) and CAD/stents (> 3 months)    Plan - MAC               Induction: intravenous    Postoperative Plan: Postoperative administration of opioids is intended.    Pertinent diagnostic labs and testing reviewed    Informed Consent:    Anesthetic plan and risks discussed with patient.    Use of blood products discussed with: patient whom consented to blood products.

## 2024-02-21 NOTE — DISCHARGE INSTRUCTIONS
What to Expect Post Anesthesia    Rest and take it easy for the first 24 hours.  A responsible adult is recommended to remain with you during that time.  It is normal to feel sleepy.  We encourage you to not do anything that requires balance, judgment or coordination.    FOR 24 HOURS DO NOT:  Drive, operate machinery or run household appliances.  Drink beer or alcoholic beverages.  Make important decisions or sign legal documents.    To avoid nausea, slowly advance diet as tolerated, avoiding spicy or greasy foods for the first day.  Add more substantial food to your diet according to your provider's instructions.  Babies can be fed formula or breast milk as soon as they are hungry.  INCREASE FLUIDS AND FIBER TO AVOID CONSTIPATION.    MILD FLU-LIKE SYMPTOMS ARE NORMAL.  YOU MAY EXPERIENCE GENERALIZED MUSCLE ACHES, THROAT IRRITATION, HEADACHE AND/OR SOME NAUSEA.    TAMMIE - TRANSESOPHAGEAL ECHOCARDIOGRAM  1. Don't drive or drink alcohol for 24 hours. The medication you received will make you too drowsy.  2. If you begin to vomit bloody material, or develop black or bloody stools, call your doctor as soon as possible.  3. If you have any neck, chest, abdominal pain or temp of 100 degrees, call your doctor.  4. See your doctor call set up an appointment [phone]  If any questions arise, call your provider.  If your provider is not available, please feel free to call the [facility] at [phone].    MEDICATIONS: Resume taking daily medication.  Take prescribed pain medication with food.  If no medication is prescribed, you may take non-aspirin pain medication if needed.  PAIN MEDICATION CAN BE VERY CONSTIPATING.  Take a stool softener or laxative such as senokot, pericolace, or milk of magnesia if needed.        You should call [phone] if you develop problems with breathing or chest pain.  If any questions arise, call your doctor. If your doctor is not available, please feel free to call . You can also call the  HEALTH HOTLINE open 24 hours/day, 7 days/week and speak to a nurse at [phone], or toll free [phone].    I acknowledge receipt and understanding of these Home Care Instructions.       follow-up with employee health today

## 2024-02-21 NOTE — ANESTHESIA POSTPROCEDURE EVALUATION
Patient: Jorden Bonilla    Procedure Summary       Date: 02/21/24 Room / Location: [facility] - Echocardiology [facility]    Anesthesia Start: 1321 Anesthesia Stop: 1329    Procedure: EC-TAMMIE W/O CONT Diagnosis:       Ischemic stroke (HCC)      (anesthesia please)    Scheduled Providers: Michael Moss M.D.; Jeremy Lunsford M.D. Responsible Provider: Jeremy Lunsford M.D.    Anesthesia Type: MAC ASA Status: 3            Final Anesthesia Type: MAC  Last vitals  BP   Blood Pressure: 110/70    Temp   36 °C (96.8 °F)    Pulse   83   Resp   12    SpO2   97 %      Anesthesia Post Evaluation    Patient location during evaluation: PACU  Patient participation: complete - patient participated  Level of consciousness: awake and alert    Airway patency: patent  Anesthetic complications: no  Cardiovascular status: hemodynamically stable  Respiratory status: acceptable  Hydration status: euvolemic    PONV: none          No notable events documented.     Nurse Pain Score: 0 (NPRS)

## 2024-02-22 LAB — LV EJECT FRACT  99904: 60

## 2024-02-23 ENCOUNTER — PHYSICAL THERAPY (OUTPATIENT)
Dept: PHYSICAL THERAPY | Facility: REHABILITATION | Age: 59
End: 2024-02-23
Attending: OBSTETRICS & GYNECOLOGY
Payer: COMMERCIAL

## 2024-02-23 DIAGNOSIS — I63.9 CEREBRAL INFARCTION, UNSPECIFIED MECHANISM (HCC): ICD-10-CM

## 2024-02-23 PROCEDURE — 97112 NEUROMUSCULAR REEDUCATION: CPT

## 2024-02-23 PROCEDURE — 97014 ELECTRIC STIMULATION THERAPY: CPT

## 2024-02-23 NOTE — OP THERAPY DAILY TREATMENT
"  Outpatient Physical Therapy  DAILY TREATMENT     [facility] Physical [address]  [address]  [place] [address]  Phone:  [phone]  Fax:  [phone]    Date: 2024    Patient: Jorden Bonilla  YOB: 1965  MRN: [id number]     Time Calculation    Start time: 1145  Stop time: 1230 Time Calculation (min): 45 minutes         Chief Complaint: Difficulty Walking and Loss Of Balance    Visit #: 5    SUBJECTIVE:  Pt and spouse present with therapy.  Pt reports walking longer distances daily indoors and outdoors.   Following last session, had to take a nap following, denies being overly tired    OBJECTIVE:  Current objective measures:     10MWT: 16.4: .36m/s  Fast 10MWT: 13.9: .43m/s    MiniBest Test      Anticipatory  STS:2  Rise to Toes:1/0 minimal range of motion  SLS: Left 1.-3 sec  Right. 2-30sec  Subscore 4 /6    Reactive Postural Control  Compensatory Stepping Correction-Forward:1, min weight shift outside MARIO, 1 step to catch  Compensatory Stepping Correction-Backwards:1, minimal weight shift, 2-4 steps to catch  Compensatory Stepping Correction-Lateral: Left: 1 Right:1, minimal weight shift outside MARIO  Subscore 3 /6    Sensory Orientation:  Stance (feet together) Eyes open, Firm surface:2  Stance (feet together) Eyes closed, Foam Surface:1  Incline -eyes closed:2  Subscore  5/6    Dynamic Gait *all performed w/o quad cane exception of stepping over obstacle  Changes in gait speed:1, slowed speed, not sig diff with slow vs normal pace  Walk with head turns-horizontal:2  Walk with pivot turns:2  Step over obstacles:0- able to clear 9\"H surface, hit boxes 23\" H  TU.9 Tug CO.04, counting forward by 3's from 0.-0    Subscore 6 /10    17/28    Less than 21 indicates increased risk of falling   The Activities-specific Balance Confidence Scale    Walk around the house.40  Walk up or down stairs.40  Bend over and  a slipper from the front of a closet " "floor.40  Reach for a small can off a shelf at eye level.40  Stand on your tip toes and reach for something over head.30  Stand on a chair and reach for something.30  Sweep the floor.30  Walk outside the house to a car parked I the driveway.40  Get into or out of a car.40  Walk across a parking lot to the mall.40  Walk up or down a ramp.40  Walk in a crowded mall where people rapidly walk past you.40  Are bumped into by people as you walk through the mall.40  Step onto or off an escalator while you are holding on to a railing.30  Step on to ot off an escalator while holding onto parcels such that you can't use the railing.20  Walk outside on icy sidewalks.10    Total:    Scoring 550/16=  34.3%    Less than 67% indicates increased risk for falling            Therapeutic Exercises (CPT 02430):     1. SL bridging, NT    2. 2 foot, NT, easy    3. SL with R LE lifted, NT, easy    4. single knee to chest, NT    5. adductor stretch, NT    6. modified tandem, hz head tursn, NT    Therapeutic Treatments and Modalities:     1. Neuromuscular Re-education (CPT 90178), see below    2. E Stim Unattended (CPT 35358), Russain intensity: 32mA, x10' with functional STS, L ant tib and peroneals with STS able to perform once set up indep on EOM at 20\"    Therapeutic Treatment and Modalities Summary: NMR: static standing balance: reviewed inc heel toe stance toward midline w/ hz head turns x 10 ea side. Reinforced L LE posterior focus to improved L side attention/strength/integration.     Dynamic standing balance: clock taps focus on SLS, weight acceptance and weight shifting outside of MARIO.  Clockwise/counter clockwise x 12 ea direction.    Using clock yourself for reactionary balance traininSPM attempted x 2min, unable to cont safely d/t diff weight shifting/coordination.  30SPM able to perform indep with 75-80% accuracy, 3 LOB requiring UE support to catch self x5min, with aud and visual feedback    2nd attempt: 5min: 30SPM with " inc 80-90% accuracy, 1 LOB at end d/t fatigue requiring self correction with UE's.     Time-based treatments/modalities:    Physical Therapy Timed Treatment Charges  Neuromusc re-ed, balance, coor, post minutes (CPT 81652): 35 minutes    ASSESSMENT:   Response to treatment:   Demo improvement within session with L LE weight acceptance and coordination accuracy, cont to be a slower pace.  Demo good tolerance and muscle cc with Russain stim with functional movement    PLAN/RECOMMENDATIONS:   Plan for treatment: therapy treatment to continue next visit.  Planned interventions for next visit: balance: SLS/tandem, stepping over ladders continue with current treatment.

## 2024-02-28 ENCOUNTER — APPOINTMENT (OUTPATIENT)
Dept: OCCUPATIONAL THERAPY | Facility: REHABILITATION | Age: 59
End: 2024-02-28
Attending: OBSTETRICS & GYNECOLOGY
Payer: COMMERCIAL

## 2024-03-01 ENCOUNTER — PHYSICAL THERAPY (OUTPATIENT)
Dept: PHYSICAL THERAPY | Facility: REHABILITATION | Age: 59
End: 2024-03-01
Attending: OBSTETRICS & GYNECOLOGY
Payer: COMMERCIAL

## 2024-03-01 DIAGNOSIS — I63.9 CEREBRAL INFARCTION, UNSPECIFIED MECHANISM (HCC): ICD-10-CM

## 2024-03-01 PROCEDURE — 97112 NEUROMUSCULAR REEDUCATION: CPT

## 2024-03-01 PROCEDURE — 97110 THERAPEUTIC EXERCISES: CPT

## 2024-03-01 PROCEDURE — 97014 ELECTRIC STIMULATION THERAPY: CPT

## 2024-03-01 NOTE — OP THERAPY DAILY TREATMENT
"  Outpatient Physical Therapy  DAILY TREATMENT     [facility] Physical [address]  [address]  [place] [address]  Phone:  [phone]  Fax:  [phone]    Date: 2024    Patient: Jorden Bonilla  YOB: 1965  MRN: [id number]     Time Calculation    Start time: 1150  Stop time: 1230 Time Calculation (min): 40 minutes         Chief Complaint: Difficulty Walking and Loss Of Balance    Visit #: 6    SUBJECTIVE:  Pt and spouse present with therapy.  Pt reports walking indoors    OBJECTIVE:  Current objective measures:     10MWT: 16.4: .36m/s  Fast 10MWT: 13.9: .43m/s    MiniBest Test      Anticipatory  STS:2  Rise to Toes:1/0 minimal range of motion  SLS: Left 1.-3 sec  Right. 2-30sec  Subscore 4 /6    Reactive Postural Control  Compensatory Stepping Correction-Forward:1, min weight shift outside MARIO, 1 step to catch  Compensatory Stepping Correction-Backwards:1, minimal weight shift, 2-4 steps to catch  Compensatory Stepping Correction-Lateral: Left: 1 Right:1, minimal weight shift outside MARIO  Subscore 3 /6    Sensory Orientation:  Stance (feet together) Eyes open, Firm surface:2  Stance (feet together) Eyes closed, Foam Surface:1  Incline -eyes closed:2  Subscore  5/6    Dynamic Gait *all performed w/o quad cane exception of stepping over obstacle  Changes in gait speed:1, slowed speed, not sig diff with slow vs normal pace  Walk with head turns-horizontal:2  Walk with pivot turns:2  Step over obstacles:0- able to clear 9\"H surface, hit boxes 23\" H  TU.9 Tug CO.04, counting forward by 3's from 0.-0    Subscore 6 /10    17/28    Less than 21 indicates increased risk of falling   The Activities-specific Balance Confidence Scale    Walk around the house.40  Walk up or down stairs.40  Bend over and  a slipper from the front of a closet floor.40  Reach for a small can off a shelf at eye level.40  Stand on your tip toes and reach for something over " "head.30  Stand on a chair and reach for something.30  Sweep the floor.30  Walk outside the house to a car parked I the driveway.40  Get into or out of a car.40  Walk across a parking lot to the mall.40  Walk up or down a ramp.40  Walk in a crowded mall where people rapidly walk past you.40  Are bumped into by people as you walk through the mall.40  Step onto or off an escalator while you are holding on to a railing.30  Step on to ot off an escalator while holding onto parcels such that you can't use the railing.20  Walk outside on icy sidewalks.10    Total:    Scoring 550/16=  34.3%    Less than 67% indicates increased risk for falling            Therapeutic Exercises (CPT 81695):     1. SL bridging, NT    2. 2 foot, NT, easy    3. SL with R LE lifted, NT, easy    4. single knee to chest, NT    5. adductor stretch, NT    6. modified tandem, hz head tursn, NT    20. PN next visit    Therapeutic Treatments and Modalities:     1. Neuromuscular Re-education (CPT 66277), see below    2. E Stim Unattended (CPT 22399), Russain intensity: 30mA, x10' with functional STS, L ant tib and peroneals with STS able to perform once set up indep on EOM at 20\"    Therapeutic Treatment and Modalities Summary: NMR:  NMR:BWSTT on lite gait approx 10% support with harness, L UE strapped for support/decr shoulder pain  0.8MPH x 3min warm up  1.2MPH 1 deg incline x2min, 1 min walking break at 0.8MPH  1.2MPH x  1 deg incline 2min, total 543ft, 1 min walknig break at 0.8MPH  1.2MPH 1 deg incline x [id number] SBA until last 20sec, for safety modA for advancement L LE.   Total 960ft, 11min, 0.18mi;es   Cues for equal step lengths, L hip /knee flex for clearance and decr circumduction.  -SBA/Gregory intermittently large step lengths, cues to inc hip /knee flex avoid circumduction and for safety d/t fatigue and diff     Immediately followed by over ground gait training with no AD, gait belt. Vc's for L LE exaggerated hip/knee flex and equal step " lengths, int Gregory for facilitating trunk rotation and B arm swing x200',x 75'    Time-based treatments/modalities:    Physical Therapy Timed Treatment Charges  Neuromusc re-ed, balance, coor, post minutes (CPT 92646): 10 minutes  Therapeutic exercise minutes (CPT 63934): 30 minutes    ASSESSMENT:   Response to treatment:   Demo improving awareness to L toe drag, consistently presents with L Lower leg circumduction, improves within session.  New trip on L LE with fatigue, able to recover with CGA.    PLAN/RECOMMENDATIONS:   Plan for treatment: therapy treatment to continue next visit.  Planned interventions for next visit: balance: SLS/tandem, stepping over ladders continue with current treatment.

## 2024-03-06 ENCOUNTER — PHYSICAL THERAPY (OUTPATIENT)
Dept: PHYSICAL THERAPY | Facility: REHABILITATION | Age: 59
End: 2024-03-06
Attending: OBSTETRICS & GYNECOLOGY
Payer: COMMERCIAL

## 2024-03-06 DIAGNOSIS — I63.9 CEREBRAL INFARCTION, UNSPECIFIED MECHANISM (HCC): ICD-10-CM

## 2024-03-06 PROCEDURE — 97110 THERAPEUTIC EXERCISES: CPT

## 2024-03-06 PROCEDURE — 97112 NEUROMUSCULAR REEDUCATION: CPT

## 2024-03-06 NOTE — OP THERAPY PROGRESS SUMMARY
Outpatient Physical Therapy  PROGRESS SUMMARY NOTE      [facility] Therapy [place] [address]  [address]  [place] [address]  Phone:  [phone]  Fax:  [phone]    Date of Visit: 03/06/2024    Patient: Jorden Bonilla  YOB: 1965  MRN: [id number]     Referring Provider: Deepthi Colindres M.D.  [address] [place] [address]   Referring Diagnosis Cerebral infarction, unspecified [I63.9]     Visit Diagnoses     ICD-10-CM   1. Cerebral infarction, unspecified mechanism (HCC)  I63.9       Rehab Potential: good    Progress Report Period: 2/2/2024-3/6/2024    Functional Assessment Used          Objective Findings and Assessment:   Patient progression towards goals: Jorden has participated in 6 visits, including today's visit.  Met 2/2 STG's, 1/4 LTG's, partially 2/4 LTG's, demo improvement in 5xSTS by 6 sec, inc in L hip flex and knee ext strength as per obj below.  Demo impaired balance reactions, impaired vestibular function and impaired gait mechanics, pt would benefit from skilled PT to address these impairments.    Objective findings and assessment details: Today: 11.5sec no UE's    Balance/Gait Comments   2/2/24 5xSTS: 17.2sec  no UE's    Anticipatory  STS:2  Rise to Toes:1 minimal range of motion  SLS: Left 1.-1-3 sec  Right. 2-30sec  Subscore 4 /6    Reactive Postural Control  Compensatory Stepping Correction-Forward:2  Compensatory Stepping Correction-Backwards:2  2-3 steps to catch  Compensatory Stepping Correction-Lateral: Left: 2 Right:2, minimal weight shift outside MARIO  Subscore 6/6    Sensory Orientation:  Stance (feet together) Eyes open, Firm surface:2  Stance (feet together) Eyes closed, Foam Surface:1- 8 sec, post left lateral LOB  Incline -eyes closed:2  Subscore  5/6    Dynamic Gait *all performed w/o quad cane exception of stepping over obstacle  Changes in gait speed:1, slowed speed, not sig diff with slow vs normal pace  Walk with head  "turns-horizontal:1, decr head turn ROM  Walk with pivot turns:2  Step over obstacles:0- able to clear 9\"H surface, hit boxes 23\" H**didn't restest d/t fatigue  TU.3 Tug CO.58, counting forward by 3's from 0.-0    Subscore 6 /10    15/28    Lower extremity (left):     Hip flexion: 3+    Knee extension: 4    Ankle dorsiflexion: 2 (assessed in sitting with PRAFO donned)    Ankle plantar flexion: 2  Lower extremity (right):     Hip flexion: 5    Knee extension: 5    Ankle dorsiflexion: 5    Goals:   Short Term Goals:   1. Pt will be compliant with HEP-MET  2. Pt will be able to participated: miniBESt, 6MWT, 10MWT-MET        Short term goal time span:  2-4 weeks      Long Term Goals:    Long Term Goals:    1. Pt will be able to demo LE strength: >3+/5 to allow indep amb with LRAD in community-partially met, L ankle only 2/5   2. Pt will be able to demo improvement in 5xSTS by 4 sec to decr risk for falls-MET  3. Pt will be able to demo improve CV endurance to improve 6MWT >200ft-not met, continue  4. Pt will improve miniBEST >4 to decr risk for falls.-not met, continue  Long term goal time span:  6-8 weeks    Plan:   Planned therapy interventions:  Gait Training (CPT 95266), Manual Therapy (CPT 29546), Neuromuscular Re-education (CPT 74207) and Therapeutic Exercise (CPT 57406)  Frequency:  1x week  Duration in weeks:  12      Referring provider co-signature:  I have reviewed this plan of care and my co-signature certifies the need for services.     Certification Period: 2024 to 24    Physician Signature: ________________________________ Date: ______________          "

## 2024-03-06 NOTE — OP THERAPY DAILY TREATMENT
Outpatient Physical Therapy  DAILY TREATMENT     [facility] Physical [address]  [address]  [place] [address]  Phone:  [phone]  Fax:  [phone]    Date: 03/06/2024    Patient: Jorden Bonilla  YOB: 1965  MRN: [id number]     Time Calculation                   Chief Complaint: No chief complaint on file.    Visit #: 7    SUBJECTIVE:  Pt and spouse present with therapy.  Pt and spouse report, 3rd appt today, both are tired.     OBJECTIVE:  Current objective measures:     See PN    Less than 21 indicates increased risk of falling   The Activities-specific Balance Confidence Scale    Walk around the house.40  Walk up or down stairs.40  Bend over and  a slipper from the front of a closet floor.40  Reach for a small can off a shelf at eye level.40  Stand on your tip toes and reach for something over head.30  Stand on a chair and reach for something.30  Sweep the floor.30  Walk outside the house to a car parked I the driveway.40  Get into or out of a car.40  Walk across a parking lot to the mall.40  Walk up or down a ramp.40  Walk in a crowded mall where people rapidly walk past you.40  Are bumped into by people as you walk through the mall.40  Step onto or off an escalator while you are holding on to a railing.30  Step on to ot off an escalator while holding onto parcels such that you can't use the railing.20  Walk outside on icy sidewalks.10    Total:    Scoring 550/16=  34.3%    Less than 67% indicates increased risk for falling            Therapeutic Exercises (CPT 67221):     1. SL bridging, NT    2. 2 foot, NT, easy    3. SL with R LE lifted, NT, easy    4. single knee to chest, NT    5. adductor stretch, NT    6. modified tandem, hz head tursn, NT    20. PN next visit    Therapeutic Treatments and Modalities:     1. Neuromuscular Re-education (CPT 68525), see below    Therapeutic Treatment and Modalities Summary: NMR:   see miniBEST.  Discussed HEP, goals to  increase intensity of gait training with inc speed, ankle weights or difficult. Encouraged ongoing HEP daily to strengthening, balance and gait.     Time-based treatments/modalities:         ASSESSMENT:   Response to treatment:   See PN.    PLAN/RECOMMENDATIONS:   Plan for treatment: therapy treatment to continue next visit.  Planned interventions for next visit: balance: SLS/tandem, stepping over ladders continue with current treatment.

## 2024-03-08 ENCOUNTER — APPOINTMENT (OUTPATIENT)
Dept: PHYSICAL THERAPY | Facility: REHABILITATION | Age: 59
End: 2024-03-08
Attending: OBSTETRICS & GYNECOLOGY
Payer: COMMERCIAL

## 2024-03-11 ENCOUNTER — TELEPHONE (OUTPATIENT)
Dept: CARDIOLOGY | Facility: MEDICAL CENTER | Age: 59
End: 2024-03-11
Payer: COMMERCIAL

## 2024-03-11 NOTE — TELEPHONE ENCOUNTER
EOS noted in media dated today.     VR: Please see EOS in media dated today and read for final results. Thank you!

## 2024-03-13 ENCOUNTER — PHYSICAL THERAPY (OUTPATIENT)
Dept: PHYSICAL THERAPY | Facility: REHABILITATION | Age: 59
End: 2024-03-13
Attending: OBSTETRICS & GYNECOLOGY
Payer: COMMERCIAL

## 2024-03-13 DIAGNOSIS — I63.9 CEREBRAL INFARCTION, UNSPECIFIED MECHANISM (HCC): ICD-10-CM

## 2024-03-13 PROCEDURE — 97116 GAIT TRAINING THERAPY: CPT

## 2024-03-13 PROCEDURE — 97112 NEUROMUSCULAR REEDUCATION: CPT

## 2024-03-13 PROCEDURE — 97110 THERAPEUTIC EXERCISES: CPT

## 2024-03-13 NOTE — OP THERAPY DAILY TREATMENT
Outpatient Physical Therapy  DAILY TREATMENT     [facility] Physical [address]  [address]  [place] [address]  Phone:  [phone]  Fax:  [phone]    Date: 03/13/2024    Patient: Jorden Bonilla  YOB: 1965  MRN: [id number]     Time Calculation    Start time: 1500  Stop time: 1545 Time Calculation (min): 45 minutes         Chief Complaint: Difficulty Walking and Loss Of Balance    Visit #: 8    SUBJECTIVE:  Pt and spouse present with therapy, reports being sore after last TM session, required use of w/c d/t LE soreness, pt encouraged by fatigued had no issues after the initial soreness of day wore off.     OBJECTIVE:  Current objective measures:   Less than 21 indicates increased risk of falling   The Activities-specific Balance Confidence Scale    Walk around the house.40  Walk up or down stairs.40  Bend over and  a slipper from the front of a closet floor.40  Reach for a small can off a shelf at eye level.40  Stand on your tip toes and reach for something over head.30  Stand on a chair and reach for something.30  Sweep the floor.30  Walk outside the house to a car parked I the driveway.40  Get into or out of a car.40  Walk across a parking lot to the mall.40  Walk up or down a ramp.40  Walk in a crowded mall where people rapidly walk past you.40  Are bumped into by people as you walk through the mall.40  Step onto or off an escalator while you are holding on to a railing.30  Step on to ot off an escalator while holding onto parcels such that you can't use the railing.20  Walk outside on icy sidewalks.10    Total:    Scoring 550/16=  34.3%    Less than 67% indicates increased risk for falling            Therapeutic Exercises (CPT 74374):     1. SL bridging, NT    2. 2 foot, NT, easy    3. SL with R LE lifted, NT, easy    4. single knee to chest, NT    5. adductor stretch, NT    6. modified tandem, hz head tursn, NT    20. PN next visit    Therapeutic Treatments  "and Modalities:     1. Neuromuscular Re-education (CPT 47752), see below    Therapeutic Treatment and Modalities Summary: NMR:    -training with 4\" ladders: focus on inc L LE clearance with swing phase.   10'x4 fwd & lat ea direction  SBA , cues for initial 2 reps forward decr circumduction/compensation    Standing lunges at table for support and balance: to improve HS strength and stability with stance phase  R LE posterior: cues to heel/ext R HS/gluts x 5 reps max cues    L LE posterior cues at heel/R HS activation x6 reps with R UE support on mat table, slower activation of HS noted with initial ext R LE vs L LS.   Fatigued quickly, L LE fasciluating d/t fatigue  *given for HEP    Gait training : following NMR, edu on use of skills integrated into gait training.  Reinforced stepping with hip/knee flex and equal step lengths for forced weight acceptance L LE.  Amb no AD x 200' SBA mod cues for integration demo ability to carryover >50% of time.     Time-based treatments/modalities:    Physical Therapy Timed Treatment Charges  Gait training minutes (CPT 57316): 15 minutes  Neuromusc re-ed, balance, coor, post minutes (CPT 43633): 15 minutes  Therapeutic exercise minutes (CPT 34273): 15 minutes    ASSESSMENT:   Response to treatment:   Demo improved L LE dissociation with gait training following NMR of HS activation and strengthening.      PLAN/RECOMMENDATIONS:   Plan for treatment: therapy treatment to continue next visit.  Planned interventions for next visit: continue with current treatment.         "

## 2024-03-15 ENCOUNTER — APPOINTMENT (OUTPATIENT)
Dept: PHYSICAL THERAPY | Facility: REHABILITATION | Age: 59
End: 2024-03-15
Attending: OBSTETRICS & GYNECOLOGY
Payer: COMMERCIAL

## 2024-03-15 ENCOUNTER — TELEPHONE (OUTPATIENT)
Dept: OCCUPATIONAL THERAPY | Facility: REHABILITATION | Age: 59
End: 2024-03-15
Payer: COMMERCIAL

## 2024-03-15 NOTE — OP THERAPY DISCHARGE SUMMARY
Outpatient Occupational Therapy  DISCHARGE SUMMARY NOTE    [facility] Occupational [facility]  [address]  [address]  [facility] [address]  Phone:  [phone]  Fax:  [phone]    Date of Visit: 03/15/2024    Patient: Jorden Bonilla  YOB: 1965  MRN: [id number]     Referring Provider:   Deepthi Colindres M.D.  [address] [facility] [place] [address]      Referring Diagnosis:   Cerebral infarction, unspecified [I63.9]                Your patient is being discharged from Occupational Therapy with the following comments:   Patient cancelled or missed more than 2 scheduled appointments/non-compliant  Patient has failed to schedule or reschedule follow-up visits    Comments:  Patient seen for initial evaluation on 1/5/24 and 4 follow up visits until 2/14/24.  OTR and PAR staff have reached out several times to have patient re-schedule and have not; therefore patient has been discharged from OT service.   Transportation and out of pocket costs were a primary issue.     Limitations Remaining:  Non-functional left UE  Increased shoulder pain on extension and flexion     Recommendations:  Continue with GIACOMO Lakhani OTR/PATRICIO    Date: 3/15/2024

## 2024-03-22 ENCOUNTER — APPOINTMENT (OUTPATIENT)
Dept: PHYSICAL THERAPY | Facility: REHABILITATION | Age: 59
End: 2024-03-22
Attending: OBSTETRICS & GYNECOLOGY
Payer: COMMERCIAL

## 2024-04-05 ENCOUNTER — PHYSICAL THERAPY (OUTPATIENT)
Dept: PHYSICAL THERAPY | Facility: REHABILITATION | Age: 59
End: 2024-04-05
Attending: OBSTETRICS & GYNECOLOGY
Payer: COMMERCIAL

## 2024-04-05 DIAGNOSIS — I63.9 CEREBRAL INFARCTION, UNSPECIFIED MECHANISM (HCC): ICD-10-CM

## 2024-04-05 PROCEDURE — 97110 THERAPEUTIC EXERCISES: CPT

## 2024-04-05 PROCEDURE — 97112 NEUROMUSCULAR REEDUCATION: CPT

## 2024-04-05 NOTE — OP THERAPY DAILY TREATMENT
Outpatient Physical Therapy  DAILY TREATMENT     [facility] Physical Therapy [address]  [address]  [place] [address]  Phone:  [phone]  Fax:  [phone]    Date: 04/05/2024    Patient: Jorden Bonilla  YOB: 1965  MRN: [id number]     Time Calculation    Start time: 0855  Stop time: 0935 Time Calculation (min): 40 minutes         Chief Complaint: Difficulty Walking and Loss Of Balance    Visit #: 9    SUBJECTIVE:  Jorden reports compliance with HEP: on HEP on floor, walking with SPC indep. Near fall getting into the car d/t L LE spasming with increased cold weather.      OBJECTIVE:  Current objective measures:       Objective Findings and Assessment:   Patient progression towards goals: Jorden has participated in 6 visits, including today's visit.  Met 2/2 STG's, 1/4 LTG's, partially 2/4 LTG's, demo improvement in 5xSTS by 6 sec, inc in L hip flex and knee ext strength as per obj below.  Demo impaired balance reactions, impaired vestibular function and impaired gait mechanics, pt would benefit from skilled PT to address these impairments.     Jorden reports compliance with HEP: on HEP on floor, walking with SPC indep. Near fall getting into the car d/t L LE spasming with increased cold weather.       Objective findings and assessment details: Today:   13.03sec no UE's  13.97 no UE's, inc ant weight     3/4/24: 11.5sec no UE's     Anticipatory  STS:2  Rise to Toes:1 minimal range of motion  SLS: Left 1.-2-3 sec  Right. 1-12sec  Subscore 4 /6     Reactive Postural Control  Compensatory Stepping Correction-Forward:2  Compensatory Stepping Correction-Backwards:2  2-3 steps to catch  Compensatory Stepping Correction-Lateral: Left: 2 Right:2, minimal weight shift outside MARIO  Subscore 6/6     Sensory Orientation:  Stance (feet together) Eyes open, Firm surface:2  Stance (feet together) Eyes closed, Foam Surface:1-4-6sec, post left lateral LOB  Incline -eyes closed:2  Subscore   "5/6     Dynamic Gait *all performed w/o quad cane exception of stepping over obstacle  Changes in gait speed:1, slowed speed, not sig diff with slow vs normal pace  Walk with head turns-horizontal:1, decr head turn ROM  Walk with pivot turns:2  Step over obstacles:1- able to clear 23\" H  TU.8 Tug CO.3, counting forward by 3's from 0.-1     Subscore 6 /10     /         Therapeutic Exercises (CPT 71367):     1. SL bridging, NT    2. 2 foot, NT, easy    3. SL with R LE lifted, NT, easy    4. single knee to chest, NT    5. adductor stretch, NT    6. modified tandem, hz head tursn, NT    7. step ups, x15 L LE only, no UE support-HEP    8. lateral step ups, x10 L LE only, int buckling/d/t genu recurvatum-HEP    9. Nu step, BUE's and BLE's, L1 x 8min, L UE hand , pain in shoulder afterward. discussed stretching program, denies issues other than nu step, reinforced daily stretching/modifying when on Nu step as needed.    20. PN next visit    Therapeutic Treatments and Modalities:     1. Neuromuscular Re-education (CPT 65733), see below    Therapeutic Treatment and Modalities Summary: NMR: Edu on progress with miniBEST, scoring 21/28, right at cutoff for fall risk, technically falling outside of being a fall risk.  Edu pt and spouse on not utilizing and cane indoors, while walking in familiar, uncluttered and not distracted environment.  Reinforced neuroplasticity in that as he relies on SPC decreasing reliance and use of L side.    Time-based treatments/modalities:    Physical Therapy Timed Treatment Charges  Neuromusc re-ed, balance, coor, post minutes (CPT 86208): 30 minutes  Therapeutic exercise minutes (CPT 55931): 10 minutes    ASSESSMENT:   Response to treatment:   See PN    PLAN/RECOMMENDATIONS:   Plan for treatment: therapy treatment to continue next visit.  Planned interventions for next visit: continue with current treatment.         "

## 2024-04-05 NOTE — OP THERAPY PROGRESS SUMMARY
Outpatient Physical Therapy  PROGRESS SUMMARY NOTE      [facility] Physical Therapy [place] [address]  [address]  [place] [address]  Phone:  [phone]  Fax:  [phone]    Date of Visit: 04/05/2024    Patient: Jorden Bonilla  YOB: 1965  MRN: [id number]     Referring Provider: Deepthi Colindres M.D.  [address] [place] [address]   Referring Diagnosis Cerebral infarction, unspecified [I63.9]     Visit Diagnoses     ICD-10-CM   1. Cerebral infarction, unspecified mechanism (HCC)  I63.9         Rehab Potential: good    Progress Report Period:3/6/2024-4/5/2024    Functional Assessment Used          Objective Findings and Assessment:   Patient progression towards goals: Jorden has participated in 2 visits, including today's visit since last PN.  Met 2/2 STG's, 0/4 LTG's, partially 1/4 LTG's. Demo improvement in miniBEST by 6, MCID is 4.  Demo decrease in 5xSTS by 2.3 sec demo increased spacticity d/t cold weather today and limited time in therapy, may have contributed to worsening function.  Demo impaired balance reactions, impaired vestibular function and impaired gait mechanics, pt would benefit from skilled PT to address these impairments.        Objective findings and assessment details: Today:   13.03sec no UE's  13.97 no UE's, inc ant weight   3/4/24: 11.5sec no UE's    Anticipatory  STS:2  Rise to Toes:1 minimal range of motion  SLS: Left 1.-2-3 sec  Right. 1-12sec  Subscore 4 /6    Reactive Postural Control  Compensatory Stepping Correction-Forward:2  Compensatory Stepping Correction-Backwards:2  2-3 steps to catch  Compensatory Stepping Correction-Lateral: Left: 2 Right:2, minimal weight shift outside MARIO  Subscore 6/6    Sensory Orientation:  Stance (feet together) Eyes open, Firm surface:2  Stance (feet together) Eyes closed, Foam Surface:1-4-6sec, post left lateral LOB  Incline -eyes closed:2  Subscore  5/6    Dynamic Gait *all performed w/o quad cane exception of  "stepping over obstacle  Changes in gait speed:1, slowed speed, not sig diff with slow vs normal pace  Walk with head turns-horizontal:1, decr head turn ROM  Walk with pivot turns:2  Step over obstacles:1- able to clear 23\" H  TU.8 Tug CO.3, counting forward by 3's from 0.-1    Subscore 10          Goals:   Short Term Goals:   1. Pt will be compliant with HEP-MET  2. Pt will be able to participated: miniBESt, 6MWT, 10MWT-MET  NEW GOAL:   Pt will demo improvement in TUG by 2.9sec to decr risk for falls        Short term goal time span:  2-4 weeks      Long Term Goals:    Long Term Goals:    1. Pt will be able to demo LE strength: >3+/5 to allow indep amb with LRAD in community-partially met, L ankle only 2/5   2. Pt will be able to demo improvement in 5xSTS by 4 sec to decr risk for falls-Not met, cont  3. Pt will be able to demo improve CV endurance to improve 6MWT >200ft-not met, continue  4. Pt will improve miniBEST >4 to decr risk for falls.-not met, continue  Long term goal time span:  6-8 weeks    Plan:   Planned therapy interventions:  Gait Training (CPT 83270), Manual Therapy (CPT 05194), Neuromuscular Re-education (CPT 49330) and Therapeutic Exercise (CPT 49139)  Frequency:  1x week  Duration in weeks:  12      Referring provider co-signature:  I have reviewed this plan of care and my co-signature certifies the need for services.     Certification Period: 2024 to 24    Physician Signature: ________________________________ Date: ______________          "

## 2024-04-09 ENCOUNTER — TELEPHONE (OUTPATIENT)
Dept: NEUROLOGY | Facility: MEDICAL CENTER | Age: 59
End: 2024-04-09
Payer: COMMERCIAL

## 2024-04-09 NOTE — TELEPHONE ENCOUNTER
NEUROLOGY PATIENT PRE-VISIT PLANNING     Patient was NOT contacted to complete PVP.  Note: Patient will not be contacted if there is no indication to call.     Patient Appointment is scheduled as: Established Patient     Is visit type and length scheduled correctly? Yes    [employer] Patient is checked in Patient Demographics? Yes    3.   Is referral attached to visit? Yes    4. Were records received from referring provider? Yes    4. Patient was NOT contacted to have someone accompany them to visit.     5. Is this appointment scheduled as a Hospital Follow-Up?  No    6. Does the patient require any pre procedure or post procedure follow up? No    7. If any orders were placed at last visit or intended to be done for this visit do we have Results/Consult Notes? Yes  Labs - Labs were not ordered at last office visit.  Imaging - Imaging was not ordered at last office visit.  Referrals - Referral ordered, patient was seen and consult notes are in chart. Care Teams updated  YES.  Note: If patient appointment is for lab or imaging review and patient did not complete the studies, check with provider if OK to reschedule patient until completed.    8. If patient appointment is for Botox - is order pended for provider? N/A    9. Was Plan Assessment from last Neurology Office Visit Reviewed?  Yes

## 2024-04-17 ENCOUNTER — APPOINTMENT (OUTPATIENT)
Dept: NEUROLOGY | Facility: MEDICAL CENTER | Age: 59
End: 2024-04-17
Attending: SPECIALIST
Payer: COMMERCIAL

## 2024-04-19 ENCOUNTER — PHYSICAL THERAPY (OUTPATIENT)
Dept: PHYSICAL THERAPY | Facility: REHABILITATION | Age: 59
End: 2024-04-19
Attending: OBSTETRICS & GYNECOLOGY
Payer: COMMERCIAL

## 2024-04-19 DIAGNOSIS — I63.9 CEREBRAL INFARCTION, UNSPECIFIED MECHANISM (HCC): ICD-10-CM

## 2024-04-19 PROCEDURE — 97110 THERAPEUTIC EXERCISES: CPT

## 2024-04-19 PROCEDURE — 97112 NEUROMUSCULAR REEDUCATION: CPT

## 2024-04-19 PROCEDURE — 97014 ELECTRIC STIMULATION THERAPY: CPT

## 2024-04-19 NOTE — OP THERAPY DAILY TREATMENT
"  Outpatient Physical Therapy  DAILY TREATMENT     [facility] Physical [address]  [address]  [place] [address]  Phone:  [phone]  Fax:  [phone]    Date: 2024    Patient: Jorden Bonilla  YOB: 1965  MRN: [id number]     Time Calculation    Start time: 0845  Stop time: 0945 Time Calculation (min): 60 minutes         Chief Complaint: Difficulty Walking and Loss Of Balance    Visit #: 10    SUBJECTIVE:  Pt denies concerns or questions.  Cont to work on HEP and walking at home, walking unaided or SPC.  Pt presents with SBQC.     OBJECTIVE:  Current objective measures:    Objective findings and assessment details: Today:   13.03sec no UE's  13.97 no UE's, inc ant weight     3/4/24: 11.5sec no UE's     Anticipatory  STS:2  Rise to Toes:1 minimal range of motion  SLS: Left 1.-2-3 sec  Right. 1-12sec  Subscore 4 /6     Reactive Postural Control  Compensatory Stepping Correction-Forward:2  Compensatory Stepping Correction-Backwards:2  2-3 steps to catch  Compensatory Stepping Correction-Lateral: Left: 2 Right:2, minimal weight shift outside MARIO  Subscore 6/6     Sensory Orientation:  Stance (feet together) Eyes open, Firm surface:2  Stance (feet together) Eyes closed, Foam Surface:1-4-6sec, post left lateral LOB  Incline -eyes closed:2  Subscore  5/6     Dynamic Gait *all performed w/o quad cane exception of stepping over obstacle  Changes in gait speed:1, slowed speed, not sig diff with slow vs normal pace  Walk with head turns-horizontal:1, decr head turn ROM  Walk with pivot turns:2  Step over obstacles:1- able to clear 23\" H  TU.8 Tug CO.3, counting forward by 3's from 0.-1     Subscore 6 /10     /         Therapeutic Exercises (CPT 15672):     1. SL bridging, NT    2. 2 foot, NT, easy    3. SL with R LE lifted, NT, easy    4. single knee to chest, NT    5. adductor stretch, NT    6. modified tandem, hz head tursn, NT    7. step ups, x15 L LE only, no " "UE support-HEP    8. lateral step ups, x10 L LE only, int buckling/d/t genu recurvatum-HEP    9. Nu step, BUE's and BLE's, L1 x 8min, L UE hand , pain in shoulder afterward. discussed stretching program, denies issues other than nu step, reinforced daily stretching/modifying when on Nu step as needed.    10. Alt taps on 6\", x10', pink tband used facilitate L sided core activaiton    20. PN next visit      Therapeutic Exercise Summary: Seated reviewed L shoulder stretching/HEP d/t near fall from quadruped, onto L shoulder and notable bracing L shoulder.  ER: at 0: 30deg, ER at 45 dedeg, both painful end range, TrP rel L UT    Therapeutic Treatments and Modalities:     1. Neuromuscular Re-education (CPT 96367), see below    2. E Stim Unattended (CPT 24625), Russain 5/5/ L ant tib/peroneals x15' with STS, vc's for ant tibial translation    Therapeutic Treatment and Modalities Summary: NMR:   Gait waiting room>gym x100' no AD, bradykinetic step to with decr L toe clearance d/t impaired knee flex and int circumduction to compensate.     Time-based treatments/modalities:    Physical Therapy Timed Treatment Charges  Neuromusc re-ed, balance, coor, post minutes (CPT 41776): 30 minutes  Therapeutic exercise minutes (CPT 64337): 15 minutes    ASSESSMENT:   Response to treatment:   VC's for decreasing compensatory L LE movement with swing phase, attention to forced high step to clear. Reviewed importance of >2x/day stretching L shoulder to maintain flexibility.    PLAN/RECOMMENDATIONS:   Plan for treatment: therapy treatment to continue next visit.  Planned interventions for next visit: continue with current treatment.         "

## 2024-05-03 ENCOUNTER — APPOINTMENT (OUTPATIENT)
Dept: PHYSICAL THERAPY | Facility: REHABILITATION | Age: 59
End: 2024-05-03
Payer: MEDICAID

## 2024-05-10 ENCOUNTER — APPOINTMENT (OUTPATIENT)
Dept: PHYSICAL THERAPY | Facility: REHABILITATION | Age: 59
End: 2024-05-10
Attending: OBSTETRICS & GYNECOLOGY
Payer: MEDICAID

## 2024-05-31 ENCOUNTER — APPOINTMENT (OUTPATIENT)
Dept: PHYSICAL THERAPY | Facility: REHABILITATION | Age: 59
End: 2024-05-31
Attending: OBSTETRICS & GYNECOLOGY
Payer: MEDICAID

## 2024-07-25 ENCOUNTER — APPOINTMENT (OUTPATIENT)
Dept: CARDIOLOGY | Facility: MEDICAL CENTER | Age: 59
End: 2024-07-25
Attending: INTERNAL MEDICINE
Payer: MEDICAID

## 2025-08-18 DIAGNOSIS — I25.84 CORONARY ARTERY DISEASE DUE TO CALCIFIED CORONARY LESION: ICD-10-CM

## 2025-08-18 DIAGNOSIS — I25.10 CORONARY ARTERY DISEASE DUE TO CALCIFIED CORONARY LESION: ICD-10-CM
